# Patient Record
Sex: FEMALE | Race: WHITE | Employment: OTHER | URBAN - METROPOLITAN AREA
[De-identification: names, ages, dates, MRNs, and addresses within clinical notes are randomized per-mention and may not be internally consistent; named-entity substitution may affect disease eponyms.]

---

## 2017-05-19 PROBLEM — F32.A DEPRESSION: Status: ACTIVE | Noted: 2017-05-19

## 2017-05-19 PROBLEM — M17.0 PRIMARY OSTEOARTHRITIS OF BOTH KNEES: Status: ACTIVE | Noted: 2017-05-19

## 2017-05-19 PROBLEM — E03.9 ACQUIRED HYPOTHYROIDISM: Status: ACTIVE | Noted: 2017-05-19

## 2017-05-19 PROBLEM — K64.9 HEMORRHOIDS: Status: ACTIVE | Noted: 2017-05-19

## 2017-05-19 PROBLEM — I10 ESSENTIAL HYPERTENSION: Status: ACTIVE | Noted: 2017-05-19

## 2017-05-19 PROBLEM — G47.9 SLEEP DISTURBANCE: Status: ACTIVE | Noted: 2017-05-19

## 2017-12-01 PROBLEM — E78.49 OTHER HYPERLIPIDEMIA: Status: ACTIVE | Noted: 2017-12-01

## 2018-02-02 ENCOUNTER — HOSPITAL ENCOUNTER (OUTPATIENT)
Dept: MAMMOGRAPHY | Age: 73
Discharge: HOME | End: 2018-02-02
Attending: INTERNAL MEDICINE
Payer: MEDICARE

## 2018-02-02 DIAGNOSIS — Z12.31 VISIT FOR SCREENING MAMMOGRAM: ICD-10-CM

## 2018-02-02 PROCEDURE — 77067 SCR MAMMO BI INCL CAD: CPT

## 2018-06-22 PROBLEM — E55.9 VITAMIN D DEFICIENCY: Status: ACTIVE | Noted: 2018-06-22

## 2018-10-12 PROBLEM — Z00.00 MEDICARE ANNUAL WELLNESS VISIT, SUBSEQUENT: Status: ACTIVE | Noted: 2018-10-12

## 2018-10-12 PROBLEM — Z78.0 MENOPAUSE: Status: ACTIVE | Noted: 2018-10-12

## 2018-10-12 PROBLEM — N39.41 URGE INCONTINENCE OF URINE: Status: ACTIVE | Noted: 2018-10-12

## 2019-02-08 ENCOUNTER — HOSPITAL ENCOUNTER (OUTPATIENT)
Dept: MAMMOGRAPHY | Age: 74
Discharge: HOME | End: 2019-02-08
Attending: INTERNAL MEDICINE
Payer: MEDICARE

## 2019-02-08 DIAGNOSIS — Z12.31 VISIT FOR SCREENING MAMMOGRAM: ICD-10-CM

## 2019-02-08 DIAGNOSIS — Z78.0 MENOPAUSE: ICD-10-CM

## 2019-02-08 PROCEDURE — 77080 DXA BONE DENSITY AXIAL: CPT

## 2019-02-08 PROCEDURE — 77067 SCR MAMMO BI INCL CAD: CPT

## 2020-01-08 ENCOUNTER — HOSPITAL ENCOUNTER (OUTPATIENT)
Age: 75
Setting detail: OUTPATIENT SURGERY
End: 2020-01-08
Attending: ORTHOPAEDIC SURGERY | Admitting: ORTHOPAEDIC SURGERY

## 2020-02-11 ENCOUNTER — HOSPITAL ENCOUNTER (OUTPATIENT)
Dept: MAMMOGRAPHY | Age: 75
Discharge: HOME | End: 2020-02-11
Attending: INTERNAL MEDICINE
Payer: MEDICARE

## 2020-02-11 DIAGNOSIS — Z12.31 VISIT FOR SCREENING MAMMOGRAM: ICD-10-CM

## 2020-02-11 PROCEDURE — 77067 SCR MAMMO BI INCL CAD: CPT

## 2020-03-10 ENCOUNTER — HOSPITAL ENCOUNTER (OUTPATIENT)
Dept: SURGERY | Age: 75
Discharge: HOME | End: 2020-03-10
Payer: MEDICARE

## 2020-03-10 ENCOUNTER — HOSPITAL ENCOUNTER (OUTPATIENT)
Dept: PHYSICAL THERAPY | Age: 75
Discharge: HOME | End: 2020-03-10
Payer: MEDICARE

## 2020-03-10 ENCOUNTER — HOME HEALTH ADMISSION (OUTPATIENT)
Dept: HOME HEALTH SERVICES | Facility: HOME HEALTH | Age: 75
End: 2020-03-10
Payer: MEDICARE

## 2020-03-10 VITALS
RESPIRATION RATE: 12 BRPM | BODY MASS INDEX: 39.73 KG/M2 | HEART RATE: 70 BPM | OXYGEN SATURATION: 99 % | DIASTOLIC BLOOD PRESSURE: 75 MMHG | HEIGHT: 63 IN | WEIGHT: 224.25 LBS | SYSTOLIC BLOOD PRESSURE: 152 MMHG | TEMPERATURE: 97.1 F

## 2020-03-10 LAB
ANION GAP SERPL CALC-SCNC: 5 MMOL/L (ref 7–16)
APTT PPP: 36.5 SEC (ref 24.3–35.4)
ATRIAL RATE: 67 BPM
BACTERIA SPEC CULT: NORMAL
BASOPHILS # BLD: 0.1 K/UL (ref 0–0.2)
BASOPHILS NFR BLD: 1 % (ref 0–2)
BUN SERPL-MCNC: 14 MG/DL (ref 8–23)
CALCIUM SERPL-MCNC: 8.9 MG/DL (ref 8.3–10.4)
CALCULATED P AXIS, ECG09: 44 DEGREES
CALCULATED R AXIS, ECG10: 46 DEGREES
CALCULATED T AXIS, ECG11: 30 DEGREES
CHLORIDE SERPL-SCNC: 103 MMOL/L (ref 98–107)
CO2 SERPL-SCNC: 29 MMOL/L (ref 21–32)
CREAT SERPL-MCNC: 0.94 MG/DL (ref 0.6–1)
DIAGNOSIS, 93000: NORMAL
DIFFERENTIAL METHOD BLD: NORMAL
EOSINOPHIL # BLD: 0.1 K/UL (ref 0–0.8)
EOSINOPHIL NFR BLD: 2 % (ref 0.5–7.8)
ERYTHROCYTE [DISTWIDTH] IN BLOOD BY AUTOMATED COUNT: 13.2 % (ref 11.9–14.6)
EST. AVERAGE GLUCOSE BLD GHB EST-MCNC: 114 MG/DL
GLUCOSE SERPL-MCNC: 93 MG/DL (ref 65–100)
HBA1C MFR BLD: 5.6 %
HCT VFR BLD AUTO: 41 % (ref 35.8–46.3)
HGB BLD-MCNC: 13.3 G/DL (ref 11.7–15.4)
IMM GRANULOCYTES # BLD AUTO: 0 K/UL (ref 0–0.5)
IMM GRANULOCYTES NFR BLD AUTO: 0 % (ref 0–5)
INR PPP: 1
LYMPHOCYTES # BLD: 1.3 K/UL (ref 0.5–4.6)
LYMPHOCYTES NFR BLD: 19 % (ref 13–44)
MCH RBC QN AUTO: 29 PG (ref 26.1–32.9)
MCHC RBC AUTO-ENTMCNC: 32.4 G/DL (ref 31.4–35)
MCV RBC AUTO: 89.3 FL (ref 79.6–97.8)
MONOCYTES # BLD: 0.7 K/UL (ref 0.1–1.3)
MONOCYTES NFR BLD: 10 % (ref 4–12)
NEUTS SEG # BLD: 4.7 K/UL (ref 1.7–8.2)
NEUTS SEG NFR BLD: 68 % (ref 43–78)
NRBC # BLD: 0 K/UL (ref 0–0.2)
P-R INTERVAL, ECG05: 158 MS
PLATELET # BLD AUTO: 175 K/UL (ref 150–450)
PMV BLD AUTO: 11.1 FL (ref 9.4–12.3)
POTASSIUM SERPL-SCNC: 4.3 MMOL/L (ref 3.5–5.1)
PROTHROMBIN TIME: 13.3 SEC (ref 12–14.7)
Q-T INTERVAL, ECG07: 390 MS
QRS DURATION, ECG06: 88 MS
QTC CALCULATION (BEZET), ECG08: 412 MS
RBC # BLD AUTO: 4.59 M/UL (ref 4.05–5.2)
SERVICE CMNT-IMP: NORMAL
SODIUM SERPL-SCNC: 137 MMOL/L (ref 136–145)
VENTRICULAR RATE, ECG03: 67 BPM
WBC # BLD AUTO: 6.9 K/UL (ref 4.3–11.1)

## 2020-03-10 PROCEDURE — 87641 MR-STAPH DNA AMP PROBE: CPT

## 2020-03-10 PROCEDURE — 85025 COMPLETE CBC W/AUTO DIFF WBC: CPT

## 2020-03-10 PROCEDURE — 36415 COLL VENOUS BLD VENIPUNCTURE: CPT

## 2020-03-10 PROCEDURE — 85610 PROTHROMBIN TIME: CPT

## 2020-03-10 PROCEDURE — 77030027138 HC INCENT SPIROMETER -A

## 2020-03-10 PROCEDURE — 80048 BASIC METABOLIC PNL TOTAL CA: CPT

## 2020-03-10 PROCEDURE — 93005 ELECTROCARDIOGRAM TRACING: CPT | Performed by: ANESTHESIOLOGY

## 2020-03-10 PROCEDURE — 83036 HEMOGLOBIN GLYCOSYLATED A1C: CPT

## 2020-03-10 PROCEDURE — 97161 PT EVAL LOW COMPLEX 20 MIN: CPT

## 2020-03-10 PROCEDURE — 85730 THROMBOPLASTIN TIME PARTIAL: CPT

## 2020-03-10 RX ORDER — ACETAMINOPHEN 325 MG/1
650 TABLET ORAL
COMMUNITY
End: 2020-12-21

## 2020-03-10 RX ORDER — LEVOTHYROXINE SODIUM 88 UG/1
88 TABLET ORAL
COMMUNITY
End: 2020-08-13

## 2020-03-10 RX ORDER — LOSARTAN POTASSIUM 100 MG/1
100 TABLET ORAL
COMMUNITY
End: 2021-03-22 | Stop reason: SDUPTHER

## 2020-03-10 NOTE — PERIOP NOTES
PLEASE CONTINUE TAKING ALL PRESCRIPTION MEDICATIONS UP TO THE DAY OF SURGERY UNLESS OTHERWISE DIRECTED BELOW. DISCONTINUE all vitamins and supplements 21 days prior to surgery. DISCONTINUE Non-Steriodal Anti-Inflammatory (NSAIDS) such as Advil and Aleve 5 days prior to surgery. Home Medications to take  the day of surgery   Levothyroxine           Home Medications   to Hold           Comments   Day before surgery: Take Acetaminophen 1000mg in the morning and at bedtime OR Acetaminophen 650mg in the morning, afternoon and bedtime             Please do not bring home medications with you on the day of surgery unless otherwise directed by your nurse. If you are instructed to bring home medications, please give them to your nurse as they will be administered by the nursing staff. If you have any questions, please call [facility] [phone] or [address] [place] [address] [phone]. A copy of this note was provided to the patient for reference.

## 2020-03-10 NOTE — ADVANCED PRACTICE NURSE
Total Joint Surgery Preoperative Chart Review      Patient ID:  Lenny Joseph  [id number]  25 y.o.  1945  Surgeon: Dr. Edy Lock  Date of Surgery: 3/31/2020  Procedure: Total Right Knee Arthroplasty  Primary Care Physician: Ilana Mendoza [phone]  Specialty Physician(s):      Subjective:   Lenny Joseph is a 69 y.o. WHITE OR  female who presents for preoperative evaluation for Total Right Knee arthroplasty. This is a preoperative chart review note based on data collected by the nurse at the surgical Pre-Assessment visit. Past Medical History:   Diagnosis Date    Anxiety     Chronic pain     fibromyalgia    Depression 2017    Essential hypertension 2017    Hypothyroid     Insomnia     Nausea & vomiting     hx of    Obesity     bmi- 39.7    Osteoarthritis 12/15/2015    Stress incontinence, female       Past Surgical History:   Procedure Laterality Date    HX BREAST BIOPSY Bilateral     HX  SECTION      HX COLONOSCOPY      normal; internal hemorrhoid    HX HYSTERECTOMY      HX KNEE REPLACEMENT Left 12/15/2015     Family History   Problem Relation Age of Onset    Heart Disease Father     Stroke Father     Breast Cancer Sister       Social History     Tobacco Use    Smoking status: Never Smoker    Smokeless tobacco: Never Used   Substance Use Topics    Alcohol use: No       Prior to Admission medications    Medication Sig Start Date End Date Taking? Authorizing Provider   levothyroxine (SYNTHROID) 88 mcg tablet Take 88 mcg by mouth Daily (before breakfast). Yes Provider, Historical   losartan (COZAAR) 50 mg tablet Take 50 mg by mouth daily. Yes Provider, Historical   acetaminophen (TYLENOL) 325 mg tablet Take  by mouth as needed for Pain. Yes Provider, Historical   zolpidem (AMBIEN) 10 mg tablet Take 1 Tab by mouth nightly as needed for Sleep. Max Daily Amount: 10 mg.   Patient taking differently: Take 5 mg by mouth nightly. 3/19/19  Yes Jodi Lynch MD   cholecalciferol, VITAMIN D3, (VITAMIN D3) 5,000 unit tab tablet Take 5,000 Units by mouth daily (after lunch). Yes Provider, Historical   FLUoxetine (PROZAC) 20 mg capsule Take 1 Cap by mouth daily. 10/12/18   Jodi Lynch MD   lisinopril (PRINIVIL, ZESTRIL) 10 mg tablet Take 1 Tab by mouth daily. 10/12/18   Jodi Lynch MD   levothyroxine (SYNTHROID) 100 mcg tablet Take 1 Tab by mouth Daily (before breakfast). Indications: hypothyroidism 10/12/18   Jodi Lynch MD   dibucaine (NUPERCAINAL) 1 % ointment Apply  to affected area three (3) times daily.  12/1/17   Jodi Lynch MD     Allergies   Allergen Reactions    Daypro [Oxaprozin] Swelling    Meloxicam Unknown (comments)          Objective:     Physical Exam:   Patient Vitals for the past 24 hrs:   Temp Pulse Resp BP SpO2   03/10/20 1202 97.1 °F (36.2 °C) 70 12 152/75 99 %       ECG:    EKG Results     Procedure 720 Value Units Date/Time    EKG, 12 LEAD, INITIAL [[id number]] Collected:  03/10/20 1128    Order Status:  Completed Updated:  03/10/20 1336     Ventricular Rate 67 BPM      Atrial Rate 67 BPM      P-R Interval 158 ms      QRS Duration 88 ms      Q-T Interval 390 ms      QTC Calculation (Bezet) 412 ms      Calculated P Axis 44 degrees      Calculated R Axis 46 degrees      Calculated T Axis 30 degrees      Diagnosis --     Normal sinus rhythm  Normal ECG  Cannot rule out Inferior infarct , age undetermined  When compared with ECG of 23-NOV-2015 10:44,  No significant change was found  Confirmed by Yamilet Allen ([id number]) on 3/10/2020 1:36:00 PM            Data Review:   Labs:   Recent Results (from the past 24 hour(s))   CBC WITH AUTOMATED DIFF    Collection Time: 03/10/20 10:48 AM   Result Value Ref Range    WBC 6.9 4.3 - 11.1 K/uL    RBC 4.59 4.05 - 5.2 M/uL    HGB 13.3 11.7 - 15.4 g/dL    HCT 41.0 35.8 - 46.3 %    MCV 89.3 79.6 - 97.8 FL    MCH 29.0 26.1 - 32.9 PG    MCHC 32.4 31.4 - 35.0 g/dL    RDW 13.2 11.9 - 14.6 %    PLATELET 376 368 - 456 K/uL    MPV 11.1 9.4 - 12.3 FL    ABSOLUTE NRBC 0.00 0.0 - 0.2 K/uL    DF AUTOMATED      NEUTROPHILS 68 43 - 78 %    LYMPHOCYTES 19 13 - 44 %    MONOCYTES 10 4.0 - 12.0 %    EOSINOPHILS 2 0.5 - 7.8 %    BASOPHILS 1 0.0 - 2.0 %    IMMATURE GRANULOCYTES 0 0.0 - 5.0 %    ABS. NEUTROPHILS 4.7 1.7 - 8.2 K/UL    ABS. LYMPHOCYTES 1.3 0.5 - 4.6 K/UL    ABS. MONOCYTES 0.7 0.1 - 1.3 K/UL    ABS. EOSINOPHILS 0.1 0.0 - 0.8 K/UL    ABS. BASOPHILS 0.1 0.0 - 0.2 K/UL    ABS. IMM.  GRANS. 0.0 0.0 - 0.5 K/UL   HEMOGLOBIN A1C WITH EAG    Collection Time: 03/10/20 10:48 AM   Result Value Ref Range    Hemoglobin A1c 5.6 %    Est. average glucose 556 mg/dL   METABOLIC PANEL, BASIC    Collection Time: 03/10/20 10:48 AM   Result Value Ref Range    Sodium 137 136 - 145 mmol/L    Potassium 4.3 3.5 - 5.1 mmol/L    Chloride 103 98 - 107 mmol/L    CO2 29 21 - 32 mmol/L    Anion gap 5 (L) 7 - 16 mmol/L    Glucose 93 65 - 100 mg/dL    BUN 14 8 - 23 MG/DL    Creatinine 0.94 0.6 - 1.0 MG/DL    GFR est AA >60 >60 ml/min/1.73m2    GFR est non-AA >60 >60 ml/min/1.73m2    Calcium 8.9 8.3 - 10.4 MG/DL   PROTHROMBIN TIME + INR    Collection Time: 03/10/20 10:48 AM   Result Value Ref Range    Prothrombin time 13.3 12.0 - 14.7 sec    INR 1.0     PTT    Collection Time: 03/10/20 10:48 AM   Result Value Ref Range    aPTT 36.5 (H) 24.3 - 35.4 SEC   EKG, 12 LEAD, INITIAL    Collection Time: 03/10/20 11:28 AM   Result Value Ref Range    Ventricular Rate 67 BPM    Atrial Rate 67 BPM    P-R Interval 158 ms    QRS Duration 88 ms    Q-T Interval 390 ms    QTC Calculation (Bezet) 412 ms    Calculated P Axis 44 degrees    Calculated R Axis 46 degrees    Calculated T Axis 30 degrees    Diagnosis       Normal sinus rhythm  Normal ECG  Cannot rule out Inferior infarct , age undetermined  When compared with ECG of 23-NOV-2015 10:44,  No significant change was found  Confirmed by Valentina Freeman ([id number]) on 3/10/2020 1:36:00 PM           Problem List:  )  Patient Active Problem List   Diagnosis Code    Osteoarthritis M19.90    S/P total knee arthroplasty Z96.659    Acquired hypothyroidism E03.9    Essential hypertension I10    Depression F32.9    Hemorrhoids K64.9    Primary osteoarthritis of both knees M17.0    Sleep disturbance G47.9    Other hyperlipidemia E78.49    Vitamin D deficiency E55.9    Urge incontinence of urine N39.41    Medicare annual wellness visit, subsequent Z00.00    Menopause Z78.0       Total Joint Surgery Pre-Assessment Recommendations:           He/she is a moderate risk for sleep apnea but is not interested in additional work up at this time. Will initiate perioperative LEDA precautions. Recommend continuous saturation monitoring hours of sleep, during hospitalization.     PEP therapy BID  Signed By: RODNEY Peguero    March 10, 2020

## 2020-03-10 NOTE — PROGRESS NOTES
SW met with pt in Prehab to discuss Left TKA scheduled for 3/31/20. Pt plans to return home with HHPT postop. Pt states she lives alone. Pt resides in [place] and chose [facility],  [facility] referral completed. Pt states she has a cane, RW and comfort height toilet (therefore doesn't anticipate needing a BSC). No additional needs or questions identified at this time.   Iveth Anne

## 2020-03-10 NOTE — PROGRESS NOTES
03/10/20 1030   Oxygen Therapy   O2 Sat (%) 100 %   Pulse via Oximetry 73 beats per minute   O2 Device Room air   Pre-Treatment   Breath Sounds Bilateral Clear;Diminished   Pre FEV1 (liters) 1.5 liters   % Predicted 72   Incentive Spirometry Treatment   Actual Volume (ml) 2250 ml     Initial respiratory Assessment completed with pt. Pt was interviewed and evaluated in Joint camp prior to surgery. Patient ID:  Tremaine Thayer  [id number]  76 y.o.  1945  Surgeon: Dr. Sophy Hunt  Date of Surgery: 3/31/2020  Procedure: Total Right Hip Arthroplasty  Primary Care Physician: Coleen Wong -[phone]  Specialists:                      Pt instructed in the use of Incentive Spirometry. Pt instructed to bring Incentive Spirometer back on date of surgery & to start using Is upon return to pt room. Written instructions given to patient. Pt taught proper cough technique    History of smoking:   DENIES                 Quit date:         Secondhand smoke:DENIES    Past procedures with Oxygen desaturation or delayed awakening:DENIES    Past Medical History:   Diagnosis Date    Anxiety     Chronic pain     fibromyalgia    Depression 5/19/2017    Essential hypertension 5/19/2017    Hypothyroid     Insomnia     Nausea & vomiting     hx of    Obesity     bmi- 39.7    Osteoarthritis 12/15/2015    Stress incontinence, female         Respiratory history:DENIES SOB                                                                 Respiratory meds:  DENIES    FAMILY PRESENT:                                                            NO    PAST SLEEP STUDY:                      DENIES  HX OF LEDA:                                         DENIES  LEDA assessment:                                                                                            PHYSICAL EXAM   Body mass index is 39.72 kg/m².    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 : No  Does the patient have or are they being treated for high blood pressure?: Yes  Is the patient's BMI greater than 35?: Yes  Is your neck circumference greater than 17 inches (Male) or 16 inches (Female)?: No  Is the patient older than 48?: Yes  Is the patient male?: No  LEDA Score: 4  Has the patient been referred to Sleep Medicine?: No  Has the patient previously been diagnosed with Obstructive Sleep Apnea?: No                            CPAP:                       NONE                                    CONT SAT HS          Referrals:    Pt. Phone Number:

## 2020-03-10 NOTE — PERIOP NOTES
Patient verified name and . Order for consent found in EHR and does not matches case posting; patient verified procedure correct in EMR (R total knee arthroplasty). Type 3 surgery, joint PAT assessment complete. Labs per surgeon: CBC, BMP, PT/PTT, Hgba1c  Labs per anesthesia protocol: no additional  EKG: done today- result NSR and within anesthesia guidelines     Most recent PCP office note 2020 found in Care Everywhere for anesthesia reference. MRSA/MSSA swab collected; pharmacy to review and dose antibiotic as appropriate. Hospital approved surgical skin cleanser and instructions to return bottle on DOS given per hospital policy. Patient provided with handouts including Guide to Surgery, Pain Management, Hand Hygiene, Blood Transfusion Education, and [facility] Brochure. Patient answered medical/surgical history questions at their best of ability. All prior to admission medications documented in [facility]. Original medication prescription bottles NOT visualized during patient appointment. Patient instructed to hold all vitamins 3 weeks prior to surgery and NSAIDS 5 days prior to surgery. Written and verbal medication instructions per anesthesia protocols given to pt. Patient teach back successful and patient demonstrates knowledge of instruction.

## 2020-03-10 NOTE — PERIOP NOTES
Norman Fuentes, ortho NP, stated pt reported she is having right TKA and not right RAZIA as listed on case posting in EMR. SARAH Byrnes to surgeon, called and she verified pt is having right knee arthroplasty and not hip- she will notify Joe Carey (surgeon PA) to update his consent order to state TKA and not RAZIA in EMR. Call to Francine Avila in [address] scheduling and verified with surgeon faxed request[facility] states she will update EMR to state knee and not hip.

## 2020-03-10 NOTE — PERIOP NOTES
Lab results within anesthesia guidelines except for elevated PTT result- will have anesthesia review per protocol. MRSA swab result pending. All results routed/faxed to pt's PCP, Anjali Harding MD, per surgeon's order. Recent Results (from the past 12 hour(s))   CBC WITH AUTOMATED DIFF    Collection Time: 03/10/20 10:48 AM   Result Value Ref Range    WBC 6.9 4.3 - 11.1 K/uL    RBC 4.59 4.05 - 5.2 M/uL    HGB 13.3 11.7 - 15.4 g/dL    HCT 41.0 35.8 - 46.3 %    MCV 89.3 79.6 - 97.8 FL    MCH 29.0 26.1 - 32.9 PG    MCHC 32.4 31.4 - 35.0 g/dL    RDW 13.2 11.9 - 14.6 %    PLATELET 996 835 - 241 K/uL    MPV 11.1 9.4 - 12.3 FL    ABSOLUTE NRBC 0.00 0.0 - 0.2 K/uL    DF AUTOMATED      NEUTROPHILS 68 43 - 78 %    LYMPHOCYTES 19 13 - 44 %    MONOCYTES 10 4.0 - 12.0 %    EOSINOPHILS 2 0.5 - 7.8 %    BASOPHILS 1 0.0 - 2.0 %    IMMATURE GRANULOCYTES 0 0.0 - 5.0 %    ABS. NEUTROPHILS 4.7 1.7 - 8.2 K/UL    ABS. LYMPHOCYTES 1.3 0.5 - 4.6 K/UL    ABS. MONOCYTES 0.7 0.1 - 1.3 K/UL    ABS. EOSINOPHILS 0.1 0.0 - 0.8 K/UL    ABS. BASOPHILS 0.1 0.0 - 0.2 K/UL    ABS. IMM.  GRANS. 0.0 0.0 - 0.5 K/UL   HEMOGLOBIN A1C WITH EAG    Collection Time: 03/10/20 10:48 AM   Result Value Ref Range    Hemoglobin A1c 5.6 %    Est. average glucose 996 mg/dL   METABOLIC PANEL, BASIC    Collection Time: 03/10/20 10:48 AM   Result Value Ref Range    Sodium 137 136 - 145 mmol/L    Potassium 4.3 3.5 - 5.1 mmol/L    Chloride 103 98 - 107 mmol/L    CO2 29 21 - 32 mmol/L    Anion gap 5 (L) 7 - 16 mmol/L    Glucose 93 65 - 100 mg/dL    BUN 14 8 - 23 MG/DL    Creatinine 0.94 0.6 - 1.0 MG/DL    GFR est AA >60 >60 ml/min/1.73m2    GFR est non-AA >60 >60 ml/min/1.73m2    Calcium 8.9 8.3 - 10.4 MG/DL   PROTHROMBIN TIME + INR    Collection Time: 03/10/20 10:48 AM   Result Value Ref Range    Prothrombin time 13.3 12.0 - 14.7 sec    INR 1.0     PTT    Collection Time: 03/10/20 10:48 AM   Result Value Ref Range    aPTT 36.5 (H) 24.3 - 35.4 SEC   EKG, 12 LEAD, INITIAL    Collection Time: 03/10/20 11:28 AM   Result Value Ref Range    Ventricular Rate 67 BPM    Atrial Rate 67 BPM    P-R Interval 158 ms    QRS Duration 88 ms    Q-T Interval 390 ms    QTC Calculation (Bezet) 412 ms    Calculated P Axis 44 degrees    Calculated R Axis 46 degrees    Calculated T Axis 30 degrees    Diagnosis       Normal sinus rhythm  Normal ECG  When compared with ECG of 23-NOV-2015 10:44,  No significant change was found

## 2020-03-10 NOTE — PROGRESS NOTES
Kg Mcelroy  : 7[birth year](78 y.o.) [facility]  [facility] [address]  [phone]       Physical Therapy Prehab Plan of Treatment and Evaluation Summary:3/10/2020    ICD-10: Treatment Diagnosis:   · Pain in Right Knee (M25.561)  · Stiffness of Right Knee, Not elsewhere classified (M25.661)  Precautions/Allergies:   Daypro [oxaprozin] and Meloxicam  MEDICAL/REFERRING DIAGNOSIS:  Unilateral primary osteoarthritis, right knee [M17.11]  REFERRING PHYSICIAN: Ivette Swenson MD  DATE OF SURGERY: 3/31/20    Assessment:   Comments:  She is here alone and has had a L TKA in . She plans [place]ing homeat [place] with the support of her son. PROBLEM LIST (Impacting functional limitations):  Ms. Bean Marin presents with the following right lower extremity(s) problems:  1. Strength  2. Range of Motion  3. Home Exercise Program  4. Pain   INTERVENTIONS PLANNED:  1. Home Exercise Program  2. Educational Discussion      TREATMENT PLAN: Effective Dates: 3/10/2020 TO 3/10/2020. Frequency/Duration: Patient to continue to perform home exercise program at least twice per day up until her surgery. GOALS: (Goals have been discussed and agreed upon with patient.)  Discharge Goals: Time Frame: 1 Day  1. Patient will demonstrate independence with a home exercise program designed to increase strength, range of motion and pain control to minimize functional deficits and optimize patient for total joint replacement. Rehabilitation Potential For Stated Goals: Good  Regarding Joshua Mike's therapy, I certify that the treatment plan above will be carried out by a therapist or under their direction.   Thank you for this referral,  Pawel Whitfield, PT               HISTORY:   Present Symptoms:  Pain Intensity 1: 8(at its worst)  Pain Location 1: Knee  Pain Orientation 1: Right, Medial, Anterior   History of Present Injury/Illness (Reason for Referral):  Medical/Referring Diagnosis: Unilateral primary osteoarthritis, right knee [M17.11]   Past Medical History/Comorbidities:   Ms. Clara Millard  has a past medical history of Anxiety, Chronic pain, Depression (2017), Essential hypertension (2017), Insomnia, Nausea & vomiting, Osteoarthritis (12/15/2015), and Thyroid disease. She also has no past medical history of Adverse effect of anesthesia, Aneurysm ([address] Arrhythmia, Asthma, Autoimmune disease ([address] CAD (coronary artery disease), Cancer ([address] Chronic kidney disease, Coagulation defects, COPD, Diabetes ([address] Difficult intubation, GERD (gastroesophageal reflux disease), Heart failure ([address] Liver disease, Malignant hyperthermia due to anesthesia, Morbid obesity ([address] Other ill-defined conditions(799.89), Pseudocholinesterase deficiency, PUD (peptic ulcer disease), Seizures ([address] Stroke ([address] or Thromboembolus ([address] Ms. Clara Millard  has a past surgical history that includes hx hysterectomy; hx  section; pr breast surgery procedure unlisted; hx orthopaedic (); hx colonoscopy (); and hx breast biopsy.   Social History/Living Environment:   Home Environment: Private residence  # Steps to Enter: 1  Rails to Enter: No  One/Two Story Residence: One story  Living Alone: Yes  Support Systems: Child(beth)  Patient Expects to be Discharged to[de-identified] Private residence  Current DME Used/Available at Home: surinder Villegas Joline Sheller, renee, [address] chair  Tub or Shower Type: Tub/Shower combination  Work/Activity:  retired  Dominant Side:  RIGHT  Current Medications:  See Pre-assessment nursing note   Number of Personal Factors/Comorbidities that affect the Plan of Care: 1-2: MODERATE COMPLEXITY   EXAMINATION:   ADLs (Current Functional Status):   Ambulation:  [x] Independent  [] Walk Indoors Only  [] Walk Outdoors  [x] Use Assistive Device  [] Use Wheelchair Only Dressing:  [x] Independent  Requires Assistance from Someone for:  [] Sock/Shoes  [] Pants  [] Everything Bathing/Showering:   [x] Independent  [] Requires Assistance from Someone  [] Sponge Bath Only Household Activities:  [x] Routine house and yard work  [] Light Housework Only  [] None   Observation/Orthostatic Postural Assessment:    Genu varus right, Leg length discrepancy, right(R LE 1/8\" shorter than L)  ROM/Flexibility:   AROM: Generally decreased, functional(L knee around 100 flexion)                       RLE AROM  R Knee Flexion: 111  R Knee Extension: 12   Strength:   Strength: Generally decreased, functional(L LE 4/5)              RLE Strength  R Hip Flexion: 4  R Knee Extension: 4  R Ankle Dorsiflexion: 4   Functional Mobility:    Sensation: Intact(L LE)    Stand to Sit: Independent  Sit to Stand: Independent  Stand Pivot Transfers: Independent  Distance (ft): 300 Feet (ft)  Ambulation - Level of Assistance: Modified independent  Assistive Device: Cane, straight  Speed/Elaan: Pace decreased (<100 feet/min)  Step Length: Left shortened  Stance: Right decreased  Gait Abnormalities: Antalgic          Balance:    Sitting: Intact  Standing: Intact   Body Structures Involved:  1. Bones  2. Joints  3. Muscles Body Functions Affected:  1. Neuromusculoskeletal  2. Movement Related Activities and Participation Affected:  1. General Tasks and Demands  2. Mobility   Number of elements that affect the Plan of Care: 4+: HIGH COMPLEXITY   CLINICAL PRESENTATION:   Presentation: Stable and uncomplicated: LOW COMPLEXITY   CLINICAL DECISION MAKING:   Outcome Measure: Tool Used: Lower Extremity Functional Scale (LEFS)  Score:  Initial: 53/80 Most Recent: X/80 (Date: -- )   Interpretation of Score: 20 questions each scored on a 5 point scale with 0 representing \"extreme difficulty or unable to perform\" and 4 representing \"no difficulty\". The lower the score, the greater the functional disability. 80/80 represents no disability. Minimal detectable change is 9 points. Medical Necessity:   · Ms. Felipa Chun is expected to optimize her lower extremity strength and ROM in preparation for joint replacement surgery. Reason for Services/Other Comments:  · Achieve baseline assesment of musculoskeletal system, functional mobility and home environment. , educate in PT HEP in preparation for surgery, educate in hospital plan of care. Use of outcome tool(s) and clinical judgement create a POC that gives a: Clear prediction of patient's progress: LOW COMPLEXITY   TREATMENT:   Treatment/Session Assessment:  Patient was instructed in PT- HEP to increase strength and ROM in LEs. Answered all questions. · Post session pain:  2  · Compliance with Program/Exercises: compliant most of the time.   Total Treatment Duration:  PT Patient Time In/Time Out  Time In: 5835  Time Out: [address] [place], [place]

## 2020-05-19 VITALS — WEIGHT: 220 LBS | HEIGHT: 63 IN | BODY MASS INDEX: 38.98 KG/M2

## 2020-05-19 LAB — SARS-COV-2, NAA: NOT DETECTED

## 2020-05-19 NOTE — PERIOP NOTES
PLEASE CONTINUE TAKING ALL PRESCRIPTION MEDICATIONS UP TO THE DAY OF SURGERY UNLESS OTHERWISE DIRECTED BELOW. DISCONTINUE all vitamins and supplements 21 days prior to surgery. DISCONTINUE Non-Steriodal Anti-Inflammatory (NSAIDS) such as Advil and Aleve 5 days prior to surgery. Home Medications to take  the day of surgery   Levothyroxine           Home Medications   to Hold           Comments         *Visitor policy of NO visitor per patient discussed. Please do not bring home medications with you on the day of surgery unless otherwise directed by your nurse. If you are instructed to bring home medications, please give them to your nurse as they will be administered by the nursing staff. If you have any questions, please call [facility] [phone] or [facility] [phone]. A copy of this note was provided to the patient for reference.

## 2020-05-19 NOTE — PERIOP NOTES
Patient verified name and . Order for consent yes found in EHR and matches case posting; patient verified. Type 3 surgery, phone assessment/update complete. Labs per surgeon: CBC,BMP, PT/PTT, Hgb A1C completed 3/10/20 ; PTT elevated at 36.5~reviewed by Dr Mercy Arciniega, anesthesiologist on 3/10/20 with no orders; other results within anesthesia guidelines and available for review in Chart Review. Labs per anesthesia protocol: no additional  EKG:completed 3/10/20 and within anesthesia guidelines; available for reference in Chart Review. MRSA/MSSA swab collected 3/10/20 with result:  negative; pharmacy to review and dose antibiotic as appropriate. Coronavirus oral swab completed 20; result:  pending    Hospital approved surgical skin cleanser and instructions to return bottle on DOS given per hospital policy. Patient provided with handouts including Guide to Surgery, Pain Management, Hand Hygiene, Blood Transfusion Education, and [place] [facility] Brochure. Patient answered medical/surgical history questions at their best of ability. All prior to admission medications documented in [facility]. Original medication prescription bottle NOT visualized during patient appointment. Patient instructed to hold all vitamins 3 weeks prior to surgery and NSAIDS 5 days prior to surgery. Patient teach back successful and patient demonstrates knowledge of instruction.

## 2020-05-20 ENCOUNTER — HOSPITAL ENCOUNTER (OUTPATIENT)
Dept: SURGERY | Age: 75
Discharge: HOME | End: 2020-05-20

## 2020-07-07 ENCOUNTER — HOSPITAL ENCOUNTER (OUTPATIENT)
Dept: PHYSICAL THERAPY | Age: 75
End: 2020-07-07

## 2020-07-07 ENCOUNTER — HOSPITAL ENCOUNTER (OUTPATIENT)
Dept: SURGERY | Age: 75
Discharge: HOME | End: 2020-07-07
Payer: MEDICARE

## 2020-07-07 LAB
ANION GAP SERPL CALC-SCNC: 6 MMOL/L (ref 7–16)
APTT PPP: 37.4 SEC (ref 24.3–35.4)
BACTERIA SPEC CULT: NORMAL
BASOPHILS # BLD: 0.1 K/UL (ref 0–0.2)
BASOPHILS NFR BLD: 1 % (ref 0–2)
BUN SERPL-MCNC: 13 MG/DL (ref 8–23)
CALCIUM SERPL-MCNC: 8.6 MG/DL (ref 8.3–10.4)
CHLORIDE SERPL-SCNC: 106 MMOL/L (ref 98–107)
CO2 SERPL-SCNC: 27 MMOL/L (ref 21–32)
CREAT SERPL-MCNC: 0.87 MG/DL (ref 0.6–1)
DIFFERENTIAL METHOD BLD: NORMAL
EOSINOPHIL # BLD: 0.1 K/UL (ref 0–0.8)
EOSINOPHIL NFR BLD: 1 % (ref 0.5–7.8)
ERYTHROCYTE [DISTWIDTH] IN BLOOD BY AUTOMATED COUNT: 14 % (ref 11.9–14.6)
EST. AVERAGE GLUCOSE BLD GHB EST-MCNC: 111 MG/DL
GLUCOSE SERPL-MCNC: 98 MG/DL (ref 65–100)
HBA1C MFR BLD: 5.5 %
HCT VFR BLD AUTO: 41.4 % (ref 35.8–46.3)
HGB BLD-MCNC: 13.7 G/DL (ref 11.7–15.4)
IMM GRANULOCYTES # BLD AUTO: 0 K/UL (ref 0–0.5)
IMM GRANULOCYTES NFR BLD AUTO: 0 % (ref 0–5)
INR PPP: 1
LYMPHOCYTES # BLD: 1.2 K/UL (ref 0.5–4.6)
LYMPHOCYTES NFR BLD: 18 % (ref 13–44)
MCH RBC QN AUTO: 29.6 PG (ref 26.1–32.9)
MCHC RBC AUTO-ENTMCNC: 33.1 G/DL (ref 31.4–35)
MCV RBC AUTO: 89.4 FL (ref 79.6–97.8)
MONOCYTES # BLD: 0.6 K/UL (ref 0.1–1.3)
MONOCYTES NFR BLD: 9 % (ref 4–12)
NEUTS SEG # BLD: 4.6 K/UL (ref 1.7–8.2)
NEUTS SEG NFR BLD: 70 % (ref 43–78)
NRBC # BLD: 0 K/UL (ref 0–0.2)
PLATELET # BLD AUTO: 173 K/UL (ref 150–450)
PMV BLD AUTO: 11.1 FL (ref 9.4–12.3)
POTASSIUM SERPL-SCNC: 4.1 MMOL/L (ref 3.5–5.1)
PROTHROMBIN TIME: 13.9 SEC (ref 12–14.7)
RBC # BLD AUTO: 4.63 M/UL (ref 4.05–5.2)
SERVICE CMNT-IMP: NORMAL
SODIUM SERPL-SCNC: 139 MMOL/L (ref 136–145)
WBC # BLD AUTO: 6.6 K/UL (ref 4.3–11.1)

## 2020-07-07 PROCEDURE — 36415 COLL VENOUS BLD VENIPUNCTURE: CPT

## 2020-07-07 PROCEDURE — 85025 COMPLETE CBC W/AUTO DIFF WBC: CPT

## 2020-07-07 PROCEDURE — 83036 HEMOGLOBIN GLYCOSYLATED A1C: CPT

## 2020-07-07 PROCEDURE — 85610 PROTHROMBIN TIME: CPT

## 2020-07-07 PROCEDURE — 80048 BASIC METABOLIC PNL TOTAL CA: CPT

## 2020-07-07 PROCEDURE — 87641 MR-STAPH DNA AMP PROBE: CPT

## 2020-07-07 PROCEDURE — 85730 THROMBOPLASTIN TIME PARTIAL: CPT

## 2020-07-07 NOTE — PERIOP NOTES
Patient verified name and . Order for consent found in EHR and matches case posting; patient verifies procedure. Type 3 surgery, PHONE assessment complete. Orders received. Labs per surgeon: CBC, BMP, PT/PTT, [id number] today; results pending. Labs per anesthesia protocol: included in surgeons orders. EKG: last done on 03/10/2020 at prior joint camp appointment. Result was abnormal and has not been reviewed by anesthesia. Will have anesthesia review along with ekg dated 11/23/15 for comparison. Patient had prior walk in joint camp appointment on 03/10/2020 and then a phone assessment on 2020. At that time patient was provided with handouts including Guide to Surgery, Pain Management, Hand Hygiene, Blood Transfusion Education, and [facility] Brochure. Hospital approved surgical skin cleanser and with instructions also given at this time. Patient instructed to return bottle on DOS per hospital policy. MRSA/MSSA swab collected today on 2020; pharmacy to review and dose antibiotic as appropriate. Patient answered medical/surgical history questions at their best of ability. All prior to admission medications documented in [facility]. Patient instructed to take the following medications the day of surgery according to anesthesia guidelines with a small sip of water: Levothyroxine. Hold all vitamins 21 days prior to surgery and NSAIDS 5 days prior to surgery. Prescription meds to hold: none. Patient instructed on the following:  >A negative Covid swab result is required to proceed with surgery; the swab will be collected 7 days prior to surgery at the [facility] at [address]. The patient will be contacted by the Covid swab team for an appointment date and time. For questions or concerns the patient should call [phone]. The clinic is closed from  for lunch and on weekends.   Appointment date/time 2020 @ 9:50 found in EHR and provided to patient. > 1 visitor allowed at this time. >Arrive at [facility], time of arrival to be called the day before by 1400. If no call instructed to call [phone]. >Clear liquids until 2 hours prior to arrival time then NPO including gum, mints, and ice chips. >Responsible adult must drive patient to the hospital, stay during surgery. >Use Dayana-Hex soap in shower the night before surgery and on the morning of surgery  >All piercings must be removed prior to arrival.    >Leave all valuables (money and jewelry) at home but bring insurance card and ID on DOS. >Do not wear make-up, nail polish, lotions, cologne, perfumes, powders, or oil on skin. Patient teach back successful and patient demonstrates knowledge of instruction.             P

## 2020-07-07 NOTE — PERIOP NOTES
CBC, BMP, PT/PTT, HGB-A1C; results within anesthesia protocols except for elevated PTT level=37.4; anesthesia to review.

## 2020-07-08 VITALS — WEIGHT: 220 LBS | HEIGHT: 63 IN | OXYGEN SATURATION: 99 % | BODY MASS INDEX: 38.98 KG/M2

## 2020-07-08 NOTE — PROGRESS NOTES
07/07/20 1030   Oxygen Therapy   O2 Sat (%) 99 %   Pulse via Oximetry 75 beats per minute   Incentive Spirometry Treatment   Actual Volume (ml) 2000 ml   PT WAS SEEN IN JOINT CAMP 3/10/2020.   IS REVIEWED WITH PT   CS HS AFTER SURGERY

## 2020-07-24 NOTE — H&P
H&P    Patient ID:  Carmine Fothergill  [id number]  67 y.o.  1945  Surgeon:  Brown Garibay MD  Date of Surgery: * No surgery date entered *  Procedure: Right Knee Total Arthroplasty  Primary Care Physician: Leroy Chapman MD        Subjective:  Carmine Fothergill is a 69 y.o. WHITE OR  female who presents with Right Knee pain. She has history of Right Knee pain for several months. Symptoms worse with walking long distances and relieved with rest. Conservative treatment consisting of  activity modification and injections have not helped. The patient lives with their family. The patients goal after surgery is improved pain and function. Past Medical History:   Diagnosis Date    Anxiety     Chronic pain     fibromyalgia    Depression 2017    Essential hypertension 2017    on med for control     Hypothyroid     on med for control     Insomnia     Nausea & vomiting     hx of N/V post-op    Obesity     bmi- 39.7    Osteoarthritis 12/15/2015    Stress incontinence, female       Past Surgical History:   Procedure Laterality Date    HX BREAST BIOPSY Bilateral     HX  SECTION      HX COLONOSCOPY      normal; internal hemorrhoid    HX HYSTERECTOMY      HX KNEE REPLACEMENT Left 12/15/2015     Family History   Problem Relation Age of Onset    No Known Problems Mother     Heart Disease Father     Stroke Father     Breast Cancer Sister       Social History     Tobacco Use    Smoking status: Never Smoker    Smokeless tobacco: Never Used   Substance Use Topics    Alcohol use: No       Prior to Admission medications    Medication Sig Start Date End Date Taking? Authorizing Provider   levothyroxine (SYNTHROID) 88 mcg tablet Take 88 mcg by mouth Daily (before breakfast). Take / use AM day of surgery  per anesthesia protocols.   Indications: a condition with low thyroid hormone levels    Provider, Historical   losartan (COZAAR) 50 mg tablet Take 100 mg by mouth daily. Indications: high blood pressure    Provider, Historical   acetaminophen (TYLENOL) 325 mg tablet Take 650 mg by mouth every six (6) hours as needed for Pain. Indications: pain    Provider, Historical   zolpidem (AMBIEN) 10 mg tablet Take 1 Tab by mouth nightly as needed for Sleep. Max Daily Amount: 10 mg. Patient taking differently: Take 5 mg by mouth nightly. Indications: difficulty falling asleep 3/19/19   Jose Hernandez MD   cholecalciferol, VITAMIN D3, (VITAMIN D3) 5,000 unit tab tablet Take 5,000 Units by mouth daily (after lunch). Provider, Historical     Allergies   Allergen Reactions    Daypro [Oxaprozin] Swelling    Meloxicam Unknown (comments)        REVIEW OF SYSTEMS:  CONSTITUTIONAL: Denies fever, decreased appetite, weight loss/gain, night sweats or fatigue. HEENT: Denies vision or hearing changes. denies glasses. denies hearing aids. CARDIAC: Denies CP, palpitations, rheumatic fever, murmur, peripheral edema, carotid artery disease or syncopal episodes. RESPIRATORY: Denies dyspnea on exertion, asthma, COPD or orthopnea. GI: Denies GERD, history of GI bleed or melena, PUD, hepatitis or cirrhosis. : Denies dysuria, hematuria. denies BPH symptoms. HEMATOLOGIC: Denies anemia or blood disorders. ENDOCRINE: Denies thyroid disease. MUSCULOSKELETAL: See HPI. NEUROLOGIC: Denies seizure, peripheral neuropathy or memory loss. PSYCH: Denies depression, anxiety or insomnia. SKIN: Denies rash or open sores. Objective:    PHYSICAL EXAM  GENERAL: No data found. EYES: PERRL. EOM intact. MOUTH:Teeth and Gums normal. NECK: Full ROM. Trachea midline. No thyromegaly or JVD. CARDIOVASCULAR: Regular rate and rhythm. No murmur or gallops. No carotid bruits. Peripheral pulses: radial 2 +, PT 2+, DP 2+ bilaterally. LUNGS: CTA bilaterally. No wheezes, rhonchi or rales. GI: positive BS. Abdomen nontender. NEUROLOGIC: Alert and oriented x 3.  Bilateral equal strong had grasp and bilateral equal strong plantar flexion and dorsiflexion. GAIT: abnormal MUSCULOSKELETAL: ROM: full with pain. Tenderness: over the medial and lateral joint lines. Crepitus: present. SKIN: No rash, bruising, swelling, redness or warmth. Labs:  No results found for this or any previous visit (from the past 24 hour(s)). Xray Right Knee: joint space narrowing    Assessment:  Advanced Right Knee Osteoarthritis. Total Right Knee Arthroplasty Indicated. Patient Active Problem List   Diagnosis Code    Osteoarthritis M19.90    S/P total knee arthroplasty Z96.659    Acquired hypothyroidism E03.9    Essential hypertension I10    Depression F32.9    Hemorrhoids K64.9    Primary osteoarthritis of both knees M17.0    Sleep disturbance G47.9    Other hyperlipidemia E78.49    Vitamin D deficiency E55.9    Urge incontinence of urine N39.41    Medicare annual wellness visit, subsequent Z00.00    Menopause Z78.0       Plan:  I have advised the patient of the risks and consequences, including possible complications of performing total joint replacement, as well as not doing this operation. The patient had the opportunity to ask questions and have them answered to their satisfaction.      Signed:  JUSTIN Hannon 7/24/2020

## 2020-07-27 ENCOUNTER — ANESTHESIA EVENT (OUTPATIENT)
Dept: SURGERY | Age: 75
DRG: 470 | End: 2020-07-27
Payer: MEDICARE

## 2020-07-28 ENCOUNTER — ANESTHESIA (OUTPATIENT)
Dept: SURGERY | Age: 75
DRG: 470 | End: 2020-07-28
Payer: MEDICARE

## 2020-07-28 ENCOUNTER — HOSPITAL ENCOUNTER (INPATIENT)
Age: 75
LOS: 2 days | Discharge: HOME HEALTH | DRG: 470 | End: 2020-07-30
Attending: ORTHOPAEDIC SURGERY | Admitting: ORTHOPAEDIC SURGERY
Payer: MEDICARE

## 2020-07-28 DIAGNOSIS — Z96.651 S/P TKR (TOTAL KNEE REPLACEMENT), RIGHT: Primary | ICD-10-CM

## 2020-07-28 PROBLEM — M17.11 ARTHRITIS OF KNEE, RIGHT: Status: ACTIVE | Noted: 2020-07-28

## 2020-07-28 LAB
GLUCOSE BLD STRIP.AUTO-MCNC: 87 MG/DL (ref 65–100)
HGB BLD-MCNC: 12.3 G/DL (ref 11.7–15.4)

## 2020-07-28 PROCEDURE — 76210000017 HC OR PH I REC 1.5 TO 2 HR: Performed by: ORTHOPAEDIC SURGERY

## 2020-07-28 PROCEDURE — 74011000258 HC RX REV CODE- 258: Performed by: ORTHOPAEDIC SURGERY

## 2020-07-28 PROCEDURE — 77030007880 HC KT SPN EPDRL BBMI -B: Performed by: NURSE ANESTHETIST, CERTIFIED REGISTERED

## 2020-07-28 PROCEDURE — 77030003602 HC NDL NRV BLK BBMI -B: Performed by: NURSE ANESTHETIST, CERTIFIED REGISTERED

## 2020-07-28 PROCEDURE — 74011000250 HC RX REV CODE- 250: Performed by: ANESTHESIOLOGY

## 2020-07-28 PROCEDURE — 74011250636 HC RX REV CODE- 250/636: Performed by: ANESTHESIOLOGY

## 2020-07-28 PROCEDURE — 77030019557 HC ELECTRD VES SEAL MEDT -F: Performed by: ORTHOPAEDIC SURGERY

## 2020-07-28 PROCEDURE — 77030036688 HC BLNKT CLD THER S2SG -B

## 2020-07-28 PROCEDURE — 77030040361 HC SLV COMPR DVT MDII -B

## 2020-07-28 PROCEDURE — 74011000250 HC RX REV CODE- 250: Performed by: ORTHOPAEDIC SURGERY

## 2020-07-28 PROCEDURE — 65270000029 HC RM PRIVATE

## 2020-07-28 PROCEDURE — 97161 PT EVAL LOW COMPLEX 20 MIN: CPT

## 2020-07-28 PROCEDURE — 77030033067 HC SUT PDO STRATFX SPIR J&J -B: Performed by: ORTHOPAEDIC SURGERY

## 2020-07-28 PROCEDURE — 77030040922 HC BLNKT HYPOTHRM STRY -A: Performed by: NURSE ANESTHETIST, CERTIFIED REGISTERED

## 2020-07-28 PROCEDURE — 74011000250 HC RX REV CODE- 250: Performed by: NURSE ANESTHETIST, CERTIFIED REGISTERED

## 2020-07-28 PROCEDURE — 74011250636 HC RX REV CODE- 250/636: Performed by: ORTHOPAEDIC SURGERY

## 2020-07-28 PROCEDURE — 77030013819 HC MX SYS CEM ZIMM -B: Performed by: ORTHOPAEDIC SURGERY

## 2020-07-28 PROCEDURE — 77030016544 HC BLD SAW RECIP1 STRY -B: Performed by: ORTHOPAEDIC SURGERY

## 2020-07-28 PROCEDURE — 77030012935 HC DRSG AQUACEL BMS -B: Performed by: ORTHOPAEDIC SURGERY

## 2020-07-28 PROCEDURE — 36415 COLL VENOUS BLD VENIPUNCTURE: CPT

## 2020-07-28 PROCEDURE — 77030003665 HC NDL SPN BBMI -A: Performed by: NURSE ANESTHETIST, CERTIFIED REGISTERED

## 2020-07-28 PROCEDURE — 82962 GLUCOSE BLOOD TEST: CPT

## 2020-07-28 PROCEDURE — 97535 SELF CARE MNGMENT TRAINING: CPT

## 2020-07-28 PROCEDURE — 74011250637 HC RX REV CODE- 250/637: Performed by: ORTHOPAEDIC SURGERY

## 2020-07-28 PROCEDURE — 77030037714 HC CLOSR DEV INCIS ZIP STRY -C: Performed by: ORTHOPAEDIC SURGERY

## 2020-07-28 PROCEDURE — 76010000162 HC OR TIME 1.5 TO 2 HR INTENSV-TIER 1: Performed by: ORTHOPAEDIC SURGERY

## 2020-07-28 PROCEDURE — 0SRC0J9 REPLACEMENT OF RIGHT KNEE JOINT WITH SYNTHETIC SUBSTITUTE, CEMENTED, OPEN APPROACH: ICD-10-PCS | Performed by: ORTHOPAEDIC SURGERY

## 2020-07-28 PROCEDURE — 77030031139 HC SUT VCRL2 J&J -A: Performed by: ORTHOPAEDIC SURGERY

## 2020-07-28 PROCEDURE — 77030006720 HC BLD PAT RMR ZIMM -B: Performed by: ORTHOPAEDIC SURGERY

## 2020-07-28 PROCEDURE — 74011250636 HC RX REV CODE- 250/636: Performed by: NURSE ANESTHETIST, CERTIFIED REGISTERED

## 2020-07-28 PROCEDURE — 97110 THERAPEUTIC EXERCISES: CPT

## 2020-07-28 PROCEDURE — 76010010054 HC POST OP PAIN BLOCK: Performed by: ORTHOPAEDIC SURGERY

## 2020-07-28 PROCEDURE — 76942 ECHO GUIDE FOR BIOPSY: CPT | Performed by: ORTHOPAEDIC SURGERY

## 2020-07-28 PROCEDURE — 94762 N-INVAS EAR/PLS OXIMTRY CONT: CPT

## 2020-07-28 PROCEDURE — 77030013708 HC HNDPC SUC IRR PULS STRY –B: Performed by: ORTHOPAEDIC SURGERY

## 2020-07-28 PROCEDURE — C1776 JOINT DEVICE (IMPLANTABLE): HCPCS | Performed by: ORTHOPAEDIC SURGERY

## 2020-07-28 PROCEDURE — 77030018836 HC SOL IRR NACL ICUM -A: Performed by: ORTHOPAEDIC SURGERY

## 2020-07-28 PROCEDURE — 97165 OT EVAL LOW COMPLEX 30 MIN: CPT

## 2020-07-28 PROCEDURE — 76060000035 HC ANESTHESIA 2 TO 2.5 HR: Performed by: ORTHOPAEDIC SURGERY

## 2020-07-28 PROCEDURE — 77030006835 HC BLD SAW SAG STRY -B: Performed by: ORTHOPAEDIC SURGERY

## 2020-07-28 PROCEDURE — C1713 ANCHOR/SCREW BN/BN,TIS/BN: HCPCS | Performed by: ORTHOPAEDIC SURGERY

## 2020-07-28 PROCEDURE — 77030037715 HC DRSG ZIP STRY -B: Performed by: ORTHOPAEDIC SURGERY

## 2020-07-28 PROCEDURE — 85018 HEMOGLOBIN: CPT

## 2020-07-28 DEVICE — KNEE K1 TOT HEMI STD CEM IMPL CAPPED SYNTHES: Type: IMPLANTABLE DEVICE | Status: FUNCTIONAL

## 2020-07-28 DEVICE — COMPONENT FEM SZ 6 R KNEE POST STBL CEM ATTUNE: Type: IMPLANTABLE DEVICE | Site: KNEE | Status: FUNCTIONAL

## 2020-07-28 DEVICE — BASEPLATE TIB SZ 6 FIX BEAR CEM ATTUNE: Type: IMPLANTABLE DEVICE | Site: KNEE | Status: FUNCTIONAL

## 2020-07-28 DEVICE — CEMENT BNE SIMPLEX W/O GENT -- PK/10 ONLY: Type: IMPLANTABLE DEVICE | Site: KNEE | Status: FUNCTIONAL

## 2020-07-28 DEVICE — INSERT TIB SZ 6 THK10MM KNEE POST STBL FIX BEAR ATTUNE: Type: IMPLANTABLE DEVICE | Site: KNEE | Status: FUNCTIONAL

## 2020-07-28 RX ORDER — PROMETHAZINE HYDROCHLORIDE 25 MG/1
25 TABLET ORAL
Status: DISCONTINUED | OUTPATIENT
Start: 2020-07-28 | End: 2020-07-30 | Stop reason: HOSPADM

## 2020-07-28 RX ORDER — FENTANYL CITRATE 50 UG/ML
100 INJECTION, SOLUTION INTRAMUSCULAR; INTRAVENOUS ONCE
Status: COMPLETED | OUTPATIENT
Start: 2020-07-28 | End: 2020-07-28

## 2020-07-28 RX ORDER — ACETAMINOPHEN 500 MG
1000 TABLET ORAL ONCE
Status: DISCONTINUED | OUTPATIENT
Start: 2020-07-28 | End: 2020-07-28 | Stop reason: HOSPADM

## 2020-07-28 RX ORDER — AMOXICILLIN 250 MG
2 CAPSULE ORAL DAILY
Status: DISCONTINUED | OUTPATIENT
Start: 2020-07-29 | End: 2020-07-30 | Stop reason: HOSPADM

## 2020-07-28 RX ORDER — ACETAMINOPHEN 500 MG
1000 TABLET ORAL EVERY 6 HOURS
Status: DISCONTINUED | OUTPATIENT
Start: 2020-07-28 | End: 2020-07-30 | Stop reason: HOSPADM

## 2020-07-28 RX ORDER — ZOLPIDEM TARTRATE 5 MG/1
5 TABLET ORAL
Status: DISCONTINUED | OUTPATIENT
Start: 2020-07-28 | End: 2020-07-30 | Stop reason: HOSPADM

## 2020-07-28 RX ORDER — OXYCODONE HYDROCHLORIDE 5 MG/1
5 TABLET ORAL
Status: DISCONTINUED | OUTPATIENT
Start: 2020-07-28 | End: 2020-07-28 | Stop reason: HOSPADM

## 2020-07-28 RX ORDER — DEXAMETHASONE SODIUM PHOSPHATE 100 MG/10ML
10 INJECTION INTRAMUSCULAR; INTRAVENOUS ONCE
Status: ACTIVE | OUTPATIENT
Start: 2020-07-29 | End: 2020-07-30

## 2020-07-28 RX ORDER — HYDROMORPHONE HYDROCHLORIDE 1 MG/ML
1 INJECTION, SOLUTION INTRAMUSCULAR; INTRAVENOUS; SUBCUTANEOUS
Status: DISCONTINUED | OUTPATIENT
Start: 2020-07-28 | End: 2020-07-30 | Stop reason: HOSPADM

## 2020-07-28 RX ORDER — CEFAZOLIN SODIUM/WATER 2 G/20 ML
2 SYRINGE (ML) INTRAVENOUS ONCE
Status: COMPLETED | OUTPATIENT
Start: 2020-07-28 | End: 2020-07-28

## 2020-07-28 RX ORDER — SODIUM CHLORIDE 0.9 % (FLUSH) 0.9 %
5-40 SYRINGE (ML) INJECTION EVERY 8 HOURS
Status: DISCONTINUED | OUTPATIENT
Start: 2020-07-28 | End: 2020-07-30 | Stop reason: HOSPADM

## 2020-07-28 RX ORDER — SODIUM CHLORIDE, SODIUM LACTATE, POTASSIUM CHLORIDE, CALCIUM CHLORIDE 600; 310; 30; 20 MG/100ML; MG/100ML; MG/100ML; MG/100ML
100 INJECTION, SOLUTION INTRAVENOUS CONTINUOUS
Status: DISCONTINUED | OUTPATIENT
Start: 2020-07-28 | End: 2020-07-28 | Stop reason: HOSPADM

## 2020-07-28 RX ORDER — ASPIRIN 81 MG/1
81 TABLET ORAL EVERY 12 HOURS
Status: DISCONTINUED | OUTPATIENT
Start: 2020-07-28 | End: 2020-07-30 | Stop reason: HOSPADM

## 2020-07-28 RX ORDER — MIDAZOLAM HYDROCHLORIDE 1 MG/ML
2 INJECTION, SOLUTION INTRAMUSCULAR; INTRAVENOUS ONCE
Status: DISCONTINUED | OUTPATIENT
Start: 2020-07-28 | End: 2020-07-28 | Stop reason: HOSPADM

## 2020-07-28 RX ORDER — SODIUM CHLORIDE 9 MG/ML
100 INJECTION, SOLUTION INTRAVENOUS CONTINUOUS
Status: DISPENSED | OUTPATIENT
Start: 2020-07-28 | End: 2020-07-30

## 2020-07-28 RX ORDER — ONDANSETRON 4 MG/1
8 TABLET, ORALLY DISINTEGRATING ORAL
Status: DISCONTINUED | OUTPATIENT
Start: 2020-07-28 | End: 2020-07-30 | Stop reason: HOSPADM

## 2020-07-28 RX ORDER — OXYCODONE HYDROCHLORIDE 5 MG/1
5-10 TABLET ORAL
Status: DISCONTINUED | OUTPATIENT
Start: 2020-07-28 | End: 2020-07-30 | Stop reason: HOSPADM

## 2020-07-28 RX ORDER — SODIUM CHLORIDE 0.9 % (FLUSH) 0.9 %
5-40 SYRINGE (ML) INJECTION AS NEEDED
Status: DISCONTINUED | OUTPATIENT
Start: 2020-07-28 | End: 2020-07-30 | Stop reason: HOSPADM

## 2020-07-28 RX ORDER — KETOROLAC TROMETHAMINE 30 MG/ML
INJECTION, SOLUTION INTRAMUSCULAR; INTRAVENOUS AS NEEDED
Status: DISCONTINUED | OUTPATIENT
Start: 2020-07-28 | End: 2020-07-28 | Stop reason: HOSPADM

## 2020-07-28 RX ORDER — MIDAZOLAM HYDROCHLORIDE 1 MG/ML
2 INJECTION, SOLUTION INTRAMUSCULAR; INTRAVENOUS
Status: COMPLETED | OUTPATIENT
Start: 2020-07-28 | End: 2020-07-28

## 2020-07-28 RX ORDER — NALOXONE HYDROCHLORIDE 0.4 MG/ML
.2-.4 INJECTION, SOLUTION INTRAMUSCULAR; INTRAVENOUS; SUBCUTANEOUS
Status: DISCONTINUED | OUTPATIENT
Start: 2020-07-28 | End: 2020-07-30 | Stop reason: HOSPADM

## 2020-07-28 RX ORDER — ROPIVACAINE HYDROCHLORIDE 2 MG/ML
INJECTION, SOLUTION EPIDURAL; INFILTRATION; PERINEURAL AS NEEDED
Status: DISCONTINUED | OUTPATIENT
Start: 2020-07-28 | End: 2020-07-28 | Stop reason: HOSPADM

## 2020-07-28 RX ORDER — DIPHENHYDRAMINE HCL 25 MG
25 CAPSULE ORAL
Status: DISCONTINUED | OUTPATIENT
Start: 2020-07-28 | End: 2020-07-30 | Stop reason: HOSPADM

## 2020-07-28 RX ORDER — PROPOFOL 10 MG/ML
INJECTION, EMULSION INTRAVENOUS
Status: DISCONTINUED | OUTPATIENT
Start: 2020-07-28 | End: 2020-07-28 | Stop reason: HOSPADM

## 2020-07-28 RX ORDER — HYDROMORPHONE HYDROCHLORIDE 2 MG/ML
0.5 INJECTION, SOLUTION INTRAMUSCULAR; INTRAVENOUS; SUBCUTANEOUS
Status: COMPLETED | OUTPATIENT
Start: 2020-07-28 | End: 2020-07-28

## 2020-07-28 RX ORDER — CEFAZOLIN SODIUM/WATER 2 G/20 ML
2 SYRINGE (ML) INTRAVENOUS EVERY 8 HOURS
Status: COMPLETED | OUTPATIENT
Start: 2020-07-28 | End: 2020-07-29

## 2020-07-28 RX ORDER — LEVOTHYROXINE SODIUM 88 UG/1
88 TABLET ORAL
Status: DISCONTINUED | OUTPATIENT
Start: 2020-07-29 | End: 2020-07-30 | Stop reason: HOSPADM

## 2020-07-28 RX ORDER — LIDOCAINE HYDROCHLORIDE 10 MG/ML
0.1 INJECTION INFILTRATION; PERINEURAL AS NEEDED
Status: DISCONTINUED | OUTPATIENT
Start: 2020-07-28 | End: 2020-07-28 | Stop reason: HOSPADM

## 2020-07-28 RX ORDER — LOSARTAN POTASSIUM 50 MG/1
100 TABLET ORAL DAILY
Status: DISCONTINUED | OUTPATIENT
Start: 2020-07-29 | End: 2020-07-30 | Stop reason: HOSPADM

## 2020-07-28 RX ORDER — NALOXONE HYDROCHLORIDE 0.4 MG/ML
0.04 INJECTION, SOLUTION INTRAMUSCULAR; INTRAVENOUS; SUBCUTANEOUS
Status: DISCONTINUED | OUTPATIENT
Start: 2020-07-28 | End: 2020-07-28 | Stop reason: HOSPADM

## 2020-07-28 RX ADMIN — ASPIRIN 81 MG: 81 TABLET, COATED ORAL at 22:29

## 2020-07-28 RX ADMIN — Medication 1 AMPULE: at 22:29

## 2020-07-28 RX ADMIN — SODIUM CHLORIDE, SODIUM LACTATE, POTASSIUM CHLORIDE, AND CALCIUM CHLORIDE: 600; 310; 30; 20 INJECTION, SOLUTION INTRAVENOUS at 11:16

## 2020-07-28 RX ADMIN — HYDROMORPHONE HYDROCHLORIDE 0.5 MG: 2 INJECTION INTRAMUSCULAR; INTRAVENOUS; SUBCUTANEOUS at 12:36

## 2020-07-28 RX ADMIN — Medication 5 ML: at 22:32

## 2020-07-28 RX ADMIN — OXYCODONE HYDROCHLORIDE 10 MG: 5 TABLET ORAL at 17:42

## 2020-07-28 RX ADMIN — ACETAMINOPHEN 1000 MG: 500 TABLET, FILM COATED ORAL at 17:42

## 2020-07-28 RX ADMIN — FENTANYL CITRATE 100 MCG: 50 INJECTION INTRAMUSCULAR; INTRAVENOUS at 10:00

## 2020-07-28 RX ADMIN — TRANEXAMIC ACID 1000 MG: 100 INJECTION, SOLUTION INTRAVENOUS at 10:36

## 2020-07-28 RX ADMIN — MEPIVACAINE HYDROCHLORIDE 60 MG: 20 INJECTION, SOLUTION EPIDURAL; INFILTRATION at 10:23

## 2020-07-28 RX ADMIN — ROPIVACAINE HYDROCHLORIDE 20 ML: 2 INJECTION, SOLUTION EPIDURAL; INFILTRATION at 10:03

## 2020-07-28 RX ADMIN — Medication 3 AMPULE: at 09:18

## 2020-07-28 RX ADMIN — OXYCODONE HYDROCHLORIDE 10 MG: 5 TABLET ORAL at 22:29

## 2020-07-28 RX ADMIN — ACETAMINOPHEN 1000 MG: 500 TABLET, FILM COATED ORAL at 23:44

## 2020-07-28 RX ADMIN — HYDROMORPHONE HYDROCHLORIDE 0.5 MG: 2 INJECTION INTRAMUSCULAR; INTRAVENOUS; SUBCUTANEOUS at 12:51

## 2020-07-28 RX ADMIN — SODIUM CHLORIDE, SODIUM LACTATE, POTASSIUM CHLORIDE, AND CALCIUM CHLORIDE 100 ML/HR: 600; 310; 30; 20 INJECTION, SOLUTION INTRAVENOUS at 09:21

## 2020-07-28 RX ADMIN — HYDROMORPHONE HYDROCHLORIDE 0.5 MG: 2 INJECTION INTRAMUSCULAR; INTRAVENOUS; SUBCUTANEOUS at 12:46

## 2020-07-28 RX ADMIN — LIDOCAINE HYDROCHLORIDE 0.1 ML: 10 INJECTION, SOLUTION INFILTRATION; PERINEURAL at 09:18

## 2020-07-28 RX ADMIN — CEFAZOLIN SODIUM 2 G: 100 INJECTION, POWDER, LYOPHILIZED, FOR SOLUTION INTRAVENOUS at 17:42

## 2020-07-28 RX ADMIN — MIDAZOLAM 2 MG: 1 INJECTION INTRAMUSCULAR; INTRAVENOUS at 09:59

## 2020-07-28 RX ADMIN — HYDROMORPHONE HYDROCHLORIDE 0.5 MG: 2 INJECTION INTRAMUSCULAR; INTRAVENOUS; SUBCUTANEOUS at 12:41

## 2020-07-28 RX ADMIN — ZOLPIDEM TARTRATE 5 MG: 5 TABLET ORAL at 22:30

## 2020-07-28 RX ADMIN — PROPOFOL 75 MCG/KG/MIN: 10 INJECTION, EMULSION INTRAVENOUS at 10:32

## 2020-07-28 RX ADMIN — Medication 2 G: at 10:13

## 2020-07-28 NOTE — PROGRESS NOTES
Problem: Self Care Deficits Care Plan (Adult)  Goal: *Acute Goals and Plan of Care (Insert Text)  Description: GOALS:   DISCHARGE GOALS (in preparation for going home/rehab):  3 days  1. Ms. Bertha Stewart will perform one lower body dressing activity with minimal assistance required to demonstrate improved functional mobility and safety. 2.  Ms. Bertha Stewart will perform one lower body bathing activity with minimal assistance required to demonstrate improved functional mobility and safety. 3.  Ms. Bertha Stweart will perform toileting/toilet transfer with contact guard assistance to demonstrate improved functional mobility and safety. 4.  Ms. Bertha Stewart will perform shower transfer with contact guard assistance to demonstrate improved functional mobility and safety. Outcome: Progressing Towards Goal     JOINT CAMP OCCUPATIONAL THERAPY TKA: Initial Assessment, Daily Note, Treatment Day: Day of Assessment, and PM 7/28/2020  INPATIENT: Hospital Day: 1  Payor: Jessica Giron / Plan: [address] [place] [address] HMO / Product Type: [employer] /      NAME/AGE/GENDER: Alfredo Wilkins is a 69 y.o. female   PRIMARY DIAGNOSIS:  Primary osteoarthritis of right knee [M17.11]   Procedure(s) and Anesthesia Type:     * RIGHT KNEE ARTHROPLASTY TOTAL/DEPUY - Spinal (Right)  ICD-10: Treatment Diagnosis:    Pain in Right Knee (M25.561)  Stiffness of Right Knee, Not elsewhere classified (G29.904)      ASSESSMENT:     Ms. Bertha Stewart is s/p right TKA and presents with decreased weight bearing on right LE and decreased independence with functional mobility and activities of daily living as compared to baseline level of function and safety. Patient would benefit from skilled Occupational Therapy to maximize independence and safety with self-care task and functional mobility. Pt would also benefit from education on adaptive equipment and safety precautions in preparation for going home. Pt up in room, to bathroom and donned underwear.  Pt moves well and should progress well with self care. This section established at most recent assessment   PROBLEM LIST (Impairments causing functional limitations):  Decreased Strength  Decreased ADL/Functional Activities  Decreased Transfer Abilities  Increased Pain  Increased Fatigue  Decreased Flexibility/Joint Mobility  Decreased Knowledge of Precautions   INTERVENTIONS PLANNED: (Benefits and precautions of occupational therapy have been discussed with the patient.)  Activities of daily living training  Adaptive equipment training  Balance training  Clothing management  Donning&doffing training  Theraputic activity     TREATMENT PLAN: Frequency/Duration: Follow patient 1-2 times to address above goals. Rehabilitation Potential For Stated Goals: Good     RECOMMENDED REHABILITATION/EQUIPMENT: (at time of discharge pending progress): Continue Skilled Therapy. OCCUPATIONAL PROFILE AND HISTORY:   History of Present Injury/Illness (Reason for Referral): Pt presents this date s/p (right) TKA. Past Medical History/Comorbidities:   Ms. Patrica Velazquez  has a past medical history of Anxiety, Chronic pain, Depression (5/19/2017), Essential hypertension (05/19/2017), Hypothyroid, Insomnia, Nausea & vomiting, Obesity, Osteoarthritis (12/15/2015), and Stress incontinence, female.  She also has no past medical history of Adverse effect of anesthesia, Aneurysm ([address] Arrhythmia, Asthma, Autoimmune disease ([address] CAD (coronary artery disease), Cancer ([address] Chronic kidney disease, Chronic obstructive pulmonary disease ([address] Coagulation defects, Coagulation disorder ([address] COPD, Diabetes ([address] Difficult intubation, Endocarditis, GERD (gastroesophageal reflux disease), Heart failure ([address] Liver disease, Malignant hyperthermia due to anesthesia, Nicotine vapor product user, Non-nicotine vapor product user, Other ill-defined conditions(799.89), Pseudocholinesterase deficiency, PUD (peptic ulcer disease), Rheumatic fever, Seizures ([address] Sleep apnea, Stroke ([facility] or Thromboembolus ([facility] Ms. Jody Townsend  has a past surgical history that includes hx hysterectomy (); hx  section (); hx colonoscopy (); hx breast biopsy (Bilateral); and hx knee replacement (Left, 12/15/2015). Social History/Living Environment:      Prior Level of Function/Work/Activity:  Independent      Number of Personal Factors/Comorbidities that affect the Plan of Care: Brief history (0):  LOW COMPLEXITY   ASSESSMENT OF OCCUPATIONAL PERFORMANCE[de-identified]   Most Recent Physical Functioning:   Balance  Sitting: Intact  Standing: Intact; With support                    Coordination  Fine Motor Skills-Upper: Left Intact; Right Intact  Gross Motor Skills-Upper: Left Intact; Right Intact         Mental Status  Neurologic State: Alert  Orientation Level: Oriented X4  Perception: Appears intact  Perseveration: No perseveration noted  Safety/Judgement: Awareness of environment                Basic ADLs (From Assessment) Complex ADLs (From Assessment)   Basic ADL  Feeding: Independent  Oral Facial Hygiene/Grooming: Supervision  Bathing: Moderate assistance  Upper Body Dressing: Supervision  Lower Body Dressing: Moderate assistance  Toileting:  Moderate assistance     Grooming/Bathing/Dressing Activities of Daily Living     Cognitive Retraining  Safety/Judgement: Awareness of environment                 Functional Transfers  Bathroom Mobility: Contact guard assistance  Toilet Transfer : Minimum assistance  Shower Transfer: Minimum assistance     Bed/Mat Mobility  Stand to Sit: Contact guard assistance  Bed to Chair: Contact guard assistance         Physical Skills Involved:  Range of Motion  Balance  Strength Cognitive Skills Affected (resulting in the inability to perform in a timely and safe manner):  none Psychosocial Skills Affected:  Environmental Adaptation   Number of elements that affect the Plan of Care: 3-5:  MODERATE COMPLEXITY   CLINICAL DECISION MAKIN [facility] [address] [address] AM-PAC 6 Clicks   Daily Activity Inpatient Short Form  How much help from another person does the patient currently need. .. Total A Lot A Little None   1. Putting on and taking off regular lower body clothing? [] 1   [x] 2   [] 3   [] 4   2. Bathing (including washing, rinsing, drying)? [] 1   [x] 2   [] 3   [] 4   3. Toileting, which includes using toilet, bedpan or urinal?   [] 1   [] 2   [x] 3   [] 4   4. Putting on and taking off regular upper body clothing? [] 1   [] 2   [] 3   [x] 4   5. Taking care of personal grooming such as brushing teeth? [] 1   [] 2   [] 3   [x] 4   6. Eating meals? [] 1   [] 2   [] 3   [x] 4   © 2007, Trustees of [facility] [employer], under license to [employer]. All rights reserved     Score:  Initial: 19 Most Recent: X (Date: -- )    Interpretation of Tool:  Represents activities that are increasingly more difficult (i.e. Bed mobility, Transfers, Gait). Use of outcome tool(s) and clinical judgement create a POC that gives a: LOW COMPLEXITY            TREATMENT:   (In addition to Assessment/Re-Assessment sessions the following treatments were rendered)     Pre-treatment Symptoms/Complaints:  pt without complaint of pain  Pain: Initial:   Pain Intensity 1: 0 0 Post Session:  0     Self Care: (10 min): Procedure(s) (per grid) utilized to improve and/or restore self-care/home management as related to dressing and toileting. Required minimal verbal cueing to facilitate activities of daily living skills and compensatory activities. OT evaluation completed   Treatment/Session Assessment:     Response to Treatment:  Pt up in room tolerated well.     Education:  [] Home Exercises  [x] Fall Precautions  [] Hip Precautions [] Going Home Video  [x] Knee/Hip Prosthesis Review  [x] Walker Management/Safety [x] Adaptive Equipment as Needed       Interdisciplinary Collaboration:   Physical Therapist  Occupational Therapist  Registered Nurse    After treatment position/precautions:   Up in chair  Bed/Chair-wheels locked  Call light within reach  RN notified  Family at bedside     Compliance with Program/Exercises: Compliant all of the time, Will assess as treatment progresses. Recommendations/Intent for next treatment session:  Treatment next visit will focus on increasing Ms. Liberty's independence with bed mobility, transfers, self care, functional mobility, modalities for pain, and patient education.       Total Treatment Duration:  OT Patient Time In/Time Out  Time In: 1320  Time Out: [address] Kriss Severin

## 2020-07-28 NOTE — PERIOP NOTES
TRANSFER - OUT REPORT:    Verbal report given to [facility] RN on Blanco Reach  being transferred to [facility] for routine progression of care       Report consisted of patients Situation, Background, Assessment and   Recommendations(SBAR). Information from the following report(s) SBAR, Kardex, [facility] and Recent Results was reviewed with the receiving nurse. Lines:   Peripheral IV 07/28/20 Left Hand (Active)   Site Assessment Clean, dry, & intact 07/28/20 0917   Phlebitis Assessment 0 07/28/20 0917   Infiltration Assessment 0 07/28/20 0917   Dressing Status Clean, dry, & intact 07/28/20 0917   Dressing Type Tape;Transparent 07/28/20 0917   Hub Color/Line Status Infusing 07/28/20 0917   Action Taken Blood drawn 07/28/20 1618        Opportunity for questions and clarification was provided.       Patient transported with:   [employer]

## 2020-07-28 NOTE — INTERVAL H&P NOTE
Update History & Physical 
 
The Patient's History and Physical of July 24, 2020 was reviewed with the patient and I examined the patient. There was no change. The surgical site was confirmed by the patient and me. Plan:  The risk, benefits, expected outcome, and alternative to the recommended procedure have been discussed with the patient. Patient understands and wants to proceed with the procedure.  
 
Electronically signed by JUSTIN Carranza on 7/28/2020 at 9:53 AM

## 2020-07-28 NOTE — PERIOP NOTES
Teach back method used with patient concerning hibiclens wash, TB screening, incentive spirometer, pain management goals, and discharge needs list. Is goal 1500

## 2020-07-28 NOTE — OP NOTES
[facility]  Cementled Total Knee Arthroplasty  Geovanny Silvestre   : 1945  Medical Record [id number]  Pre-operative Diagnosis:  Primary osteoarthritis of right knee [M17.11]  Post-operative Diagnosis: Primary osteoarthritis of right knee [M17.11]    Surgeon: Anitha Hare MD  Assistant: Darylene Alma, PA-C    Anesthesia: Spinal    Procedure: Cemented Total Knee Arthroplasty   The complexity of the total joint surgery requires the use of a first assistant for positioning, retraction and assistance in closure. The patient's Body mass index is 38.97 kg/m²., BMI's greater then 40 make surgical exposure and retraction extremely difficult and increase operative time. Tourniquet Time: none  EBL: 150cc  Additional Findings: Severe DJD  Releases none    Anjel Kaur was brought to the operating room and positioned on the operating table. She was anethestized  IV antibiotics were administered per CMS protocol. Prior to the incision being made a timeout was called identifying the patient, procedure ,operative side and surgeon. The right leg was prepped and draped in the usual sterile manner  An anterior longitudinal incision was accomplished just medial to the tibial tubercle and extending approximal 6 centimeters proximal to the superior pole of the patella. A medial parapatellar capsular incision was performed. The medial capsular flap was elevated around to the insertion of the semimembranous tendon. The patella was everted and the knee flexed and externally rotated. The medial and external menisci were excised. The lateral half of the fat pad excised and the patella femoral ligament was released. The anterior cruciate ligament was resected and the posterior cruciate ligament was substituted. Using extramedullary instrumentation, the tibial cut was accomplished with appropriate posterior slope. Approxiamately 2 mm of bone was removed from the low side of the tibia. The distal femur was next addressed. A drill hole was made above the intracondylar notch. Using appropriate intramedullary instrumentation,a 5 degree valgus distal cut was accomplished. A femur was sized. The anterior and posterior cuts were then made about the distal femur. The osteophytes were removed from the tibial and femoral surfaces. The flexion and extension gaps were assessed with the appropriate spacer blocks. Additional surgical procedures included none. The flexion and extension gaps were deemed appropriately balanced. The appropriate cutting blocks were then utilized to perform the anterior chamfer, posterior chamfer and notch cuts, with appropriate lateral tranlation accomplished for the patellofemoral groove. The tibia was sized. The tibial base plate was pinned into place with the appropriate external rotation and stem site prepared. A preliminary range of motion was accomplished with the above size trial components. A polyethylene insert allowed the patient to obtain full extension as well as appropriate flexion. The patient's ligaments were stable in flexion and extension to medial and lateral stressing and the alignment was through the appropriate mechanical axis. The patella was then everted. Given acceptable condition, the tibia was left unresurfaced following patelloplasty. All trial components were removed and the implants were cemented into place. Bone and cement debris was meticulously removed. The betadine lavage protocol was not performed. Padmini Laurent knee was placed through range of motion and noted to be stable as mentioned above with the trial components. The wound was dry, therefore no drain was used. The operative knee was injected with 60cc of Naropin, 10 cc's of morphine and 1 cc of 30mg of Toradol.   The capsular layer was closed using a #1 vicryl suture, while subcutaneous layers were closed using 2-0 Vicryl interrupted sutures and a #1 Stratofix. Finally the skin was closed using 3-0 Vicryl and a Zipline closure. A sterile sterile bandage was applied. An Iceman cryo pad was applied on the operative leg. Sponge count and needle counts were correct. Theodore Barry left the operating room     Implants:   Implant Name Type Inv.  Item Serial No.  Lot No. LRB No. Used Action   CEMENT BNE SIMPLEX W/O GENT -- PK/10 ONLY - [id number]  CEMENT BNE SIMPLEX W/O GENT -- PK/10 ONLY [id number] [employer] HOW [id number] Right 1 Implanted   CEMENT BNE SIMPLEX W/O GENT -- PK/10 ONLY - [id number]  CEMENT BNE SIMPLEX W/O GENT -- PK/10 ONLY [id number] [employer] HOW [id number] Right 1 Implanted   ATTUNE PATELLA MEDIALIZED ANATOMIC 35MM CEMENTED AOX   [id number]  [id number] Right 1 Implanted   FEM PS SZ 6 RT DEMIAN -- ATTUNE - [id number]  FEM PS SZ 6 RT DEMIAN -- ATTUNE [id number] [facility] [employer] [id number] Right 1 Implanted   BEARING TIB BASE FIX SZ6 DEMIAN -- ATTUNE - [id number]  BEARING TIB BASE FIX SZ6 DEMIAN -- ATTUNE [id number] [facility] [employer] [id number] Right 1 Implanted   INSERT TIB FB PS SZ 6 10MM -- ATTUNE - [id number]  INSERT TIB FB PS SZ 6 10MM -- ATTUNE [id number] [facility] [employer] [id number] Right 1 Implanted     Signed By: Salvador Cadet MD

## 2020-07-28 NOTE — PROGRESS NOTES
Care Management Interventions  PCP Verified by CM: Yes  Mode of Transport at Discharge: Self  Transition of Care Consult (CM Consult): [address]: Yes  Discharge Durable Medical Equipment: No  Physical Therapy Consult: Yes  Occupational Therapy Consult: Yes  Current Support Network: Own Home  Confirm Follow Up Transport: Family  The Plan for Transition of Care is Related to the Following Treatment Goals : return to independent function  The Patient and/or Patient Representative was Provided with a Choice of Provider and Agrees with the Discharge Plan?: Yes  Freedom of Choice List was Provided with Basic Dialogue that Supports the Patient's Individualized Plan of Care/Goals, Treatment Preferences and Shares the Quality Data Associated with the Providers?: Yes  Discharge Location  Discharge Placement: Home with home health    Patient is a 69y.o. year old female admitted for Right TKA . Patient plans to return home on discharge. Order received to arrange home health. Patient without preference towards agency. Referral sent to [facility]. Patient denies any equipment needs as patient has a walker. Will follow until discharge.

## 2020-07-28 NOTE — ANESTHESIA PREPROCEDURE EVALUATION
Anesthetic History     PONV          Review of Systems / Medical History  Patient summary reviewed    Pulmonary                   Neuro/Psych         Psychiatric history     Cardiovascular    Hypertension              Exercise tolerance: >4 METS     GI/Hepatic/Renal                Endo/Other      Hypothyroidism  Morbid obesity and arthritis     Other Findings              Physical Exam    Airway  Mallampati: II    Neck ROM: normal range of motion   Mouth opening: Normal     Cardiovascular  Regular rate and rhythm,  S1 and S2 normal,  no murmur, click, rub, or gallop             Dental  No notable dental hx       Pulmonary  Breath sounds clear to auscultation               Abdominal         Other Findings            Anesthetic Plan    ASA: 3  Anesthesia type: spinal      Post-op pain plan if not by surgeon: peripheral nerve block single    Induction: Intravenous  Anesthetic plan and risks discussed with: Patient

## 2020-07-28 NOTE — ANESTHESIA PROCEDURE NOTES
Peripheral Block    Start time: 7/28/2020 10:02 AM  End time: 7/28/2020 10:03 AM  Performed by: Hardy Waddell MD  Authorized by: Hardy Waddell MD       Pre-procedure: Indications: at surgeon's request and post-op pain management    Preanesthetic Checklist: patient identified, risks and benefits discussed, site marked, timeout performed, anesthesia consent given and patient being monitored    Timeout Time: 10:02  Preanesthetic Checklist comment:  Risk of nerve damage discussed. Block Type:   Block Type: Adductor canal  Laterality:  Right  Monitoring:  Standard ASA monitoring, continuous pulse ox, frequent vital sign checks, heart rate, oxygen and responsive to questions  Injection Technique:  Single shot  Procedures: ultrasound guided    Prep: chlorhexidine    Location:  Mid thigh  Needle Type:  Stimuplex (4 inch)  Needle Gauge:  20 G  Needle Localization:  Ultrasound guidance    Assessment:  Number of attempts:  1  Injection Assessment:  Incremental injection every 5 mL, local visualized surrounding nerve on ultrasound, negative aspiration for blood, no intravascular symptoms, no paresthesia and ultrasound image on chart  Patient tolerance:  Patient tolerated the procedure well with no immediate complications  3 cc 1% Lidocaine injected at needle insertion site. Needle inserted and placed in close proximity to the nerve under real time ultrasound guidance. Ultrasound was used to visualize the spread of local anesthetic in close proximity to the nerve being blocked. The nerve appeared anatomically normal and there were no abnormal findings.

## 2020-07-28 NOTE — ANESTHESIA PROCEDURE NOTES
Spinal Block    Performed by: Cheri Valencia MD  Authorized by: Cheri Valencia MD     Pre-procedure: Indications: primary anesthetic  Preanesthetic Checklist: patient identified, risks and benefits discussed, anesthesia consent, patient being monitored and timeout performed    Timeout Time: 10:19  Preanesthetic Checklist comment:  Risk of nerve damage discussed.     Spinal Block:   Patient Position:  Seated  Prep Region:  Lumbar  Prep: chlorhexidine and patient draped      Location:  L3-4  Technique:  Single shot    Local Dose (mL):  3    Needle:   Needle Type:  Pencan  Needle Gauge:  25 G  Attempts:  1      Events: CSF confirmed, no blood with aspiration and no paresthesia        Assessment:  Insertion:  Uncomplicated  Patient tolerance:  Patient tolerated the procedure well with no immediate complications

## 2020-07-28 NOTE — ANESTHESIA POSTPROCEDURE EVALUATION
Procedure(s):  RIGHT KNEE ARTHROPLASTY TOTAL/DEPUY.     spinal    Anesthesia Post Evaluation        Patient location during evaluation: PACU  Patient participation: complete - patient participated  Level of consciousness: awake  Pain management: satisfactory to patient  Airway patency: patent  Anesthetic complications: no  Cardiovascular status: hemodynamically stable  Respiratory status: spontaneous ventilation  Hydration status: euvolemic  Post anesthesia nausea and vomiting:  none      INITIAL Post-op Vital signs:   Vitals Value Taken Time   /51 7/28/2020  1:07 PM   Temp 36.5 °C (97.7 °F) 7/28/2020 12:13 PM   Pulse 57 7/28/2020  1:07 PM   Resp 15 7/28/2020  1:07 PM   SpO2 98 % 7/28/2020  1:07 PM

## 2020-07-28 NOTE — PERIOP NOTES
Betadine lavage:  17.5cc of betadine lot # [id number] , exp. Date  06/2021,  in 500cc of . 9NS Lot # [id number] , exp.  Date : 03/01/2023

## 2020-07-28 NOTE — PERIOP NOTES
TRANSFER - IN REPORT:    Verbal report received from [facility] on Alissazach Peoples  being received from 3rd floor ortho for routine progression of care      Report consisted of patients Situation, Background, Assessment and   Recommendations(SBAR). Information from the following report(s) Kardex and MAR was reviewed with the receiving nurse. Opportunity for questions and clarification was provided. Assessment completed upon patients arrival to unit and care assumed.

## 2020-07-28 NOTE — PROGRESS NOTES
Problem: Mobility Impaired (Adult and Pediatric)  Goal: *Acute Goals and Plan of Care (Insert Text)  Description: GOALS (1-4 days):  (1.)Ms. Leopold Bile will move from supine to sit and sit to supine  in bed with SUPERVISION. (2.)Ms. Leopold Bile will transfer from bed to chair and chair to bed with SUPERVISION using the least restrictive device. (3.)Ms. Leopold Bile will ambulate with SUPERVISION for 200 feet with the least restrictive device. (4.)Ms. Leopold Bile will ambulate up/down 1 steps without a railing with MINIMAL ASSIST with no device. (5.)Ms. Leopold Bile will increase right knee ROM to 5°-80°.  ________________________________________________________________________________________________    Outcome: Progressing Towards Goal     PHYSICAL THERAPY JOINT CAMP TKA: Initial Assessment and PM 7/28/2020  INPATIENT: Hospital Day: 1  Payor: Gilma Brock / Plan: [address] [place] [address] HMO / Product Type: [employer] Medicare /      NAME/AGE/GENDER: Gustavo Huggins is a 69 y.o. female   PRIMARY DIAGNOSIS:  Primary osteoarthritis of right knee [M17.11]   Procedure(s) and Anesthesia Type:     * RIGHT KNEE ARTHROPLASTY TOTAL/DEPUY - Spinal (Right)  ICD-10: Treatment Diagnosis:    Pain in Right Knee (M25.561)  Stiffness of Right Knee, Not elsewhere classified (M25.661)  Difficulty in walking, Not elsewhere classified (R26.2)      ASSESSMENT:     Ms. Leopold Bile presents s/p R TKA. Patient demonstrates decreased R LE strength and ROM and decreased independence with mobility. Patient would benefit from therapy to address these deficits. She had a L TKA in 2015. Patient will return home at d/c with assist from her son.     This section established at most recent assessment   PROBLEM LIST (Impairments causing functional limitations):  Decreased Strength  Decreased ADL/Functional Activities  Decreased Transfer Abilities  Decreased Ambulation Ability/Technique  Decreased Balance  Increased Pain  Decreased Flexibility/Joint Mobility  Edema/Girth  Decreased Barker with Home Exercise Program   INTERVENTIONS PLANNED: (Benefits and precautions of physical therapy have been discussed with the patient.)  bed mobility  gait training  home exercise program (HEP)  Range of Motion: active/assisted/passive  Therapeutic Activities  therapeutic exercise/strengthening  transfer training  Group Therapy     TREATMENT PLAN: Frequency/Duration: Follow patient BID for duration of hospital stay to address above goals. Rehabilitation Potential For Stated Goals: Good     RECOMMENDED REHABILITATION/EQUIPMENT: (at time of discharge pending progress): Continue Skilled Therapy. HISTORY:   History of Present Injury/Illness (Reason for Referral):  R TKA  Past Medical History/Comorbidities:   Ms. Adis Aguilar  has a past medical history of Anxiety, Chronic pain, Depression (2017), Essential hypertension (2017), Hypothyroid, Insomnia, Nausea & vomiting, Obesity, Osteoarthritis (12/15/2015), and Stress incontinence, female. She also has no past medical history of Adverse effect of anesthesia, Aneurysm ([address] Arrhythmia, Asthma, Autoimmune disease ([address] CAD (coronary artery disease), Cancer ([address] Chronic kidney disease, Chronic obstructive pulmonary disease ([address] Coagulation defects, Coagulation disorder ([address] COPD, Diabetes ([address] Difficult intubation, Endocarditis, GERD (gastroesophageal reflux disease), Heart failure ([address] Liver disease, Malignant hyperthermia due to anesthesia, Nicotine vapor product user, Non-nicotine vapor product user, Other ill-defined conditions(799.89), Pseudocholinesterase deficiency, PUD (peptic ulcer disease), Rheumatic fever, Seizures ([address] Sleep apnea, Stroke ([address] or Thromboembolus ([address] Ms. Adis Aguilar  has a past surgical history that includes hx hysterectomy (); hx  section (); hx colonoscopy (); hx breast biopsy (Bilateral); and hx knee replacement (Left, 12/15/2015).   Social History/Living Environment:   Home Environment: Private residence  # Steps to Enter: 1  Rails to [employer]: No  One/Two Story Residence: One story  Living Alone: Yes  Support Systems: Child(beth)  Patient Expects to be Discharged to[de-identified] Private residence  Current DME Used/Available at Home: Albertina Lofts, straight, Shower chair, Walker, rolling  Tub or Shower Type: Tub/Shower combination  Prior Level of Function/Work/Activity:  Ambulating with a cane. Number of Personal Factors/Comorbidities that affect the Plan of Care: 0: LOW COMPLEXITY   EXAMINATION:   Most Recent Physical Functioning:      Gross Assessment  AROM: Generally decreased, functional(L LE)  Strength: Generally decreased, functional(L LE)  Coordination: Generally decreased, functional                RLE Strength  R Hip Flexion: 2+  R Knee Extension: 2+  R Ankle Dorsiflexion: 3+    Bed Mobility  Supine to Sit: Contact guard assistance    Transfers  Sit to Stand: Contact guard assistance  Stand to Sit: Contact guard assistance  Bed to Chair: Contact guard assistance    Balance  Sitting: Intact  Standing: With support              Weight Bearing Status  Right Side Weight Bearing: As tolerated  Distance (ft): 15 Feet (ft)  Ambulation - Level of Assistance: Contact guard assistance  Assistive Device: Walker, rolling  Speed/Elana: Pace decreased (<100 feet/min)  Step Length: Left shortened;Right shortened  Stance: Right decreased  Gait Abnormalities: Antalgic  Interventions: Safety awareness training;Verbal cues     Braces/Orthotics: none           Body Structures Involved:  Joints  Muscles Body Functions Affected: Movement Related Activities and Participation Affected: Mobility   Number of elements that affect the Plan of Care: 4+: HIGH COMPLEXITY   CLINICAL PRESENTATION:   Presentation: Stable and uncomplicated: LOW COMPLEXITY   CLINICAL DECISION MAKIN [facility] [address] [address] AM-PAC 6 Clicks   Basic Mobility Inpatient Short Form  How much difficulty does the patient currently have. ..  Unable A Lot A Little None   1. Turning over in bed (including adjusting bedclothes, sheets and blankets)? [] 1   [] 2   [x] 3   [] 4   2. Sitting down on and standing up from a chair with arms ( e.g., wheelchair, bedside commode, etc.)   [] 1   [] 2   [x] 3   [] 4   3. Moving from lying on back to sitting on the side of the bed? [] 1   [] 2   [x] 3   [] 4   How much help from another person does the patient currently need. .. Total A Lot A Little None   4. Moving to and from a bed to a chair (including a wheelchair)? [] 1   [] 2   [x] 3   [] 4   5. Need to walk in hospital room? [] 1   [] 2   [x] 3   [] 4   6. Climbing 3-5 steps with a railing? [] 1   [] 2   [x] 3   [] 4   © 2007, Trustees of [address] [place] [address], under license to [employer]. All rights reserved     Score:  Initial: 18 Most Recent: X (Date: -- )    Interpretation of Tool:  Represents activities that are increasingly more difficult (i.e. Bed mobility, Transfers, Gait). Medical Necessity:     Patient is expected to demonstrate progress in   strength, range of motion, balance, and coordination   to   increase independence with mobility and HEP. .  Reason for Services/Other Comments:  Patient continues to require skilled intervention due to   Decreased R LE strength and ROM and decreased independence with mobility. .   Use of outcome tool(s) and clinical judgement create a POC that gives a: Clear prediction of patient's progress: LOW COMPLEXITY            TREATMENT:   (In addition to Assessment/Re-Assessment sessions the following treatments were rendered)     Pre-treatment Symptoms/Complaints:  Patient ready. Pain Initial:   Pain Intensity 1: 3  Post Session:  3/10     Gait Training ( ):  Gait training to improve and/or restore physical functioning as related to mobility, balance, and coordination.   Ambulated 15 Feet (ft) with Contact guard assistance using a Walker, rolling and minimal Safety awareness training;Verbal cues related to their stance phase and stride length to promote proper body alignment. Therapeutic Exercise: (10 Minutes):  Exercises per grid below to improve mobility and strength. Required minimal verbal and tactile cues to promote proper body alignment. Progressed range and repetitions as indicated. Date:  7/28/20 Date:   Date:     ACTIVITY/EXERCISE AM PM AM PM AM PM   GROUP THERAPY  []  []  []  []  []  []   Ankle Pumps  10       Quad Sets  10       Gluteal Sets  10       Hip ABd/ADduction  10       Straight Leg Raises  10       Knee Slides  10       Short Arc Quads  10       Long Arc Quads         Chair Slides                  B = bilateral; AA = active assistive; A = active; P = passive      Treatment/Session Assessment:     Response to Treatment:  Patient participated well. Moving well and should progress well. Education:  [] Home Exercises  [x] Fall Precautions  []  [] D/C Instruction Review  [] Knee Prosthesis Review  [x] Walker Management/Safety [] Adaptive Equipment as Needed       Interdisciplinary Collaboration:   Physical Therapist  Occupational Therapist  Registered Nurse    After treatment position/precautions:   Up in chair  Bed/Chair-wheels locked  Call light within reach  Family at bedside    Compliance with Program/Exercises: Will assess as treatment progresses. Recommendations/Intent for next treatment session:  Treatment next visit will focus on increasing Ms. Mike's independence with bed mobility, transfers, gait training, strength/ROM exercises, modalities for pain, and patient education.       Total Treatment Duration:  PT Patient Time In/Time Out  Time In: 1340  Time Out: Stewart Henry 62, PT

## 2020-07-28 NOTE — PERIOP NOTES
TRANSFER - OUT REPORT:    Verbal report given to [address] (name) on Nena Stands  being transferred to [facility] (unit) for routine post - op       Report consisted of patients Situation, Background, Assessment and   Recommendations(SBAR). Information from the following report(s) SBAR was reviewed with the receiving nurse. Lines:   Peripheral IV 07/28/20 Left Hand (Active)   Site Assessment Clean, dry, & intact 07/28/20 1254   Phlebitis Assessment 0 07/28/20 1254   Infiltration Assessment 0 07/28/20 1254   Dressing Status Clean, dry, & intact 07/28/20 1254   Dressing Type Tape;Transparent 07/28/20 1254   Hub Color/Line Status Pink; Infusing 07/28/20 1254   Action Taken Blood drawn 07/28/20 8217        Opportunity for questions and clarification was provided.       Patient transported with:  Chart, IV

## 2020-07-29 PROBLEM — Z96.651 S/P TKR (TOTAL KNEE REPLACEMENT), RIGHT: Status: ACTIVE | Noted: 2020-07-29

## 2020-07-29 PROCEDURE — 97116 GAIT TRAINING THERAPY: CPT

## 2020-07-29 PROCEDURE — 74011000250 HC RX REV CODE- 250: Performed by: ORTHOPAEDIC SURGERY

## 2020-07-29 PROCEDURE — 65270000029 HC RM PRIVATE

## 2020-07-29 PROCEDURE — 97535 SELF CARE MNGMENT TRAINING: CPT

## 2020-07-29 PROCEDURE — 74011250636 HC RX REV CODE- 250/636: Performed by: ORTHOPAEDIC SURGERY

## 2020-07-29 PROCEDURE — 97110 THERAPEUTIC EXERCISES: CPT

## 2020-07-29 PROCEDURE — 74011250637 HC RX REV CODE- 250/637: Performed by: ORTHOPAEDIC SURGERY

## 2020-07-29 RX ORDER — OXYCODONE HYDROCHLORIDE 5 MG/1
5-10 TABLET ORAL
Qty: 60 TAB | Refills: 0 | Status: SHIPPED | OUTPATIENT
Start: 2020-07-29 | End: 2020-08-05

## 2020-07-29 RX ORDER — ASPIRIN 81 MG/1
81 TABLET ORAL EVERY 12 HOURS
Qty: 60 TAB | Refills: 0 | Status: SHIPPED | OUTPATIENT
Start: 2020-07-29 | End: 2020-08-28

## 2020-07-29 RX ADMIN — Medication 1 AMPULE: at 22:17

## 2020-07-29 RX ADMIN — LEVOTHYROXINE SODIUM 88 MCG: 88 TABLET ORAL at 08:58

## 2020-07-29 RX ADMIN — HYDROMORPHONE HYDROCHLORIDE 1 MG: 1 INJECTION, SOLUTION INTRAMUSCULAR; INTRAVENOUS; SUBCUTANEOUS at 18:00

## 2020-07-29 RX ADMIN — CEFAZOLIN SODIUM 2 G: 100 INJECTION, POWDER, LYOPHILIZED, FOR SOLUTION INTRAVENOUS at 03:34

## 2020-07-29 RX ADMIN — ASPIRIN 81 MG: 81 TABLET, COATED ORAL at 09:00

## 2020-07-29 RX ADMIN — ACETAMINOPHEN 1000 MG: 500 TABLET, FILM COATED ORAL at 05:55

## 2020-07-29 RX ADMIN — OXYCODONE HYDROCHLORIDE 10 MG: 5 TABLET ORAL at 09:00

## 2020-07-29 RX ADMIN — Medication 5 ML: at 22:22

## 2020-07-29 RX ADMIN — Medication 10 ML: at 05:55

## 2020-07-29 RX ADMIN — Medication 5 ML: at 03:34

## 2020-07-29 RX ADMIN — LOSARTAN POTASSIUM 100 MG: 50 TABLET ORAL at 08:58

## 2020-07-29 RX ADMIN — OXYCODONE HYDROCHLORIDE 10 MG: 5 TABLET ORAL at 16:50

## 2020-07-29 RX ADMIN — ACETAMINOPHEN 1000 MG: 500 TABLET, FILM COATED ORAL at 13:37

## 2020-07-29 RX ADMIN — ACETAMINOPHEN 1000 MG: 500 TABLET, FILM COATED ORAL at 18:00

## 2020-07-29 RX ADMIN — ASPIRIN 81 MG: 81 TABLET, COATED ORAL at 22:17

## 2020-07-29 RX ADMIN — Medication 1 AMPULE: at 09:00

## 2020-07-29 RX ADMIN — OXYCODONE HYDROCHLORIDE 10 MG: 5 TABLET ORAL at 13:37

## 2020-07-29 RX ADMIN — DOCUSATE SODIUM 50MG AND SENNOSIDES 8.6MG 2 TABLET: 8.6; 5 TABLET, FILM COATED ORAL at 08:58

## 2020-07-29 RX ADMIN — HYDROMORPHONE HYDROCHLORIDE 1 MG: 1 INJECTION, SOLUTION INTRAMUSCULAR; INTRAVENOUS; SUBCUTANEOUS at 03:51

## 2020-07-29 RX ADMIN — OXYCODONE HYDROCHLORIDE 10 MG: 5 TABLET ORAL at 22:20

## 2020-07-29 RX ADMIN — ZOLPIDEM TARTRATE 5 MG: 5 TABLET ORAL at 22:17

## 2020-07-29 NOTE — PROGRESS NOTES
Orthopedic Joint Progress Note    2020  Admit Date: 2020  Admit Diagnosis: Primary osteoarthritis of right knee [M17.11]  Arthritis of knee, right [M17.11]    1 Day Post-Op    Subjective:     Kimmy Alvarez awake and alert    Review of Systems: Pertinent items are noted in HPI. Objective:     PT/OT:     PATIENT MOBILITY    Bed Mobility  Supine to Sit: Contact guard assistance  Transfers  Sit to Stand: Contact guard assistance  Stand to Sit: Contact guard assistance  Bed to Chair: Contact guard assistance      Gait  Speed/Elana: Pace decreased (<100 feet/min)  Step Length: Left shortened, Right shortened  Stance: Right decreased  Gait Abnormalities: Antalgic  Ambulation - Level of Assistance: Contact guard assistance  Distance (ft): 15 Feet (ft)  Assistive Device: Walker, rolling  Interventions: Safety awareness training, Verbal cues   Weight Bearing Status  Right Side Weight Bearing: As tolerated        Vital Signs:    Blood pressure 136/64, pulse 72, temperature 98 °F (36.7 °C), resp. rate 16, height 5' 3\" (1.6 m), weight 99.8 kg (220 lb), SpO2 98 %.   Temp (24hrs), Av.1 °F (36.7 °C), Min:97.7 °F (36.5 °C), Max:98.3 °F (36.8 °C)      Pain Control:   Pain Assessment  Pain Scale 1: Numeric (0 - 10)  Pain Intensity 1: 2  Pain Onset 1: post op  Pain Location 1: Knee  Pain Orientation 1: Right  Pain Description 1: Aching  Pain Intervention(s) 1: Medication (see MAR)    Meds:  Current Facility-Administered Medications   Medication Dose Route Frequency    levothyroxine (SYNTHROID) tablet 88 mcg  88 mcg Oral ACB    losartan (COZAAR) tablet 100 mg  100 mg Oral DAILY    zolpidem (AMBIEN) tablet 5 mg  5 mg Oral QHS    0.9% sodium chloride infusion  100 mL/hr IntraVENous CONTINUOUS    sodium chloride (NS) flush 5-40 mL  5-40 mL IntraVENous Q8H    sodium chloride (NS) flush 5-40 mL  5-40 mL IntraVENous PRN    acetaminophen (TYLENOL) tablet 1,000 mg  1,000 mg Oral Q6H    oxyCODONE IR (ROXICODONE) tablet 5-10 mg  5-10 mg Oral Q4H PRN    HYDROmorphone (PF) (DILAUDID) injection 1 mg  1 mg IntraVENous Q3H PRN    naloxone (NARCAN) injection 0.2-0.4 mg  0.2-0.4 mg IntraVENous Q10MIN PRN    dexamethasone (DECADRON) 10 mg/mL injection 10 mg  10 mg IntraVENous ONCE    promethazine (PHENERGAN) tablet 25 mg  25 mg Oral Q6H PRN    diphenhydrAMINE (BENADRYL) capsule 25 mg  25 mg Oral Q4H PRN    senna-docusate (PERICOLACE) 8.6-50 mg per tablet 2 Tab  2 Tab Oral DAILY    aspirin delayed-release tablet 81 mg  81 mg Oral Q12H    ondansetron (ZOFRAN ODT) tablet 8 mg  8 mg Oral Q8H PRN    alcohol 62% (NOZIN) nasal  1 Ampule  1 Ampule Topical Q12H        LAB:    Lab Results   Component Value Date/Time    INR 1.0 07/07/2020 10:50 AM    INR 1.0 03/10/2020 10:48 AM    INR 1.0 11/23/2015 09:05 AM     Lab Results   Component Value Date/Time    HGB 12.3 07/28/2020 07:54 PM    HGB 13.7 07/07/2020 10:50 AM    HGB 13.3 03/10/2020 10:48 AM       Wound Knee Left (Active)   Number of days: 1688       Wound Knee Right (Active)   Dressing Status Clean, dry, and intact 07/28/20 1455   Dressing Type Aquacel 07/28/20 1455   Number of days: 1         Physical Exam:  Calves soft/ neuro intact      Assessment:      Principal Problem:    S/P TKR (total knee replacement), right (7/29/2020)    Active Problems:    Arthritis of knee, right (7/28/2020)         Plan:     Continue PT/OT/Rehab  Consult: Rehab team including PT, OT, recreational therapy, and    Home soon    Patient Expects to be Discharged to[de-identified] Private residence

## 2020-07-29 NOTE — PROGRESS NOTES
Problem: Falls - Risk of  Goal: *Absence of Falls  Description: Document Zenon Jean Fall Risk and appropriate interventions in the flowsheet.   Outcome: Progressing Towards Goal  Note: Fall Risk Interventions:  Mobility Interventions: Bed/chair exit alarm, Communicate number of staff needed for ambulation/transfer, OT consult for ADLs, Patient to call before getting OOB, PT Consult for mobility concerns, PT Consult for assist device competence         Medication Interventions: Patient to call before getting OOB    Elimination Interventions: Call light in reach, Patient to call for help with toileting needs              Problem: Patient Education: Go to Patient Education Activity  Goal: Patient/Family Education  Outcome: Progressing Towards Goal     Problem: Patient Education: Go to Patient Education Activity  Goal: Patient/Family Education  Outcome: Progressing Towards Goal     Problem: Patient Education: Go to Patient Education Activity  Goal: Patient/Family Education  Outcome: Progressing Towards Goal

## 2020-07-29 NOTE — PROGRESS NOTES
Problem: Self Care Deficits Care Plan (Adult)  Goal: *Acute Goals and Plan of Care (Insert Text)  Description: GOALS:   DISCHARGE GOALS (in preparation for going home/rehab):  3 days  1. Ms. Deepa Pineda will perform one lower body dressing activity with minimal assistance required to demonstrate improved functional mobility and safety. 2.  Ms. Deepa Pineda will perform one lower body bathing activity with minimal assistance required to demonstrate improved functional mobility and safety. 3.  Ms. Deepa Pineda will perform toileting/toilet transfer with contact guard assistance to demonstrate improved functional mobility and safety. 4.  Ms. Deepa Pineda will perform shower transfer with contact guard assistance to demonstrate improved functional mobility and safety. Outcome: MET     JOINT CAMP OCCUPATIONAL THERAPY TKA: Daily Note, Discharge, Treatment Day: 1st and AM 7/29/2020  INPATIENT: Hospital Day: 2  Payor: Lakia Armas / Plan: [address] [place] [address] HMO / Product Type: Managed Care Medicare /      NAME/AGE/GENDER: Pedro Mclaughlin is a 69 y.o. female   PRIMARY DIAGNOSIS:  Primary osteoarthritis of right knee [M17.11]   Procedure(s) and Anesthesia Type:     * RIGHT KNEE ARTHROPLASTY TOTAL/DEPUY - Spinal (Right)  ICD-10: Treatment Diagnosis:    · Pain in Right Knee (M25.561)  · Stiffness of Right Knee, Not elsewhere classified (H10.109)      ASSESSMENT:     Ms. Deepa Pineda is s/p right TKA and presents with decreased weight bearing on right LE and decreased independence with functional mobility and activities of daily living as compared to baseline level of function and safety. Patient would benefit from skilled Occupational Therapy to maximize independence and safety with self-care task and functional mobility. Pt would also benefit from education on adaptive equipment and safety precautions in preparation for going home. Pt up in room, to bathroom and donned underwear. Pt moves well and should progress well with self care.       With encouragement, patient completed shower and dressing as charter below in ADL grid and is ambulating with rolling walker and stand by assist.  Patient has met 4/4 goals and plans to return home with good family support. Family able to provide patient with appropriate level of assistance at this time. OT reviewed safety precautions throughout session and therapy schedule for the remainder of today. Patient instructed to call for assistance when needing to get up from recliner and all needs in reach. Patient verbalized understanding of call light. This section established at most recent assessment   PROBLEM LIST (Impairments causing functional limitations):  1. Decreased Strength  2. Decreased ADL/Functional Activities  3. Decreased Transfer Abilities  4. Increased Pain  5. Increased Fatigue  6. Decreased Flexibility/Joint Mobility  7. Decreased Knowledge of Precautions   INTERVENTIONS PLANNED: (Benefits and precautions of occupational therapy have been discussed with the patient.)  1. Activities of daily living training  2. Adaptive equipment training  3. Balance training  4. Clothing management  5. Donning&doffing training  6. Theraputic activity     TREATMENT PLAN: Frequency/Duration: Follow patient 1-2 times to address above goals. Rehabilitation Potential For Stated Goals: Good     RECOMMENDED REHABILITATION/EQUIPMENT: (at time of discharge pending progress): Continue Skilled Therapy. OCCUPATIONAL PROFILE AND HISTORY:   History of Present Injury/Illness (Reason for Referral): Pt presents this date s/p (right) TKA. Past Medical History/Comorbidities:   Ms. Isha De La Garza  has a past medical history of Anxiety, Chronic pain, Depression (5/19/2017), Essential hypertension (05/19/2017), Hypothyroid, Insomnia, Nausea & vomiting, Obesity, Osteoarthritis (12/15/2015), and Stress incontinence, female.  She also has no past medical history of Adverse effect of anesthesia, Aneurysm ([address] Arrhythmia, Asthma, Autoimmune disease ([address] CAD (coronary artery disease), Cancer ([address] Chronic kidney disease, Chronic obstructive pulmonary disease ([address] Coagulation defects, Coagulation disorder ([address] COPD, Diabetes ([address] Difficult intubation, Endocarditis, GERD (gastroesophageal reflux disease), Heart failure ([address] Liver disease, Malignant hyperthermia due to anesthesia, Nicotine vapor product user, Non-nicotine vapor product user, Other ill-defined conditions(799.89), Pseudocholinesterase deficiency, PUD (peptic ulcer disease), Rheumatic fever, Seizures ([address] Sleep apnea, Stroke ([address] or Thromboembolus ([address] Ms. Zonia Roman  has a past surgical history that includes hx hysterectomy (); hx  section (); hx colonoscopy (); hx breast biopsy (Bilateral); and hx knee replacement (Left, 12/15/2015). Social History/Living Environment:   Home Environment: Private residence  # Steps to Enter: 1  Rails to Enter: No  One/Two Story Residence: One story  Living Alone: Yes  Support Systems: Child(beth)  Patient Expects to be Discharged to[de-identified] Private residence  Current DME Used/Available at Home: [facility], [place], straight, Shower chair, Walker, rolling  Tub or Shower Type: Tub/Shower combination  Prior Level of Function/Work/Activity:  Independent      Number of Personal Factors/Comorbidities that affect the Plan of Care: Brief history (0):  LOW COMPLEXITY   ASSESSMENT OF OCCUPATIONAL PERFORMANCE[de-identified]   Most Recent Physical Functioning:   Balance  Sitting: Intact  Standing: Intact; With support                              Mental Status  Neurologic State: Alert                Basic ADLs (From Assessment) Complex ADLs (From Assessment)   Basic ADL  Feeding: Independent  Oral Facial Hygiene/Grooming: Supervision  Bathing: Moderate assistance  Type of Bath: Chlorhexidine (CHG), Full, Shower  Upper Body Dressing: Supervision  Lower Body Dressing: Moderate assistance  Toileting:  Moderate assistance     Grooming/Bathing/Dressing Activities of Daily Living   Grooming  Grooming Assistance: Supervision(standing at the sink)     Upper Body Bathing  Bathing Assistance: Supervision     Lower Body Bathing  Bathing Assistance: Supervision  Position Performed: Seated in chair  Adaptive Equipment: Grab bar;Walker  Adaptive Equipment: Shower chair;Walker;Grab bar [address]: Stand-by assistance  Cues: Verbal cues provided  Adaptive Equipment: Grab bars;Elevated seat;Walker   Upper Body Dressing Assistance  Dressing Assistance: Supervision Functional Transfers  Bathroom Mobility: Stand-by assistance  Toilet Transfer : Stand-by assistance  Shower Transfer: Stand-by assistance  Cues: Verbal cues provided  Adaptive Equipment: Shower chair with back   [address]: Minimum assistance Bed/Mat Mobility  Supine to Sit: Contact guard assistance  Sit to Stand: Contact guard assistance  Stand to Sit: Contact guard assistance  Bed to Chair: Contact guard assistance         Physical Skills Involved:  1. Range of Motion  2. Balance  3. Strength Cognitive Skills Affected (resulting in the inability to perform in a timely and safe manner): 1. none Psychosocial Skills Affected:  1. Environmental Adaptation   Number of elements that affect the Plan of Care: 3-5:  MODERATE COMPLEXITY   CLINICAL DECISION MAKING:   [facility] -PAC 6 Clicks   Daily Activity Inpatient Short Form  How much help from another person does the patient currently need. .. Total A Lot A Little None   1. Putting on and taking off regular lower body clothing? [] 1   [] 2   [x] 3   [] 4   2. Bathing (including washing, rinsing, drying)? [] 1   [] 2   [] 3   [x] 4   3. Toileting, which includes using toilet, bedpan or urinal?   [] 1   [] 2   [] 3   [x] 4   4. Putting on and taking off regular upper body clothing? [] 1   [] 2   [] 3   [x] 4   5. Taking care of personal grooming such as brushing teeth?    [] 1   [] 2   [] 3 [x] 4   6. Eating meals? [] 1   [] 2   [] 3   [x] 4   © 2007, Trustees of [address] [place] [address] [address] [address], under license to [email or web address]. All rights reserved     Score:  Initial: 19 Most Recent: 23 (Date: 7/29/2020 )    Interpretation of Tool:  Represents activities that are increasingly more difficult (i.e. Bed mobility, Transfers, Gait). Use of outcome tool(s) and clinical judgement create a POC that gives a: LOW COMPLEXITY            TREATMENT:   (In addition to Assessment/Re-Assessment sessions the following treatments were rendered)     Pre-treatment Symptoms/Complaints:  pt without complaint of pain  Pain: Initial:   Pain Intensity 1: 0 0 Post Session:  0   Self Care: (40 min): Procedure(s) (per grid) utilized to improve and/or restore self-care/home management as related to dressing, bathing, toileting and grooming. Required minimal verbal, manual and   cueing to facilitate activities of daily living skills and compensatory activities. Treatment/Session Assessment:     Response to Treatment:  Pt up to shower tolerated well. Education:  [] Home Exercises  [x] Fall Precautions  [] Hip Precautions [] Going Home Video  [x] Knee/Hip Prosthesis Review  [x] Walker Management/Safety [x] Adaptive Equipment as Needed       Interdisciplinary Collaboration:   o Physical Therapist  o Occupational Therapist  o Registered Nurse    After treatment position/precautions:   o Up in chair  o Bed/Chair-wheels locked  o Call light within reach  o RN notified     Compliance with Program/Exercises: Compliant all of the time. Recommendations/Intent for next treatment session: Pt doing well all goals met and will do well at home with support from son. Patient will be discharged home with home health PT. No further Occupational Therapy warranted, will discharge Occupational Therapy services.       Total Treatment Duration:  OT Patient Time In/Time Out  Time In: 0915  Time Out: [facility],

## 2020-07-29 NOTE — PROGRESS NOTES
Shift assessment completed via flow sheet. No signs of distress or c/o pain at this time. Safety measures in place. Instructed pt to call for assistance. Will continue to monitor. 2220 Pt c/o R knee pain. 6/10. Administered PRN Roxicodone 2 5mg tablets PO per MD order. Safety measures are in place. Instructed pt to call for assistance. Will continue to monitor.

## 2020-07-29 NOTE — PROGRESS NOTES
Assisted pt to bathroom. Upon returning to bed, pt c/o R knee pain 10/10. Administered PRN Dilaudid 1mg IVP per MD order. Safety measures in place. Will continue to monitor.

## 2020-07-29 NOTE — PROGRESS NOTES
Problem: Mobility Impaired (Adult and Pediatric)  Goal: *Acute Goals and Plan of Care (Insert Text)  Description: GOALS (1-4 days):  (1.)Ms. Tee Melvin will move from supine to sit and sit to supine  in bed with SUPERVISION. (2.)Ms. Tee Melvin will transfer from bed to chair and chair to bed with SUPERVISION using the least restrictive device. (3.)Ms. Tee Melvin will ambulate with SUPERVISION for 200 feet with the least restrictive device. (4.)Ms. Tee Melvin will ambulate up/down 1 steps without a railing with MINIMAL ASSIST with no device. (5.)Ms. Tee Melvin will increase right knee ROM to 5°-80°.  ________________________________________________________________________________________________    Outcome: Progressing Towards Goal     PHYSICAL THERAPY JOINT CAMP TKA: Daily Note and AM 7/29/2020  INPATIENT: Hospital Day: 2  Payor: Kelsy Cain / Plan: [address] [place] [address] HMO / Product Type: Managed Care Medicare /      NAME/AGE/GENDER: Solon Crigler is a 69 y.o. female   PRIMARY DIAGNOSIS:  Primary osteoarthritis of right knee [M17.11]   Procedure(s) and Anesthesia Type:     * RIGHT KNEE ARTHROPLASTY TOTAL/DEPUY - Spinal (Right)  ICD-10: Treatment Diagnosis:    · Pain in Right Knee (M25.561)  · Stiffness of Right Knee, Not elsewhere classified (M25.661)  · Difficulty in walking, Not elsewhere classified (R26.2)      ASSESSMENT:     Ms. Tee Melvin presents s/p R TKA. Patient demonstrates decreased R LE strength and ROM and decreased independence with mobility. Patient would benefit from therapy to address these deficits. She had a L TKA in 2015. Patient will return home at d/c with assist from her son.  7/29 am supine upon arrival.  Performs exercises in the bed without any problems or increase in pain. Walk 30 ft using RW with CGA and no LOB. Remain in the recliner with needs in reach. This section established at most recent assessment   PROBLEM LIST (Impairments causing functional limitations):  1. Decreased Strength  2.  Decreased ADL/Functional Activities  3. Decreased Transfer Abilities  4. Decreased Ambulation Ability/Technique  5. Decreased Balance  6. Increased Pain  7. Decreased Flexibility/Joint Mobility  8. Edema/Girth  9. Decreased [place] with Home Exercise Program   INTERVENTIONS PLANNED: (Benefits and precautions of physical therapy have been discussed with the patient.)  1. bed mobility  2. gait training  3. home exercise program (HEP)  4. Range of Motion: active/assisted/passive  5. Therapeutic Activities  6. therapeutic exercise/strengthening  7. transfer training  8. Group Therapy     TREATMENT PLAN: Frequency/Duration: Follow patient BID for duration of hospital stay to address above goals. Rehabilitation Potential For Stated Goals: Good     RECOMMENDED REHABILITATION/EQUIPMENT: (at time of discharge pending progress): Continue Skilled Therapy. HISTORY:   History of Present Injury/Illness (Reason for Referral):  R TKA  Past Medical History/Comorbidities:   Ms. Jay Pollock  has a past medical history of Anxiety, Chronic pain, Depression (5/19/2017), Essential hypertension (05/19/2017), Hypothyroid, Insomnia, Nausea & vomiting, Obesity, Osteoarthritis (12/15/2015), and Stress incontinence, female. She also has no past medical history of Adverse effect of anesthesia, Aneurysm ([address] Arrhythmia, Asthma, Autoimmune disease ([address] CAD (coronary artery disease), Cancer ([address] Chronic kidney disease, Chronic obstructive pulmonary disease ([address] Coagulation defects, Coagulation disorder ([address] COPD, Diabetes ([address] Difficult intubation, Endocarditis, GERD (gastroesophageal reflux disease), Heart failure ([address] Liver disease, Malignant hyperthermia due to anesthesia, Nicotine vapor product user, Non-nicotine vapor product user, Other ill-defined conditions(799.89), Pseudocholinesterase deficiency, PUD (peptic ulcer disease), Rheumatic fever, Seizures ([address] Sleep apnea, Stroke ([address] or Thromboembolus ([address]   Ms. Jay Pollock  has a past surgical history that includes hx hysterectomy (); hx  section (); hx colonoscopy (); hx breast biopsy (Bilateral); and hx knee replacement (Left, 12/15/2015). Social History/Living Environment:   Home Environment: Private residence  # Steps to Enter: 1  Rails to Enter: No  One/Two Story Residence: One story  Living Alone: Yes  Support Systems: Child(beth)  Patient Expects to be Discharged to[de-identified] Private residence  Current DME Used/Available at Home: Álvaro Cater, straight, Shower chair, Walker, rolling  Tub or Shower Type: Tub/Shower combination  Prior Level of Function/Work/Activity:  Ambulating with a cane. Number of Personal Factors/Comorbidities that affect the Plan of Care: 0: LOW COMPLEXITY   EXAMINATION:   Most Recent Physical Functioning:                            Bed Mobility  Supine to Sit: Contact guard assistance    Transfers  Sit to Stand: Contact guard assistance  Stand to Sit: Contact guard assistance  Bed to Chair: Contact guard assistance    Balance  Sitting: Intact  Standing: With support              Weight Bearing Status  Right Side Weight Bearing: As tolerated  Distance (ft): 30 Feet (ft)  Ambulation - Level of Assistance: Contact guard assistance  Assistive Device: Walker, rolling  Speed/Elana: Pace decreased (<100 feet/min)  Step Length: Left shortened;Right shortened  Stance: Right decreased  Gait Abnormalities: Antalgic  Interventions: Safety awareness training;Verbal cues     Braces/Orthotics: none    Right Knee Cold  Type: Cryocuff      Body Structures Involved:  1. Joints  2. Muscles Body Functions Affected:  1. Movement Related Activities and Participation Affected:  1.  Mobility   Number of elements that affect the Plan of Care: 4+: HIGH COMPLEXITY   CLINICAL PRESENTATION:   Presentation: Stable and uncomplicated: LOW COMPLEXITY   CLINICAL DECISION MAKIN [facility] [address] [id number] AM-PAC 6 Clicks   Basic Mobility Inpatient Short Form  How much difficulty does the patient currently have. .. Unable A Lot A Little None   1. Turning over in bed (including adjusting bedclothes, sheets and blankets)? [] 1   [] 2   [x] 3   [] 4   2. Sitting down on and standing up from a chair with arms ( e.g., wheelchair, bedside commode, etc.)   [] 1   [] 2   [x] 3   [] 4   3. Moving from lying on back to sitting on the side of the bed? [] 1   [] 2   [x] 3   [] 4   How much help from another person does the patient currently need. .. Total A Lot A Little None   4. Moving to and from a bed to a chair (including a wheelchair)? [] 1   [] 2   [x] 3   [] 4   5. Need to walk in hospital room? [] 1   [] 2   [x] 3   [] 4   6. Climbing 3-5 steps with a railing? [] 1   [] 2   [x] 3   [] 4   © 2007, Trustees of [address] [place], [place] [id number], under license to [employer]. All rights reserved     Score:  Initial: 18 Most Recent: X (Date: -- )    Interpretation of Tool:  Represents activities that are increasingly more difficult (i.e. Bed mobility, Transfers, Gait). Medical Necessity:     · Patient is expected to demonstrate progress in   · strength, range of motion, balance, and coordination  ·  to   · increase independence with mobility and HEP. · .  Reason for Services/Other Comments:  · Patient continues to require skilled intervention due to   · Decreased R LE strength and ROM and decreased independence with mobility. · .   Use of outcome tool(s) and clinical judgement create a POC that gives a: Clear prediction of patient's progress: LOW COMPLEXITY            TREATMENT:   (In addition to Assessment/Re-Assessment sessions the following treatments were rendered)     Pre-treatment Symptoms/Complaints:  Patient agreeable to work with therapist  Pain Initial:   Pain Intensity 1: 4(more sore after therapy)  Post Session:      Gait Training (15 Minutes):  Gait training to improve and/or restore physical functioning as related to mobility, balance, and coordination.   Ambulated 30 Feet (ft) with Contact guard assistance using a Walker, rolling and minimal Safety awareness training;Verbal cues related to their stance phase and stride length to promote proper body alignment. Therapeutic Exercise: (15 Minutes):  Exercises per grid below to improve mobility and strength. Required minimal verbal and tactile cues to promote proper body alignment. Progressed range and repetitions as indicated. Date:  7/28/20 Date:  7/29   Date:     ACTIVITY/EXERCISE AM PM AM PM AM PM   GROUP THERAPY  []  []  []  []  []  []   Ankle Pumps  10 15      Quad Sets  10 15      Gluteal Sets  10 15      Hip ABd/ADduction  10 15      Straight Leg Raises  10 15      Knee Slides  10 15      Short Arc Quads  10 15      Long Arc Quads         Chair Slides                  B = bilateral; AA = active assistive; A = active; P = passive      Treatment/Session Assessment:     Response to Treatment:  Patient participated well. Education:  [] Home Exercises  [x] Fall Precautions  []  [] D/C Instruction Review  [] Knee Prosthesis Review  [x] Walker Management/Safety [] Adaptive Equipment as Needed       Interdisciplinary Collaboration:   o Physical Therapy Assistant  o Registered Nurse    After treatment position/precautions:   o Up in chair  o Bed/Chair-wheels locked  o Call light within reach  o Family at bedside    Compliance with Program/Exercises: Will assess as treatment progresses. Recommendations/Intent for next treatment session:  Treatment next visit will focus on increasing Ms. Olympia's independence with bed mobility, transfers, gait training, strength/ROM exercises, modalities for pain, and patient education.       Total Treatment Duration:  PT Patient Time In/Time Out  Time In: 0745  Time Out: 0815    Laverne Niño, PTA

## 2020-07-29 NOTE — DISCHARGE SUMMARY
[facility]  Total Joint Discharge Summary      Patient ID:  Nena Kapoor  [id number]  45 y.o.  1945    Admit date: 7/28/2020  Discharge date and time: 7/29/20  Admitting Physician: Geralene Fleischer, MD  Surgeon: Same  Admission Diagnoses: Primary osteoarthritis of right knee [M17.11]  Arthritis of knee, right [M17.11]  Discharge Diagnoses: Principal Problem:    S/P TKR (total knee replacement), right (7/29/2020)    Active Problems:    Arthritis of knee, right (7/28/2020)                                Perioperative Antibiotics: Ancef 1 to 2 mg was given depending on patient's weight. If allergic to Ancef or due to other indications, patient was given Vancomycin. Hospital Medications given:   [unfilled]  [unfilled]  [unfilled]    Discharge Medications given:  Current Discharge Medication List      START taking these medications    Details   aspirin delayed-release 81 mg tablet Take 1 Tab by mouth every twelve (12) hours every twelve (12) hours for 30 days. Qty: 60 Tab, Refills: 0      oxyCODONE IR (ROXICODONE) 5 mg immediate release tablet Take 1-2 Tabs by mouth every four (4) hours as needed for Pain for up to 7 days. Max Daily Amount: 60 mg.  Qty: 60 Tab, Refills: 0    Associated Diagnoses: S/P TKR (total knee replacement), right         CONTINUE these medications which have NOT CHANGED    Details   levothyroxine (SYNTHROID) 88 mcg tablet Take 88 mcg by mouth Daily (before breakfast). Take / use AM day of surgery  per anesthesia protocols. Indications: a condition with low thyroid hormone levels      losartan (COZAAR) 50 mg tablet Take 100 mg by mouth daily. Indications: high blood pressure      acetaminophen (TYLENOL) 325 mg tablet Take 650 mg by mouth every six (6) hours as needed for Pain. Indications: pain      zolpidem (AMBIEN) 10 mg tablet Take 1 Tab by mouth nightly as needed for Sleep.  Max Daily Amount: 10 mg.  Qty: 90 Tab, Refills: 1    Associated Diagnoses: Sleep disturbance      cholecalciferol, VITAMIN D3, (VITAMIN D3) 5,000 unit tab tablet Take 5,000 Units by mouth daily (after lunch). Additional DVT Prophylaxis:  ROGELIO Hose,Plexi-Pulse    Postoperative transfusions:   none  Post Op complications: none    Hemoglobin at discharge:   Lab Results   Component Value Date/Time    HGB 12.3 07/28/2020 07:54 PM       Wound appears to be healing without any evidence of infection. Physical Therapy started on the day following surgery and progressed to independent ambulation with the aid of a walker. At the time of discharge, able to go up and down stairs and had understanding of precautions needed following surgery.       PT/OT:            Assistive Device: Walker (comment)                Discharged to: home    Discharge instructions:  -Rx pain medication given  - Anticoagulate with: Ecotrin 81 mg PO BID x 4 weeks  -Resume pre hospital diet             -Resume home medications per medical continuation form     -Ambulate with walker, appropriate total joint protocol  -Follow up in office as scheduled       Signed:  Darryle Lister, MD  7/29/2020  7:36 AM

## 2020-07-29 NOTE — PROGRESS NOTES
Problem: Mobility Impaired (Adult and Pediatric)  Goal: *Acute Goals and Plan of Care (Insert Text)  Description: GOALS (1-4 days):  (1.)Ms. Anaid Patel will move from supine to sit and sit to supine  in bed with SUPERVISION. (2.)Ms. Anaid Patel will transfer from bed to chair and chair to bed with SUPERVISION using the least restrictive device. (3.)Ms. Anaid Patel will ambulate with SUPERVISION for 200 feet with the least restrictive device. (4.)Ms. Anaid Patel will ambulate up/down 1 steps without a railing with MINIMAL ASSIST with no device. (5.)Ms. Anaid Patel will increase right knee ROM to 5°-80°.  ________________________________________________________________________________________________    Outcome: Progressing Towards Goal     PHYSICAL THERAPY JOINT CAMP TKA: Daily Note and PM 7/29/2020  INPATIENT: Hospital Day: 2  Payor: Max Hamilton / Plan: [address] [place] [address] HMO / Product Type: Managed Care Medicare /      NAME/AGE/GENDER: Elo Mcclendon is a 69 y.o. female   PRIMARY DIAGNOSIS:  Primary osteoarthritis of right knee [M17.11]   Procedure(s) and Anesthesia Type:     * RIGHT KNEE ARTHROPLASTY TOTAL/DEPUY - Spinal (Right)  ICD-10: Treatment Diagnosis:    · Pain in Right Knee (M25.561)  · Stiffness of Right Knee, Not elsewhere classified (M25.661)  · Difficulty in walking, Not elsewhere classified (R26.2)      ASSESSMENT:     Ms. Anaid Patel presents s/p R TKA. Patient demonstrates decreased R LE strength and ROM and decreased independence with mobility. Patient would benefit from therapy to address these deficits. She had a L TKA in 2015. Patient will return home at d/c with assist from her son.  7/29 am supine upon arrival.  Performs exercises in the bed without any problems or increase in pain. Walk 30 ft using RW with CGA and no LOB. Remain in the recliner with needs in reach.  7/29 pm sitting in the recliner upon arrival.  Performs exercises in the recliner with some increase in pain.   Increase her distance using RW with CGA and working on normal gait pattern. Return to supine with needs in reach. This section established at most recent assessment   PROBLEM LIST (Impairments causing functional limitations):  1. Decreased Strength  2. Decreased ADL/Functional Activities  3. Decreased Transfer Abilities  4. Decreased Ambulation Ability/Technique  5. Decreased Balance  6. Increased Pain  7. Decreased Flexibility/Joint Mobility  8. Edema/Girth  9. Decreased [place] with Home Exercise Program   INTERVENTIONS PLANNED: (Benefits and precautions of physical therapy have been discussed with the patient.)  1. bed mobility  2. gait training  3. home exercise program (HEP)  4. Range of Motion: active/assisted/passive  5. Therapeutic Activities  6. therapeutic exercise/strengthening  7. transfer training  8. Group Therapy     TREATMENT PLAN: Frequency/Duration: Follow patient BID for duration of hospital stay to address above goals. Rehabilitation Potential For Stated Goals: Good     RECOMMENDED REHABILITATION/EQUIPMENT: (at time of discharge pending progress): Continue Skilled Therapy. HISTORY:   History of Present Injury/Illness (Reason for Referral):  R TKA  Past Medical History/Comorbidities:   Ms. Bertha Stewart  has a past medical history of Anxiety, Chronic pain, Depression (5/19/2017), Essential hypertension (05/19/2017), Hypothyroid, Insomnia, Nausea & vomiting, Obesity, Osteoarthritis (12/15/2015), and Stress incontinence, female.  She also has no past medical history of Adverse effect of anesthesia, Aneurysm ([address] Arrhythmia, Asthma, Autoimmune disease ([address] CAD (coronary artery disease), Cancer ([address] Chronic kidney disease, Chronic obstructive pulmonary disease ([address] Coagulation defects, Coagulation disorder ([address] COPD, Diabetes ([address] Difficult intubation, Endocarditis, GERD (gastroesophageal reflux disease), Heart failure ([address] Liver disease, Malignant hyperthermia due to anesthesia, Nicotine vapor product user, Non-nicotine vapor product user, Other ill-defined conditions(799.89), Pseudocholinesterase deficiency, PUD (peptic ulcer disease), Rheumatic fever, Seizures ([facility] Sleep apnea, Stroke ([facility] or Thromboembolus ([facility] Ms. Daron Dupree  has a past surgical history that includes hx hysterectomy (); hx  section (); hx colonoscopy (); hx breast biopsy (Bilateral); and hx knee replacement (Left, 12/15/2015). Social History/Living Environment:   Home Environment: Private residence  # Steps to Enter: 1  Rails to Enter: No  One/Two Story Residence: One story  Living Alone: Yes  Support Systems: Child(beth)  Patient Expects to be Discharged to[de-identified] Private residence  Current DME Used/Available at Home: [facility], [place], straight, Shower chair, Walker, rolling  Tub or Shower Type: Tub/Shower combination  Prior Level of Function/Work/Activity:  Ambulating with a cane. Number of Personal Factors/Comorbidities that affect the Plan of Care: 0: LOW COMPLEXITY   EXAMINATION:   Most Recent Physical Functioning:                            Bed Mobility  Supine to Sit: Contact guard assistance    Transfers  Sit to Stand: Contact guard assistance  Stand to Sit: Contact guard assistance  Bed to Chair: Contact guard assistance    Balance  Sitting: Intact  Standing: Intact; With support              Weight Bearing Status  Right Side Weight Bearing: As tolerated  Distance (ft): 100 Feet (ft)  Ambulation - Level of Assistance: Contact guard assistance  Assistive Device: Walker, rolling  Speed/Elana: Pace decreased (<100 feet/min)  Step Length: Left shortened;Right shortened  Stance: Right decreased  Gait Abnormalities: Antalgic  Interventions: Safety awareness training     Braces/Orthotics: none    Right Knee Cold  Type: Cryocuff      Body Structures Involved:  1. Joints  2. Muscles Body Functions Affected:  1. Movement Related Activities and Participation Affected:  1.  Mobility   Number of elements that affect the Plan of Care: 4+: HIGH COMPLEXITY   CLINICAL PRESENTATION:   Presentation: Stable and uncomplicated: LOW COMPLEXITY   CLINICAL DECISION MAKIN[address] [place] [id number] AM-PAC 6 Clicks   Basic Mobility Inpatient Short Form  How much difficulty does the patient currently have. .. Unable A Lot A Little None   1. Turning over in bed (including adjusting bedclothes, sheets and blankets)? [] 1   [] 2   [x] 3   [] 4   2. Sitting down on and standing up from a chair with arms ( e.g., wheelchair, bedside commode, etc.)   [] 1   [] 2   [x] 3   [] 4   3. Moving from lying on back to sitting on the side of the bed? [] 1   [] 2   [x] 3   [] 4   How much help from another person does the patient currently need. .. Total A Lot A Little None   4. Moving to and from a bed to a chair (including a wheelchair)? [] 1   [] 2   [x] 3   [] 4   5. Need to walk in hospital room? [] 1   [] 2   [x] 3   [] 4   6. Climbing 3-5 steps with a railing? [] 1   [] 2   [x] 3   [] 4   © , Trustees of [address] [place] [address] [address] [address], under license to [employer]. All rights reserved     Score:  Initial: 18 Most Recent: X (Date: -- )    Interpretation of Tool:  Represents activities that are increasingly more difficult (i.e. Bed mobility, Transfers, Gait). Medical Necessity:     · Patient is expected to demonstrate progress in   · strength, range of motion, balance, and coordination  ·  to   · increase independence with mobility and HEP. · .  Reason for Services/Other Comments:  · Patient continues to require skilled intervention due to   · Decreased R LE strength and ROM and decreased independence with mobility.   · .   Use of outcome tool(s) and clinical judgement create a POC that gives a: Clear prediction of patient's progress: LOW COMPLEXITY            TREATMENT:   (In addition to Assessment/Re-Assessment sessions the following treatments were rendered)     Pre-treatment Symptoms/Complaints:  Ready to get back in bed  Pain Initial:   Pain Intensity 1: 2(4/10 after therapy)  Post Session:      Gait Training (15 Minutes):  Gait training to improve and/or restore physical functioning as related to mobility, balance, and coordination. Ambulated 100 Feet (ft) with Contact guard assistance using a Walker, rolling and minimal Safety awareness training related to their stance phase and stride length to promote proper body alignment. Therapeutic Exercise: (15 Minutes):  Exercises per grid below to improve mobility and strength. Required minimal verbal and tactile cues to promote proper body alignment. Progressed range and repetitions as indicated. Date:  7/28/20 Date:  7/29   Date:     ACTIVITY/EXERCISE AM PM AM PM AM PM   GROUP THERAPY  []  []  []  []  []  []   Ankle Pumps  10 15 15     Quad Sets  10 15 15     Gluteal Sets  10 15 15     Hip ABd/ADduction  10 15 15     Straight Leg Raises  10 15 15     Knee Slides  10 15 15     Short Arc Quads  10 15 15     Long Arc Quads         Chair Slides                  B = bilateral; AA = active assistive; A = active; P = passive      Treatment/Session Assessment:     Response to Treatment:  Patient tired after RX    Education:  [] Home Exercises  [x] Fall Precautions  []  [] D/C Instruction Review  [] Knee Prosthesis Review  [x] Walker Management/Safety [] Adaptive Equipment as Needed       Interdisciplinary Collaboration:   o Physical Therapy Assistant  o Registered Nurse    After treatment position/precautions:   o Supine in bed  o Bed/Chair-wheels locked  o Call light within reach  o Family at bedside    Compliance with Program/Exercises: Will assess as treatment progresses. Recommendations/Intent for next treatment session:  Treatment next visit will focus on increasing Ms. Canute's independence with bed mobility, transfers, gait training, strength/ROM exercises, modalities for pain, and patient education.       Total Treatment Duration:  PT Patient Time In/Time Out  Time In: 1300  Time Out: [address] Yvon Niño, [place]

## 2020-07-29 NOTE — PROGRESS NOTES
Shift assessment completed via flow sheet. Pt a/o x 4. No signs of distress or c/o pain at this time. Safety measures in place. Instructed pt to call for assistance. Will continue to monitor.

## 2020-07-30 VITALS
HEIGHT: 63 IN | OXYGEN SATURATION: 96 % | HEART RATE: 81 BPM | BODY MASS INDEX: 38.98 KG/M2 | RESPIRATION RATE: 16 BRPM | DIASTOLIC BLOOD PRESSURE: 55 MMHG | WEIGHT: 220 LBS | TEMPERATURE: 98.5 F | SYSTOLIC BLOOD PRESSURE: 126 MMHG

## 2020-07-30 PROCEDURE — 74011250637 HC RX REV CODE- 250/637: Performed by: ORTHOPAEDIC SURGERY

## 2020-07-30 PROCEDURE — 97116 GAIT TRAINING THERAPY: CPT

## 2020-07-30 PROCEDURE — 97110 THERAPEUTIC EXERCISES: CPT

## 2020-07-30 RX ADMIN — Medication 1 AMPULE: at 08:57

## 2020-07-30 RX ADMIN — OXYCODONE HYDROCHLORIDE 10 MG: 5 TABLET ORAL at 06:19

## 2020-07-30 RX ADMIN — ACETAMINOPHEN 1000 MG: 500 TABLET, FILM COATED ORAL at 00:29

## 2020-07-30 RX ADMIN — ASPIRIN 81 MG: 81 TABLET, COATED ORAL at 08:57

## 2020-07-30 RX ADMIN — Medication 10 ML: at 06:10

## 2020-07-30 RX ADMIN — LEVOTHYROXINE SODIUM 88 MCG: 88 TABLET ORAL at 06:19

## 2020-07-30 RX ADMIN — ACETAMINOPHEN 1000 MG: 500 TABLET, FILM COATED ORAL at 06:09

## 2020-07-30 RX ADMIN — LOSARTAN POTASSIUM 100 MG: 50 TABLET ORAL at 08:57

## 2020-07-30 RX ADMIN — DOCUSATE SODIUM 50MG AND SENNOSIDES 8.6MG 2 TABLET: 8.6; 5 TABLET, FILM COATED ORAL at 08:57

## 2020-07-30 NOTE — PROGRESS NOTES
Problem: Mobility Impaired (Adult and Pediatric)  Goal: *Acute Goals and Plan of Care (Insert Text)  Description: GOALS (1-4 days):  (1.)Ms. Francisca Royal will move from supine to sit and sit to supine  in bed with SUPERVISION. (2.)Ms. Francisca Royal will transfer from bed to chair and chair to bed with SUPERVISION using the least restrictive device. (3.)Ms. Francisca Royal will ambulate with SUPERVISION for 200 feet with the least restrictive device. (4.)Ms. Francisca Royal will ambulate up/down 1 steps without a railing with MINIMAL ASSIST with no device. (5.)Ms. Francisca Royal will increase right knee ROM to 5°-80°.  ________________________________________________________________________________________________    Outcome: Progressing Towards Goal     PHYSICAL THERAPY JOINT CAMP TKA: Daily Note and AM 7/30/2020  INPATIENT: Hospital Day: 3  Payor: Sheri Gould / Plan: [address] [place] [address] HMO / Product Type: Managed Care Medicare /      NAME/AGE/GENDER: Neftali Pitt is a 69 y.o. female   PRIMARY DIAGNOSIS:  Primary osteoarthritis of right knee [M17.11]   Procedure(s) and Anesthesia Type:     * RIGHT KNEE ARTHROPLASTY TOTAL/DEPUY - Spinal (Right)  ICD-10: Treatment Diagnosis:    · Pain in Right Knee (M25.561)  · Stiffness of Right Knee, Not elsewhere classified (M25.661)  · Difficulty in walking, Not elsewhere classified (R26.2)      ASSESSMENT:     Ms. Francisca Royal presents s/p R TKA. Patient demonstrates decreased R LE strength and ROM and decreased independence with mobility. Patient would benefit from therapy to address these deficits. She had a L TKA in 2015. Patient will return home at d/c with assist from her son.  7/29 am supine upon arrival.  Performs exercises in the bed without any problems or increase in pain. Walk 30 ft using RW with CGA and no LOB. Remain in the recliner with needs in reach.  7/29 pm sitting in the recliner upon arrival.  Performs exercises in the recliner with some increase in pain.   Increase her distance using RW with CGA and working on normal gait pattern. Return to supine with needs in reach. 7/30 am sitting in the recliner upon arrival.  Perform exercises in the recliner without any problems. Walk 60 ft using RW with SBA and no LOB, continue to work on normal gait pattern. This section established at most recent assessment   PROBLEM LIST (Impairments causing functional limitations):  1. Decreased Strength  2. Decreased ADL/Functional Activities  3. Decreased Transfer Abilities  4. Decreased Ambulation Ability/Technique  5. Decreased Balance  6. Increased Pain  7. Decreased Flexibility/Joint Mobility  8. Edema/Girth  9. Decreased [place] with Home Exercise Program   INTERVENTIONS PLANNED: (Benefits and precautions of physical therapy have been discussed with the patient.)  1. bed mobility  2. gait training  3. home exercise program (HEP)  4. Range of Motion: active/assisted/passive  5. Therapeutic Activities  6. therapeutic exercise/strengthening  7. transfer training  8. Group Therapy     TREATMENT PLAN: Frequency/Duration: Follow patient BID for duration of hospital stay to address above goals. Rehabilitation Potential For Stated Goals: Good     RECOMMENDED REHABILITATION/EQUIPMENT: (at time of discharge pending progress): Continue Skilled Therapy. HISTORY:   History of Present Injury/Illness (Reason for Referral):  R TKA  Past Medical History/Comorbidities:   Ms. Hagen  has a past medical history of Anxiety, Chronic pain, Depression (5/19/2017), Essential hypertension (05/19/2017), Hypothyroid, Insomnia, Nausea & vomiting, Obesity, Osteoarthritis (12/15/2015), and Stress incontinence, female.  She also has no past medical history of Adverse effect of anesthesia, Aneurysm ([address] Arrhythmia, Asthma, Autoimmune disease ([address] CAD (coronary artery disease), Cancer ([address] Chronic kidney disease, Chronic obstructive pulmonary disease ([address] Coagulation defects, Coagulation disorder ([address] COPD, Diabetes ([address] Difficult intubation, Endocarditis, GERD (gastroesophageal reflux disease), Heart failure ([address] Liver disease, Malignant hyperthermia due to anesthesia, Nicotine vapor product user, Non-nicotine vapor product user, Other ill-defined conditions(799.89), Pseudocholinesterase deficiency, PUD (peptic ulcer disease), Rheumatic fever, Seizures ([address] Sleep apnea, Stroke ([address] or Thromboembolus ([address] Ms. Mateo Rosenberg  has a past surgical history that includes hx hysterectomy (); hx  section (); hx colonoscopy (); hx breast biopsy (Bilateral); and hx knee replacement (Left, 12/15/2015). Social History/Living Environment:   Home Environment: Private residence  # Steps to Enter: 1  Rails to Enter: No  One/Two Story Residence: One story  Living Alone: Yes  Support Systems: Child(beth)  Patient Expects to be Discharged to[de-identified] Private residence  Current DME Used/Available at Home: [employer], straight, Shower chair, Walker, rolling  Tub or Shower Type: Tub/Shower combination  Prior Level of Function/Work/Activity:  Ambulating with a cane. Number of Personal Factors/Comorbidities that affect the Plan of Care: 0: LOW COMPLEXITY   EXAMINATION:   Most Recent Physical Functioning:                                 Transfers  Sit to Stand: Stand-by assistance  Stand to Sit: Stand-by assistance  Bed to Chair: Stand-by assistance    Balance  Sitting: Intact  Standing: Intact; With support              Weight Bearing Status  Right Side Weight Bearing: As tolerated  Distance (ft): 60 Feet (ft)  Ambulation - Level of Assistance: Stand-by assistance  Assistive Device: Walker, rolling  Speed/Elana: Pace decreased (<100 feet/min)  Step Length: Left shortened;Right shortened  Stance: Right decreased  Gait Abnormalities: Antalgic  Interventions: Safety awareness training     Braces/Orthotics: none    Right Knee Cold  Type: Cryocuff      Body Structures Involved:  1. Joints  2. Muscles Body Functions Affected:  1.  Movement Related Activities and Participation Affected:  1. Mobility   Number of elements that affect the Plan of Care: 4+: HIGH COMPLEXITY   CLINICAL PRESENTATION:   Presentation: Stable and uncomplicated: LOW COMPLEXITY   CLINICAL DECISION MAKIN [facility] [address] [id number] AM-PAC 6 Clicks   Basic Mobility Inpatient Short Form  How much difficulty does the patient currently have. .. Unable A Lot A Little None   1. Turning over in bed (including adjusting bedclothes, sheets and blankets)? [] 1   [] 2   [x] 3   [] 4   2. Sitting down on and standing up from a chair with arms ( e.g., wheelchair, bedside commode, etc.)   [] 1   [] 2   [x] 3   [] 4   3. Moving from lying on back to sitting on the side of the bed? [] 1   [] 2   [x] 3   [] 4   How much help from another person does the patient currently need. .. Total A Lot A Little None   4. Moving to and from a bed to a chair (including a wheelchair)? [] 1   [] 2   [x] 3   [] 4   5. Need to walk in hospital room? [] 1   [] 2   [x] 3   [] 4   6. Climbing 3-5 steps with a railing? [] 1   [] 2   [x] 3   [] 4   © , Trustees of [facility] [address] [address], under license to [employer]. All rights reserved     Score:  Initial: 18 Most Recent: X (Date: -- )    Interpretation of Tool:  Represents activities that are increasingly more difficult (i.e. Bed mobility, Transfers, Gait). Medical Necessity:     · Patient is expected to demonstrate progress in   · strength, range of motion, balance, and coordination  ·  to   · increase independence with mobility and HEP. · .  Reason for Services/Other Comments:  · Patient continues to require skilled intervention due to   · Decreased R LE strength and ROM and decreased independence with mobility.   · .   Use of outcome tool(s) and clinical judgement create a POC that gives a: Clear prediction of patient's progress: LOW COMPLEXITY            TREATMENT:   (In addition to Assessment/Re-Assessment sessions the following treatments were rendered) Pre-treatment Symptoms/Complaints:  Doing ok  Pain Initial:   Pain Intensity 1: 2(sore mostly)  Post Session:      Gait Training (15 Minutes):  Gait training to improve and/or restore physical functioning as related to mobility, balance, and coordination. Ambulated 60 Feet (ft) with Stand-by assistance using a Walker, rolling and minimal Safety awareness training related to their stance phase and stride length to promote proper body alignment. Therapeutic Exercise: (15 Minutes):  Exercises per grid below to improve mobility and strength. Required minimal verbal and tactile cues to promote proper body alignment. Progressed range and repetitions as indicated. Date:  7/28/20 Date:  7/29   Date:  7/30     ACTIVITY/EXERCISE AM PM AM PM AM PM   GROUP THERAPY  []  []  []  []  []  []   Ankle Pumps  10 15 15 15    Quad Sets  10 15 15 15    Gluteal Sets  10 15 15 15    Hip ABd/ADduction  10 15 15 15    Straight Leg Raises  10 15 15 15    Knee Slides  10 15 15 15    Short Arc Quads  10 15 15 15    Long Arc Quads         Chair Slides    15 15             B = bilateral; AA = active assistive; A = active; P = passive      Treatment/Session Assessment:     Response to Treatment:  Participated well with therapy    Education:  [] Home Exercises  [x] Fall Precautions  []  [] D/C Instruction Review  [] Knee Prosthesis Review  [x] Walker Management/Safety [] Adaptive Equipment as Needed       Interdisciplinary Collaboration:   o Physical Therapy Assistant  o Registered Nurse    After treatment position/precautions:   o Up in chair  o Bed/Chair-wheels locked  o Call light within reach  o Family at bedside    Compliance with Program/Exercises: Will assess as treatment progresses. Recommendations/Intent for next treatment session:  Treatment next visit will focus on increasing Ms. Cee's independence with bed mobility, transfers, gait training, strength/ROM exercises, modalities for pain, and patient education. Total Treatment Duration:  PT Patient Time In/Time Out  Time In: 0900  Time Out: 0930    Laverne Niño, PTA

## 2020-07-30 NOTE — PROGRESS NOTES
Shift assessment completed. Pt a/ox4 and resting in bed. Aquacel dressing to right knee, c/d/i, pedal pulses +2 and palpaple. Pt able to dorsi/plantarflex. Pt denies pain at this time. Pt in chair with call light within reach and chair wheels locked. Encouraged to call for help when needed; pt verbalized understanding.

## 2020-07-30 NOTE — PROGRESS NOTES
Problem: Falls - Risk of  Goal: *Absence of Falls  Description: Document Elizabeth Baltazar Fall Risk and appropriate interventions in the flowsheet.   Outcome: Progressing Towards Goal  Note: Fall Risk Interventions:  Mobility Interventions: Patient to call before getting OOB, Utilize walker, cane, or other assistive device         Medication Interventions: Patient to call before getting OOB    Elimination Interventions: Call light in reach, Patient to call for help with toileting needs              Problem: Patient Education: Go to Patient Education Activity  Goal: Patient/Family Education  Outcome: Progressing Towards Goal

## 2020-07-30 NOTE — PROGRESS NOTES
Discharge education complete. IV removed and tip intact. Rx given. Chance given to ask questions and answers provided. Ride to arrive at 1400.

## 2020-07-30 NOTE — PROGRESS NOTES
Orthopedic Joint Progress Note    2020  Admit Date: 2020  Admit Diagnosis: Primary osteoarthritis of right knee [M17.11]  Arthritis of knee, right [M17.11]    2 Days Post-Op    Subjective:     Mallissa Gave awake and alert    Review of Systems: Pertinent items are noted in HPI. Objective:     PT/OT:     PATIENT MOBILITY    Bed Mobility  Supine to Sit: Contact guard assistance  Transfers  Sit to Stand: Contact guard assistance  Stand to Sit: Contact guard assistance  Bed to Chair: Contact guard assistance      Gait  Speed/Elana: Pace decreased (<100 feet/min)  Step Length: Left shortened, Right shortened  Stance: Right decreased  Gait Abnormalities: Antalgic  Ambulation - Level of Assistance: Contact guard assistance  Distance (ft): 100 Feet (ft)  Assistive Device: Walker, rolling  Interventions: Safety awareness training  Duration: 15 Minutes   Weight Bearing Status  Right Side Weight Bearing: As tolerated        Vital Signs:    Blood pressure 142/72, pulse 98, temperature 98.1 °F (36.7 °C), resp. rate 16, height 5' 3\" (1.6 m), weight 99.8 kg (220 lb), SpO2 98 %.   Temp (24hrs), Av.2 °F (36.8 °C), Min:98 °F (36.7 °C), Max:98.5 °F (36.9 °C)      Pain Control:   Pain Assessment  Pain Scale 1: Numeric (0 - 10)  Pain Intensity 1: 6  Pain Onset 1: post op  Pain Location 1: Knee  Pain Orientation 1: Right  Pain Description 1: Aching  Pain Intervention(s) 1: Medication (see MAR)    Meds:  Current Facility-Administered Medications   Medication Dose Route Frequency    levothyroxine (SYNTHROID) tablet 88 mcg  88 mcg Oral ACB    losartan (COZAAR) tablet 100 mg  100 mg Oral DAILY    zolpidem (AMBIEN) tablet 5 mg  5 mg Oral QHS    sodium chloride (NS) flush 5-40 mL  5-40 mL IntraVENous Q8H    sodium chloride (NS) flush 5-40 mL  5-40 mL IntraVENous PRN    acetaminophen (TYLENOL) tablet 1,000 mg  1,000 mg Oral Q6H    oxyCODONE IR (ROXICODONE) tablet 5-10 mg  5-10 mg Oral Q4H PRN    HYDROmorphone (PF) (DILAUDID) injection 1 mg  1 mg IntraVENous Q3H PRN    naloxone (NARCAN) injection 0.2-0.4 mg  0.2-0.4 mg IntraVENous Q10MIN PRN    promethazine (PHENERGAN) tablet 25 mg  25 mg Oral Q6H PRN    diphenhydrAMINE (BENADRYL) capsule 25 mg  25 mg Oral Q4H PRN    senna-docusate (PERICOLACE) 8.6-50 mg per tablet 2 Tab  2 Tab Oral DAILY    aspirin delayed-release tablet 81 mg  81 mg Oral Q12H    ondansetron (ZOFRAN ODT) tablet 8 mg  8 mg Oral Q8H PRN    alcohol 62% (NOZIN) nasal  1 Ampule  1 Ampule Topical Q12H        LAB:    Lab Results   Component Value Date/Time    INR 1.0 07/07/2020 10:50 AM    INR 1.0 03/10/2020 10:48 AM    INR 1.0 11/23/2015 09:05 AM     Lab Results   Component Value Date/Time    HGB 12.3 07/28/2020 07:54 PM    HGB 13.7 07/07/2020 10:50 AM    HGB 13.3 03/10/2020 10:48 AM       Wound Knee Left (Active)   Number of days: 1689       Wound Knee Right (Active)   Dressing Status Clean, dry, and intact 07/28/20 1455   Dressing Type Aquacel 07/28/20 1455   Number of days: 2         Physical Exam:  No significant changes    Assessment:      Principal Problem:    S/P TKR (total knee replacement), right (7/29/2020)    Active Problems:    Arthritis of knee, right (7/28/2020)         Plan:     Continue PT/OT/Rehab  Consult: Rehab team including PT, OT, recreational therapy, and    Home today      Patient Expects to be Discharged to[de-identified] Private residence

## 2020-07-30 NOTE — PROGRESS NOTES
Problem: Falls - Risk of  Goal: *Absence of Falls  Description: Document Susu Araya Fall Risk and appropriate interventions in the flowsheet.   Outcome: Progressing Towards Goal  Note: Fall Risk Interventions:  Mobility Interventions: Patient to call before getting OOB         Medication Interventions: Patient to call before getting OOB    Elimination Interventions: Call light in reach, Patient to call for help with toileting needs              Problem: Patient Education: Go to Patient Education Activity  Goal: Patient/Family Education  Outcome: Progressing Towards Goal     Problem: Patient Education: Go to Patient Education Activity  Goal: Patient/Family Education  Outcome: Progressing Towards Goal     Problem: Patient Education: Go to Patient Education Activity  Goal: Patient/Family Education  Outcome: Progressing Towards Goal

## 2020-08-01 ENCOUNTER — HOME CARE VISIT (OUTPATIENT)
Dept: SCHEDULING | Facility: HOME HEALTH | Age: 75
End: 2020-08-01
Payer: MEDICARE

## 2020-08-01 VITALS
HEART RATE: 84 BPM | SYSTOLIC BLOOD PRESSURE: 138 MMHG | DIASTOLIC BLOOD PRESSURE: 70 MMHG | TEMPERATURE: 98.1 F | RESPIRATION RATE: 18 BRPM

## 2020-08-01 PROCEDURE — 400013 HH SOC

## 2020-08-01 PROCEDURE — G0151 HHCP-SERV OF PT,EA 15 MIN: HCPCS

## 2020-08-01 PROCEDURE — 3331090001 HH PPS REVENUE CREDIT

## 2020-08-01 PROCEDURE — 3331090002 HH PPS REVENUE DEBIT

## 2020-08-02 PROCEDURE — 3331090002 HH PPS REVENUE DEBIT

## 2020-08-02 PROCEDURE — 3331090001 HH PPS REVENUE CREDIT

## 2020-08-03 ENCOUNTER — HOME CARE VISIT (OUTPATIENT)
Dept: SCHEDULING | Facility: HOME HEALTH | Age: 75
End: 2020-08-03
Payer: MEDICARE

## 2020-08-03 VITALS
RESPIRATION RATE: 19 BRPM | SYSTOLIC BLOOD PRESSURE: 140 MMHG | HEART RATE: 80 BPM | DIASTOLIC BLOOD PRESSURE: 80 MMHG | TEMPERATURE: 98.3 F

## 2020-08-03 PROCEDURE — 3331090001 HH PPS REVENUE CREDIT

## 2020-08-03 PROCEDURE — G0157 HHC PT ASSISTANT EA 15: HCPCS

## 2020-08-03 PROCEDURE — 3331090002 HH PPS REVENUE DEBIT

## 2020-08-04 PROCEDURE — 3331090001 HH PPS REVENUE CREDIT

## 2020-08-04 PROCEDURE — 3331090002 HH PPS REVENUE DEBIT

## 2020-08-05 ENCOUNTER — HOME CARE VISIT (OUTPATIENT)
Dept: SCHEDULING | Facility: HOME HEALTH | Age: 75
End: 2020-08-05
Payer: MEDICARE

## 2020-08-05 VITALS
RESPIRATION RATE: 18 BRPM | DIASTOLIC BLOOD PRESSURE: 66 MMHG | TEMPERATURE: 97.2 F | HEART RATE: 74 BPM | SYSTOLIC BLOOD PRESSURE: 134 MMHG

## 2020-08-05 PROCEDURE — 3331090001 HH PPS REVENUE CREDIT

## 2020-08-05 PROCEDURE — 3331090002 HH PPS REVENUE DEBIT

## 2020-08-05 PROCEDURE — G0157 HHC PT ASSISTANT EA 15: HCPCS

## 2020-08-06 PROCEDURE — 3331090001 HH PPS REVENUE CREDIT

## 2020-08-06 PROCEDURE — 3331090002 HH PPS REVENUE DEBIT

## 2020-08-07 ENCOUNTER — HOME CARE VISIT (OUTPATIENT)
Dept: SCHEDULING | Facility: HOME HEALTH | Age: 75
End: 2020-08-07
Payer: MEDICARE

## 2020-08-07 VITALS
TEMPERATURE: 97.3 F | RESPIRATION RATE: 18 BRPM | HEART RATE: 74 BPM | SYSTOLIC BLOOD PRESSURE: 130 MMHG | DIASTOLIC BLOOD PRESSURE: 66 MMHG

## 2020-08-07 PROCEDURE — 3331090001 HH PPS REVENUE CREDIT

## 2020-08-07 PROCEDURE — 3331090002 HH PPS REVENUE DEBIT

## 2020-08-07 PROCEDURE — G0157 HHC PT ASSISTANT EA 15: HCPCS

## 2020-08-08 PROCEDURE — 3331090002 HH PPS REVENUE DEBIT

## 2020-08-08 PROCEDURE — 3331090001 HH PPS REVENUE CREDIT

## 2020-08-09 PROCEDURE — 3331090002 HH PPS REVENUE DEBIT

## 2020-08-09 PROCEDURE — 3331090001 HH PPS REVENUE CREDIT

## 2020-08-10 ENCOUNTER — HOME CARE VISIT (OUTPATIENT)
Dept: SCHEDULING | Facility: HOME HEALTH | Age: 75
End: 2020-08-10
Payer: MEDICARE

## 2020-08-10 VITALS
SYSTOLIC BLOOD PRESSURE: 128 MMHG | TEMPERATURE: 97.3 F | HEART RATE: 73 BPM | DIASTOLIC BLOOD PRESSURE: 66 MMHG | RESPIRATION RATE: 18 BRPM

## 2020-08-10 PROCEDURE — A4649 SURGICAL SUPPLIES: HCPCS

## 2020-08-10 PROCEDURE — G0157 HHC PT ASSISTANT EA 15: HCPCS

## 2020-08-10 PROCEDURE — A6258 TRANSPARENT FILM >16<=48 IN: HCPCS

## 2020-08-10 PROCEDURE — 3331090001 HH PPS REVENUE CREDIT

## 2020-08-10 PROCEDURE — 3331090002 HH PPS REVENUE DEBIT

## 2020-08-11 PROCEDURE — 3331090002 HH PPS REVENUE DEBIT

## 2020-08-11 PROCEDURE — 3331090001 HH PPS REVENUE CREDIT

## 2020-08-12 ENCOUNTER — HOME CARE VISIT (OUTPATIENT)
Dept: SCHEDULING | Facility: HOME HEALTH | Age: 75
End: 2020-08-12
Payer: MEDICARE

## 2020-08-12 VITALS
RESPIRATION RATE: 17 BRPM | HEART RATE: 80 BPM | DIASTOLIC BLOOD PRESSURE: 66 MMHG | TEMPERATURE: 97.2 F | SYSTOLIC BLOOD PRESSURE: 130 MMHG

## 2020-08-12 PROCEDURE — 3331090001 HH PPS REVENUE CREDIT

## 2020-08-12 PROCEDURE — 3331090002 HH PPS REVENUE DEBIT

## 2020-08-12 PROCEDURE — G0157 HHC PT ASSISTANT EA 15: HCPCS

## 2020-08-13 PROCEDURE — 3331090002 HH PPS REVENUE DEBIT

## 2020-08-13 PROCEDURE — 3331090001 HH PPS REVENUE CREDIT

## 2020-08-14 ENCOUNTER — HOME CARE VISIT (OUTPATIENT)
Dept: SCHEDULING | Facility: HOME HEALTH | Age: 75
End: 2020-08-14
Payer: MEDICARE

## 2020-08-14 VITALS
DIASTOLIC BLOOD PRESSURE: 66 MMHG | SYSTOLIC BLOOD PRESSURE: 126 MMHG | RESPIRATION RATE: 18 BRPM | TEMPERATURE: 97.2 F | HEART RATE: 73 BPM

## 2020-08-14 PROCEDURE — 3331090002 HH PPS REVENUE DEBIT

## 2020-08-14 PROCEDURE — G0157 HHC PT ASSISTANT EA 15: HCPCS

## 2020-08-14 PROCEDURE — 3331090001 HH PPS REVENUE CREDIT

## 2020-08-15 PROCEDURE — 3331090001 HH PPS REVENUE CREDIT

## 2020-08-15 PROCEDURE — 3331090002 HH PPS REVENUE DEBIT

## 2020-08-16 PROCEDURE — 3331090001 HH PPS REVENUE CREDIT

## 2020-08-16 PROCEDURE — 3331090002 HH PPS REVENUE DEBIT

## 2020-08-17 ENCOUNTER — HOME CARE VISIT (OUTPATIENT)
Dept: SCHEDULING | Facility: HOME HEALTH | Age: 75
End: 2020-08-17
Payer: MEDICARE

## 2020-08-17 VITALS
SYSTOLIC BLOOD PRESSURE: 124 MMHG | RESPIRATION RATE: 18 BRPM | DIASTOLIC BLOOD PRESSURE: 80 MMHG | HEART RATE: 80 BPM | TEMPERATURE: 98.1 F

## 2020-08-17 PROCEDURE — 3331090002 HH PPS REVENUE DEBIT

## 2020-08-17 PROCEDURE — 3331090001 HH PPS REVENUE CREDIT

## 2020-08-17 PROCEDURE — G0157 HHC PT ASSISTANT EA 15: HCPCS

## 2020-08-18 PROCEDURE — 3331090001 HH PPS REVENUE CREDIT

## 2020-08-18 PROCEDURE — 3331090002 HH PPS REVENUE DEBIT

## 2020-08-19 ENCOUNTER — HOME CARE VISIT (OUTPATIENT)
Dept: SCHEDULING | Facility: HOME HEALTH | Age: 75
End: 2020-08-19
Payer: MEDICARE

## 2020-08-19 VITALS
DIASTOLIC BLOOD PRESSURE: 78 MMHG | RESPIRATION RATE: 18 BRPM | SYSTOLIC BLOOD PRESSURE: 142 MMHG | TEMPERATURE: 98.6 F | HEART RATE: 78 BPM

## 2020-08-19 PROCEDURE — 3331090001 HH PPS REVENUE CREDIT

## 2020-08-19 PROCEDURE — 3331090002 HH PPS REVENUE DEBIT

## 2020-08-19 PROCEDURE — G0151 HHCP-SERV OF PT,EA 15 MIN: HCPCS

## 2020-08-20 PROCEDURE — 3331090001 HH PPS REVENUE CREDIT

## 2020-08-20 PROCEDURE — 3331090002 HH PPS REVENUE DEBIT

## 2020-08-24 ENCOUNTER — HOSPITAL ENCOUNTER (OUTPATIENT)
Dept: PHYSICAL THERAPY | Age: 75
Discharge: HOME | End: 2020-08-24
Payer: MEDICARE

## 2020-08-24 PROCEDURE — 97161 PT EVAL LOW COMPLEX 20 MIN: CPT

## 2020-08-24 PROCEDURE — 97110 THERAPEUTIC EXERCISES: CPT

## 2020-08-24 NOTE — THERAPY EVALUATION
Oswaldo Kinney  : 1945  Primary: [place] [employer] [place]  Secondary:  [address]  at . [address]  [address] [address], [address]  [phone]   [phone]         OUTPATIENT PHYSICAL THERAPY:Initial Assessment 2020    ICD-10: Treatment Diagnosis:  Pain in right knee (M25.561)   Difficulty in walking, Not elsewhere classified (R26.2)  Other abnormalities of gait and mobility (R26.89)  Localized edema (R60.1)    Precautions/Allergies:   Daypro [oxaprozin] and Meloxicam   MD Orders: Eval and Treat  MEDICAL/REFERRING DIAGNOSIS:  knee TKA, right Z96.651  DATE OF ONSET: 20  REFERRING PHYSICIAN: Jessica Grimm MD  RETURN PHYSICIAN APPOINTMENT: 20     INITIAL ASSESSMENT:  Ms. Kacey Guzman presents to physical therapy with decreased balance, strength, ROM, joint mobility, and increased swelling of right knee joint following TKA. These S/S are consistent with TKA s/p 4 weeks. Patient will benefit from skilled physical therapy for manual therapeutic techniques (as appropriate), therapeutic exercises and activities, balance and comprehensive home exercises program to address current impairments and functional limitations. Oswaldo Kinney will benefit from skilled PT (medically necessary) in order to address above deficits affecting participation in basic ADLs and overall functional tolerance. PROBLEM LIST (Impacting functional limitations):   1. Decreased Strength  2. Decreased ADL/Functional Activities  3. Decreased Transfer Abilities  4. Decreased Ambulation Ability/Technique  5. Decreased Balance  6. Increased Pain  7. Decreased Joint mobility/Flexibility   8. Decreased Activity Tolerance  9. Decreased [place] with Home Exercise Program INTERVENTIONS PLANNED:   1. Balance Exercise  2. Bed Mobility  3. Cold  4. Cryotherapy  5. Family Education  6. Gait Training  7. Home Exercise Program (HEP)  8. Manual Therapy  9.  Neuromuscular Re-education/Strengthening  10. Range of Motion (ROM)  11. Therapeutic Activites  12. Therapeutic Exercise/Strengthening  13. Transfer Training  14. Vasopneumatic Compression          TREATMENT PLAN:  Effective Dates: 8/24/2020 TO 11/22/2020 (90 days). Frequency/Duration: 2 times a week for 90 Days    GOALS: (Goals have been discussed and agreed upon with patient.)   Short-Term Goals~4 weeks  Goal Met   1. Anjel Kaur will be independent with HEP for strength and ROM 1.  [] Date:   3. Anjel Kaur will participate in LE strengthening exercises for hip, knee, ankle with weight as appropriate for 3 sets of 10. 3.  [] Date:   4. Anjel Kaur will demonstrate a > 1 cm decrease in midpatellar circumference of right knee to demonstrate improved edema control. 4.  [] Date:   Fransisca Elder will participate in static and dynamic balance activities for 5 minutes to help improve proprioception and decrease risk of falls 5. [] Date:   10.. Anjel Kaur will demonstrate R knee flexion >= 105 degrees to improve functional mobility and tolerance of ADLs. 7.  [] Date:   7. Anjel Kaur will demonstrate R knee extension >= within 5 deg of neutral to improve functional mobility and tolerance of ADLs 8. [] Date:   Fransisca Elder will improve MMT R LE to >=4+/5 to improve current level of independence and community reintegration. 9.  [] Date:         Long Term Goals~8 weeks Goal Met   1. Anjel Kaur will be independent in an advanced HEP of stretching and strengthening 1. [] Date:   2. Anjel Kaur will be able to perform sit to stand transfers independently with increased knee flexion, equal Wbing through B LE, and decreased use of upper extremities 2. [] Date:   Fransisca Elder will ascend/descend 12 steps with reciprocal gait pattern and min use of 1 rail. 3.  [] Date:   3. Anjel Kaur to increase lower extremity functional scale by 20 points to show improvement in areas of difficulty. 4.  [] Date:   5. Mor Dao will ambulate x 200 ft without AD demonstrating no LOB and symmetrical gait to assist in community and in home ambulation. 5.  [] Date:   Jhonny Veloz will improve TUG score by 5 sec or greater. 6.  [] Date:   7. Mor Dao will demonstrate safe technique with floor to stand transfer, with or without use of UE on chair/bench. 7.  [] Date:        Outcome Measure: Tool Used: Lower Extremity Functional Scale (LEFS)  Score:  Initial: 32/80 Most Recent: X/80 (Date: -- )   Interpretation of Score: 20 questions each scored on a 5 point scale with 0 representing \"extreme difficulty or unable to perform\" and 4 representing \"no difficulty\". The lower the score, the greater the functional disability. 80/80 represents no disability. Minimal detectable change is 9 points. Medical Necessity:   · Skilled intervention continues to be required due to current impairment. Reason for Services/Other Comments:  · Patient continues to require skilled intervention due to patient continues to present with impairments assessed at initial evaluation and requiring skilled physical therapy to meet goals for PT. Total Treatment Duration:  PT Patient Time In/Time Out  Time In: 0900  Time Out: 1015      Rehabilitation Potential For Stated Goals: Good  Regarding Keshia Mike's therapy, I certify that the treatment plan above will be carried out by a therapist or under their direction. Thank you for this referral,  Jose Morrison, PT     Referring Physician Signature: Tiffanie Stone MD              Date                  HISTORY:   History of Present Injury/Illness (Reason for Referral):  Right TKA 7/28/20, 2 days in hospital, 3 weeks HHPT discharged on 8/19/20, referral for outpatient PT. Pt states she is doing her HEP but admits not as much as she \"should be\". She states that at this point, her biggest complaint is pain and it is disrupting sleep.  Pt currently walking with cane during day and walker if has to get up during night. She says a big concern for her is that she lives alone and she fears falling and being unable to get back up. **pt has hx of left TKA in . Ambulated for several years compensating for pain of right LE prior to current surgery.     -Present Symptoms (on day of evaluation): right knee tight and constantly sore. Also sorness of posterior-lateral calf. Pain Severity:   · Currently: 4/10  · Best: 4/10  · Worst: /10    · Aggravating factors: standing, walking, squatting, kneeling, rising after sitting, stairs  · Relieving factors: Rest, Ice and Self-medicating, elevating   · Irritability: Medium (Onset of pain is equal to alleviation)     Past Medical History/Comorbidities:   Ms. Theodore Pichardo  has a past medical history of Anxiety, Chronic pain, Depression (2017), Essential hypertension (2017), Hypothyroid, Insomnia, Nausea & vomiting, Obesity, Osteoarthritis (12/15/2015), and Stress incontinence, female. She also has no past medical history of Adverse effect of anesthesia, Aneurysm ([address] Arrhythmia, Asthma, Autoimmune disease ([address] CAD (coronary artery disease), Cancer ([address] Chronic kidney disease, Chronic obstructive pulmonary disease ([address] Coagulation defects, Coagulation disorder ([address] COPD, Diabetes ([address] Difficult intubation, Endocarditis, GERD (gastroesophageal reflux disease), Heart failure ([address] Liver disease, Malignant hyperthermia due to anesthesia, Nicotine vapor product user, Non-nicotine vapor product user, Other ill-defined conditions(799.89), Pseudocholinesterase deficiency, PUD (peptic ulcer disease), Rheumatic fever, Seizures ([address] Sleep apnea, Stroke ([address] or Thromboembolus ([address] Ms. Theodore Pichardo  has a past surgical history that includes hx hysterectomy (); hx  section (); hx colonoscopy (); hx breast biopsy (Bilateral); and hx knee replacement (Left, 12/15/2015).     Active Ambulatory Problems     Diagnosis Date Noted    Osteoarthritis 12/15/2015    S/P total knee arthroplasty 12/16/2015    Acquired hypothyroidism 05/19/2017    Essential hypertension 05/19/2017    Depression 05/19/2017    Hemorrhoids 05/19/2017    Primary osteoarthritis of both knees 05/19/2017    Sleep disturbance 05/19/2017    Other hyperlipidemia 12/01/2017    Vitamin D deficiency 06/22/2018    Urge incontinence of urine 10/12/2018    Medicare annual wellness visit, subsequent 10/12/2018    Menopause 10/12/2018    Arthritis of knee, right 07/28/2020    S/P TKR (total knee replacement), right 07/29/2020     Resolved Ambulatory Problems     Diagnosis Date Noted    No Resolved Ambulatory Problems     Past Medical History:   Diagnosis Date    Anxiety     Chronic pain     Hypothyroid     Insomnia     Nausea & vomiting     Obesity     Stress incontinence, female      Social History/Living Environment:   Single level home, lives alone with 1 step to enter. Prior Level of Function/Work/Activity:  retired   Previous Treatment Approach  none  Other Clinical Tests:  none  Current Medications:    Current Outpatient Medications:     oxyCODONE IR (ROXICODONE) 5 mg immediate release tablet, Take 5 mg by mouth every four (4) hours as needed for Pain., Disp: , Rfl:     methocarbamoL (ROBAXIN) 750 mg tablet, Take 750 mg by mouth as needed for Pain., Disp: , Rfl:     levothyroxine (SYNTHROID) 88 mcg tablet, Take 88 mcg by mouth Daily (before breakfast). Indications: a condition with low thyroid hormone levels, Disp: , Rfl:     naphazoline HCl (CLEAR EYES OP), Administer 1 Drop to both eyes as needed (dry eyes). , Disp: , Rfl:     aspirin delayed-release 81 mg tablet, Take 1 Tab by mouth every twelve (12) hours every twelve (12) hours for 30 days. , Disp: 60 Tab, Rfl: 0    losartan (COZAAR) 50 mg tablet, Take 100 mg by mouth daily.  Indications: high blood pressure, Disp: , Rfl:     acetaminophen (TYLENOL) 325 mg tablet, Take 650 mg by mouth every six (6) hours as needed for Pain. Indications: pain, Disp: , Rfl:     zolpidem (AMBIEN) 10 mg tablet, Take 1 Tab by mouth nightly as needed for Sleep. Max Daily Amount: 10 mg. (Patient taking differently: Take 5 mg by mouth nightly. Indications: difficulty falling asleep), Disp: 90 Tab, Rfl: 1    cholecalciferol, VITAMIN D3, (VITAMIN D3) 5,000 unit tab tablet, Take 5,000 Units by mouth daily (after lunch). , Disp: , Rfl:       Ambulatory/Rehab Services H2 Model Falls Risk Assessment    Risk Factors:       No Risk Factors Identified Ability to Rise from Chair:       (1)  Pushes up, successful in one attempt    Falls Prevention Plan:       No modifications necessary   Total: (5 or greater = High Risk): 1    ©2010 [facility] [employer]. All Rights Reserved. [facility] Patent #[id number].  Federal Law prohibits the replication, distribution or use without written permission from [facility] [employer]         Date Last Reviewed:  8/24/2020     Number of Personal Factors/Comorbidities that affect the Plan of Care: 0: LOW COMPLEXITY   EXAMINATION:   Observation/Orthostatic Postural Assessment:   · Static standing: WBing mostly through right LE, right knee in mild flexion   · incision site closed with no sign of drainage of infection  · Moderate edema of right knee joint     Palpation:    · Tenderness to right posterior lateral calf (soleus)  · Girth: 48 left, 51cm right   AROM/PROM         Joint:  8/24/20  Re-Assess Date:  Re-Assess Date:    Active ROM RIGHT LEFT RIGHT LEFT RIGHT LEFT   Knee Extension -10 0           Knee Flexion 90 105           Hip Flexion WFL WFL           Ankle mobility DF to neutral  WNL                                               Passive ROM         Knee Extension -7 0           Knee Flexion 95 110             Strength:     Eval Date: 8/24/20  Re-Assess Date:  Re-Assess Date:      RIGHT LEFT RIGHT LEFT RIGHT LEFT   Knee Flexion  4/5  4+/5           Knee Extension  4/5  4+/5           Hip Flexion  4/5  4+/5           Hip Abduction  4/5  4+/5           Hip Extension  4+/5  4+/5           Ankle Dorsiflexion   4/5 4+/5           Ankle PF 4+ /5 4+/5             Neurological Screen:  Normal sensation and motor control LE     Functional Mobility:  Limited tolerance of walking and standing  Sit to Stand=moderate use of UE, decreased use of right LE  Squat= not observed   Gait: with use of sc, decreased TKE, extended push off on right   Stairs: step to pattern, use of rails     Balance:    · Single leg stance: <2 sec observed B   · TUG= 21 sec     Body Structures Involved:  1. Bones  2. Joints  3. Muscles  4. Ligaments Body Functions Affected:  1. Sensory/Pain  2. Neuromusculoskeletal  3. Movement Related Activities and Participation Affected:  1. Mobility  2. Self Care   Number of elements that affect the Plan of Care: 1-2: LOW COMPLEXITY   CLINICAL PRESENTATION:   Presentation: Stable and uncomplicated: LOW COMPLEXITY   CLINICAL DECISION MAKING:      Use of outcome tool(s) and clinical judgement create a POC that gives a: Clear prediction of patient's progress: LOW COMPLEXITY   See treatment note for associated treatment provided today.       Future Appointments   Date Time Provider Corky Ortiz   8/26/2020  9:00 AM Markos Barer, PT [facility] [place] [facility]   9/2/2020  9:00 AM Markos Barer, PT [facility]   9/4/2020  1:00 PM Markos Barer, PT [facility]   9/16/2020  9:00 AM Markos Barer, PT [facility]   9/18/2020  9:00 AM Markos Barer, PT [facility]   9/23/2020  9:00 AM Markos Barer, PT [facility]   9/25/2020 10:00 AM Markos Barer, PT [facility]   9/30/2020  9:00 AM Markos Barer, PT [facility]   10/2/2020 10:00 AM Markos Barer, PT [facility]   10/7/2020  9:00 AM Markos Barer, PT [facility]   10/9/2020  9:00 AM Asa Saini, PT [facility] 10/14/2020  9:00 AM Bryson Hdz, PT [facility]   10/16/2020  9:00 AM [facility] Fitzgerald[facility]         Corrinne Simper, PT

## 2020-08-24 NOTE — PROGRESS NOTES
Solon Crigler  : 1945  Payor: Kelsy Cain / Plan: [address] HMO / Product Type: Managed Care Medicare /  [address] [facility] at [address] [place] [address]. [address] [place] [address], [facility], [address] [place] [address]  [phone]   [phone]                                                          Dianna Lockhart MD      OUTPATIENT PHYSICAL THERAPY: Daily Treatment Note 2020   Visit Count:  1    Tx Diagnosis ICD-10: Treatment Diagnosis:  Pain in right knee (M25.561)   Difficulty in walking, Not elsewhere classified (R26.2)  Other abnormalities of gait and mobility (R26.89)  Localized edema (R60.1)        Pre-treatment Symptoms/Complaints:  See Initial Eval Dated 20 for more details. Pain: Initial:4/10 Post Session: 4/10   Medications Last Reviewed:  2020     Updated Objective Findings: See Initial Eval for more details. TREATMENT:   THERAPEUTIC EXERCISE: (25 minutes):  Exercises per grid below to improve mobility, strength and balance. Required minimal visual, verbal and manual cues to promote proper body alignment and promote proper body posture. Progressed resistance and complexity of movement as indicated. Date:  2020 Date:   Date:     Activity/Exercise Parameters Parameters Parameters   Education HEP, POC, PT goals, gait pattern      SLR 5 x, 3 sec hold     QS 5 sec hold x 10      Heel slide  3 steps, 10 sec each step, x 4 reps     Sit to stand  X 5      Gait  X 100 ft c cane, focused on increasing right knee flex, and decreasing over use of gastro/soleus      Stairs/step ups  4 stairs A/D           MANUAL THERAPY: (0 minutes): Joint mobilization, Soft tissue mobilization was utilized and necessary because of the patient's restricted joint motion and restricted motion of soft tissue mobility.         Date      Technique Used Grade  Level # Time(s) Effect while being performed                                                                 HEP Log Date 1. Heel slides 8/24/2020     2. SLR 8/24/2020     3. QS 8/24/2020     4.gait     5. [employer] Portal  Treatment/Session Summary:    Response to Treatment: Pt demonstrated understanding of POC and initial HEP. No increase in pain or adverse reactions. Communication/Consultation:  POC, HEP, PT goals, Faxed initial evaluation to MD.   Equipment provided today: HEP Handout     Recommendations/Intent for next treatment session:   Next visit will focus on Manual Therapy Core Stability Pain Science Education Quad strengthening soft tissue mobilization joint mobilizations, expand HEP. Treatment Plan of Care Effective Dates: 8/24/20 TO 11/22/2020 (90 days).   Frequency/Duration: 2 times a week for 90 Days         Total Treatment Billable Duration:   25  Rx plus Eval   PT Patient Time In/Time Out  Time In: 0900  Time Out: Pete 58, PT    Future Appointments   Date Time Provider Corky Ortiz   8/26/2020  9:00 [facility]   9/2/2020  9:00 [facility]   9/4/2020  1:00 PM [facility], PT [facility]   9/16/2020  9:00 AM [facility], PT [facility]   9/18/2020  9:00 AM [facility]   9/23/2020  9:00 AM [facility]   9/25/2020 10:00 AM [facility], PT [facility]   9/30/2020  9:00 AM [facility]   10/2/2020 10:00 AM [facility], PT [facility]   10/7/2020  9:00 AM [facility], PT [facility]   10/9/2020  9:00 AM [facility]   10/14/2020  9:00 AM [facility]   10/16/2020  9:00 AM Syl Blackburn, PT [facility]

## 2020-08-26 ENCOUNTER — HOSPITAL ENCOUNTER (OUTPATIENT)
Dept: PHYSICAL THERAPY | Age: 75
Discharge: HOME | End: 2020-08-26
Payer: MEDICARE

## 2020-08-26 PROCEDURE — 97110 THERAPEUTIC EXERCISES: CPT

## 2020-08-26 PROCEDURE — 97140 MANUAL THERAPY 1/> REGIONS: CPT

## 2020-08-26 PROCEDURE — 97016 VASOPNEUMATIC DEVICE THERAPY: CPT

## 2020-08-26 NOTE — PROGRESS NOTES
Alfredo Wilkins  : 1945  Payor: Jessica Mack / Plan: [address] HMO / Product Type: Managed Care Medicare /  [facility] at [address]. [address] [place] [address], [facility], [address] [place] [address]  [phone]   [phone]                                                          Gage Shelby MD      OUTPATIENT PHYSICAL THERAPY: Daily Treatment Note 2020   Visit Count:  2    Tx Diagnosis ICD-10: Treatment Diagnosis:  Pain in right knee (M25.561)   Difficulty in walking, Not elsewhere classified (R26.2)  Other abnormalities of gait and mobility (R26.89)  Localized edema (R60.1)        Pre-treatment Symptoms/Complaints:  Pt states that she did pretty well with new exercises. Pain: Initial:5/10 Post Session: c/os stiffness    Medications Last Reviewed:  2020     Updated Objective Findings: See Initial Eval for more details. TREATMENT:   THERAPEUTIC EXERCISE: (30 minutes):  Exercises per grid below to improve mobility, strength and balance. Required minimal visual, verbal and manual cues to promote proper body alignment and promote proper body posture. Progressed resistance and complexity of movement as indicated.      Date:  2020 Date:  20   Date:     Activity/Exercise Parameters Parameters Parameters   Education HEP, POC, PT goals, gait pattern  New HEP    SLR 5 x, 3 sec hold     QS 5 sec hold x 10      Heel slide  3 steps, 10 sec each step, x 4 reps With sheet for over pressure     Sit to stand  X 5  (mini squat)    Gait  X 100 ft c cane, focused on increasing right knee flex, and decreasing over use of gastro/soleus      Stairs/step ups  4 stairs A/D Next visit taps     nustep   10 min level 4     LAQ  2 x 10     SAQ  X 20     Hamstring stretch   5 x 10 sec right     Mini squat  X 15    Calf stretching   Next visit         MANUAL THERAPY: 15 minutes): Joint mobilization, Soft tissue mobilization was utilized and necessary because of the patient's restricted joint motion and restricted motion of soft tissue mobility. Date      Technique Used Grade  Level # Time(s) Effect while being performed   STM   10 min Increased mobility of soft tissues and decreased edema    Patellar mobilizations  III Right patella X 5 min  Improved patellar mobility                                         modatlities  (15 min with set up)  · Game ready (cryo and compression) x 10 min 34 deg, moderate compression. Elevation            HEP Log Date 1. Heel slides 8/26/2020     2. SLR 8/26/2020     3. QS 8/26/2020     4.gait       LAQ 8/26/2020    SAQ 8/26/2020    Ham stretch  8/26/2020    Mini squat  8/26/2020     5. [employer] Portal  Treatment/Session Summary:    Response to Treatment: pt required moderate cuing for all above exercises but able to complete all. Edema decreased after session today. Pt reports stiffness upon departure--following game ready. Communication/Consultation:  POC, HEP, PT goals, Faxed initial evaluation to MD.   Equipment provided today: HEP Handout     Recommendations/Intent for next treatment session:   Next visit will focus on Manual Therapy Core Stability Pain Science Education Quad strengthening soft tissue mobilization joint mobilizations, expand HEP. Treatment Plan of Care Effective Dates: 8/24/20 TO 11/22/2020 (90 days).   Frequency/Duration: 2 times a week for 90 Days         Total Treatment Billable Duration:     60 min   PT Patient Time In/Time Out  Time In: 0900  Time Out: [facility], PT    Future Appointments   Date Time Provider Corky Ortiz   9/2/2020  9:00 AM Aquilino Nathan, PT [facility]   9/4/2020  1:00 PM Aquilino Evans, PT [facility]   9/16/2020  9:00 AM Aquilino Scot, PT [facility]   9/18/2020  9:00 AM Aquilino Scot, PT [facility]   9/23/2020  9:00 AM Aquilino Scot, PT [facility]   9/25/2020 10:00 AM Anais Houston, PT [facility]   9/30/2020  9:00 AM [place] Ranjit, PT [facility]   10/2/2020 10:00 AM [place] Ranjit, PT [facility]   10/7/2020  9:00 AM [place] Ranjit, PT [facility]   10/9/2020  9:00 AM [place] Ranjit, PT [facility]   10/14/2020  9:00 AM [place] Ranjit, PT [facility]   10/16/2020  9:00 AM Javon Oden, PT [facility]

## 2020-09-02 ENCOUNTER — HOSPITAL ENCOUNTER (OUTPATIENT)
Dept: PHYSICAL THERAPY | Age: 75
Discharge: HOME | End: 2020-09-02
Payer: MEDICARE

## 2020-09-02 PROCEDURE — 97140 MANUAL THERAPY 1/> REGIONS: CPT

## 2020-09-02 PROCEDURE — 97110 THERAPEUTIC EXERCISES: CPT

## 2020-09-02 NOTE — PROGRESS NOTES
Anjel Vincent  : 1945  Payor: [place] / Plan: [address] HMO / Product Type: Managed Care Medicare /  [address] at 4 [facility]. [address] [place] [address] [address] [place] [address], [facility], [address] [place] [address]  [phone]   [phone]                                                          Rhona Thapa MD      OUTPATIENT PHYSICAL THERAPY: Daily Treatment Note 2020   Visit Count:  3    Tx Diagnosis ICD-10: Treatment Diagnosis:  Pain in right knee (M25.561)   Difficulty in walking, Not elsewhere classified (R26.2)  Other abnormalities of gait and mobility (R26.89)  Localized edema (R60.1)        Pre-treatment Symptoms/Complaints:  Pt states she is still having problems sleeping because of pain and tightness of right knee. Pt states that she feels a little less swelling since last session but c/o's significant discomfort after gameready last visit. Pt without questions regarding new HEP added on visit # 2. Pain: Initial:6-7/10 Post Session: 6/10    Medications Last Reviewed:  2020     Updated Objective Findings: See Initial Eval for more details. TREATMENT:   THERAPEUTIC EXERCISE: (30 minutes):  Exercises per grid below to improve mobility, strength and balance. Required minimal visual, verbal and manual cues to promote proper body alignment and promote proper body posture. Progressed resistance and complexity of movement as indicated.      Date:  2020 Date:  20   Date:  20   Activity/Exercise Parameters Parameters Parameters   Education HEP, POC, PT goals, gait pattern  New HEP    SLR 5 x, 3 sec hold     QS 5 sec hold x 10      Heel slide  3 steps, 10 sec each step, x 4 reps With sheet for over pressure  X 10 with OP    Sit to stand  X 5  (mini squat)    Gait  X 100 ft c cane, focused on increasing right knee flex, and decreasing over use of gastro/soleus   X 300 ft focusing on normal gait pattern and arm swing    Stairs/step ups  4 stairs A/D Next visit taps  NV   nustep   10 min level 4  10 min level 3    LAQ  2 x 10  X 10, 5 sec hold and ham curl    SAQ  X 20     Hamstring stretch   5 x 10 sec right  Towel under knee x 10 with leg hang    Mini squat  X 15    Marching    With tekking poles for stability x 1 min static marching                Calf stretching   Next visit  B with slant board 3 x 30 sec       MANUAL THERAPY: (25 minutes): Joint mobilization, Soft tissue mobilization was utilized and necessary because of the patient's restricted joint motion and restricted motion of soft tissue mobility. Date  9/2/20    Technique Used Grade  Level # Time(s) Effect while being performed   STM   10 min Increased mobility of soft tissues and decreased edema    Patellar mobilizations  III Right patella X 5 min  Improved patellar mobility    Lymph edema mobilization    10 min  Decreased edema surrounding knee and increased tissue mobilization                                  modatlities  (10 min )  (declined game ready)  · Ice x 10 min knee in elevation (bolster under calf to promote knee extension)           HEP Log Date 1. Heel slides 9/2/2020     2. SLR 9/2/2020     3. QS 9/2/2020     4.gait       LAQ 9/2/2020    SAQ 9/2/2020    Ham stretch  9/2/2020    Mini squat  9/2/2020     5. [facility]  Treatment/Session Summary:    Response to Treatment: Pt continues to demonstrate significant discomfort with weightbearing through right LE noted with heavy use of trekking poles to support with marching. Increased manual tissue work today to mobilize scartissue and patella. Noticeable decreased edema and increased tissue mobility after manual treatment. Communication/Consultation:     Equipment provided today: HEP Handout     Recommendations/Intent for next treatment session:   Next visit will focus on Manual Therapy Core Stability Pain Science Education Quad strengthening soft tissue mobilization joint mobilizations, expand HEP. Treatment Plan of Care Effective Dates: 8/24/20 TO 11/22/2020 (90 days).   Frequency/Duration: 2 times a week for 90 Days         Total Treatment Billable Duration:  65 min   PT Patient Time In/Time Out  Time In: 0900  Time Out: 400 [facility], PT    Future Appointments   Date Time Provider Corky Ortiz   9/4/2020  1:00 PM Markos Barer, PT [facility] [place] [facility]   9/16/2020  9:00 AM Markos Barer, PT [facility]   9/18/2020  9:00 AM Markos Barer, PT [facility]   9/23/2020  9:00 AM Markos Barer, PT [facility]   9/25/2020 10:00 AM Markos Barer, PT [facility]   9/30/2020  9:00 AM Markos Barer, PT [facility]   10/2/2020 10:00 AM Markos Barer, PT [facility]   10/7/2020  9:00 AM Markos Barer, PT [facility]   10/9/2020  9:00 AM Markos Barer, PT [facility]   10/14/2020  9:00 AM Markos Barer, PT [facility]   10/16/2020  9:00 AM Jean Pierre Adame, PT [facility]

## 2020-09-04 ENCOUNTER — HOSPITAL ENCOUNTER (OUTPATIENT)
Dept: PHYSICAL THERAPY | Age: 75
Discharge: HOME | End: 2020-09-04
Payer: MEDICARE

## 2020-09-04 PROCEDURE — 97110 THERAPEUTIC EXERCISES: CPT

## 2020-09-04 PROCEDURE — 97140 MANUAL THERAPY 1/> REGIONS: CPT

## 2020-09-04 NOTE — PROGRESS NOTES
Alfredo Wilkins  : 1945  Payor: Jessica Mack / Plan: [address] HMO / Product Type: Managed Care Medicare /  [address] at 4 [place]. [address] [facility], [facility], [address] [place] [address] Road  [phone]   [phone]                                                          Gage Shelby MD      OUTPATIENT PHYSICAL THERAPY: Daily Treatment Note 2020   Visit Count:  4    Tx Diagnosis ICD-10: Treatment Diagnosis:  Pain in right knee (M25.561)   Difficulty in walking, Not elsewhere classified (R26.2)  Other abnormalities of gait and mobility (R26.89)  Localized edema (R60.1)      Pre-treatment Symptoms/Complaints:  Pt states that she is still having a lot of pain and is not seeming to get any better in that aspect. She describes pain as sharp and mostly medial knee and lateral calf. She does states that her ankle is not as tender as it was prior to focusing on normal gait pattern. *Discussed with patient that surgeon may have had to align knee differently from natural degenerated knee joint, and that would cause muscles and tendons to have to change quickly to adapt to new angle--This may be contributing to pain and swelling. Pain: Initial: 7/10 Post Session: 7/10    Medications Last Reviewed:  2020     Updated Objective Findings: gait: antalgic with intermittent use of sc  Moderate pitting edema of anterior knee upon arrival 20        TREATMENT:   THERAPEUTIC EXERCISE: (40 minutes):  Exercises per grid below to improve mobility, strength and balance. Required minimal visual, verbal and manual cues to promote proper body alignment and promote proper body posture. Progressed resistance and complexity of movement as indicated.      Date:  2020 Date:  20   Date:  20    Activity/Exercise Parameters Parameters Parameters    Education HEP, POC, PT goals, gait pattern  New HEP  Breathing, pain control, ice at bed time SLR 5 x, 3 sec hold      QS 5 sec hold x 10       Heel slide  3 steps, 10 sec each step, x 4 reps With sheet for over pressure  X 10 with OP   x 20 with manual support   Sit to stand  X 5  (mini squat)     Gait  X 100 ft c cane, focused on increasing right knee flex, and decreasing over use of gastro/soleus   X 300 ft focusing on normal gait pattern and arm swing     Stairs/step ups  4 stairs A/D Next visit taps  NV Limited due to pain   nustep   10 min level 4  10 min level 3  10 min level 3    LAQ  2 x 10  X 10, 5 sec hold and ham curl  2# x 10, 5 sec holds     SAQ  X 20   2# x 20 with manual patella and scar mobilization    Hamstring stretch   5 x 10 sec right  Towel under knee x 10 with leg hang  Towel under knee x 10 with leg hang     **used this inconjunction with breathing exercise to improve pain management    Mini squat  X 15     Marching    With tekking poles for stability x 1 min static marching     Ham curl    RTB, seated x 10          Calf stretching   Next visit  B with slant board 3 x 30 sec NV       MANUAL THERAPY: 26 minutes): Joint mobilization, Soft tissue mobilization was utilized and necessary because of the patient's restricted joint motion and restricted motion of soft tissue mobility. Date  9/4/20    Technique Used Grade  Level # Time(s) Effect while being performed   STM/ superficial MFR  Right knee anterior  10 min Increased mobility of soft tissues and decreased edema    Patellar mobilizations  III Right patella  1 min  Improved patellar mobility    Lymph edema mobilization    10 min  Decreased edema surrounding knee and increased tissue mobilization    Deep MFR  Right distal and mid quad  5 min  Decreased uneven muscle tone                           modatlities  (10 min )  (declined game ready)  · Ice x 10 min knee in elevation (bolster under calf to promote knee extension)           HEP Log Date 1. Heel slides 9/4/2020     2. SLR 9/4/2020     3.  QS 9/4/2020     4.gait LAQ 9/4/2020    SAQ 9/4/2020    Ham stretch  9/4/2020    Mini squat  9/4/2020     5. [employer] Portal  Treatment/Session Summary:    Response to Treatment: Pt continues to demonstrate significant discomfort with weightbearing through right LE. Due to significant pain level today, limited with higher level and weightbearing activities. Focused on edema control and non weightbearing activities. Pt has improved knee flexion >90 degrees as noted during exercises. Also educated and practiced relaxation breathing to help decrease anxiety and pain. Pt seemed to respond well to this, tolerating increased movement. Knee stiff but decreased edema showing no pitting at end of session. Communication/Consultation:     Equipment provided today: None today. Recommendations/Intent for next treatment session:   Next visit will focus on Manual Therapy Core Stability Pain Science Education Quad strengthening soft tissue mobilization joint mobilizations, expand HEP. Treatment Plan of Care Effective Dates: 8/24/20 TO 11/22/2020 (90 days).   Frequency/Duration: 2 times a week for 90 Days         Total Treatment Billable Duration:  61 min   PT Patient Time In/Time Out  Time In: 1300  Time Out: Char[address], PT    Future Appointments   Date Time Provider Corky Ortiz   9/9/2020  9:00 AM Karri Needle, PT [facility]   9/11/2020 10:00 AM Karri Needle, PT [facility]   9/16/2020  9:00 AM Karri Needle, PT [facility]   9/18/2020  9:00 AM Karri Needle, PT [facility]   9/23/2020  9:00 AM Karri Needle, PT [facility]   9/25/2020 10:00 AM Karri Needle, PT [facility]   9/30/2020  9:00 AM Karri Needle, PT [facility]   10/2/2020 10:00 AM Karri Needle, PT [facility]   10/7/2020  9:00 AM Karri Needle, PT [facility]   10/9/2020  9:00 AM Karri Needle, PT [facility]   10/14/2020  9:00 AM Megan Pat, PT [facility]   10/16/2020  9:00 AM Panda Wilson Mai, PT [facility]

## 2020-09-09 ENCOUNTER — HOSPITAL ENCOUNTER (OUTPATIENT)
Dept: PHYSICAL THERAPY | Age: 75
Discharge: HOME | End: 2020-09-09
Payer: MEDICARE

## 2020-09-09 PROCEDURE — 97140 MANUAL THERAPY 1/> REGIONS: CPT

## 2020-09-09 PROCEDURE — 97110 THERAPEUTIC EXERCISES: CPT

## 2020-09-09 NOTE — PROGRESS NOTES
Alissa Peoples  : 1945  Payor: Leonard Walter / Plan: [address] HMO / Product Type: Managed Care Medicare /  [address] at 4 [place]. [address] [facility], [facility], [address] [place] [address] Road  [phone]   [phone]                                                          Arielle Hathaway MD      OUTPATIENT PHYSICAL THERAPY: Daily Treatment Note 2020   Visit Count:  5    Tx Diagnosis ICD-10: Treatment Diagnosis:  Pain in right knee (M25.561)   Difficulty in walking, Not elsewhere classified (R26.2)  Other abnormalities of gait and mobility (R26.89)  Localized edema (R60.1)       Pre-treatment Symptoms/Complaints:  Pt states her knee is still very painful and swelling and she is not sleeping. Pt also states that she was taken off of Ambien by her MD around same time as knee surgery. She had been taking Ambien for several years and this most likely contributed to inability to sleep and leading to decreased tolerance for pain. Pain: Initial: 6-7/10 Post Session: 5/10    Medications Last Reviewed:  2020     Updated Objective Findings: gait: able to ambulate with symmetrical gait pattern no AD x 20 ft   Min to mod edema of anterior knee upon arrival 20        TREATMENT:   THERAPEUTIC EXERCISE: (40 minutes):  Exercises per grid below to improve mobility, strength and balance. Required minimal visual, verbal and manual cues to promote proper body alignment and promote proper body posture. Progressed resistance and complexity of movement as indicated.      Date:  2020 Date:  20   Date:  20   Activity/Exercise Parameters Parameters Parameters     Education HEP, POC, PT goals, gait pattern  New HEP  Breathing, pain control, ice at bed time  Pain science, sleep deprivation    SLR 5 x, 3 sec hold    X 8   QS 5 sec hold x 10        Heel slide  3 steps, 10 sec each step, x 4 reps With sheet for over pressure  X 10 with OP   x 20 with manual support X 10 with manual support    Sit to stand  X 5  (mini squat)      Gait  X 100 ft c cane, focused on increasing right knee flex, and decreasing over use of gastro/soleus   X 300 ft focusing on normal gait pattern and arm swing   X 20 ft, no AD    Stairs/step ups  4 stairs A/D Next visit taps  NV Limited due to pain Step taps alternating x 2 min    Step ups x 10 each    nustep   10 min level 4  10 min level 3  10 min level 3  10 min level 4    LAQ  2 x 10  X 10, 5 sec hold and ham curl  2# x 10, 5 sec holds   2# x 15    SAQ  X 20   2# x 20 with manual patella and scar mobilization     Hamstring stretch   5 x 10 sec right  Towel under knee x 10 with leg hang  Towel under knee x 10 with leg hang     **used this inconjunction with breathing exercise to improve pain management  Towel under knee x 10 with leg hang    Mini squat  X 15   X 10 (touch downs)    Marching    With tekking poles for stability x 1 min static marching      Ham curl    RTB, seated x 10            Calf stretching   Next visit  B with slant board 3 x 30 sec NV Slant board 3 x 30 sec B        MANUAL THERAPY: 13 minutes): Joint mobilization, Soft tissue mobilization was utilized and necessary because of the patient's restricted joint motion and restricted motion of soft tissue mobility. Date  9/9/20    Technique Used Grade  Level # Time(s) Effect while being performed   STM/ superficial MFR  Right knee anterior  5 min Increased mobility of soft tissues and decreased edema    Patellar mobilizations        Lymph edema mobilization    3min  Decreased edema surrounding knee and increased tissue mobilization    Deep MFR  Right distal and mid quad  5 min  Decreased uneven muscle tone                           modatlities  (10 min )  (declined game ready)  · Ice x 10 min knee in elevation (bolster under calf to promote knee extension)           HEP Log Date 1. Heel slides 9/9/2020     2. SLR 9/9/2020     3.  QS 9/9/2020     4.gait       LAQ 9/9/2020    SAQ 9/9/2020    Ham stretch  9/9/2020    Mini squat  9/9/2020     5. [facility] Portal  Treatment/Session Summary:    Response to Treatment: Pt continues to show improved gait and ROM of knee along with decreased swelling however subjective pain reports persist regardless of level of PT activities performed. significant cuing needed throughout session for breathing and muscle guarding. Pain decreased upon departure. Communication/Consultation:  Lengthy discussion during session today regarding pain science related to sleep deprivation. Advised pt to discuss options with her internist MD for sleep either prescribed or OTC medications. *Pt states that she had taken Ambien for years and recently stopped (under direction of  MD)     Equipment provided today: None today. Recommendations/Intent for next treatment session:   Next visit will focus on Manual Therapy Core Stability Pain Science Education Quad strengthening soft tissue mobilization joint mobilizations, expand HEP. Treatment Plan of Care Effective Dates: 8/24/20 TO 11/22/2020 (90 days).   Frequency/Duration: 2 times a week for 90 Days         Total Treatment Billable Duration:  54 min   PT Patient Time In/Time Out  Time In: 0900  Time Out: (P 1015  [place] [facility], PT    Future Appointments   Date Time Provider Corky Ortiz   9/11/2020 10:00 AM Paris Salaam, PT [facility]   9/16/2020  9:00 AM Paris Salaam, PT [facility]   9/18/2020  9:00 AM Paris Salaam, PT [facility]   9/23/2020  9:00 AM Paris Salaam, PT [facility]   9/25/2020 10:00 AM Paris Salaam, PT [facility]   9/30/2020  9:00 AM Paris Salaam, PT [facility]   10/2/2020 10:00 AM Paris Salaam, PT [facility]   10/7/2020  9:00 AM Paris Salaam, PT [facility]   10/9/2020  9:00 AM Paris Salaam, PT [facility]   10/14/2020 9:00 AM Pepito Seymour, PT [facility]   10/16/2020  9:00 AM Kim Mccoy, PT [facility]

## 2020-09-11 ENCOUNTER — HOSPITAL ENCOUNTER (OUTPATIENT)
Dept: PHYSICAL THERAPY | Age: 75
Discharge: HOME | End: 2020-09-11
Payer: MEDICARE

## 2020-09-11 PROCEDURE — 97140 MANUAL THERAPY 1/> REGIONS: CPT

## 2020-09-11 PROCEDURE — 97110 THERAPEUTIC EXERCISES: CPT

## 2020-09-11 NOTE — PROGRESS NOTES
Eligio Fernandez  : 1945  Payor: Jeb Gitelman / Plan: Leora Teran HMO / Product Type: Managed Care Medicare /  [facility] at [address] [place] [address]. [facility], [address] [place] [address], [address] [place] [address]  [phone]   [phone]                                                          Teresa Zacarias MD      OUTPATIENT PHYSICAL THERAPY: Daily Treatment Note 2020   Visit Count:  6    Tx Diagnosis ICD-10: Treatment Diagnosis:  Pain in right knee (M25.561)   Difficulty in walking, Not elsewhere classified (R26.2)  Other abnormalities of gait and mobility (R26.89)  Localized edema (R60.1)       Pre-treatment Symptoms/Complaints:  Pt states that she she was not too sore after last session. She also was able to sleep better with when she took benadryl which has helped. Pain: Initial: 3-4/10 Post Session: 3/10    Medications Last Reviewed:  2020     Updated Objective Findings: gait: able to ambulate with symmetrical gait pattern no AD x 20 ft   Min to mod edema of anterior knee upon arrival 20        TREATMENT:   THERAPEUTIC EXERCISE: (40 minutes):  Exercises per grid below to improve mobility, strength and balance. Required minimal visual, verbal and manual cues to promote proper body alignment and promote proper body posture. Progressed resistance and complexity of movement as indicated.      Date:  2020 Date:  20   Date:  20   Activity/Exercise Parameters Parameters Parameters      Education HEP, POC, PT goals, gait pattern  New HEP  Breathing, pain control, ice at bed time  Pain science, sleep deprivation     SLR 5 x, 3 sec hold    X 8    QS 5 sec hold x 10         Heel slide  3 steps, 10 sec each step, x 4 reps With sheet for over pressure  X 10 with OP   x 20 with manual support X 10 with manual support  X 10 with manual OP   Sit to stand  X 5  (mini squat)       Gait  X 100 ft c cane, focused on increasing right knee flex, and decreasing over use of gastro/soleus   X 300 ft focusing on normal gait pattern and arm swing   X 20 ft, no AD  X 200 ft focusing on arm swing and symmetrical stance phase    Stairs/step ups  4 stairs A/D Next visit taps  NV Limited due to pain Step taps alternating x 2 min    Step ups x 10 each  Step ups 2 x 1 min    nustep   10 min level 4  10 min level 3  10 min level 3  10 min level 4  10 min level 3    LAQ  2 x 10  X 10, 5 sec hold and ham curl  2# x 10, 5 sec holds   2# x 15  X 15 with tissue mobilization    SAQ  X 20   2# x 20 with manual patella and scar mobilization      Hamstring stretch   5 x 10 sec right  Towel under knee x 10 with leg hang  Towel under knee x 10 with leg hang     **used this inconjunction with breathing exercise to improve pain management  Towel under knee x 10 with leg hang  Towel under knee x 10 with leg hang   Mini squat  X 15   X 10 (touch downs)     Marching    With tekking poles for stability x 1 min static marching       Ham curl    RTB, seated x 10  X 10 seated    Side stepping      2 x 40 ft    Calf stretching   Next visit  B with slant board 3 x 30 sec NV Slant board 3 x 30 sec B  Manual        MANUAL THERAPY: 15 minutes): Joint mobilization, Soft tissue mobilization was utilized and necessary because of the patient's restricted joint motion and restricted motion of soft tissue mobility.         Date  9/11/20    Technique Used Grade  Level # Time(s) Effect while being performed   STM/ superficial MFR  Right knee anterior  5 min Increased mobility of soft tissues and decreased edema    Patellar mobilizations    3 reps all planes  Increased mobility of patella all planes    Lymph edema mobilization    3min  Decreased edema surrounding knee and increased tissue mobilization    Deep MFR  Right distal and mid quad  5 min  Decreased uneven muscle tone    Ankle DF/PF, IV/Ev   X 10 with OP Mobilization of tib-fib (prox and distal) and gastroc elongation modatlities  (10 min )  (declined game ready)  · Ice x 10 min knee in elevation (bolster under calf to promote knee extension)           HEP Log Date 1. Heel slides 9/11/2020     2. SLR 9/11/2020     3. QS 9/11/2020     4.gait       LAQ 9/11/2020    SAQ 9/11/2020    Ham stretch  9/11/2020    Mini squat  9/11/2020     5. [facility] Portal  Treatment/Session Summary:    Response to Treatment: Pt with improved pain level today. Cuing for gait needed but overall performed all above well and with good effort. 90-95 degrees flexion knee today. Able to release some superficial scar adhesions today. Pain decreased upon departure. Communication/Consultation:  None     Equipment provided today: None today. Recommendations/Intent for next treatment session:   Next visit will focus on Manual Therapy Core Stability Pain Science Education Quad strengthening soft tissue mobilization joint mobilizations, expand HEP. Treatment Plan of Care Effective Dates: 8/24/20 TO 11/22/2020 (90 days).   Frequency/Duration: 2 times a week for 90 Days         Total Treatment Billable Duration:  55 min   PT Patient Time In/Time Out  Time In: 1000  Time Out: 43 [facility],     Future Appointments   Date Time Provider Corky Ortiz   9/16/2020  9:00 AM [place] Ranjit, PT [facility]   9/18/2020  9:00 AM [place] Ranjit, PT [facility]   9/23/2020  9:00 AM [place] Ranjit, PT [facility]   9/25/2020 10:00 AM [place] Ranjit, PT [facility]   9/30/2020  9:00 AM [place] Ranjit, PT [facility]   10/2/2020 10:00 AM [place] Ranjit, PT [facility]   10/7/2020  9:00 AM [place] Ranjit, PT [facility]   10/9/2020  9:00 AM [place] Ranjit, PT [facility]   10/14/2020  9:00 AM [place] Ranjit, PT [facility]   10/16/2020  9:00 AM Javon Oden, PT [facility]

## 2020-09-16 ENCOUNTER — HOSPITAL ENCOUNTER (OUTPATIENT)
Dept: PHYSICAL THERAPY | Age: 75
Discharge: HOME | End: 2020-09-16
Payer: MEDICARE

## 2020-09-16 PROCEDURE — 97110 THERAPEUTIC EXERCISES: CPT

## 2020-09-16 PROCEDURE — 97140 MANUAL THERAPY 1/> REGIONS: CPT

## 2020-09-16 NOTE — PROGRESS NOTES
Ines Silva  : 1945  Payor: Doug Mychal / Plan: [address] HMO / Product Type: Managed Care Medicare /  [address] at [address] [place]. [address], [facility], [address] [place] [address] Road  [phone]   [phone]                                                          Yolanda Child MD      OUTPATIENT PHYSICAL THERAPY: Daily Treatment Note 2020   Visit Count:  7    Tx Diagnosis ICD-10: Treatment Diagnosis:  Pain in right knee (M25.561)   Difficulty in walking, Not elsewhere classified (R26.2)  Other abnormalities of gait and mobility (R26.89)  Localized edema (R60.1)       Pre-treatment Symptoms/Complaints: pt states that she continues to have right knee pain disrupting sleep. Pain: Initial: 6/10 Post Session: 5/10    Medications Last Reviewed:  2020     Updated Objective Findings:   gait: able to ambulate with symmetrical gait pattern no AD x 20 ft   ROM: right knee 7-97 degrees         TREATMENT:   THERAPEUTIC EXERCISE: (45 minutes):  Exercises per grid below to improve mobility, strength and balance. Required minimal visual, verbal and manual cues to promote proper body alignment and promote proper body posture. Progressed resistance and complexity of movement as indicated.      Date:  2020 Date:  20   Date:  20       Activity/Exercise Parameters Parameters Parameters           Education HEP, POC, PT goals, gait pattern  New HEP  Breathing, pain control, ice at bed time  Pain science, sleep deprivation          SLR 5 x, 3 sec hold    X 8         QS 5 sec hold x 10       Standing and supine x 10        Heel slide  3 steps, 10 sec each step, x 4 reps With sheet for over pressure  X 10 with OP   x 20 with manual support X 10 with manual support  X 10 with manual OP X 5, 15 sec OP       Sit to stand  X 5  (mini squat)            Gait  X 100 ft c cane, focused on increasing right knee flex, and decreasing over use of gastro/soleus   X 300 ft focusing on normal gait pattern and arm swing   X 20 ft, no AD  X 200 ft focusing on arm swing and symmetrical stance phase  X 200 ft        Stairs/step ups  4 stairs A/D Next visit taps  NV Limited due to pain Step taps alternating x 2 min    Step ups x 10 each  Step ups 2 x 1 min         nustep   10 min level 4  10 min level 3  10 min level 3  10 min level 4  10 min level 3  10 min level 3        LAQ  2 x 10  X 10, 5 sec hold and ham curl  2# x 10, 5 sec holds   2# x 15  X 15 with tissue mobilization  2 1/2 # x 10, 5 sec hold in extension        SAQ  X 20   2# x 20 with manual patella and scar mobilization           Hamstring stretch   5 x 10 sec right  Towel under knee x 10 with leg hang  Towel under knee x 10 with leg hang     **used this inconjunction with breathing exercise to improve pain management  Towel under knee x 10 with leg hang  Towel under knee x 10 with leg hang On stairs 5 x 10 sec with quad set        Mini squat  X 15   X 10 (touch downs)          Marching    With tekking poles for stability x 1 min static marching            Ham curl    RTB, seated x 10  X 10 seated  X 10 seated,   X 5 prone         Side stepping      2 x 40 ft  2 x 40 ft        Calf stretching   Next visit  B with slant board 3 x 30 sec NV Slant board 3 x 30 sec B  Manual  B 3 x 20 sec        Knee flexion stretching on steps        X 10, 10 sec hold                                                      MANUAL THERAPY: 10 minutes): Joint mobilization, Soft tissue mobilization was utilized and necessary because of the patient's restricted joint motion and restricted motion of soft tissue mobility.         Date  9/16/20    Technique Used Grade  Level # Time(s) Effect while being performed   STM/ superficial MFR  Right knee anterior  3 min Increased mobility of soft tissues and decreased edema    Patellar mobilizations    3 reps all planes  Increased mobility of patella all planes    Lymph edema mobilization    3 min  Decreased edema surrounding knee and increased tissue mobilization    Deep MFR  Right distal and mid quad  3 min  Decreased uneven muscle tone    Ankle DF/PF, IV/Ev  -------------                     modatlities  (0min ) declined 9/16/2020 ---will ice at home   · Ice x 10 min knee in elevation (bolster under calf to promote knee extension)           HEP Log Date 1. Heel slides 9/16/2020     2. SLR 9/16/2020     3. QS 9/16/2020     4.gait     5. LAQ 9/16/2020    6. SAQ 9/16/2020    7. Ham stretch  9/16/2020    8. Mini squat  9/16/2020     9. Prone ham hang and curl    10. [employer] Portal  Treatment/Session Summary:    Response to Treatment: Pt continues to report higher levels of pain but is tolerating higher level activities and manual techniques each visit. Tissue mobility seems to be improving as well as patellar mobility. Pt continues to need cuing for gait but showing improvements with symmetry and stability as well. Added prone knee extension and ham curls to HEP. Knee flexion improving slowly. Communication/Consultation:  None     Equipment provided today: None today. Recommendations/Intent for next treatment session:   Next visit will focus on Manual Therapy Core Stability Pain Science Education Quad strengthening soft tissue mobilization joint mobilizations, expand HEP. Treatment Plan of Care Effective Dates: 8/24/20 TO 11/22/2020 (90 days).   Frequency/Duration: 2 times a week for 90 Days         Total Treatment Billable Duration:  55 min   PT Patient Time In/Time Out  Time In: 0900  Time Out: [facility],     Future Appointments   Date Time Provider Corky Ortiz   9/18/2020  9:00 AM Waylon Caputo, PT [facility]   9/23/2020  9:00 AM Waylon Caputo, PT [facility]   9/25/2020 10:00 AM Waylon Caputo, PT [facility]   9/30/2020  9:00 AM Burgess Fidel Gordon, PT [facility] 10/2/2020 10:00 AM Arnaldo Riggs, PT [facility]   10/7/2020  9:00 AM Arnaldo Riggs, PT [facility]   10/9/2020  9:00 AM Arnaldo Riggs, PT [facility]   10/14/2020  9:00 AM Arnaldo Riggs, PT [facility]   10/16/2020  9:00 AM Jose Bailey, PT [facility]

## 2020-09-18 ENCOUNTER — HOSPITAL ENCOUNTER (OUTPATIENT)
Dept: PHYSICAL THERAPY | Age: 75
Discharge: HOME | End: 2020-09-18
Payer: MEDICARE

## 2020-09-18 PROCEDURE — 97140 MANUAL THERAPY 1/> REGIONS: CPT

## 2020-09-18 PROCEDURE — 97110 THERAPEUTIC EXERCISES: CPT

## 2020-09-18 NOTE — PROGRESS NOTES
Humberto Costa  : 1945  Payor: Aurea Alexsierra / Plan: [address] HMO / Product Type: Managed Care Medicare /  Yana Quiver at [address] [place]. [facility] [address], [address] [place] [address]  [phone]   [phone]                                                          Adelso Cardona MD      OUTPATIENT PHYSICAL THERAPY: Daily Treatment Note 2020   Visit Count:  8    Tx Diagnosis ICD-10: Treatment Diagnosis:  Pain in right knee (M25.561)   Difficulty in walking, Not elsewhere classified (R26.2)  Other abnormalities of gait and mobility (R26.89)  Localized edema (R60.1)       Pre-treatment Symptoms/Complaints:    Pt states that she had some increased pain over last couple days since new exercises. Still cant sleep. Has not tried using ice or getting out of bed during night. --- I continue to encourage pt to contact MD to discuss sleep difficulty. Pain: Initial: 6/10 Post Session: 5/10    Medications Last Reviewed:  2020     Updated Objective Findings:   gait: able to ambulate with symmetrical gait pattern no AD x 20 ft   ROM: right knee 7-97 degrees         TREATMENT:   THERAPEUTIC EXERCISE: (42 minutes):  Exercises per grid below to improve mobility, strength and balance. Required minimal visual, verbal and manual cues to promote proper body alignment and promote proper body posture. Progressed resistance and complexity of movement as indicated.      Date:  2020 Date:  20   Date:  20      Activity/Exercise Parameters Parameters Parameters           Education HEP, POC, PT goals, gait pattern  New HEP  Breathing, pain control, ice at bed time  Pain science, sleep deprivation          SLR 5 x, 3 sec hold    X 8         QS 5 sec hold x 10       Standing and supine x 10  Standing and supine with Left SAQ       Heel slide  3 steps, 10 sec each step, x 4 reps With sheet for over pressure  X 10 with OP   x 20 with manual support X 10 with manual support  X 10 with manual OP X 5, 15 sec OP X 5 with 15 sec OP       Sit to stand  X 5  (mini squat)            Gait  X 100 ft c cane, focused on increasing right knee flex, and decreasing over use of gastro/soleus   X 300 ft focusing on normal gait pattern and arm swing   X 20 ft, no AD  X 200 ft focusing on arm swing and symmetrical stance phase  X 200 ft  X 200 ft        Stairs/step ups  4 stairs A/D Next visit taps  NV Limited due to pain Step taps alternating x 2 min    Step ups x 10 each  Step ups 2 x 1 min         nustep   10 min level 4  10 min level 3  10 min level 3  10 min level 4  10 min level 3  10 min level 3  10 min level 3      LAQ  2 x 10  X 10, 5 sec hold and ham curl  2# x 10, 5 sec holds   2# x 15  X 15 with tissue mobilization  2 1/2 # x 10, 5 sec hold in extension  X 10, 5 sec holds in flexion and extension       SAQ  X 20   2# x 20 with manual patella and scar mobilization     Over ball with ankle pumps x 10 R and L      Hamstring stretch   5 x 10 sec right  Towel under knee x 10 with leg hang  Towel under knee x 10 with leg hang     **used this inconjunction with breathing exercise to improve pain management  Towel under knee x 10 with leg hang  Towel under knee x 10 with leg hang On stairs 5 x 10 sec with quad set  On stairs 5 x 10 sec with quad set       Mini squat  X 15   X 10 (touch downs)          Marching    With tekking poles for stability x 1 min static marching            Ham curl    RTB, seated x 10  X 10 seated  X 10 seated,   X 5 prone   Seated       Side stepping      2 x 40 ft  2 x 40 ft  In ladder x 4 laps       Calf stretching   Next visit  B with slant board 3 x 30 sec NV Slant board 3 x 30 sec B  Manual  B 3 x 20 sec  2 x 30 sec B       Knee flexion stretching on steps        X 10, 10 sec hold  X 6, 10 sec                                                     MANUAL THERAPY: 13 minutes): Joint mobilization, Soft tissue mobilization was utilized and necessary because of the patient's restricted joint motion and restricted motion of soft tissue mobility. Date  9/18/20    Technique Used Grade  Level # Time(s) Effect while being performed   STM/ superficial MFR  Right knee anterior  5 min Increased mobility of soft tissues and decreased edema    Patellar mobilizations     ------   Lymph edema mobilization    5 min  Decreased edema surrounding knee and increased tissue mobilization    Deep MFR  Right distal and mid quad  3 min  Decreased uneven muscle tone    Ankle DF/PF, IV/Ev  -------------                     modatlities  (0min ) declined 9/18/2020 ---will ice at home   · Ice x 10 min knee in elevation (bolster under calf to promote knee extension)           HEP Log Date 1. Heel slides 9/18/2020     2. SLR 9/18/2020     3. QS 9/18/2020     4.gait     5. LAQ 9/18/2020    6. SAQ 9/18/2020    7. Ham stretch  9/18/2020    8. Mini squat  9/18/2020     9. Prone ham hang and curl    10. [employer] Portal  Treatment/Session Summary:    Response to Treatment: No significant change with pain reports during and prior to therapy session today verses last.  Pt continues to require encouragement with exercises and cueing to improve gait but shows understanding each time. Used agility ladder for timing of gait. Some increased pitting edema of distal scar noted today. Communication/Consultation:  None     Equipment provided today: None today. Recommendations/Intent for next treatment session:   Next visit will focus on Manual Therapy Core Stability Pain Science Education Quad strengthening soft tissue mobilization joint mobilizations, expand HEP. Treatment Plan of Care Effective Dates: 8/24/20 TO 11/22/2020 (90 days).   Frequency/Duration: 2 times a week for 90 Days         Total Treatment Billable Duration:  55 min   PT Patient Time In/Time Out  Time In: 0900  Time Out: [facility] , PT    Future Appointments   Date Time Provider Corky Diana   9/23/2020  9:00 AM Stacie Manges, PT [facility] [place] [facility]   9/25/2020 10:00 AM Stacie Manges, PT [facility]   9/30/2020  9:00 AM Stacie Manges, PT [facility]   10/2/2020 10:00 AM Stacie Manges, PT [facility]   10/7/2020  9:00 AM Stacie Manges, PT [facility]   10/9/2020  9:00 AM Stacie Manges, PT [facility]   10/14/2020  9:00 AM Stacie Manges, PT [facility]   10/16/2020  9:00 AM Javi Silva, PT [facility]

## 2020-09-23 ENCOUNTER — HOSPITAL ENCOUNTER (OUTPATIENT)
Dept: PHYSICAL THERAPY | Age: 75
Discharge: HOME | End: 2020-09-23
Payer: MEDICARE

## 2020-09-23 PROCEDURE — 97110 THERAPEUTIC EXERCISES: CPT

## 2020-09-23 NOTE — PROGRESS NOTES
Alfredo Wilkins  : 1945  Payor: Jessica Mack / Plan: [address] HMO / Product Type: Managed Care Medicare /  [facility] at [address] [place]. [facility], [address] [place] [address], [address] [place] [address]  [phone]   [phone]                                                          Gage Shelby MD      OUTPATIENT PHYSICAL THERAPY: Daily Treatment Note 2020   Visit Count:  9    Tx Diagnosis ICD-10: Treatment Diagnosis:  Pain in right knee (M25.561)   Difficulty in walking, Not elsewhere classified (R26.2)  Other abnormalities of gait and mobility (R26.89)  Localized edema (R60.1)       Pre-treatment Symptoms/Complaints:    Pt continues to do HEP regularly but states that she continues to have pain throughout and that this morning is the best she has felt since last therapy session. Pt continues also to be frustrated and unable to \"see\" progress -- discussion throughout session today about progress made. See below for measurements and increased resist or reps on exercise. Pain: Initial: 5/10 Post Session: 5/10    Medications Last Reviewed:  2020     Updated Objective Findings:   Gait: able to ambulate with symmetrical gait pattern no AD x 20 ft   ROM: right knee 5-99 degrees   TUG: 15 sec         TREATMENT:   THERAPEUTIC EXERCISE: (55 minutes):  Exercises per grid below to improve mobility, strength and balance. Required minimal visual, verbal and manual cues to promote proper body alignment and promote proper body posture. Progressed resistance and complexity of movement as indicated.      Date:  2020 Date:  20   Date:  20     Activity/Exercise Parameters Parameters Parameters           Education HEP, POC, PT goals, gait pattern  New HEP  Breathing, pain control, ice at bed time  Pain science, sleep deprivation          SLR 5 x, 3 sec hold    X 8    2 x 10      QS 5 sec hold x 10       Standing and supine x 10  Standing and supine with Left SAQ  Standing and with LAQ, SLR, and SAQ     Heel slide  3 steps, 10 sec each step, x 4 reps With sheet for over pressure  X 10 with OP   x 20 with manual support X 10 with manual support  X 10 with manual OP X 5, 15 sec OP X 5 with 15 sec OP  X 5, 30 sec, 3 step progression, each rep     Sit to stand  X 5  (mini squat)            Gait  X 100 ft c cane, focused on increasing right knee flex, and decreasing over use of gastro/soleus   X 300 ft focusing on normal gait pattern and arm swing   X 20 ft, no AD  X 200 ft focusing on arm swing and symmetrical stance phase  X 200 ft  X 200 ft   X 200 ft   Cuing for right heel strike, forward gaze, relaxed arm swing and breathing      Stairs/step ups  4 stairs A/D Next visit taps  NV Limited due to pain Step taps alternating x 2 min    Step ups x 10 each  Step ups 2 x 1 min         nustep   10 min level 4  10 min level 3  10 min level 3  10 min level 4  10 min level 3  10 min level 3  10 min level 3 12 min level 3      LAQ  2 x 10  X 10, 5 sec hold and ham curl  2# x 10, 5 sec holds   2# x 15  X 15 with tissue mobilization  2 1/2 # x 10, 5 sec hold in extension  X 10, 5 sec holds in flexion and extension  3# 2 x 10      SAQ  X 20   2# x 20 with manual patella and scar mobilization     Over ball with ankle pumps x 10 R and L 3# x 20      Hamstring stretch   5 x 10 sec right  Towel under knee x 10 with leg hang  Towel under knee x 10 with leg hang     **used this inconjunction with breathing exercise to improve pain management  Towel under knee x 10 with leg hang  Towel under knee x 10 with leg hang On stairs 5 x 10 sec with quad set  On stairs 5 x 10 sec with quad set       Mini squat  X 15   X 10 (touch downs)          Marching    With tekking poles for stability x 1 min static marching            Ham curl    RTB, seated x 10  X 10 seated  X 10 seated,   X 5 prone   Seated       Side stepping      2 x 40 ft  2 x 40 ft  In ladder x 4 laps       Calf stretching   Next visit  B with slant board 3 x 30 sec NV Slant board 3 x 30 sec B  Manual  B 3 x 20 sec  2 x 30 sec B  Slant board 3 x 30 sec      Knee flexion stretching on steps        X 10, 10 sec hold  X 6, 10 sec  Seated knee flexion stretch 3 x 10- 20 sec      Step taps          2 x 1 min                                      MANUAL THERAPY: (NONE TODAY minutes): Joint mobilization, Soft tissue mobilization was utilized and necessary because of the patient's restricted joint motion and restricted motion of soft tissue mobility. Date      Technique Used Grade  Level # Time(s) Effect while being performed   STM/ superficial MFR  Right knee anterior   Increased mobility of soft tissues and decreased edema    Patellar mobilizations     ------   Lymph edema mobilization     Decreased edema surrounding knee and increased tissue mobilization    Deep MFR  Right distal and mid quad   Decreased uneven muscle tone    Ankle DF/PF, IV/Ev  -------------                     modatlities  (0min ) declined 9/23/2020 ---will ice at home   · Ice x 10 min knee in elevation (bolster under calf to promote knee extension)           HEP Log Date 1. Heel slides 9/23/2020     2. SLR 9/23/2020     3. QS 9/23/2020     4.gait     5. LAQ 9/23/2020    6. SAQ 9/23/2020    7. Ham stretch  9/23/2020    8. Mini squat  9/23/2020     9. Prone knee hang and ham curl 9/23/2020    10. Seated knee flexion (scooting) 9/23/2020              [facility] [employer] Portal  Treatment/Session Summary:    Response to Treatment:  added new HEP to progress ROM. Reinforced progress is being made, for patient, noting the increased weights and repetitions for above exercises as well as the ROM measurements. Difficult for patient to see progress when she compares to memories and perceptions of left TKA several years ago. I encouraged pt to focus on ROM, strength, and gait steve rather than pain level. Communication/Consultation:  None     Equipment provided today: None today. Recommendations/Intent for next treatment session:   Next visit will focus on Manual Therapy Core Stability Pain Science Education Quad strengthening soft tissue mobilization joint mobilizations, expand HEP. Treatment Plan of Care Effective Dates: 8/24/20 TO 11/22/2020 (90 days).   Frequency/Duration: 2 times a week for 90 Days     Total Treatment Billable Duration:  55 min   PT Patient Time In/Time Out  Time In: 0900  Time Out: [facility], PT    Future Appointments   Date Time Provider Corky Ortiz   9/25/2020 10:00 AM [facility], PT [facility]   9/30/2020  9:00 AM [facility], PT [facility]   10/2/2020 10:00 AM [facility], PT [facility]   10/7/2020  9:00 AM [facility], PT [facility]   10/9/2020  9:00 AM [facility], PT [facility]   10/14/2020  9:00 AM [facility], PT [facility]   10/16/2020  9:00 AM Gayle Lawson, PT [facility]

## 2020-09-25 ENCOUNTER — HOSPITAL ENCOUNTER (OUTPATIENT)
Dept: PHYSICAL THERAPY | Age: 75
Discharge: HOME | End: 2020-09-25
Payer: MEDICARE

## 2020-09-25 PROCEDURE — 97110 THERAPEUTIC EXERCISES: CPT

## 2020-09-25 PROCEDURE — 97140 MANUAL THERAPY 1/> REGIONS: CPT

## 2020-09-25 NOTE — PROGRESS NOTES
Nena Kapoor  : 1945  Primary: [employer] Hmo  Secondary:  [address]  [address]. [place] [address], [address]  [phone]   [phone]                                                                                                                                  PROGRESS UPDATE: 2020  ________________________________________________________                                                                                                                                                                  Visit Count:  10  Treatment Diagnosis:  Pain in right knee (M25.561)   Difficulty in walking, Not elsewhere classified (R26.2)  Other abnormalities of gait and mobility (R26.89)  Localized edema (R60.1)    Summary: Nena Kapoor has attended 10 out patient physical therapy visits. She is progressing well toward her functional goals however, continues to be frustrated at the amount of pain she is experiencing. Though she is improving her ROM and strength, she ambulates with antalgic gait and his significant difficulty sleeping due to right knee pain. Pt admitted that she was recently taken off of sleep medication she had been taking for several years, causing sleep difficulty---this may be contributing to insomnia and in turn, may be leading to decreased tolerance for pain or increased frustration. Overall, she is working hard during her therapy sessions and demonstrates evidence of consistent HEP. Would recommend continuing to improve ROM and gait . ROM:   Right knee 5-99 degrees   Left knee 0-105 degrees       Short-Term Goals~4 weeks  Goal Met   1. Nena Kapoor will be independent with HEP for strength and ROM 1.  [x]? Date:   1. Nena Kapoor will participate in LE strengthening exercises for hip, knee, ankle with weight as appropriate for 3 sets of 10. 3.  [x]?  Date:   3. Nena Kapoor will demonstrate a > 1 cm decrease in midpatellar circumference of right knee to demonstrate improved edema control. 4.  [x]? Date:   11. Mallrandy Gave will participate in static and dynamic balance activities for 5 minutes to help improve proprioception and decrease risk of falls 5. [x]? Date:   10.. Mallissa Gave will demonstrate R knee flexion >= 105 degrees to improve functional mobility and tolerance of ADLs. 7.  []? Date:   7. Mallissa Gave will demonstrate R knee extension >= within 5 deg of neutral to improve functional mobility and tolerance of ADLs 8.  [x]? Date:   6. Mallissa Gave will improve MMT R LE to >=4+/5 to improve current level of independence and community reintegration. 9.  []? Date:             Long Term Goals~8 weeks Goal Met   1. Mor Gave will be independent in an advanced HEP of stretching and strengthening 1. [x]? Date:   2. Mallrandy Gave will be able to perform sit to stand transfers independently with increased knee flexion, equal Wbing through B LE, and decreased use of upper extremities 2. [x]? needs cuing    3. Mallrandy Gave will ascend/descend 12 steps with reciprocal gait pattern and min use of 1 rail. 3.  []? Date:   3. Mallissa Gave to increase lower extremity functional scale by 20 points to show improvement in areas of difficulty. 4.  []? Date:   11.  Mallrandy Gave will ambulate x 200 ft without AD demonstrating no LOB and symmetrical gait to assist in community and in home ambulation. 5.  []? Date:   10.  Mallissa Gave will improve TUG score by 5 sec or greater. 6.  []? Date:   7. Mallissa Gave will demonstrate safe technique with floor to stand transfer, with or without use of UE on chair/bench.      []? Date:     Thank you for this referral,   Jose Morrison, PT  Regarding [facility] Cee's therapy, I certify that the treatment plan above will be carried out by a therapist or under their direction.   Thank you for this referral,  Jose Morrison, PT       Referring Physician:  Tiffanie Stone MD

## 2020-09-25 NOTE — PROGRESS NOTES
Mike Olmos  : 1945  Payor: Cassie Abimani / Plan: [address] HMO / Product Type: Managed Care Medicare /  [address] at [address] [place]. [address], [facility], [address] [place] [address]  [phone]   [phone]                                                          Gregg Sinha MD      OUTPATIENT PHYSICAL THERAPY: Daily Treatment Note 2020   Visit Count:  10    Tx Diagnosis ICD-10: Treatment Diagnosis:  Pain in right knee (M25.561)   Difficulty in walking, Not elsewhere classified (R26.2)  Other abnormalities of gait and mobility (R26.89)  Localized edema (R60.1)       Pre-treatment Symptoms/Complaints:   Pt states soreness of right medial knee pain. Overall, says she doesn't feel great today and thinks the rain has something to do with it. No significant increase of knee soreness after last session. Pain: Initial: 4-5/10 Post Session: 5/10    Medications Last Reviewed:  2020     Updated Objective Findings:   Gait: able to ambulate with symmetrical gait pattern no AD x 20 ft   ROM: right knee 5-99 degrees   TUG: 15 sec        TREATMENT:   THERAPEUTIC EXERCISE: (45 minutes):  Exercises per grid below to improve mobility, strength and balance. Required minimal visual, verbal and manual cues to promote proper body alignment and promote proper body posture. Progressed resistance and complexity of movement as indicated.      Date:  2020 Date:  20   Date:  20    Activity/Exercise Parameters Parameters Parameters           Education HEP, POC, PT goals, gait pattern  New HEP  Breathing, pain control, ice at bed time  Pain science, sleep deprivation      Weaning form cane     SLR 5 x, 3 sec hold    X 8    2 x 10  X 10 5 sec hold     QS 5 sec hold x 10       Standing and supine x 10  Standing and supine with Left SAQ  Standing and with LAQ, SLR, and SAQ     Heel slide 3 steps, 10 sec each step, x 4 reps With sheet for over pressure  X 10 with OP   x 20 with manual support X 10 with manual support  X 10 with manual OP X 5, 15 sec OP X 5 with 15 sec OP  X 5, 30 sec, 3 step progression, each rep X 5     Sit to stand  X 5  (mini squat)        X 2 demo, no use of UE     Gait  X 100 ft c cane, focused on increasing right knee flex, and decreasing over use of gastro/soleus   X 300 ft focusing on normal gait pattern and arm swing   X 20 ft, no AD  X 200 ft focusing on arm swing and symmetrical stance phase  X 200 ft  X 200 ft   X 200 ft   Cuing for right heel strike, forward gaze, relaxed arm swing and breathing  With trekking poles x 600 ft, minimal cuing     X 100 ft carrying laundry basket     Stairs/step ups  4 stairs A/D Next visit taps  [place] Limited due to pain Step taps alternating x 2 min    Step ups x 10 each  Step ups 2 x 1 min         nustep   10 min level 4  10 min level 3  10 min level 3  10 min level 4  10 min level 3  10 min level 3  10 min level 3 12 min level 3  10 min level 3     LAQ  2 x 10  X 10, 5 sec hold and ham curl  2# x 10, 5 sec holds   2# x 15  X 15 with tissue mobilization  2 1/2 # x 10, 5 sec hold in extension  X 10, 5 sec holds in flexion and extension  3# 2 x 10  3# 2 x 10     SAQ  X 20   2# x 20 with manual patella and scar mobilization     Over ball with ankle pumps x 10 R and L 3# x 20  3# with STM x 20     Hamstring stretch   5 x 10 sec right  Towel under knee x 10 with leg hang  Towel under knee x 10 with leg hang     **used this inconjunction with breathing exercise to improve pain management  Towel under knee x 10 with leg hang  Towel under knee x 10 with leg hang On stairs 5 x 10 sec with quad set  On stairs 5 x 10 sec with quad set       Mini squat  X 15   X 10 (touch downs)          Marching    With tekking poles for stability x 1 min static marching            Ham curl    RTB, seated x 10  X 10 seated  X 10 seated,   X 5 prone   Seated       Side stepping      2 x 40 ft  2 x 40 ft  In ladder x 4 laps       Calf stretching   Next visit  B with slant board 3 x 30 sec [place] Slant board 3 x 30 sec B  Manual  B 3 x 20 sec  2 x 30 sec B  Slant board 3 x 30 sec      Knee flexion stretching on steps        X 10, 10 sec hold  X 6, 10 sec  Seated knee flexion stretch 3 x 10- 20 sec      Step taps          2 x 1 min      Bridge           X 10                       MANUAL THERAPY: (10 minutes): Joint mobilization, Soft tissue mobilization was utilized and necessary because of the patient's restricted joint motion and restricted motion of soft tissue mobility. Date      Technique Used Grade  Level # Time(s) Effect while being performed   STM/ superficial MFR  Right knee anterior  5 min  Increased mobility of soft tissues and decreased edema    Patellar mobilizations  III All directions 3 bouts  Increased mobility of patella    Lymph edema mobilization   superficial  2 min  Decreased edema surrounding knee and increased tissue mobilization    Deep MFR  Right distal and mid quad  5 min  Decreased uneven muscle tone    Ankle DF/PF, IV/Ev  -------------                     modatlities  (0min ) declined 9/25/2020 ---will ice at home   · Ice x 10 min knee in elevation (bolster under calf to promote knee extension)           HEP Log Date 1. Heel slides 9/25/2020     2. SLR 9/25/2020     3. QS 9/25/2020     4.gait     5. LAQ 9/25/2020    6. SAQ 9/25/2020    7. Ham stretch  9/25/2020    8. Mini squat  9/25/2020     9. Prone knee hang and ham curl 9/25/2020    10. Seated knee flexion (scooting) 9/25/2020              [facility] Portal  Treatment/Session Summary:    Response to Treatment:    Focused some time on gait today as pt continues to limit weight shift to right LE efficiently. Pt demonstrated improvement while using trekking poles and carrying laundry basket.   Discussed weaning from cane as well as getting more activity such as going for walks outside and going out to stores more often to improve mobility as well as lift spirits. Pt agreeable to attempt over weekend. Pt departed with no c/os   Communication/Consultation:  None     Equipment provided today: None today. Recommendations/Intent for next treatment session:   Next visit will focus on Manual Therapy Core Stability Pain Science Education Quad strengthening soft tissue mobilization joint mobilizations, expand HEP. Treatment Plan of Care Effective Dates: 8/24/20 TO 11/22/2020 (90 days).   Frequency/Duration: 2 times a week for 90 Days     Total Treatment Billable Duration:  55 min   PT Patient Time In/Time Out  Time In: 1000  Time Out: [address] [facility], PT    Future Appointments   Date Time Provider Corky [place]iz   9/30/2020  9:00 AM Arnaldo Riggs, PT [facility]   10/2/2020 10:00 AM Arnaldo Riggs, PT [facility]   10/7/2020  9:00 AM Arnaldo Riggs, PT [facility]   10/9/2020  9:00 AM Arnaldo Riggs, PT [facility]   10/14/2020  9:00 AM Arnaldo Riggs, PT [facility]   10/16/2020  9:00 AM Jose Bailey, PT [facility]

## 2020-09-30 ENCOUNTER — HOSPITAL ENCOUNTER (OUTPATIENT)
Dept: PHYSICAL THERAPY | Age: 75
Discharge: HOME | End: 2020-09-30
Payer: MEDICARE

## 2020-09-30 PROCEDURE — 97140 MANUAL THERAPY 1/> REGIONS: CPT

## 2020-09-30 PROCEDURE — 97110 THERAPEUTIC EXERCISES: CPT

## 2020-09-30 NOTE — PROGRESS NOTES
Aide Caballero  : 1945  Payor: Julio Frames / Plan: [address] HMO / Product Type: Managed Care Medicare /  [address] [place] [address] at [facility]. [address] [facility], [facility], [address] [place] [address] Road  [phone]   [phone]                                                          Federico Lance MD      OUTPATIENT PHYSICAL THERAPY: Daily Treatment Note 2020   Visit Count:  11    Tx Diagnosis ICD-10: Treatment Diagnosis:  Pain in right knee (M25.561)   Difficulty in walking, Not elsewhere classified (R26.2)  Other abnormalities of gait and mobility (R26.89)  Localized edema (R60.1)       Pre-treatment Symptoms/Complaints:   Pt states no changes with pain level over weekend. She reports she worked on walking outside a couple times like we discussed last and also switching cane from left hand to right hand. Pain: Initial: 4-10 Post Session: -5/10    Medications Last Reviewed:  2020     Updated Objective Findings:   Gait: able to ambulate with symmetrical gait pattern no AD x 20 ft   ROM: right knee 5-99 degrees   TUG: 15 sec   Symmetrical girth measurements of right knee        TREATMENT:   THERAPEUTIC EXERCISE: (45 minutes):  Exercises per grid below to improve mobility, strength and balance. Required minimal visual, verbal and manual cues to promote proper body alignment and promote proper body posture. Progressed resistance and complexity of movement as indicated.      Date:  2020 Date:  20   Date:  20    Activity/Exercise Parameters Parameters Parameters           Education HEP, POC, PT goals, gait pattern  New HEP  Breathing, pain control, ice at bed time  Pain science, sleep deprivation      Weaning form cane     SLR 5 x, 3 sec hold    X 8    2 x 10  X 10 5 sec hold     QS 5 sec hold x 10       Standing and supine x 10  Standing and supine with Left SAQ  Standing and with LAQ, SLR, and SAQ     Heel slide  3 steps, 10 sec each step, x 4 reps With sheet for over pressure  X 10 with OP   x 20 with manual support X 10 with manual support  X 10 with manual OP X 5, 15 sec OP X 5 with 15 sec OP  X 5, 30 sec, 3 step progression, each rep X 5  X 3    Sit to stand  X 5  (mini squat)        X 2 demo, no use of UE  2 x 10 no UE, touch down onto blue cushion    Gait  X 100 ft c cane, focused on increasing right knee flex, and decreasing over use of gastro/soleus   X 300 ft focusing on normal gait pattern and arm swing   X 20 ft, no AD  X 200 ft focusing on arm swing and symmetrical stance phase  X 200 ft  X 200 ft   X 200 ft   Cuing for right heel strike, forward gaze, relaxed arm swing and breathing  With trekking poles x 600 ft, minimal cuing     X 100 ft carrying laundry basket  200 ft x 2    Stairs/step ups  4 stairs A/D Next visit taps  [place] Limited due to pain Step taps alternating x 2 min    Step ups x 10 each  Step ups 2 x 1 min         nustep   10 min level 4  10 min level 3  10 min level 3  10 min level 4  10 min level 3  10 min level 3  10 min level 3 12 min level 3  10 min level 3  10 min level 3.5   LAQ  2 x 10  X 10, 5 sec hold and ham curl  2# x 10, 5 sec holds   2# x 15  X 15 with tissue mobilization  2 1/2 # x 10, 5 sec hold in extension  X 10, 5 sec holds in flexion and extension  3# 2 x 10  3# 2 x 10     SAQ  X 20   2# x 20 with manual patella and scar mobilization     Over ball with ankle pumps x 10 R and L 3# x 20  3# with STM x 20     Hamstring stretch   5 x 10 sec right  Towel under knee x 10 with leg hang  Towel under knee x 10 with leg hang     **used this inconjunction with breathing exercise to improve pain management  Towel under knee x 10 with leg hang  Towel under knee x 10 with leg hang On stairs 5 x 10 sec with quad set  On stairs 5 x 10 sec with quad set       Mini squat  X 15   X 10 (touch downs)          Marching    With tekking poles for stability x 1 min static marching         in front of mirror with dowel to hold x 10 B    Ham curl    RTB, seated x 10  X 10 seated  X 10 seated,   X 5 prone   Seated       Side stepping      2 x 40 ft  2 x 40 ft  In ladder x 4 laps       Calf stretching   Next visit  B with slant board 3 x 30 sec [place] Slant board 3 x 30 sec B  Manual  B 3 x 20 sec  2 x 30 sec B  Slant board 3 x 30 sec   Slant board 3 x 30 sec    Knee flexion stretching on steps        X 10, 10 sec hold  X 6, 10 sec  Seated knee flexion stretch 3 x 10- 20 sec   10 x 10   Step taps          2 x 1 min   Out to side 2 x 10 B with dowel for stability    Bridge           X 10  HEP                                                 MANUAL THERAPY: (10 minutes): Joint mobilization, Soft tissue mobilization was utilized and necessary because of the patient's restricted joint motion and restricted motion of soft tissue mobility. Date  9/30/20    Technique Used Grade  Level # Time(s) Effect while being performed   STM/ superficial MFR  Right knee anterior  5 min  Increased mobility of soft tissues and decreased edema    Patellar mobilizations   ----- ----- ----   Lymph edema mobilization   superficial  2 min  Decreased edema surrounding knee and increased tissue mobilization    Deep MFR  Right distal and mid quad  5 min  Decreased uneven muscle tone    Ankle DF/PF, IV/Ev  -------------                     modatlities  (0min ) declined 9/30/2020 ---will ice at home   · Ice x 10 min knee in elevation (bolster under calf to promote knee extension)           HEP Log Date 1. Heel slides 9/30/2020     2. SLR 9/30/2020     3. QS 9/30/2020     4.gait     5. LAQ 9/30/2020    6. SAQ 9/30/2020    7. Ham stretch  9/30/2020    8. Mini squat  9/30/2020     9. Prone knee hang and ham curl 9/30/2020    10.  Seated knee flexion (scooting) 9/30/2020              [facility] Portal  Treatment/Session Summary:    Response to Treatment:  continues to progress but needs encouragement for positive thoughts and to acknowledge physical progress being made. Pt with no new c/o's on departure   Communication/Consultation:  None     Equipment provided today: None today. Recommendations/Intent for next treatment session:   Next visit will focus on Manual Therapy Core Stability Pain Science Education Quad strengthening soft tissue mobilization joint mobilizations, expand HEP. Treatment Plan of Care Effective Dates: 8/24/20 TO 11/22/2020 (90 days).   Frequency/Duration: 2 times a week for 90 Days     Total Treatment Billable Duration:  55 min   PT Patient Time In/Time Out  Time In: 0900  Time Out: 531 [facility], PT    Future Appointments   Date Time Provider Corky Ortiz   10/2/2020 10:00 AM Creed Flattery, PT [facility]   10/7/2020  9:00 AM Creed Flattery, PT [facility]   10/9/2020  9:00 AM Creed Flattery, PT [facility]   10/14/2020  9:00 AM Creed Flattery, PT [facility]   10/16/2020  9:00 AM Karen Neely, PT [facility]

## 2020-10-02 ENCOUNTER — HOSPITAL ENCOUNTER (OUTPATIENT)
Dept: PHYSICAL THERAPY | Age: 75
Discharge: HOME | End: 2020-10-02
Payer: MEDICARE

## 2020-10-02 PROCEDURE — 97110 THERAPEUTIC EXERCISES: CPT

## 2020-10-02 PROCEDURE — 97140 MANUAL THERAPY 1/> REGIONS: CPT

## 2020-10-02 NOTE — PROGRESS NOTES
Miles Archibald  : 1945  Payor: Krystina Orr / Plan: 1600 [address] HMO / Product Type: Managed Care Medicare /  [address] [place]. [address], [facility], [address] [place] [address]  [phone]   [phone]                                                          Chari Hankins MD      OUTPATIENT PHYSICAL THERAPY: Daily Treatment Note 10/2/2020   Visit Count:  12    Tx Diagnosis ICD-10: Treatment Diagnosis:  Pain in right knee (M25.561)   Difficulty in walking, Not elsewhere classified (R26.2)  Other abnormalities of gait and mobility (R26.89)  Localized edema (R60.1)       Pre-treatment Symptoms/Complaints:   Pt with reports of unchanged pain levels right knee. Pt called surgeon to discuss pain and disturbed sleep--waiting for returned call. States she is doing her HEP at home regularly. Walked around outside home using cane. Pain: Initial: 6/10 Post Session: 6/10    Medications Last Reviewed:  10/2/2020     Updated Objective Findings:   Gait: able to ambulate with symmetrical gait pattern no AD x 100 ft   ROM: right knee 5-100 degrees   TUG: 15 sec   Symmetrical girth measurements of right knee        TREATMENT:   THERAPEUTIC EXERCISE: (45 minutes):  Exercises per grid below to improve mobility, strength and balance. Required minimal visual, verbal and manual cues to promote proper body alignment and promote proper body posture. Progressed resistance and complexity of movement as indicated.      Date:  2020 Date:  20   Date:  9/2/20 9/4/20  9/9/20 9/11/20 9/16/20 9/18/20 9/23/20 9/25/20 9/30/20  10/2/20    Activity/Exercise Parameters Parameters Parameters            Education HEP, POC, PT goals, gait pattern  New HEP  Breathing, pain control, ice at bed time  Pain science, sleep deprivation      Weaning from cane      SLR 5 x, 3 sec hold    X 8    2 x 10  X 10 5 sec hold      QS 5 sec hold x 10       Standing and supine x 10 Standing and supine with Left SAQ  Standing and with LAQ, SLR, and SAQ   In weight bearing positions    Heel slide  3 steps, 10 sec each step, x 4 reps With sheet for over pressure  X 10 with OP   x 20 with manual support X 10 with manual support  X 10 with manual OP X 5, 15 sec OP X 5 with 15 sec OP  X 5, 30 sec, 3 step progression, each rep X 5  X 3  Supine knee flex/ext (hip at 90) x 10 with ankle pumps each position  X 5    Sit to stand  X 5  (mini squat)        X 2 demo, no use of UE  2 x 10 no UE, touch down onto blue cushion     Gait  X 100 ft c cane, focused on increasing right knee flex, and decreasing over use of gastro/soleus   X 300 ft focusing on normal gait pattern and arm swing   X 20 ft, no AD  X 200 ft focusing on arm swing and symmetrical stance phase  X 200 ft  X 200 ft   X 200 ft   Cuing for right heel strike, forward gaze, relaxed arm swing and breathing  With trekking poles x 600 ft, minimal cuing     X 100 ft carrying laundry basket  200 ft x 2  X 400 ft   200 with 4# weights    Stairs/step ups  4 stairs A/D Next visit taps  [place] Limited due to pain Step taps alternating x 2 min    Step ups x 10 each  Step ups 2 x 1 min       Step ups x 10 B (1 UE)    Step taps x 2 min    nustep   10 min level 4  10 min level 3  10 min level 3  10 min level 4  10 min level 3  10 min level 3  10 min level 3 12 min level 3  10 min level 3  10 min level 3.5 10 min level 3.5    LAQ  2 x 10  X 10, 5 sec hold and ham curl  2# x 10, 5 sec holds   2# x 15  X 15 with tissue mobilization  2 1/2 # x 10, 5 sec hold in extension  X 10, 5 sec holds in flexion and extension  3# 2 x 10  3# 2 x 10   2 x 10 with 3#    SAQ  X 20   2# x 20 with manual patella and scar mobilization     Over ball with ankle pumps x 10 R and L 3# x 20  3# with STM x 20   X 20 with 3#    Hamstring stretch   5 x 10 sec right  Towel under knee x 10 with leg hang  Towel under knee x 10 with leg hang     **used this inconjunction with breathing exercise to improve pain management  Towel under knee x 10 with leg hang  Towel under knee x 10 with leg hang On stairs 5 x 10 sec with quad set  On stairs 5 x 10 sec with quad set        Mini squat  X 15   X 10 (touch downs)           Marching    With tekking poles for stability x 1 min static marching         in front of mirror with dowel to hold x 10 B     Ham curl    RTB, seated x 10  X 10 seated  X 10 seated,   X 5 prone   Seated        Side stepping      2 x 40 ft  2 x 40 ft  In ladder x 4 laps     2 x 40 ft    Calf stretching   Next visit  B with slant board 3 x 30 sec [place] Slant board 3 x 30 sec B  Manual  B 3 x 20 sec  2 x 30 sec B  Slant board 3 x 30 sec   Slant board 3 x 30 sec     Knee flexion stretching on steps        X 10, 10 sec hold  X 6, 10 sec  Seated knee flexion stretch 3 x 10- 20 sec   10 x 10 Seated scoot x 4, 10 sec holds at end range, with manual tissue work    Step taps          2 x 1 min   Out to side 2 x 10 B with dowel for stability  On stairs 2 x 1 min, no UE    Bridge           X 10  HEP Declined today due to soreness of right glute--will attempt next visit    hooklying resisted hip abd             RTB x 10, 3 sec hold                                      MANUAL THERAPY: (10 minutes): Joint mobilization, Soft tissue mobilization was utilized and necessary because of the patient's restricted joint motion and restricted motion of soft tissue mobility.         Date  10/2/20    Technique Used Grade  Level # Time(s) Effect while being performed   STM/ superficial MFR  Right knee anterior and incision  sight   5 min  Increased mobility of soft tissues and decreased edema, decreased scar adhesion     Patellar mobilizations   ----- ----- ----   Lymph edema mobilization   ---------     Deep MFR  Right distal and mid quad  2 min  Decreased uneven muscle tone    Ankle DF/PF, IV/Ev  -------------                     modatlities  (0min ) declined 10/2/2020 ---will ice at home   · Ice x 10 min knee in elevation (bolster under calf to promote knee extension)           HEP Log Date 1. Heel slides 10/2/2020     2. SLR 10/2/2020     3. QS 10/2/2020     4.gait     5. LAQ 10/2/2020    6. SAQ 10/2/2020    7. Ham stretch  10/2/2020    8. Mini squat  10/2/2020     9. Prone knee hang and ham curl 10/2/2020    10. Seated knee flexion (scooting) 10/2/2020    resisted hip abd  10/2/2020          [facility] Portal  Treatment/Session Summary:      Response to Treatment:  pt continues to improve functional mobility of right knee however due to pain, pt requires encouragement to increase knee flexion and weightbearing during ambulation. Edema min to none, ROM improving, and pt able to perform 10 step ups with right LE today with minimal UE assist demonstrating functional strength, however, pt report VERY painful. Communication/Consultation:  None     Equipment provided today: None today. Recommendations/Intent for next treatment session:   Next visit will focus on Manual Therapy Core Stability Pain Science Education Quad strengthening soft tissue mobilization joint mobilizations, expand HEP. Treatment Plan of Care Effective Dates: 8/24/20 TO 11/22/2020 (90 days).   Frequency/Duration: 2 times a week for 90 Days     Total Treatment Billable Duration:  55 min   PT Patient Time In/Time Out  Time In: 1000  Time Out: 43 [facility],     Future Appointments   Date Time Provider Corky Ortiz   10/7/2020  9:00 AM Anastasia Nichols, PT [facility]   10/9/2020  9:00 AM Anastasia Nichols, PT [facility]   10/14/2020  9:00 AM Anastasia Nichols, PT [facility]   10/16/2020  9:00 AM Celine Echevarria, PT [facility]

## 2020-10-07 ENCOUNTER — HOSPITAL ENCOUNTER (OUTPATIENT)
Dept: PHYSICAL THERAPY | Age: 75
Discharge: HOME | End: 2020-10-07
Payer: MEDICARE

## 2020-10-07 PROCEDURE — 97530 THERAPEUTIC ACTIVITIES: CPT

## 2020-10-07 PROCEDURE — 97110 THERAPEUTIC EXERCISES: CPT

## 2020-10-07 NOTE — PROGRESS NOTES
Trudi Mora  : 1945  Payor: Cathy Dakota / Plan: [address] HMO / Product Type: Managed Care Medicare /  [address] at [address] [place]. [address], [facility], [address] [place] [address] Road  [phone]   [phone]                                                          Laurence Yanez MD      OUTPATIENT PHYSICAL THERAPY: Daily Treatment Note 10/7/2020   Visit Count:  13    Tx Diagnosis ICD-10: Treatment Diagnosis:  Pain in right knee (M25.561)   Difficulty in walking, Not elsewhere classified (R26.2)  Other abnormalities of gait and mobility (R26.89)  Localized edema (R60.1)       Pre-treatment Symptoms/Complaints:  Pt to MD on Friday so will cancel therapy session that day. Plans to discuss pain level and progress with MD.  Pt reports that she was pretty sore after last visit. She thinks this may be due to scar tissue work. States she is doing her HEP at home regularly. Pain: Initial: 5/10 Post Session: 5/10    Medications Last Reviewed:  10/7/2020     Updated Objective Findings:     Short-Term Goals~4 weeks  Goal Met   1. Shiloh Priest be independent with HEP for strength and ROM 1.  [x]? ? Date:   3. Shiloh Priest participate in LE strengthening exercises for hip, knee, ankle with weight as appropriate for 3 sets of 10. 3.  [x]? ? Date:   4. Shiloh Priest demonstrate a > 1 cm decrease in midpatellar circumference of right knee to demonstrate improved edema control.  4.  [x]? ? Date:   5. Shiloh Priest participate in static and dynamic balance activities for 5 minutes to help improve proprioception and decrease risk of falls 5.  [x]? ? Date:   6.. Rudi IRWIN Cee will demonstrate R knee flexion >= 105 degrees to improve functional mobility and tolerance of ADLs. 7.  [x]? ? Date:   7. Art Marie Cee will demonstrate R knee extension >= within 5 deg of neutral to improve functional mobility and tolerance of ADLs 8.  [x]? ? Date:   8. Adriano Marie improve MMT R LE to >=4+/5 to improve current level of independence and community reintegration. 9.  [x]? ? Date:             Long Term Goals~8 weeks Goal Met   1. Adriano Marie be independent in an advanced HEP of stretching and strengthening 1.  [x]? ? Date:   2. Adriano Marie be able to perform sit to stand transfers independently with increased knee flexion, equal Wbing through B LE, and decreased use of upper extremities 2.  [x]? ? needs cuing    3. Terrie Sandhoff Babb will ascend/descend 12 steps with reciprocal gait pattern and min use of 1 rail. 3.  [x]? ? Date:   4. Rudi Rahmanb to increase lower extremity functional scale by 20 points to show improvement in areas of difficulty.  4.  []? ? Date:   5.  Rudi Mike will ambulate x 200 ft without AD demonstrating no LOB and symmetrical gait to assist in community and in home ambulation. 5.  [x]? ? Date:   6.  Terrie Sandhoff Babb will improve TUG score by 5 sec or greater. 6.  []? ? Date:   7. Adriano Marie demonstrate safe technique with floor to stand transfer, with or without use of UE on chair/bench.      []? ? Date:        Gait: able to ambulate with symmetrical gait pattern no AD x 100 ft   ROM: right knee 5-104 degrees   TUG: 10 sec  Symmetrical girth measurements of right knee    sit to stand x 10 in 30 sec      TREATMENT:   THERAPEUTIC EXERCISE: (45 minutes):  Exercises per grid below to improve mobility, strength and balance. Required minimal visual, verbal and manual cues to promote proper body alignment and promote proper body posture. Progressed resistance and complexity of movement as indicated.      Date:  8/24/2020 Date:  8/26/20   Date:  9/2/20 9/4/20  9/9/20 9/11/20 9/16/20 9/18/20 9/23/20 9/25/20 9/30/20  10/2/20  10/ 7/20    Activity/Exercise Parameters Parameters Parameters             Education HEP, POC, PT goals, gait pattern  New HEP  Breathing, pain control, ice at bed time  Pain science, sleep deprivation Weaning from cane       SLR 5 x, 3 sec hold    X 8    2 x 10  X 10 5 sec hold       QS 5 sec hold x 10       Standing and supine x 10  Standing and supine with Left SAQ  Standing and with LAQ, SLR, and SAQ   In weight bearing positions     Heel slide  3 steps, 10 sec each step, x 4 reps With sheet for over pressure  X 10 with OP   x 20 with manual support X 10 with manual support  X 10 with manual OP X 5, 15 sec OP X 5 with 15 sec OP  X 5, 30 sec, 3 step progression, each rep X 5  X 3  Supine knee flex/ext (hip at 90) x 10 with ankle pumps each position  X 5  X 5 with 10 sec hold    Sit to stand  X 5  (mini squat)        X 2 demo, no use of UE  2 x 10 no UE, touch down onto blue cushion   X 10 no UE    Gait  X 100 ft c cane, focused on increasing right knee flex, and decreasing over use of gastro/soleus   X 300 ft focusing on normal gait pattern and arm swing   X 20 ft, no AD  X 200 ft focusing on arm swing and symmetrical stance phase  X 200 ft  X 200 ft   X 200 ft   Cuing for right heel strike, forward gaze, relaxed arm swing and breathing  With trekking poles x 600 ft, minimal cuing     X 100 ft carrying laundry basket  200 ft x 2  X 400 ft   200 with 4# weights  4# x 400ft        Stairs/step ups  4 stairs A/D Next visit taps  NV Limited due to pain Step taps alternating x 2 min    Step ups x 10 each  Step ups 2 x 1 min       Step ups x 10 B (1 UE)    Step taps x 2 min  recip gait, 1 UE x 4 up and down     nustep   10 min level 4  10 min level 3  10 min level 3  10 min level 4  10 min level 3  10 min level 3  10 min level 3 12 min level 3  10 min level 3  10 min level 3.5 10 min level 3.5  10 min level 3.0    LAQ  2 x 10  X 10, 5 sec hold and ham curl  2# x 10, 5 sec holds   2# x 15  X 15 with tissue mobilization  2 1/2 # x 10, 5 sec hold in extension  X 10, 5 sec holds in flexion and extension  3# 2 x 10  3# 2 x 10   2 x 10 with 3#  2 x 10 with 4#   SAQ  X 20   2# x 20 with manual patella and scar mobilization Over ball with ankle pumps x 10 R and L 3# x 20  3# with STM x 20   X 20 with 3#  4# x 2 x 15    Hamstring stretch   5 x 10 sec right  Towel under knee x 10 with leg hang  Towel under knee x 10 with leg hang     **used this inconjunction with breathing exercise to improve pain management  Towel under knee x 10 with leg hang  Towel under knee x 10 with leg hang On stairs 5 x 10 sec with quad set  On stairs 5 x 10 sec with quad set         Mini squat  X 15   X 10 (touch downs)            Marching    With tekking poles for stability x 1 min static marching         in front of mirror with dowel to hold x 10 B      Ham curl    RTB, seated x 10  X 10 seated  X 10 seated,   X 5 prone   Seated         Side stepping      2 x 40 ft  2 x 40 ft  In ladder x 4 laps     2 x 40 ft     Calf stretching   Next visit  B with slant board 3 x 30 sec NV Slant board 3 x 30 sec B  Manual  B 3 x 20 sec  2 x 30 sec B  Slant board 3 x 30 sec   Slant board 3 x 30 sec      Knee flexion stretching on steps        X 10, 10 sec hold  X 6, 10 sec  Seated knee flexion stretch 3 x 10- 20 sec   10 x 10 Seated scoot x 4, 10 sec holds at end range, with manual tissue work     Step taps          2 x 1 min   Out to side 2 x 10 B with dowel for stability  On stairs 2 x 1 min, no UE     Bridge           X 10  HEP Declined today due to soreness of right glute--will attempt next visit     hooklying resisted hip abd             RTB x 10, 3 sec hold                                         MANUAL THERAPY: ( during TE minutes): Joint mobilization, Soft tissue mobilization was utilized and necessary because of the patient's restricted joint motion and restricted motion of soft tissue mobility.         Date  10/2/20    Technique Used Grade  Level # Time(s) Effect while being performed   STM/ superficial MFR  Right knee anterior and incision  sight   5 min  Increased mobility of soft tissues and decreased edema, decreased scar adhesion     Patellar mobilizations ----- ----- ----   Lymph edema mobilization   ---------     Deep MFR  Right distal and mid quad  2 min  Decreased uneven muscle tone    Ankle DF/PF, IV/Ev  -------------                   Therapeutic Activity (    15 min): Therapeutic activities per grid below to improve mobility, strength, balance, coordination and dynamic movement of hip - right, knee - right and leg - right to improve functional gait and transfers . Required minimal verbal cues to promote coordination of lower extremity(s). · TUG  · Sit to stand 30 sec     modatlities  (0min ) declined 10/7/2020 ---will ice at home   · Ice x 10 min knee in elevation (bolster under calf to promote knee extension)           HEP Log Date 1. Heel slides 10/7/2020     2. SLR 10/7/2020     3. QS 10/7/2020     4.gait     5. LAQ 10/7/2020    6. SAQ 10/7/2020    7. Ham stretch  10/7/2020    8. Mini squat  10/7/2020     9. Prone knee hang and ham curl 10/7/2020    10. Seated knee flexion (scooting) 10/7/2020    resisted hip abd  10/7/2020          [facility]  Treatment/Session Summary:      Response to Treatment:  see progress update for objective data and summary. Pt progressing well and has improved in all aspects above. Communication/Consultation:  Routed progress update to MD due to pt seeing MD Friday    Equipment provided today: None today. Recommendations/Intent for next treatment session:   Next visit will focus on Manual Therapy Core Stability Pain Science Education Quad strengthening soft tissue mobilization joint mobilizations, expand HEP. Treatment Plan of Care Effective Dates: 8/24/20 TO 11/22/2020 (90 days).   Frequency/Duration: 2 times a week for 90 Days     Total Treatment Billable Duration:  55 min   PT Patient Time In/Time Out  Time In: 0900  Time Out: [facility], PT    Future Appointments   Date Time Provider Corky Ortiz   10/14/2020  9:00 AM DELIA Rascon   10/16/2020  9:00 AM [facility]

## 2020-10-07 NOTE — PROGRESS NOTES
Malgorzata Banuelos  : 1945  Primary: [employer] Hmo  Secondary:  [address] at [facility]  [address] [place] [address], [address]  [phone]   [phone]         OUTPATIENT PHYSICAL THERAPY:Progress Report 10/7/2020    ICD-10: Treatment Diagnosis:  Pain in right knee (M25.561)   Difficulty in walking, Not elsewhere classified (R26.2)  Other abnormalities of gait and mobility (R26.89)  Localized edema (R60.1)    Precautions/Allergies:   Daypro [oxaprozin] and Meloxicam   MD Orders: Eval and Treat  MEDICAL/REFERRING DIAGNOSIS:  knee TKA, right Z96.651  DATE OF ONSET: 20  REFERRING PHYSICIAN: Torri Lopez MD  RETURN PHYSICIAN APPOINTMENT: 20      ASSESSMENT 10/7/20:  Ms. Elza Kee has attended 13 scheduled out patient PT visits for treatment following R TKA. She is currently 10 weeks s/p surgery. She continues to progress well toward and is meeting most therapy goals. She is improving her right knee ROM and strength, and is able to walk community distances without use of assistive device, no LOB and demonstration good negotiation of curbs and stairs. All functional outcome testing has improved in significant amounts and overall has demonstrated independence with HEP. One limiting factor continues to be her pain level. Pt has been unable to sleep well due to pain and reports pain seems to be unchanged with any activities or treatment. Would recommend continuing to improve and refine functional mobility over the next week and may prepare for discharge as indicated at that time. TREATMENT PLAN:  Effective Dates: 2020 TO 2020 (90 days). Frequency/Duration: 2 times a week for 90 Days    GOALS: (Goals have been discussed and agreed upon with patient.)      Short-Term Goals~4 weeks  Goal Met   1. Sobia Harris be independent with HEP for strength and ROM 1.  [x]? ? Date:   3. Sobia Harris participate in LE strengthening exercises for hip, knee, ankle with weight as appropriate for 3 sets of 10. 3.  [x]? ? Date:   4. Rowena Marr demonstrate a > 1 cm decrease in midpatellar circumference of right knee to demonstrate improved edema control.  4.  [x]? ? Date:   5. Rowena Marr participate in static and dynamic balance activities for 5 minutes to help improve proprioception and decrease risk of falls 5.  [x]? ? Date:   6.. Rudi IRWIN Cee will demonstrate R knee flexion >= 105 degrees to improve functional mobility and tolerance of ADLs. 7.  [x]? ? Date:   7. Nancy Winkler Cee will demonstrate R knee extension >= within 5 deg of neutral to improve functional mobility and tolerance of ADLs 8.  [x]? ? Date:   8. Rudi Rahmanb will improve MMT R LE to >=4+/5 to improve current level of independence and community reintegration. 9.  [x]? ? Date:             Long Term Goals~8 weeks Goal Met   1. Rowena Marr be independent in an advanced HEP of stretching and strengthening 1.  [x]? ? Date:   2. Rowena Marr be able to perform sit to stand transfers independently with increased knee flexion, equal Wbing through B LE, and decreased use of upper extremities 2.  [x]? ? needs cuing    3. Nancy Mike will ascend/descend 12 steps with reciprocal gait pattern and min use of 1 rail. 3.  [x]? ? Date:   4. Rudi Rahmanb to increase lower extremity functional scale by 20 points to show improvement in areas of difficulty.  4.  []? ? Date:   5.  Rudi Mike will ambulate x 200 ft without AD demonstrating no LOB and symmetrical gait to assist in community and in home ambulation. 5.  [x]? ? Date:   6.  Nancy Rahmanb will improve TUG score by 5 sec or greater. 6.  [x]? ? Date:   7. Rowena Marr demonstrate safe technique with floor to stand transfer, with or without use of UE on chair/bench.      []? ? Date:       Outcome Measure:    Tool Used: Lower Extremity Functional Scale (LEFS)  Score:  Initial: 32/80 Most Recent: 49/80 (Date: 10/7/20 )   Interpretation of Score: 20 questions each scored on a 5 point scale with 0 representing \"extreme difficulty or unable to perform\" and 4 representing \"no difficulty\". The lower the score, the greater the functional disability. 80/80 represents no disability. Minimal detectable change is 9 points. Medical Necessity:   · Skilled intervention continues to be required due to current impairment. Reason for Services/Other Comments:  · Patient continues to require skilled intervention due to patient continues to present with impairments assessed at initial evaluation and requiring skilled physical therapy to meet goals for PT. Total Treatment Duration:         Rehabilitation Potential For Stated Goals: Good  Regarding Art Mike's therapy, I certify that the treatment plan above will be carried out by a therapist or under their direction. Thank you for this referral,  Annel Kessler, PT     Referring Physician  Laurence Yanez MD                Ambulatory/Rehab Services H2 Model Falls Risk Assessment    Risk Factors:       No Risk Factors Identified Ability to Rise from Chair:       (1)  Pushes up, successful in one attempt    Falls Prevention Plan:       No modifications necessary   Total: (5 or greater = High Risk): 1    ©2010 [facility] [place] [address] [place] [address] States Patent #[id number].  Federal Law prohibits the replication, distribution or use without written permission from [facility] of "[employer]     Date Last Reviewed:  10/7/2020   EXAMINATION:   Observation/Orthostatic Postural Assessment:   · Static standing: WBing mostly through right LE, right knee in mild flexion   · incision site closed with no sign of drainage of infection  · No edema of right knee joint observed      Palpation:    · Tenderness anterior and medial right knee  · Girth: mid patella 51 cm right , 51 cm left   AROM/PROM       Joint:  8/24/20  Re-Assess Date: 10/7/2020    Active ROM RIGHT LEFT RIGHT LEFT   Knee Extension -10 0 -5 0   Knee Flexion 90 105 103 105   Hip Flexion [facility] WFL       Ankle mobility DF to neutral  WNL                                   Passive ROM       Knee Extension -7 0 -5     Knee Flexion 95 110 106       Strength:     Eval Date: 8/24/20  Re-Assess Date: 10/7/2020        RIGHT LEFT RIGHT LEFT   Knee Flexion  4/5  4+/5 4+/5     Knee Extension  4/5  4+/5 4+/5     Hip Flexion  4/5  4+/5 4+/5     Hip Abduction  4/5  4+/5 4+/5     Hip Extension  4+/5  4+/5 4+/5     Ankle Dorsiflexion   4/5 4+/5 4+/5     Ankle PF 4+ /5 4+/5 4+/5       Functional Mobility:        Sit to Stand=Sit to stand x 10 reps in 30 sec, no use of UE   Gait: x 400 ft with mild intermittent antalgic gait, No LOB    Stairs: one rail, recip pattern, no LOB    TUG= 21 sec (at initial eval)   TUG 10 sec 10/7/2020 (no fall risk)     See treatment note for associated treatment provided today.       Future Appointments   Date Time Provider Corky Ortiz   10/14/2020  9:00 AM Janice Calix PT [facility]   10/16/2020  9:00 AM Shelton Salas, PT [facility]         Nell Lafleur, PT

## 2020-10-09 ENCOUNTER — APPOINTMENT (OUTPATIENT)
Dept: PHYSICAL THERAPY | Age: 75
End: 2020-10-09
Payer: MEDICARE

## 2020-10-14 ENCOUNTER — APPOINTMENT (OUTPATIENT)
Dept: PHYSICAL THERAPY | Age: 75
End: 2020-10-14
Payer: MEDICARE

## 2020-10-16 ENCOUNTER — APPOINTMENT (OUTPATIENT)
Dept: PHYSICAL THERAPY | Age: 75
End: 2020-10-16
Payer: MEDICARE

## 2020-12-20 PROBLEM — Z11.59 NEED FOR HEPATITIS C SCREENING TEST: Status: ACTIVE | Noted: 2020-12-20

## 2020-12-20 PROBLEM — K64.9 HEMORRHOIDS: Status: RESOLVED | Noted: 2017-05-19 | Resolved: 2020-12-20

## 2020-12-20 PROBLEM — E78.5 HYPERLIPIDEMIA: Status: ACTIVE | Noted: 2017-12-01

## 2020-12-20 PROBLEM — M17.0 PRIMARY OSTEOARTHRITIS OF BOTH KNEES: Status: RESOLVED | Noted: 2017-05-19 | Resolved: 2020-12-20

## 2020-12-20 PROBLEM — Z23 NEED FOR SHINGLES VACCINE: Status: ACTIVE | Noted: 2020-12-20

## 2020-12-20 PROBLEM — Z12.11 COLON CANCER SCREENING: Status: ACTIVE | Noted: 2020-12-20

## 2020-12-20 PROBLEM — G47.00 INSOMNIA: Status: ACTIVE | Noted: 2017-05-19

## 2020-12-20 PROBLEM — M17.11 ARTHRITIS OF KNEE, RIGHT: Status: RESOLVED | Noted: 2020-07-28 | Resolved: 2020-12-20

## 2020-12-20 PROBLEM — M85.80 OSTEOPENIA: Status: ACTIVE | Noted: 2020-12-20

## 2020-12-21 PROBLEM — Z12.31 ENCOUNTER FOR SCREENING MAMMOGRAM FOR MALIGNANT NEOPLASM OF BREAST: Status: ACTIVE | Noted: 2020-12-21

## 2020-12-21 PROBLEM — Z12.4 CERVICAL CANCER SCREENING: Status: ACTIVE | Noted: 2020-12-21

## 2020-12-21 PROBLEM — Z13.5 GLAUCOMA SCREENING: Status: ACTIVE | Noted: 2020-12-21

## 2020-12-21 PROBLEM — Z23 NEED FOR INFLUENZA VACCINATION: Status: ACTIVE | Noted: 2020-12-21

## 2020-12-21 PROBLEM — E66.01 SEVERE OBESITY (HCC): Status: ACTIVE | Noted: 2020-12-21

## 2021-01-12 ENCOUNTER — TRANSCRIBE ORDER (OUTPATIENT)
Dept: SCHEDULING | Age: 76
End: 2021-01-12

## 2021-01-12 DIAGNOSIS — Z12.39 SCREENING BREAST EXAMINATION: Primary | ICD-10-CM

## 2021-01-15 NOTE — PROGRESS NOTES
Jeyson Coleman  : 1945  Primary: [employer] Medicare Hmo  Secondary:  [address] at . [address]  [address] [place] [address], [address]  [phone]   [phone]         OUTPATIENT PHYSICAL THERAPY:Discontinuation Summary 1/15/2021       ICD-10: Treatment Diagnosis:  Pain in right knee (M25.561)   Difficulty in walking, Not elsewhere classified (R26.2)  Other abnormalities of gait and mobility (R26.89)  Localized edema (R60.1)     MEDICAL/REFERRING DIAGNOSIS:  knee TKA, right Z96.651  DATE OF ONSET: 20  REFERRING PHYSICIAN: Taylor Whitmore MD        ASSESSMENT 10/7/20:  Ms. Josh Mcnamara attended 13 scheduled out patient PT visits for treatment following R TKA. She progressed well toward her therapy goals and was meeting most at last attended PT visit on 10/7/20. Pt left a message with front office later that week stating that felt she could be discharged at that time. Please refer to last progress update on 10/7 for objective measures. Pt's POC is discontinued. Please reorder therapy if needed.      Thank you for this referral.     Madison Amaro, PT

## 2021-02-12 ENCOUNTER — HOSPITAL ENCOUNTER (OUTPATIENT)
Dept: MAMMOGRAPHY | Age: 76
Discharge: HOME | End: 2021-02-12
Attending: INTERNAL MEDICINE
Payer: MEDICARE

## 2021-02-12 DIAGNOSIS — Z12.39 SCREENING BREAST EXAMINATION: ICD-10-CM

## 2021-02-12 PROCEDURE — 77067 SCR MAMMO BI INCL CAD: CPT

## 2021-02-18 ENCOUNTER — HOSPITAL ENCOUNTER (OUTPATIENT)
Dept: MAMMOGRAPHY | Age: 76
Discharge: HOME | End: 2021-02-18
Attending: DERMATOLOGY
Payer: MEDICARE

## 2021-02-18 DIAGNOSIS — R92.8 ABNORMAL SCREENING MAMMOGRAM: ICD-10-CM

## 2021-02-18 PROCEDURE — 76642 ULTRASOUND BREAST LIMITED: CPT

## 2021-02-18 PROCEDURE — 77065 DX MAMMO INCL CAD UNI: CPT

## 2021-02-25 ENCOUNTER — HOSPITAL ENCOUNTER (OUTPATIENT)
Dept: MAMMOGRAPHY | Age: 76
Discharge: HOME | End: 2021-02-25
Attending: INTERNAL MEDICINE
Payer: MEDICARE

## 2021-02-25 ENCOUNTER — HOSPITAL ENCOUNTER (OUTPATIENT)
Dept: MAMMOGRAPHY | Age: 76
Discharge: HOME | End: 2021-02-25
Attending: DERMATOLOGY
Payer: MEDICARE

## 2021-02-25 VITALS — DIASTOLIC BLOOD PRESSURE: 68 MMHG | SYSTOLIC BLOOD PRESSURE: 152 MMHG | HEART RATE: 74 BPM

## 2021-02-25 DIAGNOSIS — N63.10 BREAST MASS, RIGHT: ICD-10-CM

## 2021-02-25 DIAGNOSIS — R92.8 ABNORMAL MAMMOGRAM: ICD-10-CM

## 2021-02-25 DIAGNOSIS — R92.8 ABNORMAL ULTRASOUND OF BREAST: ICD-10-CM

## 2021-02-25 PROCEDURE — 19084 BX BREAST ADD LESION US IMAG: CPT

## 2021-02-25 PROCEDURE — 19083 BX BREAST 1ST LESION US IMAG: CPT

## 2021-02-25 PROCEDURE — 77065 DX MAMMO INCL CAD UNI: CPT

## 2021-02-25 PROCEDURE — 88305 TISSUE EXAM BY PATHOLOGIST: CPT

## 2021-02-25 RX ORDER — LIDOCAINE HYDROCHLORIDE 10 MG/ML
10 INJECTION INFILTRATION; PERINEURAL
Status: DISPENSED | OUTPATIENT
Start: 2021-02-25 | End: 2021-02-25

## 2021-03-03 ENCOUNTER — HOSPITAL ENCOUNTER (OUTPATIENT)
Dept: MRI IMAGING | Age: 76
Discharge: HOME | End: 2021-03-03
Attending: DERMATOLOGY
Payer: MEDICARE

## 2021-03-03 DIAGNOSIS — R92.8 ABNORMAL MAMMOGRAM OF RIGHT BREAST: ICD-10-CM

## 2021-03-03 PROCEDURE — 77049 MRI BREAST C-+ W/CAD BI: CPT

## 2021-03-03 PROCEDURE — 74011250636 HC RX REV CODE- 250/636: Performed by: DERMATOLOGY

## 2021-03-03 PROCEDURE — A9575 INJ GADOTERATE MEGLUMI 0.1ML: HCPCS | Performed by: DERMATOLOGY

## 2021-03-03 RX ORDER — SODIUM CHLORIDE 0.9 % (FLUSH) 0.9 %
10 SYRINGE (ML) INJECTION
Status: COMPLETED | OUTPATIENT
Start: 2021-03-03 | End: 2021-03-03

## 2021-03-03 RX ORDER — GADOTERATE MEGLUMINE 376.9 MG/ML
20 INJECTION INTRAVENOUS
Status: COMPLETED | OUTPATIENT
Start: 2021-03-03 | End: 2021-03-03

## 2021-03-03 RX ADMIN — Medication 10 ML: at 13:55

## 2021-03-03 RX ADMIN — GADOTERATE MEGLUMINE 20 ML: 376.9 INJECTION INTRAVENOUS at 13:55

## 2021-03-16 NOTE — H&P (VIEW-ONLY)
Radha Butts MD, [address] [facility], [address] [place] Phone [phone]   Fax [phone] Date of visit: 3/16/2021 Primary/Requesting provider: Zaheer Valdez MD 
 
Chief Complaint Patient presents with  New Patient  
  r br papilloma HISTORY OF PRESENT ILLNESS 
Avril Mims is a 77 y.o. female. HPI Patient is a 77 y.o. female who presents for discussion of her recent breast biopsies and surgical options. She denies any antecedent symptoms referrable to her breast(s), specifically no pain, swelling, redness, skin change, or nipple retraction or discharge. She has had 2(?) prior breast biopsies, both surgical, which were benign. She reports a sister had breast cancer, diagnosed in her 62s. Pt is admittedly very hesitant to proceed with any further interventions. Medications:  
Current Outpatient Medications Medication Sig  
 IBUPROFEN PO Take  by mouth.  levothyroxine (SYNTHROID) 88 mcg tablet Take 88 mcg by mouth Daily (before breakfast). Indications: a condition with low thyroid hormone levels  losartan (COZAAR) 100 mg tablet Take 100 mg by mouth. Indications: high blood pressure  zolpidem (AMBIEN) 5 mg tablet Take  by mouth nightly as needed for Sleep. No current facility-administered medications for this visit. Allergies: Allergies Allergen Reactions  Daypro [Oxaprozin] Swelling  Meloxicam Unknown (comments) Past History: 
Past Medical History:  
Diagnosis Date  Anxiety  Depression 5/19/2017  Essential hypertension 05/19/2017  Fibromyalgia  History of postoperative nausea and vomiting  Hyperlipidemia  Hypothyroid  Impaired fasting glucose  Insomnia  Obesity  Osteoarthritis 12/15/2015  Stress incontinence, female  Vitamin D deficiency Past Surgical History:  
Procedure Laterality Date  HX BREAST BIOPSY Bilateral   
 [address]  HX COLONOSCOPY  2012  
 normal; internal hemorrhoid [address]  HX KNEE REPLACEMENT Bilateral   
 Left 2015, Right 7/29/20 Family and Social History: 
Family History Problem Relation Age of Onset  No Known Problems Mother  Heart Disease Father  Stroke Father  Breast Cancer Sister Social History Socioeconomic History  Marital status:  Spouse name: Not on file  Number of children: Not on file  Years of education: Not on file  Highest education level: Not on file Occupational History  Not on file Social Needs  Financial resource strain: Not on file  Food insecurity Worry: Not on file Inability: Not on file  Transportation needs Medical: Not on file Non-medical: Not on file Tobacco Use  Smoking status: Never Smoker  Smokeless tobacco: Never Used Substance and Sexual Activity  Alcohol use: No  
 Drug use: No  
 Sexual activity: Not on file Lifestyle  Physical activity Days per week: Not on file Minutes per session: Not on file  Stress: Not on file Relationships  Social connections Talks on phone: Not on file Gets together: Not on file Attends Denominational service: Not on file Active member of club or organization: Not on file Attends meetings of clubs or organizations: Not on file Relationship status: Not on file  Intimate partner violence Fear of current or ex partner: Not on file Emotionally abused: Not on file Physically abused: Not on file Forced sexual activity: Not on file Other Topics Concern  Not on file Social History Narrative  Not on file Review of Systems Constitutional: Negative for chills, fever and weight loss. HENT: Negative for congestion and ear discharge. Eyes: Negative. Respiratory: Negative. Negative for stridor. Cardiovascular: Positive for leg swelling.   
Gastrointestinal: Negative for diarrhea, nausea and vomiting. Genitourinary: Negative for dysuria and hematuria. Musculoskeletal: Positive for joint pain (right knee replaced 6 mos ago, still sore). Skin: Negative for rash. Neurological: Positive for weakness. Negative for dizziness, seizures and headaches. Endo/Heme/Allergies: Does not bruise/bleed easily. Psychiatric/Behavioral: Negative for depression, substance abuse and suicidal ideas. All other systems reviewed and are negative. Physical Exam 
Visit Vitals /72 Pulse 71 Ht 5' 3\" (1.6 m) Wt 214 lb (97.1 kg) BMI 37.91 kg/m² General Appearance: In no acute distress Ears/Nose/Mouth/Throat:   Hearing grossly normal. 
  
    Neck: Supple. Chest:   Lungs clear to auscultation bilaterally. Cardiovascular:  Regular rate and rhythm, S1, S2 normal, no murmur. Abdomen:   Soft. Extremities: No gross deformity Neuro: alert and oriented x 3  
   
   
  
PEDRO LUIS Results (most recent): 
[place] [facility] [place]/25/21 [facility] POST BX IMAGING RT INCL CAD Addendum AddendumLaangelaa Downs, accession number: [id number] Date: 3/8/2021  Pathology was noted as A: Right breast, 1:00 position, 9 cm from nipple,  core biopsy: Fibrocystic mastopathy having intraductal papilloma with focal  atypia. B: Right breast, 1:00 position 12 cm from nipple, core biopsy: Fibrocystic mastopathy having intraductal papilloma with focal atypia. Results are likely concordant with imaging. Recommend surgical  consultation and consideration of excision, given the possibility of associated malignancy. Pathology report was called to Dr. Rani Luque office on 2/26/2021 by  VIRGINIA Shannon. Patient was referred to Dr. Tiago Morfin and has an  appointment scheduled with him on 3/16/2021. Thomas Franco MD 3/9/2021  4:32 PM        
 Narrative Ultrasound-guided core right breast biopsy x2 Digital diagnostic right mammogram  
 
CLINICAL INDICATION: Indeterminate lesions in the right upper inner quadrant at 
1:00 12 cm from nipple and 1:00 9 cm from nipple. Her sister had postmenopausal 
breast cancer. COMPARISON: 2/18/2021 and 2/12/2021 PROCEDURE: After discussion of the risks and benefits, including but not limited 
to bleeding and infection, both written and verbal informed consent were 
obtained. The overlying skin was prepped in sterile fashion. Preprocedural sonography of the right axilla showed no evidence of 
lymphadenopathy. On prior study the right breast lesions recommended for biopsy 
were described at 1:00 16 cm from nipple and at 1:00 12 cm from nipple; but on 
additional review and real-time scanning today these are more accurately labeled 
at 12 cm and 9 cm from the nipple. Right breast 1:00 12 cm from nipple: Total of 5 mL of 1% Lidocaine was used for 
local anesthesia. Under ultrasound guidance 3 core samples were obtained with a 
14 gauge Achieve biopsy needle. A marker clip was placed within the lesion. The 
needle was withdrawn and hemostasis was obtained with manual pressure. Right breast 1:00 9 cm from nipple: Total of 5 mL of 1% Lidocaine was used for 
local anesthesia. Under ultrasound guidance 3 core samples were obtained with a 
14 gauge Achieve biopsy needle. A marker clip was placed within the lesion. The 
needle was withdrawn and hemostasis was obtained with manual pressure. The patient tolerated each procedure well without complications. Postprocedural right mammogram was obtained showing appropriate location of each 
clip. Impression Successful ultrasound-guided core biopsies of 2 right breast lesions 
at 1:00. Pathology results are currently pending. MRI Results (most recent): 
Results from Hospital Encounter encounter on 03/03/21 MRI BREAST BI W WO CONT  Narrative MRI of the Breasts with and without contrast 
 
CLINICAL INDICATION:  Further evaluation of right posterior 1:00 upper inner 
breast abnormalities with recent biopsies on 2/25/2021 demonstrating papillomas, 
atypia, and fibrocystic changes (at 1:00 9 cm from nipple and at 1:00 12 cm from 
nipple). Prior left benign biopsy in 2011 demonstrating fibroadenoma at 2:00 
posteriorly. No previous personal malignancy. Her sister had breast cancer. COMPARISON: Ultrasound and mammography 2/25/2021, 2/18/2021 and 2/12/2021 TECHNIQUE: Standard MRI sequences were obtained through the breasts in multiple 
planes. Images were obtained before and after intravenous infusion of 20 mL of 
Dotarem contrast. Images were reviewed with PACS and with ObjectVideo CAD software. FINDINGS: The breasts demonstrate mild glandularity and minimal background 
enhancement. Right breast: Biopsy clip artifact noted in the 2 sites at 1:00, middle and 
posterior depth. There are partially confluent and partially scattered clumped 
and reticular nodular areas of nonmass enhancement spanning the upper inner 
quadrant from anterior to middle and posterior depth. Overall this measures up 
to 10 cm in AP dimension, 3 cm transverse, and 3 cm craniocaudal. The most 
focally prominent areas of nodular enhancement do surround the clips. Left breast: No evidence of suspicious enhancing mass, and no dominant or unique 
nonmass enhancement, to suggest malignancy. Lymph nodes: No evidence of axillary or internal mammary lymphadenopathy. Elsewhere: limited visualization of the partially included thorax and upper 
abdomen shows no concerning findings. Impression 1. Right upper inner quadrant nonmass enhancement, discontinuous but overall 
spanning 10 cm. This could all be due to the biopsy result of papillomas, 
fibrocystic changes and atypia. However malignancy such as DCIS can have a 
similar appearance. Recommend surgical consultation and consideration of 
excision. 2. No suspicious finding in the left breast or the chest wall.  No evidence of lymphadenopathy. Continued management as directed clinically. BI-RADS Assessment Category 4: Suspicious Finding- Biopsy should be considered. Pathology: 
Biopsy of right breast, x2, at 1:00 position 9cm and 12cm FN---- fibrocystic disease with intraductal papilloma with focal atypia. ASSESSMENT and PLAN Encounter Diagnoses Name Primary?  Intraductal papilloma of breast, left Yes Comment: x2, both with atypia Discussed pathology in detail with Ms Teto Groves, attempting to explain the rationale for wide excision of the area(s) when core biopsy shows atypical papilloma. Specifically, she is counseled regarding the small percentage of women who will be found to harbor neoplastic process(es) locally when core biopsy reveals atypia. She reports being previously told that the indication for additional surgery was to prevent the papillomas from turning into cancer. This may also be a benefit. Patient repeatedly expressed frustration with the workup process and her disappointment that she had the needle biopsy and now needs more procedures. Multiple times, I reviewed the current standard evaluation and management process for mammographically identified abnormalities. She expresses understanding but remains incredulous. I am not sure what the indication was for her MRI; this is not typically obtained for papillomas. However, since it was obtained, we have evidence of a large area of her upper inner right breast with non-mass enhancement. Removal of all of this tissue is not appropriate at this time as we have no confirmation of malignancy. I do not believe she would have a reasonable cosmetic outcome if the entirety of the MRI abnormality were removed; she would likely need total mastectomy. We thoroughly discussed the need for additional intervention(s) potentially necessary should the proposed lumpectomy/wide excisions reveal neoplasia.  
 
Above discussion/counseling consumes >75% of our ~65min appointment. After discussion, we will proceed with right breast wide excision x 2, with wire localization. We have discussed the technical details of the procedure(s) in detail. Risks reviewed include anesthetic risks, bleeding, infection, wound healing problems and cosmetic abnormalities, need for further surgical intervention depending on pathology reports, lymphedema if axillary procedure planned, and recurrence. All questions are answered.

## 2021-03-22 ENCOUNTER — HOSPITAL ENCOUNTER (OUTPATIENT)
Dept: SURGERY | Age: 76
Discharge: HOME | End: 2021-03-22

## 2021-03-22 PROBLEM — Z11.59 NEED FOR HEPATITIS C SCREENING TEST: Status: RESOLVED | Noted: 2020-12-20 | Resolved: 2021-03-22

## 2021-03-25 VITALS — HEIGHT: 63 IN | BODY MASS INDEX: 37.39 KG/M2 | WEIGHT: 211 LBS

## 2021-03-25 RX ORDER — IBUPROFEN 400 MG/1
TABLET ORAL
COMMUNITY

## 2021-03-25 RX ORDER — BISMUTH SUBSALICYLATE 262 MG
1 TABLET,CHEWABLE ORAL DAILY
COMMUNITY

## 2021-03-25 NOTE — PERIOP NOTES
Patient verified name and . Order for consent found in EHR and matches case posting; patient verifies procedure. Type 1B surgery, PAT PHONE assessment complete. Orders received. Labs per surgeon: None  Labs per anesthesia protocol: None. Negative Covid test dated 3/23/21 located in EHR. Patient answered medical/surgical history questions at their best of ability. All prior to admission medications documented in [facility]. Patient instructed to take the following medications the day of surgery according to anesthesia guidelines with a small sip of water: Levothyroxine. Hold all vitamins, supplements, herbals, NSAIDs, and ASA starting today. Patient instructed on the following:      > Arrive at [facility] [address], time of arrival to be called the day before by 1700  > NPO after midnight including gum, mints, and ice chips  > Responsible adult must drive patient to the hospital, stay during surgery, and patient will need supervision 24 hours after anesthesia  > Use anti-bacterial soap in shower the night before surgery and on the morning of surgery  > All piercings must be removed prior to arrival.    > Leave all valuables (money and jewelry) at home but bring insurance card and ID on DOS.   > Do not wear make-up, nail polish, lotions, cologne, perfumes, powders, or oil on skin.

## 2021-03-28 ENCOUNTER — ANESTHESIA EVENT (OUTPATIENT)
Dept: SURGERY | Age: 76
End: 2021-03-28
Payer: MEDICARE

## 2021-03-29 ENCOUNTER — APPOINTMENT (OUTPATIENT)
Dept: MAMMOGRAPHY | Age: 76
End: 2021-03-29
Attending: SURGERY
Payer: MEDICARE

## 2021-03-29 ENCOUNTER — ANESTHESIA (OUTPATIENT)
Dept: SURGERY | Age: 76
End: 2021-03-29
Payer: MEDICARE

## 2021-03-29 ENCOUNTER — HOSPITAL ENCOUNTER (OUTPATIENT)
Age: 76
Setting detail: OUTPATIENT SURGERY
Discharge: HOME | End: 2021-03-29
Attending: SURGERY | Admitting: SURGERY
Payer: MEDICARE

## 2021-03-29 VITALS
SYSTOLIC BLOOD PRESSURE: 148 MMHG | OXYGEN SATURATION: 99 % | TEMPERATURE: 97.3 F | WEIGHT: 211 LBS | RESPIRATION RATE: 16 BRPM | HEIGHT: 63 IN | HEART RATE: 65 BPM | BODY MASS INDEX: 37.39 KG/M2 | DIASTOLIC BLOOD PRESSURE: 80 MMHG

## 2021-03-29 DIAGNOSIS — D24.2 INTRADUCTAL PAPILLOMA OF BREAST, LEFT: ICD-10-CM

## 2021-03-29 DIAGNOSIS — N63.10 BREAST MASS, RIGHT: ICD-10-CM

## 2021-03-29 DIAGNOSIS — D24.1 INTRADUCTAL PAPILLOMA OF BREAST, RIGHT: ICD-10-CM

## 2021-03-29 PROCEDURE — 19285 PERQ DEV BREAST 1ST US IMAG: CPT

## 2021-03-29 PROCEDURE — 2709999900 HC NON-CHARGEABLE SUPPLY: Performed by: SURGERY

## 2021-03-29 PROCEDURE — 76210000006 HC OR PH I REC 0.5 TO 1 HR: Performed by: SURGERY

## 2021-03-29 PROCEDURE — 74011250637 HC RX REV CODE- 250/637: Performed by: ANESTHESIOLOGY

## 2021-03-29 PROCEDURE — 77030040922 HC BLNKT HYPOTHRM STRY -A: Performed by: NURSE ANESTHETIST, CERTIFIED REGISTERED

## 2021-03-29 PROCEDURE — 77030002996 HC SUT SLK J&J -A: Performed by: SURGERY

## 2021-03-29 PROCEDURE — 19126 EXCISION ADDL BREAST LESION: CPT | Performed by: SURGERY

## 2021-03-29 PROCEDURE — 19125 EXCISION BREAST LESION: CPT | Performed by: SURGERY

## 2021-03-29 PROCEDURE — 74011000250 HC RX REV CODE- 250: Performed by: SURGERY

## 2021-03-29 PROCEDURE — 76210000020 HC REC RM PH II FIRST 0.5 HR: Performed by: SURGERY

## 2021-03-29 PROCEDURE — 77030011267 HC ELECTRD BLD COVD -A: Performed by: SURGERY

## 2021-03-29 PROCEDURE — 77065 DX MAMMO INCL CAD UNI: CPT

## 2021-03-29 PROCEDURE — 74011250636 HC RX REV CODE- 250/636: Performed by: NURSE ANESTHETIST, CERTIFIED REGISTERED

## 2021-03-29 PROCEDURE — 88307 TISSUE EXAM BY PATHOLOGIST: CPT

## 2021-03-29 PROCEDURE — 74011250636 HC RX REV CODE- 250/636: Performed by: SURGERY

## 2021-03-29 PROCEDURE — 77030031139 HC SUT VCRL2 J&J -A: Performed by: SURGERY

## 2021-03-29 PROCEDURE — 76010000161 HC OR TIME 1 TO 1.5 HR INTENSV-TIER 1: Performed by: SURGERY

## 2021-03-29 PROCEDURE — 77030010509 HC AIRWY LMA MSK TELE -A: Performed by: NURSE ANESTHETIST, CERTIFIED REGISTERED

## 2021-03-29 PROCEDURE — 74011000250 HC RX REV CODE- 250: Performed by: NURSE ANESTHETIST, CERTIFIED REGISTERED

## 2021-03-29 PROCEDURE — 74011000250 HC RX REV CODE- 250: Performed by: ANESTHESIOLOGY

## 2021-03-29 PROCEDURE — 76060000033 HC ANESTHESIA 1 TO 1.5 HR: Performed by: SURGERY

## 2021-03-29 PROCEDURE — 77030040361 HC SLV COMPR DVT MDII -B: Performed by: SURGERY

## 2021-03-29 PROCEDURE — 74011250636 HC RX REV CODE- 250/636: Performed by: ANESTHESIOLOGY

## 2021-03-29 PROCEDURE — 19286 PERQ DEV BREAST ADD US IMAG: CPT

## 2021-03-29 RX ORDER — LIDOCAINE HYDROCHLORIDE 10 MG/ML
3 INJECTION INFILTRATION; PERINEURAL
Status: COMPLETED | OUTPATIENT
Start: 2021-03-29 | End: 2021-03-29

## 2021-03-29 RX ORDER — FENTANYL CITRATE 50 UG/ML
INJECTION, SOLUTION INTRAMUSCULAR; INTRAVENOUS AS NEEDED
Status: DISCONTINUED | OUTPATIENT
Start: 2021-03-29 | End: 2021-03-29 | Stop reason: HOSPADM

## 2021-03-29 RX ORDER — FLUMAZENIL 0.1 MG/ML
0.2 INJECTION INTRAVENOUS AS NEEDED
Status: DISCONTINUED | OUTPATIENT
Start: 2021-03-29 | End: 2021-03-29 | Stop reason: HOSPADM

## 2021-03-29 RX ORDER — ACETAMINOPHEN 500 MG
1000 TABLET ORAL ONCE
Status: COMPLETED | OUTPATIENT
Start: 2021-03-29 | End: 2021-03-29

## 2021-03-29 RX ORDER — KETOROLAC TROMETHAMINE 30 MG/ML
INJECTION, SOLUTION INTRAMUSCULAR; INTRAVENOUS AS NEEDED
Status: DISCONTINUED | OUTPATIENT
Start: 2021-03-29 | End: 2021-03-29 | Stop reason: HOSPADM

## 2021-03-29 RX ORDER — DIPHENHYDRAMINE HYDROCHLORIDE 50 MG/ML
12.5 INJECTION, SOLUTION INTRAMUSCULAR; INTRAVENOUS
Status: DISCONTINUED | OUTPATIENT
Start: 2021-03-29 | End: 2021-03-29 | Stop reason: HOSPADM

## 2021-03-29 RX ORDER — NALOXONE HYDROCHLORIDE 0.4 MG/ML
0.1 INJECTION, SOLUTION INTRAMUSCULAR; INTRAVENOUS; SUBCUTANEOUS
Status: DISCONTINUED | OUTPATIENT
Start: 2021-03-29 | End: 2021-03-29 | Stop reason: HOSPADM

## 2021-03-29 RX ORDER — OXYCODONE HYDROCHLORIDE 5 MG/1
5 TABLET ORAL
Status: DISCONTINUED | OUTPATIENT
Start: 2021-03-29 | End: 2021-03-29 | Stop reason: HOSPADM

## 2021-03-29 RX ORDER — DEXAMETHASONE SODIUM PHOSPHATE 4 MG/ML
INJECTION, SOLUTION INTRA-ARTICULAR; INTRALESIONAL; INTRAMUSCULAR; INTRAVENOUS; SOFT TISSUE AS NEEDED
Status: DISCONTINUED | OUTPATIENT
Start: 2021-03-29 | End: 2021-03-29 | Stop reason: HOSPADM

## 2021-03-29 RX ORDER — LIDOCAINE HYDROCHLORIDE 10 MG/ML
INJECTION INFILTRATION; PERINEURAL
Status: CANCELLED | OUTPATIENT
Start: 2021-03-29 | End: 2021-03-29

## 2021-03-29 RX ORDER — PROPOFOL 10 MG/ML
INJECTION, EMULSION INTRAVENOUS AS NEEDED
Status: DISCONTINUED | OUTPATIENT
Start: 2021-03-29 | End: 2021-03-29 | Stop reason: HOSPADM

## 2021-03-29 RX ORDER — SODIUM CHLORIDE, SODIUM LACTATE, POTASSIUM CHLORIDE, CALCIUM CHLORIDE 600; 310; 30; 20 MG/100ML; MG/100ML; MG/100ML; MG/100ML
75 INJECTION, SOLUTION INTRAVENOUS CONTINUOUS
Status: DISCONTINUED | OUTPATIENT
Start: 2021-03-29 | End: 2021-03-29 | Stop reason: HOSPADM

## 2021-03-29 RX ORDER — HYDROCODONE BITARTRATE AND ACETAMINOPHEN 5; 325 MG/1; MG/1
TABLET ORAL
Qty: 10 TAB | Refills: 0 | Status: SHIPPED | OUTPATIENT
Start: 2021-03-29 | End: 2021-04-05

## 2021-03-29 RX ORDER — OXYCODONE HYDROCHLORIDE 5 MG/1
10 TABLET ORAL
Status: DISCONTINUED | OUTPATIENT
Start: 2021-03-29 | End: 2021-03-29 | Stop reason: HOSPADM

## 2021-03-29 RX ORDER — CEFAZOLIN SODIUM/WATER 2 G/20 ML
2 SYRINGE (ML) INTRAVENOUS
Status: COMPLETED | OUTPATIENT
Start: 2021-03-29 | End: 2021-03-29

## 2021-03-29 RX ORDER — LIDOCAINE HYDROCHLORIDE 10 MG/ML
0.1 INJECTION INFILTRATION; PERINEURAL AS NEEDED
Status: DISCONTINUED | OUTPATIENT
Start: 2021-03-29 | End: 2021-03-29 | Stop reason: HOSPADM

## 2021-03-29 RX ORDER — LIDOCAINE HYDROCHLORIDE 20 MG/ML
INJECTION, SOLUTION EPIDURAL; INFILTRATION; INTRACAUDAL; PERINEURAL AS NEEDED
Status: DISCONTINUED | OUTPATIENT
Start: 2021-03-29 | End: 2021-03-29 | Stop reason: HOSPADM

## 2021-03-29 RX ORDER — ONDANSETRON 2 MG/ML
INJECTION INTRAMUSCULAR; INTRAVENOUS AS NEEDED
Status: DISCONTINUED | OUTPATIENT
Start: 2021-03-29 | End: 2021-03-29 | Stop reason: HOSPADM

## 2021-03-29 RX ORDER — HYDROMORPHONE HYDROCHLORIDE 2 MG/ML
0.5 INJECTION, SOLUTION INTRAMUSCULAR; INTRAVENOUS; SUBCUTANEOUS
Status: DISCONTINUED | OUTPATIENT
Start: 2021-03-29 | End: 2021-03-29 | Stop reason: HOSPADM

## 2021-03-29 RX ADMIN — DEXAMETHASONE SODIUM PHOSPHATE 10 MG: 4 INJECTION, SOLUTION INTRAMUSCULAR; INTRAVENOUS at 12:00

## 2021-03-29 RX ADMIN — SODIUM CHLORIDE, SODIUM LACTATE, POTASSIUM CHLORIDE, AND CALCIUM CHLORIDE 75 ML/HR: 600; 310; 30; 20 INJECTION, SOLUTION INTRAVENOUS at 08:37

## 2021-03-29 RX ADMIN — CEFAZOLIN 2 G: 1 INJECTION, POWDER, FOR SOLUTION INTRAVENOUS at 12:00

## 2021-03-29 RX ADMIN — FENTANYL CITRATE 25 MCG: 50 INJECTION INTRAMUSCULAR; INTRAVENOUS at 12:34

## 2021-03-29 RX ADMIN — LIDOCAINE HYDROCHLORIDE 0.1 ML: 10 INJECTION, SOLUTION INFILTRATION; PERINEURAL at 08:38

## 2021-03-29 RX ADMIN — KETOROLAC TROMETHAMINE 15 MG: 30 INJECTION, SOLUTION INTRAMUSCULAR at 12:42

## 2021-03-29 RX ADMIN — PROPOFOL 200 MG: 10 INJECTION, EMULSION INTRAVENOUS at 11:54

## 2021-03-29 RX ADMIN — LIDOCAINE HYDROCHLORIDE 3 ML: 10 INJECTION, SOLUTION INFILTRATION; PERINEURAL at 10:00

## 2021-03-29 RX ADMIN — LIDOCAINE HYDROCHLORIDE 60 MG: 20 INJECTION, SOLUTION EPIDURAL; INFILTRATION; INTRACAUDAL; PERINEURAL at 11:54

## 2021-03-29 RX ADMIN — FENTANYL CITRATE 25 MCG: 50 INJECTION INTRAMUSCULAR; INTRAVENOUS at 12:22

## 2021-03-29 RX ADMIN — ACETAMINOPHEN 1000 MG: 500 TABLET, FILM COATED ORAL at 08:37

## 2021-03-29 RX ADMIN — ONDANSETRON 4 MG: 2 INJECTION INTRAMUSCULAR; INTRAVENOUS at 12:42

## 2021-03-29 NOTE — OP NOTES
Operative Note    Date of Surgery: 3/29/2021    Preoperative Diagnosis: Papilloma of right breast [D24.1] -2 sites    Postoperative Diagnosis: Papilloma of right breast [D24.1] -2 sites    Surgeon(s) and Role:  Pastor See MD - Primary      Anesthesia: General    Estimated Blood Loss: minimal    Procedure:   Procedure(s):  RIGHT BREAST NEEDLE LOCALIZED LUMPECTOMY X2- (upper outer quadrant, deep central)    Indications:  As detailed in the H&P. Procedure in Detail:  Prior to the operative procedure,  Johnathan Michel underwent wire localization to the two discrete/distinct lesions, previously biopsied, of the Right breast in radiology. Films were reviewed. The patient was then brought to the operating room and placed on the table in a supine position with adequate padding to all pressure points and the arm extended laterally. After induction of anesthesia, the operative site was prepped and draped in the usual fashion. A curved, skin-crease,  incision was made in the area of the more central wire localizing the deep, central lesion. This was carried down into the subcutaneous level and  small superior and inferior flaps were created sharply. Dissection then extended down the wire to the depth of interest where the area was excised circumferentially  and was imaged for evaluation and confirmation of wire removal and clip capture. To avoid a second incision, a subcutaneous tunnel was dissected from this cavity to the second, more medial and superior wire. It was identified just below the dermis and pulled into the cavity. Dissection then extended down the wire to the depth of interest where the area was excised circumferentially  and was imaged for evaluation and confirmation of wire removal and clip capture. This represented a distinct lesion, in a different breast section. The cavity was irrigated and  hemostasis was achieved with cautery.   The area was infiltrated with local and closed with interrupted subcutaneous 3-0 Vicryl and 4-0 Vicryl subcuticular sutures. Steri-Strips were applied to the wound. A gauze pressure dressing was applied to the breast. Sponge and needle counts were correct times two. The patient tolerated the procedure well and was transferred to recovery room in stable condition.                Specimens:   ID Type Source Tests Collected by Time Destination   1 : right breast superficial lesion Fresh   Sangita Meng MD 3/29/2021 1230 Pathology   2 : right breast deep lesion Fresh   Sangita Meng MD 3/29/2021 1236 Pathology        Signed By: Aline Gallagher MD     March 29, 2021

## 2021-03-29 NOTE — ANESTHESIA PREPROCEDURE EVALUATION
Relevant Problems   No relevant active problems       Anesthetic History     PONV          Review of Systems / Medical History  Patient summary reviewed and pertinent labs reviewed    Pulmonary  Within defined limits                 Neuro/Psych   Within defined limits           Cardiovascular    Hypertension: well controlled              Exercise tolerance: >4 METS     GI/Hepatic/Renal  Within defined limits              Endo/Other      Hypothyroidism  Obesity     Other Findings              Physical Exam    Airway  Mallampati: II  TM Distance: > 6 cm  Neck ROM: normal range of motion   Mouth opening: Normal     Cardiovascular    Rhythm: regular  Rate: normal         Dental  No notable dental hx       Pulmonary  Breath sounds clear to auscultation               Abdominal         Other Findings            Anesthetic Plan    ASA: 2  Anesthesia type: general            Anesthetic plan and risks discussed with: Patient

## 2021-03-29 NOTE — ANESTHESIA POSTPROCEDURE EVALUATION
Procedure(s):  RIGHT BREAST NEEDLE LOCALIZED LUMPECTOMY X2.    general    Anesthesia Post Evaluation      Multimodal analgesia: multimodal analgesia used between 6 hours prior to anesthesia start to PACU discharge  Patient location during evaluation: PACU  Patient participation: complete - patient participated  Level of consciousness: awake  Pain management: adequate  Airway patency: patent  Anesthetic complications: no  Cardiovascular status: acceptable and hemodynamically stable  Respiratory status: acceptable  Hydration status: acceptable  Comments: Acceptable for discharge from PACU.   Post anesthesia nausea and vomiting:  none  Final Post Anesthesia Temperature Assessment:  Normothermia (36.0-37.5 degrees C)      INITIAL Post-op Vital signs:   Vitals Value Taken Time   /71 03/29/21 1321   Temp 36.3 °C (97.3 °F) 03/29/21 1256   Pulse 65 03/29/21 1321   Resp 16 03/29/21 1321   SpO2 99 % 03/29/21 1321

## 2021-03-29 NOTE — DISCHARGE INSTRUCTIONS
Carlos Still M.D.  [phone]    Instructions following Breast Surgery:    ACTIVITY:   Try to take a few short walks with help around the house later today. It is very important to take short walks to avoid blood clots and pneumonia.  You may be light-headed or sleepy from anesthesia, so be careful going up and down stairs.  Avoid any activity that involves lifting your operative-side arm above your head until your follow-up appointment. We suggest wearing a tight-fitting bra continuously for the next 48 hours to minimize motion of the breast.    DIET:   Start with clear, non-carbonated liquids when you get home (sugar-free if you are diabetic), such as Gatorade, chicken broth, etc., and you may advance to regular foods as you feel able. PAIN:   You will be given a prescription for pain medication.  Try to take the pain medication with food, even a few crackers.  You may also use Tylenol, Motrin, Advil, or Aleve instead of the prescription pain medication. Do no take Tylenol and the prescription pain medication within 3 hours of each other.  URINARY RETENTION: If you are unable to empty your bladder within 6-8 hours after returning home, please go to your nearest Emergency Department or Urgent Care for urinary catheterization. WOUND CARE:   Please keep the dressing dry and intact for 5 days after surgery. You may then remove the tape and gauze;  at this time it is fine to get the incision wet,  The steri-strips will probably remain on your skin for several more days.  It is not uncommon for the breast to have a bruised appearance in the region of the surgery; this will clear over several days.  Incisions will sometimes develop redness around them as well as a hard lumpy feel. If this area of redness continues to get larger, please call the office. FOLLOW UP:   Your follow-up appointment is usually made when your surgery is arranged.  Please call the office if you are not sure of this appointment. CALL THE DOCTOR IF:   You have a temperature higher than 101.5° Fahrenheit for more than 6 hours.  You have severe nausea or vomiting.  You develop increasing redness or infection at the incision. Continue home medications as previously prescribed. After general anesthesia or intravenous sedation, for 24 hours or while taking prescription Narcotics:  · Limit your activities  · A responsible adult needs to be with you for the next 24 hours  · Do not drive and operate hazardous machinery  · Do not make important personal or business decisions  · Do not drink alcoholic beverages  · If you have not urinated within 8 hours after discharge, please contact your surgeon on call. · If you have sleep apnea and have a CPAP machine, please use it for all naps and sleeping. · Please use caution when taking narcotics and any of your home medications that may cause drowsiness. *  Please give a list of your current medications to your Primary Care Provider. *  Please update this list whenever your medications are discontinued, doses are      changed, or new medications (including over-the-counter products) are added. *  Please carry medication information at all times in case of emergency situations. These are general instructions for a healthy lifestyle:  No smoking/ No tobacco products/ Avoid exposure to second hand smoke  Surgeon General's Warning:  Quitting smoking now greatly reduces serious risk to your health. Obesity, smoking, and sedentary lifestyle greatly increases your risk for illness  A healthy diet, regular physical exercise & weight monitoring are important for maintaining a healthy lifestyle    You may be retaining fluid if you have a history of heart failure or if you experience any of the following symptoms:  Weight gain of 3 pounds or more overnight or 5 pounds in a week, increased swelling in our hands or feet or shortness of breath while lying flat in bed.   Please call your doctor as soon as you notice any of these symptoms; do not wait until your next office visit.

## 2021-03-29 NOTE — PROGRESS NOTES
eliezer Sibley will be coming back to pick patient up- [phone]  He would like to come in and hear DC instructions  Pt belongings are in locker

## 2021-03-29 NOTE — INTERVAL H&P NOTE
Update History & Physical    The Patient's History and Physical  was reviewed with the patient and I examined the patient. There was no change. The surgical site was confirmed by the patient and me. Plan:  The risk, benefits, expected outcome, and alternative to the recommended procedure have been discussed with the patient. Patient understands and wants to proceed with the procedure.     Electronically signed by Allison Romo MD on 3/29/2021 at 11:33 AM

## 2021-12-23 PROBLEM — M65.331 TRIGGER MIDDLE FINGER OF RIGHT HAND: Status: ACTIVE | Noted: 2021-12-23

## 2021-12-23 PROBLEM — Z13.5 GLAUCOMA SCREENING: Status: RESOLVED | Noted: 2020-12-21 | Resolved: 2021-12-23

## 2022-03-18 PROBLEM — E55.9 VITAMIN D DEFICIENCY: Status: ACTIVE | Noted: 2018-06-22

## 2022-03-18 PROBLEM — Z12.11 COLON CANCER SCREENING: Status: ACTIVE | Noted: 2020-12-20

## 2022-03-18 PROBLEM — Z23 ENCOUNTER FOR IMMUNIZATION: Status: ACTIVE | Noted: 2020-12-20

## 2022-03-18 PROBLEM — Z78.0 MENOPAUSE: Status: ACTIVE | Noted: 2018-10-12

## 2022-03-18 PROBLEM — Z12.31 ENCOUNTER FOR SCREENING MAMMOGRAM FOR MALIGNANT NEOPLASM OF BREAST: Status: ACTIVE | Noted: 2020-12-21

## 2022-03-18 PROBLEM — F32.A DEPRESSION: Status: ACTIVE | Noted: 2017-05-19

## 2022-03-19 PROBLEM — M65.331 TRIGGER MIDDLE FINGER OF RIGHT HAND: Status: ACTIVE | Noted: 2021-12-23

## 2022-03-19 PROBLEM — E78.5 HYPERLIPIDEMIA: Status: ACTIVE | Noted: 2017-12-01

## 2022-03-19 PROBLEM — N39.41 URGE INCONTINENCE OF URINE: Status: ACTIVE | Noted: 2018-10-12

## 2022-03-19 PROBLEM — G47.00 INSOMNIA: Status: ACTIVE | Noted: 2017-05-19

## 2022-03-19 PROBLEM — I10 ESSENTIAL HYPERTENSION: Status: ACTIVE | Noted: 2017-05-19

## 2022-03-19 PROBLEM — Z96.651 S/P TKR (TOTAL KNEE REPLACEMENT), RIGHT: Status: ACTIVE | Noted: 2020-07-29

## 2022-03-19 PROBLEM — Z12.4 CERVICAL CANCER SCREENING: Status: ACTIVE | Noted: 2020-12-21

## 2022-03-19 PROBLEM — M85.80 OSTEOPENIA: Status: ACTIVE | Noted: 2020-12-20

## 2022-03-19 PROBLEM — E03.9 ACQUIRED HYPOTHYROIDISM: Status: ACTIVE | Noted: 2017-05-19

## 2022-03-19 PROBLEM — Z00.00 MEDICARE ANNUAL WELLNESS VISIT, SUBSEQUENT: Status: ACTIVE | Noted: 2018-10-12

## 2022-03-20 PROBLEM — E66.01 SEVERE OBESITY (HCC): Status: ACTIVE | Noted: 2020-12-21

## 2022-03-23 ENCOUNTER — HOSPITAL ENCOUNTER (OUTPATIENT)
Dept: MAMMOGRAPHY | Age: 77
Discharge: HOME | End: 2022-03-23
Attending: INTERNAL MEDICINE
Payer: MEDICARE

## 2022-03-23 DIAGNOSIS — M85.852 OSTEOPENIA OF NECK OF LEFT FEMUR: ICD-10-CM

## 2022-03-23 DIAGNOSIS — Z12.31 ENCOUNTER FOR SCREENING MAMMOGRAM FOR MALIGNANT NEOPLASM OF BREAST: ICD-10-CM

## 2022-03-23 PROCEDURE — 77067 SCR MAMMO BI INCL CAD: CPT

## 2022-03-23 PROCEDURE — 77080 DXA BONE DENSITY AXIAL: CPT

## 2022-03-25 ENCOUNTER — HOSPITAL ENCOUNTER (OUTPATIENT)
Dept: MAMMOGRAPHY | Age: 77
Discharge: HOME | End: 2022-03-25
Attending: DERMATOLOGY
Payer: MEDICARE

## 2022-03-25 DIAGNOSIS — R92.8 ABNORMAL MAMMOGRAM: ICD-10-CM

## 2022-03-25 PROCEDURE — 76642 ULTRASOUND BREAST LIMITED: CPT

## 2022-03-29 NOTE — PROGRESS NOTES
Call patient. Her osteopenia is slightly worse over time but still not severe enough to require medication. Will discuss next appt.

## 2022-04-12 ENCOUNTER — HOSPITAL ENCOUNTER (OUTPATIENT)
Dept: MAMMOGRAPHY | Age: 77
Discharge: HOME | End: 2022-04-12
Attending: SURGERY
Payer: MEDICARE

## 2022-04-12 VITALS — DIASTOLIC BLOOD PRESSURE: 72 MMHG | HEART RATE: 75 BPM | SYSTOLIC BLOOD PRESSURE: 161 MMHG

## 2022-04-12 DIAGNOSIS — N63.0 BREAST MASS: ICD-10-CM

## 2022-04-12 DIAGNOSIS — R92.8 ABNORMAL MAMMOGRAM OF RIGHT BREAST: ICD-10-CM

## 2022-04-12 PROCEDURE — 77065 DX MAMMO INCL CAD UNI: CPT

## 2022-04-12 PROCEDURE — 88305 TISSUE EXAM BY PATHOLOGIST: CPT

## 2022-04-12 PROCEDURE — 74011000250 HC RX REV CODE- 250: Performed by: SURGERY

## 2022-04-12 PROCEDURE — 88361 TUMOR IMMUNOHISTOCHEM/COMPUT: CPT

## 2022-04-12 PROCEDURE — 19083 BX BREAST 1ST LESION US IMAG: CPT

## 2022-04-12 RX ORDER — LIDOCAINE HYDROCHLORIDE 10 MG/ML
8 INJECTION INFILTRATION; PERINEURAL
Status: COMPLETED | OUTPATIENT
Start: 2022-04-12 | End: 2022-04-12

## 2022-04-12 RX ADMIN — LIDOCAINE HYDROCHLORIDE 8 ML: 10 INJECTION, SOLUTION INFILTRATION; PERINEURAL at 09:55

## 2022-04-14 ENCOUNTER — HOSPITAL ENCOUNTER (OUTPATIENT)
Dept: MRI IMAGING | Age: 77
Discharge: HOME | End: 2022-04-14
Attending: SURGERY
Payer: MEDICARE

## 2022-04-14 DIAGNOSIS — C50.911 BREAST CANCER, RIGHT (HCC): ICD-10-CM

## 2022-04-14 PROCEDURE — 74011250636 HC RX REV CODE- 250/636: Performed by: SURGERY

## 2022-04-14 PROCEDURE — A9576 INJ PROHANCE MULTIPACK: HCPCS | Performed by: SURGERY

## 2022-04-14 PROCEDURE — 77049 MRI BREAST C-+ W/CAD BI: CPT

## 2022-04-14 RX ORDER — SODIUM CHLORIDE 0.9 % (FLUSH) 0.9 %
10 SYRINGE (ML) INJECTION
Status: COMPLETED | OUTPATIENT
Start: 2022-04-14 | End: 2022-04-14

## 2022-04-14 RX ADMIN — GADOTERIDOL 20 ML: 279.3 INJECTION, SOLUTION INTRAVENOUS at 10:32

## 2022-04-14 RX ADMIN — Medication 10 ML: at 10:32

## 2022-04-18 ENCOUNTER — NURSE NAVIGATOR (OUTPATIENT)
Dept: CASE MANAGEMENT | Age: 77
End: 2022-04-18

## 2022-04-18 NOTE — PROGRESS NOTES
4/18/2022 Spoke to the patient regarding her MRI. She is obviously concerned at the finding of multiple areas in the breast. We discussed that we are still waiting on the ER/AL/Her 2 which will direct treatment. She had appointment 4/25 with Dr Genet Bar and 4/26 with Dr Emily Lopez. I did ensure she had my cell number for any questions.

## 2022-04-25 ENCOUNTER — NURSE NAVIGATOR (OUTPATIENT)
Dept: CASE MANAGEMENT | Age: 77
End: 2022-04-25

## 2022-04-25 DIAGNOSIS — I10 ESSENTIAL HYPERTENSION: Primary | ICD-10-CM

## 2022-04-25 DIAGNOSIS — M85.80 OSTEOPENIA, UNSPECIFIED LOCATION: ICD-10-CM

## 2022-04-25 DIAGNOSIS — E03.9 ACQUIRED HYPOTHYROIDISM: ICD-10-CM

## 2022-04-25 NOTE — PROGRESS NOTES
Met with patient and son Vianey Caicedo in room prior to consult with Dr Gustavo Chang. Contact card given for navigation. Encouraged to call for questions or concerns.

## 2022-04-26 ENCOUNTER — NURSE NAVIGATOR (OUTPATIENT)
Dept: CASE MANAGEMENT | Age: 77
End: 2022-04-26

## 2022-04-26 NOTE — PROGRESS NOTES
Patient called with update after her visit with Dr. Teresa Wood this am.  She states she will have a mastectomy and states it is scheduled for 6-1-22. She states she is awaiting a call from the [facility]  breast center to schedule a biopsy of her axilla lymph node. She wants to reschedule her follow up with Dr. Preeti Lacy to after 6-22 so that her son can attend follow up with her. Message to  to reschedule. Support and encouragement given. Encouraged to call for questions/concerns.

## 2022-04-27 ENCOUNTER — HOSPITAL ENCOUNTER (OUTPATIENT)
Dept: MAMMOGRAPHY | Age: 77
Discharge: HOME | End: 2022-04-27
Attending: SURGERY
Payer: MEDICARE

## 2022-04-27 DIAGNOSIS — C50.911 BREAST CANCER, RIGHT (HCC): ICD-10-CM

## 2022-04-27 PROCEDURE — 76882 US LMTD JT/FCL EVL NVASC XTR: CPT

## 2022-05-31 NOTE — OR NURSING
Patient verified name and . Order for consent not found in EHR ; patient verifies procedure. Type 1b surgery, Phone assessment complete. Orders not received. Labs per surgeon: none  Labs per anesthesia protocol: none    Patient answered medical/surgical history questions at their best of ability. All prior to admission medications documented in [facility]. Patient instructed to take the following medications the day of surgery according to anesthesia guidelines with a small sip of water: synthroid   Hold all vitamins 7 days prior to surgery and NSAIDS 5 days prior to surgery. Prescription meds to hold: none    Pt states either she or her phone provider has blocked the hospital caller ID # [phone]. Please call son Elizabeth Falcon # [phone] - if needed. Patient instructed on the following:    > Arrive at [address], time of arrival to be called the day before by 1700  > NPO after midnight including gum, mints, and ice chips  > Responsible adult must drive patient to the hospital, stay during surgery, and patient will need supervision 24 hours after anesthesia  > Use antibacterial soap in shower the night before surgery and on the morning of surgery  > All piercings must be removed prior to arrival.    > Leave all valuables (money and jewelry) at home but bring insurance card and ID on DOS.   > Do not wear make-up, nail polish, lotions, cologne, perfumes, powders, or oil on skin. Artificial nails are not permitted.

## 2022-05-31 NOTE — PERIOP NOTE
Attempted to call patient for phone assessment x 7 times since 05/20/22. Left multiple messages for patient to return call - no return call from patient.

## 2022-05-31 NOTE — PERIOP NOTE
Jose with pre assessment notified that patient has not received any pre assessment calls or appointments, she will address.

## 2022-06-01 ENCOUNTER — APPOINTMENT (OUTPATIENT)
Dept: GENERAL RADIOLOGY | Age: 77
End: 2022-06-01
Attending: SURGERY
Payer: MEDICARE

## 2022-06-01 ENCOUNTER — HOSPITAL ENCOUNTER (OUTPATIENT)
Age: 77
Setting detail: OBSERVATION
Discharge: HOME | End: 2022-06-02
Attending: SURGERY
Payer: MEDICARE

## 2022-06-01 ENCOUNTER — ANESTHESIA EVENT (OUTPATIENT)
Dept: SURGERY | Age: 77
End: 2022-06-01
Payer: MEDICARE

## 2022-06-01 ENCOUNTER — ANESTHESIA (OUTPATIENT)
Dept: SURGERY | Age: 77
End: 2022-06-01
Payer: MEDICARE

## 2022-06-01 DIAGNOSIS — C50.911 MALIGNANT NEOPLASM OF RIGHT FEMALE BREAST, UNSPECIFIED ESTROGEN RECEPTOR STATUS, UNSPECIFIED SITE OF BREAST (HCC): ICD-10-CM

## 2022-06-01 PROBLEM — G89.18 POST-OPERATIVE PAIN: Status: ACTIVE | Noted: 2022-06-01

## 2022-06-01 PROBLEM — I10 ESSENTIAL HYPERTENSION: Status: ACTIVE | Noted: 2017-05-19

## 2022-06-01 PROBLEM — E03.9 ACQUIRED HYPOTHYROIDISM: Status: ACTIVE | Noted: 2017-05-19

## 2022-06-01 LAB
ANION GAP SERPL CALC-SCNC: 10 MMOL/L (ref 7–16)
BUN SERPL-MCNC: 12 MG/DL (ref 8–23)
CALCIUM SERPL-MCNC: 9 MG/DL (ref 8.3–10.4)
CHLORIDE SERPL-SCNC: 105 MMOL/L (ref 98–107)
CO2 SERPL-SCNC: 26 MMOL/L (ref 21–32)
CREAT SERPL-MCNC: 0.91 MG/DL (ref 0.6–1)
ERYTHROCYTE [DISTWIDTH] IN BLOOD BY AUTOMATED COUNT: 14.2 % (ref 11.9–14.6)
GLUCOSE SERPL-MCNC: 143 MG/DL (ref 65–100)
HCT VFR BLD AUTO: 39.6 % (ref 35.8–46.3)
HGB BLD-MCNC: 13 G/DL (ref 11.7–15.4)
MCH RBC QN AUTO: 29.1 PG (ref 26.1–32.9)
MCHC RBC AUTO-ENTMCNC: 32.8 G/DL (ref 31.4–35)
MCV RBC AUTO: 88.8 FL (ref 79.6–97.8)
NRBC # BLD: 0 K/UL (ref 0–0.2)
PLATELET # BLD AUTO: 151 K/UL (ref 150–450)
PMV BLD AUTO: 10.9 FL (ref 9.4–12.3)
POTASSIUM SERPL-SCNC: 3.4 MMOL/L (ref 3.5–5.1)
RBC # BLD AUTO: 4.46 M/UL (ref 4.05–5.2)
SODIUM SERPL-SCNC: 141 MMOL/L (ref 136–145)
WBC # BLD AUTO: 12.6 K/UL (ref 4.3–11.1)

## 2022-06-01 PROCEDURE — 2709999900 HC NON-CHARGEABLE SUPPLY: Performed by: SURGERY

## 2022-06-01 PROCEDURE — A4648 IMPLANTABLE TISSUE MARKER: HCPCS | Performed by: SURGERY

## 2022-06-01 PROCEDURE — 96372 THER/PROPH/DIAG INJ SC/IM: CPT

## 2022-06-01 PROCEDURE — 88307 TISSUE EXAM BY PATHOLOGIST: CPT

## 2022-06-01 PROCEDURE — G0378 HOSPITAL OBSERVATION PER HR: HCPCS

## 2022-06-01 PROCEDURE — 19303 MAST SIMPLE COMPLETE: CPT | Performed by: SURGERY

## 2022-06-01 PROCEDURE — C1788 PORT, INDWELLING, IMP: HCPCS | Performed by: SURGERY

## 2022-06-01 PROCEDURE — 36561 INSERT TUNNELED CV CATH: CPT | Performed by: SURGERY

## 2022-06-01 PROCEDURE — 38900 IO MAP OF SENT LYMPH NODE: CPT | Performed by: SURGERY

## 2022-06-01 PROCEDURE — 2500000003 HC RX 250 WO HCPCS: Performed by: NURSE ANESTHETIST, CERTIFIED REGISTERED

## 2022-06-01 PROCEDURE — C1751 CATH, INF, PER/CENT/MIDLINE: HCPCS | Performed by: SURGERY

## 2022-06-01 PROCEDURE — 88309 TISSUE EXAM BY PATHOLOGIST: CPT

## 2022-06-01 PROCEDURE — 3700000000 HC ANESTHESIA ATTENDED CARE: Performed by: SURGERY

## 2022-06-01 PROCEDURE — 6360000002 HC RX W HCPCS: Performed by: ANESTHESIOLOGY

## 2022-06-01 PROCEDURE — 3600000002 HC SURGERY LEVEL 2 BASE: Performed by: SURGERY

## 2022-06-01 PROCEDURE — 80048 BASIC METABOLIC PNL TOTAL CA: CPT

## 2022-06-01 PROCEDURE — 38525 BIOPSY/REMOVAL LYMPH NODES: CPT | Performed by: SURGERY

## 2022-06-01 PROCEDURE — 6370000000 HC RX 637 (ALT 250 FOR IP): Performed by: FAMILY MEDICINE

## 2022-06-01 PROCEDURE — 6370000000 HC RX 637 (ALT 250 FOR IP): Performed by: ANESTHESIOLOGY

## 2022-06-01 PROCEDURE — 2580000003 HC RX 258: Performed by: ANESTHESIOLOGY

## 2022-06-01 PROCEDURE — 3700000001 HC ADD 15 MINUTES (ANESTHESIA): Performed by: SURGERY

## 2022-06-01 PROCEDURE — 6360000002 HC RX W HCPCS: Performed by: NURSE ANESTHETIST, CERTIFIED REGISTERED

## 2022-06-01 PROCEDURE — 71045 X-RAY EXAM CHEST 1 VIEW: CPT

## 2022-06-01 PROCEDURE — 6360000002 HC RX W HCPCS: Performed by: SURGERY

## 2022-06-01 PROCEDURE — 2500000003 HC RX 250 WO HCPCS: Performed by: SURGERY

## 2022-06-01 PROCEDURE — 7100000000 HC PACU RECOVERY - FIRST 15 MIN: Performed by: SURGERY

## 2022-06-01 PROCEDURE — 36415 COLL VENOUS BLD VENIPUNCTURE: CPT

## 2022-06-01 PROCEDURE — 2580000003 HC RX 258: Performed by: FAMILY MEDICINE

## 2022-06-01 PROCEDURE — 77001 FLUOROGUIDE FOR VEIN DEVICE: CPT | Performed by: SURGERY

## 2022-06-01 PROCEDURE — 2720000010 HC SURG SUPPLY STERILE: Performed by: SURGERY

## 2022-06-01 PROCEDURE — 7100000001 HC PACU RECOVERY - ADDTL 15 MIN: Performed by: SURGERY

## 2022-06-01 PROCEDURE — 3600000012 HC SURGERY LEVEL 2 ADDTL 15MIN: Performed by: SURGERY

## 2022-06-01 PROCEDURE — 85027 COMPLETE CBC AUTOMATED: CPT

## 2022-06-01 DEVICE — IMPLANTABLE DEVICE: Type: IMPLANTABLE DEVICE | Site: CHEST | Status: FUNCTIONAL

## 2022-06-01 RX ORDER — LEVOTHYROXINE SODIUM 0.1 MG/1
100 TABLET ORAL DAILY
Status: DISCONTINUED | OUTPATIENT
Start: 2022-06-02 | End: 2022-06-02 | Stop reason: HOSPADM

## 2022-06-01 RX ORDER — SODIUM CHLORIDE 0.9 % (FLUSH) 0.9 %
5-40 SYRINGE (ML) INJECTION EVERY 12 HOURS SCHEDULED
Status: DISCONTINUED | OUTPATIENT
Start: 2022-06-01 | End: 2022-06-01 | Stop reason: HOSPADM

## 2022-06-01 RX ORDER — MIDAZOLAM HYDROCHLORIDE 2 MG/2ML
2 INJECTION, SOLUTION INTRAMUSCULAR; INTRAVENOUS
Status: DISCONTINUED | OUTPATIENT
Start: 2022-06-01 | End: 2022-06-01 | Stop reason: HOSPADM

## 2022-06-01 RX ORDER — LIDOCAINE HYDROCHLORIDE ANHYDROUS AND DEXTROSE MONOHYDRATE .4; 5 G/100ML; G/100ML
1 INJECTION, SOLUTION INTRAVENOUS CONTINUOUS
Status: CANCELLED | OUTPATIENT
Start: 2022-06-01 | End: 2022-06-02

## 2022-06-01 RX ORDER — IBUPROFEN 600 MG/1
600 TABLET ORAL EVERY 8 HOURS
Status: CANCELLED | OUTPATIENT
Start: 2022-06-02

## 2022-06-01 RX ORDER — NEOSTIGMINE METHYLSULFATE 1 MG/ML
INJECTION, SOLUTION INTRAVENOUS PRN
Status: DISCONTINUED | OUTPATIENT
Start: 2022-06-01 | End: 2022-06-01 | Stop reason: SDUPTHER

## 2022-06-01 RX ORDER — EPHEDRINE SULFATE/0.9% NACL/PF 50 MG/5 ML
SYRINGE (ML) INTRAVENOUS PRN
Status: DISCONTINUED | OUTPATIENT
Start: 2022-06-01 | End: 2022-06-01 | Stop reason: SDUPTHER

## 2022-06-01 RX ORDER — HYDROMORPHONE HYDROCHLORIDE 1 MG/ML
0.25 INJECTION, SOLUTION INTRAMUSCULAR; INTRAVENOUS; SUBCUTANEOUS EVERY 5 MIN PRN
Status: COMPLETED | OUTPATIENT
Start: 2022-06-01 | End: 2022-06-01

## 2022-06-01 RX ORDER — HEPARIN SODIUM 5000 [USP'U]/ML
INJECTION, SOLUTION INTRAVENOUS; SUBCUTANEOUS PRN
Status: DISCONTINUED | OUTPATIENT
Start: 2022-06-01 | End: 2022-06-01 | Stop reason: ALTCHOICE

## 2022-06-01 RX ORDER — OXYCODONE HYDROCHLORIDE 5 MG/1
10 TABLET ORAL PRN
Status: COMPLETED | OUTPATIENT
Start: 2022-06-01 | End: 2022-06-01

## 2022-06-01 RX ORDER — ONDANSETRON 2 MG/ML
INJECTION INTRAMUSCULAR; INTRAVENOUS PRN
Status: DISCONTINUED | OUTPATIENT
Start: 2022-06-01 | End: 2022-06-01 | Stop reason: SDUPTHER

## 2022-06-01 RX ORDER — HYDROCODONE BITARTRATE AND ACETAMINOPHEN 5; 325 MG/1; MG/1
1 TABLET ORAL EVERY 6 HOURS PRN
Status: DISCONTINUED | OUTPATIENT
Start: 2022-06-01 | End: 2022-06-02 | Stop reason: HOSPADM

## 2022-06-01 RX ORDER — DIPHENHYDRAMINE HYDROCHLORIDE 50 MG/ML
12.5 INJECTION INTRAMUSCULAR; INTRAVENOUS
Status: DISCONTINUED | OUTPATIENT
Start: 2022-06-01 | End: 2022-06-01

## 2022-06-01 RX ORDER — HYDROMORPHONE HYDROCHLORIDE 2 MG/ML
0.5 INJECTION, SOLUTION INTRAMUSCULAR; INTRAVENOUS; SUBCUTANEOUS EVERY 4 HOURS PRN
Status: CANCELLED | OUTPATIENT
Start: 2022-06-01

## 2022-06-01 RX ORDER — HYDROCODONE BITARTRATE AND ACETAMINOPHEN 5; 325 MG/1; MG/1
1 TABLET ORAL EVERY 4 HOURS PRN
Qty: 20 TABLET | Refills: 0 | Status: SHIPPED | OUTPATIENT
Start: 2022-06-01 | End: 2022-06-06

## 2022-06-01 RX ORDER — GLYCOPYRROLATE 0.2 MG/ML
INJECTION INTRAMUSCULAR; INTRAVENOUS PRN
Status: DISCONTINUED | OUTPATIENT
Start: 2022-06-01 | End: 2022-06-01 | Stop reason: SDUPTHER

## 2022-06-01 RX ORDER — DEXAMETHASONE SODIUM PHOSPHATE 10 MG/ML
INJECTION INTRAMUSCULAR; INTRAVENOUS PRN
Status: DISCONTINUED | OUTPATIENT
Start: 2022-06-01 | End: 2022-06-01 | Stop reason: SDUPTHER

## 2022-06-01 RX ORDER — LOSARTAN POTASSIUM 50 MG/1
100 TABLET ORAL DAILY
Status: DISCONTINUED | OUTPATIENT
Start: 2022-06-01 | End: 2022-06-02 | Stop reason: HOSPADM

## 2022-06-01 RX ORDER — ONDANSETRON 4 MG/1
4 TABLET, ORALLY DISINTEGRATING ORAL EVERY 8 HOURS PRN
Status: DISCONTINUED | OUTPATIENT
Start: 2022-06-01 | End: 2022-06-02 | Stop reason: HOSPADM

## 2022-06-01 RX ORDER — SODIUM CHLORIDE 9 MG/ML
INJECTION, SOLUTION INTRAVENOUS PRN
Status: DISCONTINUED | OUTPATIENT
Start: 2022-06-01 | End: 2022-06-02 | Stop reason: HOSPADM

## 2022-06-01 RX ORDER — ACETAMINOPHEN 500 MG
1000 TABLET ORAL EVERY 6 HOURS
Status: CANCELLED | OUTPATIENT
Start: 2022-06-01

## 2022-06-01 RX ORDER — SODIUM CHLORIDE 0.9 % (FLUSH) 0.9 %
5-40 SYRINGE (ML) INJECTION PRN
Status: DISCONTINUED | OUTPATIENT
Start: 2022-06-01 | End: 2022-06-02 | Stop reason: HOSPADM

## 2022-06-01 RX ORDER — ACETAMINOPHEN 500 MG
1000 TABLET ORAL ONCE
Status: COMPLETED | OUTPATIENT
Start: 2022-06-01 | End: 2022-06-01

## 2022-06-01 RX ORDER — FENTANYL CITRATE 50 UG/ML
INJECTION, SOLUTION INTRAMUSCULAR; INTRAVENOUS PRN
Status: DISCONTINUED | OUTPATIENT
Start: 2022-06-01 | End: 2022-06-01 | Stop reason: SDUPTHER

## 2022-06-01 RX ORDER — PROPOFOL 10 MG/ML
INJECTION, EMULSION INTRAVENOUS PRN
Status: DISCONTINUED | OUTPATIENT
Start: 2022-06-01 | End: 2022-06-01 | Stop reason: SDUPTHER

## 2022-06-01 RX ORDER — HYDROMORPHONE HYDROCHLORIDE 1 MG/ML
0.5 INJECTION, SOLUTION INTRAMUSCULAR; INTRAVENOUS; SUBCUTANEOUS EVERY 5 MIN PRN
Status: DISCONTINUED | OUTPATIENT
Start: 2022-06-01 | End: 2022-06-01

## 2022-06-01 RX ORDER — ROCURONIUM BROMIDE 10 MG/ML
INJECTION, SOLUTION INTRAVENOUS PRN
Status: DISCONTINUED | OUTPATIENT
Start: 2022-06-01 | End: 2022-06-01 | Stop reason: SDUPTHER

## 2022-06-01 RX ORDER — SODIUM CHLORIDE 0.9 % (FLUSH) 0.9 %
5-40 SYRINGE (ML) INJECTION PRN
Status: DISCONTINUED | OUTPATIENT
Start: 2022-06-01 | End: 2022-06-01 | Stop reason: HOSPADM

## 2022-06-01 RX ORDER — LIDOCAINE HYDROCHLORIDE 20 MG/ML
INJECTION, SOLUTION EPIDURAL; INFILTRATION; INTRACAUDAL; PERINEURAL PRN
Status: DISCONTINUED | OUTPATIENT
Start: 2022-06-01 | End: 2022-06-01 | Stop reason: SDUPTHER

## 2022-06-01 RX ORDER — ACETAMINOPHEN 325 MG/1
650 TABLET ORAL EVERY 6 HOURS PRN
Status: DISCONTINUED | OUTPATIENT
Start: 2022-06-01 | End: 2022-06-02 | Stop reason: HOSPADM

## 2022-06-01 RX ORDER — ACETAMINOPHEN 650 MG/1
650 SUPPOSITORY RECTAL EVERY 6 HOURS PRN
Status: DISCONTINUED | OUTPATIENT
Start: 2022-06-01 | End: 2022-06-02 | Stop reason: HOSPADM

## 2022-06-01 RX ORDER — SODIUM CHLORIDE 0.9 % (FLUSH) 0.9 %
5-40 SYRINGE (ML) INJECTION EVERY 12 HOURS SCHEDULED
Status: DISCONTINUED | OUTPATIENT
Start: 2022-06-01 | End: 2022-06-02 | Stop reason: HOSPADM

## 2022-06-01 RX ORDER — OXYCODONE HYDROCHLORIDE 5 MG/1
5 TABLET ORAL EVERY 4 HOURS PRN
Status: CANCELLED | OUTPATIENT
Start: 2022-06-01

## 2022-06-01 RX ORDER — HEPARIN SODIUM (PORCINE) LOCK FLUSH IV SOLN 100 UNIT/ML 100 UNIT/ML
SOLUTION INTRAVENOUS PRN
Status: DISCONTINUED | OUTPATIENT
Start: 2022-06-01 | End: 2022-06-01 | Stop reason: ALTCHOICE

## 2022-06-01 RX ORDER — ONDANSETRON 2 MG/ML
4 INJECTION INTRAMUSCULAR; INTRAVENOUS EVERY 6 HOURS PRN
Status: DISCONTINUED | OUTPATIENT
Start: 2022-06-01 | End: 2022-06-02 | Stop reason: HOSPADM

## 2022-06-01 RX ORDER — POLYETHYLENE GLYCOL 3350 17 G/17G
17 POWDER, FOR SOLUTION ORAL DAILY PRN
Status: DISCONTINUED | OUTPATIENT
Start: 2022-06-01 | End: 2022-06-02 | Stop reason: HOSPADM

## 2022-06-01 RX ORDER — KETOROLAC TROMETHAMINE 30 MG/ML
30 INJECTION, SOLUTION INTRAMUSCULAR; INTRAVENOUS EVERY 6 HOURS
Status: CANCELLED | OUTPATIENT
Start: 2022-06-01 | End: 2022-06-02

## 2022-06-01 RX ORDER — SODIUM CHLORIDE, SODIUM LACTATE, POTASSIUM CHLORIDE, CALCIUM CHLORIDE 600; 310; 30; 20 MG/100ML; MG/100ML; MG/100ML; MG/100ML
INJECTION, SOLUTION INTRAVENOUS CONTINUOUS
Status: DISCONTINUED | OUTPATIENT
Start: 2022-06-01 | End: 2022-06-01 | Stop reason: HOSPADM

## 2022-06-01 RX ORDER — SODIUM CHLORIDE 0.9 % (FLUSH) 0.9 %
5-40 SYRINGE (ML) INJECTION EVERY 12 HOURS SCHEDULED
Status: CANCELLED | OUTPATIENT
Start: 2022-06-01

## 2022-06-01 RX ORDER — MORPHINE SULFATE 2 MG/ML
1 INJECTION, SOLUTION INTRAMUSCULAR; INTRAVENOUS EVERY 4 HOURS PRN
Status: DISCONTINUED | OUTPATIENT
Start: 2022-06-01 | End: 2022-06-02 | Stop reason: HOSPADM

## 2022-06-01 RX ORDER — SODIUM CHLORIDE, SODIUM LACTATE, POTASSIUM CHLORIDE, CALCIUM CHLORIDE 600; 310; 30; 20 MG/100ML; MG/100ML; MG/100ML; MG/100ML
INJECTION, SOLUTION INTRAVENOUS CONTINUOUS
Status: DISCONTINUED | OUTPATIENT
Start: 2022-06-01 | End: 2022-06-01

## 2022-06-01 RX ORDER — BUPIVACAINE HYDROCHLORIDE AND EPINEPHRINE 5; 5 MG/ML; UG/ML
INJECTION, SOLUTION EPIDURAL; INTRACAUDAL; PERINEURAL PRN
Status: DISCONTINUED | OUTPATIENT
Start: 2022-06-01 | End: 2022-06-01 | Stop reason: ALTCHOICE

## 2022-06-01 RX ORDER — ONDANSETRON 2 MG/ML
4 INJECTION INTRAMUSCULAR; INTRAVENOUS
Status: DISCONTINUED | OUTPATIENT
Start: 2022-06-01 | End: 2022-06-01

## 2022-06-01 RX ORDER — ONDANSETRON 4 MG/1
4 TABLET, ORALLY DISINTEGRATING ORAL EVERY 4 HOURS PRN
Status: CANCELLED | OUTPATIENT
Start: 2022-06-01

## 2022-06-01 RX ORDER — ENOXAPARIN SODIUM 100 MG/ML
40 INJECTION SUBCUTANEOUS DAILY
Status: CANCELLED | OUTPATIENT
Start: 2022-06-01

## 2022-06-01 RX ORDER — ONDANSETRON 2 MG/ML
4 INJECTION INTRAMUSCULAR; INTRAVENOUS EVERY 4 HOURS PRN
Status: CANCELLED | OUTPATIENT
Start: 2022-06-01

## 2022-06-01 RX ORDER — GABAPENTIN 100 MG/1
200 CAPSULE ORAL 3 TIMES DAILY
Status: CANCELLED | OUTPATIENT
Start: 2022-06-01

## 2022-06-01 RX ORDER — FENTANYL CITRATE 50 UG/ML
100 INJECTION, SOLUTION INTRAMUSCULAR; INTRAVENOUS
Status: DISCONTINUED | OUTPATIENT
Start: 2022-06-01 | End: 2022-06-01 | Stop reason: HOSPADM

## 2022-06-01 RX ORDER — OXYCODONE HYDROCHLORIDE 5 MG/1
5 TABLET ORAL PRN
Status: COMPLETED | OUTPATIENT
Start: 2022-06-01 | End: 2022-06-01

## 2022-06-01 RX ORDER — BUPIVACAINE HYDROCHLORIDE 5 MG/ML
INJECTION, SOLUTION EPIDURAL; INTRACAUDAL PRN
Status: DISCONTINUED | OUTPATIENT
Start: 2022-06-01 | End: 2022-06-01 | Stop reason: ALTCHOICE

## 2022-06-01 RX ORDER — SODIUM CHLORIDE, SODIUM LACTATE, POTASSIUM CHLORIDE, CALCIUM CHLORIDE 600; 310; 30; 20 MG/100ML; MG/100ML; MG/100ML; MG/100ML
INJECTION, SOLUTION INTRAVENOUS CONTINUOUS
Status: CANCELLED | OUTPATIENT
Start: 2022-06-01 | End: 2022-06-02

## 2022-06-01 RX ADMIN — SODIUM CHLORIDE, SODIUM LACTATE, POTASSIUM CHLORIDE, AND CALCIUM CHLORIDE: 600; 310; 30; 20 INJECTION, SOLUTION INTRAVENOUS at 15:59

## 2022-06-01 RX ADMIN — FENTANYL CITRATE 25 MCG: 50 INJECTION INTRAMUSCULAR; INTRAVENOUS at 16:37

## 2022-06-01 RX ADMIN — Medication 2000 MG: at 15:52

## 2022-06-01 RX ADMIN — LOSARTAN POTASSIUM 100 MG: 50 TABLET, FILM COATED ORAL at 21:49

## 2022-06-01 RX ADMIN — HYDROMORPHONE HYDROCHLORIDE 0.25 MG: 1 INJECTION, SOLUTION INTRAMUSCULAR; INTRAVENOUS; SUBCUTANEOUS at 19:12

## 2022-06-01 RX ADMIN — GLYCOPYRROLATE 0.4 MG: 0.2 INJECTION, SOLUTION INTRAMUSCULAR; INTRAVENOUS at 17:55

## 2022-06-01 RX ADMIN — GLYCOPYRROLATE 0.2 MG: 0.2 INJECTION, SOLUTION INTRAMUSCULAR; INTRAVENOUS at 17:14

## 2022-06-01 RX ADMIN — SODIUM CHLORIDE, SODIUM LACTATE, POTASSIUM CHLORIDE, AND CALCIUM CHLORIDE: 600; 310; 30; 20 INJECTION, SOLUTION INTRAVENOUS at 13:21

## 2022-06-01 RX ADMIN — FENTANYL CITRATE 50 MCG: 50 INJECTION INTRAMUSCULAR; INTRAVENOUS at 15:44

## 2022-06-01 RX ADMIN — PROPOFOL 50 MG: 10 INJECTION, EMULSION INTRAVENOUS at 15:47

## 2022-06-01 RX ADMIN — ACETAMINOPHEN 1000 MG: 500 TABLET, FILM COATED ORAL at 13:12

## 2022-06-01 RX ADMIN — OXYCODONE 5 MG: 5 TABLET ORAL at 19:32

## 2022-06-01 RX ADMIN — Medication 10 MG: at 17:15

## 2022-06-01 RX ADMIN — ROCURONIUM BROMIDE 25 MG: 50 INJECTION, SOLUTION INTRAVENOUS at 15:45

## 2022-06-01 RX ADMIN — SODIUM CHLORIDE, PRESERVATIVE FREE 10 ML: 5 INJECTION INTRAVENOUS at 21:50

## 2022-06-01 RX ADMIN — PROPOFOL 200 MG: 10 INJECTION, EMULSION INTRAVENOUS at 15:44

## 2022-06-01 RX ADMIN — PROPOFOL 20 MG: 10 INJECTION, EMULSION INTRAVENOUS at 17:57

## 2022-06-01 RX ADMIN — Medication 10 MG: at 16:07

## 2022-06-01 RX ADMIN — FENTANYL CITRATE 50 MCG: 50 INJECTION INTRAMUSCULAR; INTRAVENOUS at 17:00

## 2022-06-01 RX ADMIN — HYDROCODONE BITARTRATE AND ACETAMINOPHEN 1 TABLET: 5; 325 TABLET ORAL at 23:41

## 2022-06-01 RX ADMIN — ONDANSETRON 4 MG: 2 INJECTION INTRAMUSCULAR; INTRAVENOUS at 15:54

## 2022-06-01 RX ADMIN — LIDOCAINE HYDROCHLORIDE 70 MG: 20 INJECTION, SOLUTION EPIDURAL; INFILTRATION; INTRACAUDAL; PERINEURAL at 15:44

## 2022-06-01 RX ADMIN — Medication 3 MG: at 17:55

## 2022-06-01 RX ADMIN — FENTANYL CITRATE 25 MCG: 50 INJECTION INTRAMUSCULAR; INTRAVENOUS at 15:57

## 2022-06-01 RX ADMIN — HYDROMORPHONE HYDROCHLORIDE 0.25 MG: 1 INJECTION, SOLUTION INTRAMUSCULAR; INTRAVENOUS; SUBCUTANEOUS at 19:17

## 2022-06-01 RX ADMIN — DEXAMETHASONE SODIUM PHOSPHATE 10 MG: 10 INJECTION INTRAMUSCULAR; INTRAVENOUS at 15:54

## 2022-06-01 ASSESSMENT — PAIN SCALES - GENERAL
PAINLEVEL_OUTOF10: 5
PAINLEVEL_OUTOF10: 0
PAINLEVEL_OUTOF10: 0
PAINLEVEL_OUTOF10: 6
PAINLEVEL_OUTOF10: 7
PAINLEVEL_OUTOF10: 5
PAINLEVEL_OUTOF10: 6
PAINLEVEL_OUTOF10: 5
PAINLEVEL_OUTOF10: 7
PAINLEVEL_OUTOF10: 2
PAINLEVEL_OUTOF10: 5
PAINLEVEL_OUTOF10: 2
PAINLEVEL_OUTOF10: 6
PAINLEVEL_OUTOF10: 5
PAINLEVEL_OUTOF10: 5
PAINLEVEL_OUTOF10: 0
PAINLEVEL_OUTOF10: 5
PAINLEVEL_OUTOF10: 5
PAINLEVEL_OUTOF10: 7

## 2022-06-01 ASSESSMENT — PAIN DESCRIPTION - ORIENTATION
ORIENTATION: RIGHT
ORIENTATION: LEFT;RIGHT

## 2022-06-01 ASSESSMENT — PAIN DESCRIPTION - FREQUENCY
FREQUENCY: CONTINUOUS
FREQUENCY: INTERMITTENT

## 2022-06-01 ASSESSMENT — PAIN DESCRIPTION - DESCRIPTORS
DESCRIPTORS: ACHING;SORE
DESCRIPTORS: ACHING
DESCRIPTORS: ACHING;SORE
DESCRIPTORS: ACHING;SORE

## 2022-06-01 ASSESSMENT — PAIN DESCRIPTION - LOCATION
LOCATION: BREAST
LOCATION: BREAST
LOCATION: CHEST
LOCATION: BREAST

## 2022-06-01 ASSESSMENT — PAIN DESCRIPTION - PAIN TYPE
TYPE: SURGICAL PAIN
TYPE: SURGICAL PAIN

## 2022-06-01 ASSESSMENT — PAIN DESCRIPTION - ONSET: ONSET: ON-GOING

## 2022-06-01 ASSESSMENT — PAIN - FUNCTIONAL ASSESSMENT: PAIN_FUNCTIONAL_ASSESSMENT: 0-10

## 2022-06-01 NOTE — PERIOP NOTE
During times patient is charted as EBEN on her pain, she was displaying intermittent bouts of post op  delerium.

## 2022-06-01 NOTE — ANESTHESIA PRE PROCEDURE
Department of Anesthesiology  Preprocedure Note       Name:  Nancy Humphrey   Age:  77 y.o.  :  1945                                          MRN:  [id number]         Date:  2022      Surgeon: Maame Baird):  Pablo Hardy MD    Procedure: Procedure(s):  RIGHT BREAST MASTECTOMY  RIGHT SENTINEL NODE BIOPSY MAG TRACE  PORT INSERTION    Medications prior to admission:   Prior to Admission medications    Medication Sig Start Date End Date Taking? Authorizing Provider   ibuprofen (ADVIL;MOTRIN) 400 MG tablet Take by mouth every 6 hours as needed    Ar Automatic Reconciliation   levothyroxine (SYNTHROID) 100 MCG tablet TAKE 1 TABLET EVERY DAY BEFORE BREAKFAST FOR LOW THYROID HORMONE LEVELS 21   Ar Automatic Reconciliation   losartan (COZAAR) 100 MG tablet TAKE 1 TABLET EVERY DAY FOR HIGH BLOOD PRESSURE 21   Ar Automatic Reconciliation       Current medications:    Current Facility-Administered Medications   Medication Dose Route Frequency Provider Last Rate Last Admin    fentaNYL (SUBLIMAZE) injection 100 mcg  100 mcg IntraVENous Once PRN Scott Oswald MD        lactated ringers infusion   IntraVENous Continuous Scott Oswald  mL/hr at 22 1321 New Bag at 22 1321    sodium chloride flush 0.9 % injection 5-40 mL  5-40 mL IntraVENous 2 times per day Scott Oswald MD        sodium chloride flush 0.9 % injection 5-40 mL  5-40 mL IntraVENous PRN Scott Oswald MD        midazolam PF (VERSED) injection 2 mg  2 mg IntraVENous Once PRN Scott Oswald MD        ceFAZolin (ANCEF) 2000 mg in sterile water 20 mL IV syringe  2,000 mg IntraVENous Once Pablo Hardy MD           Allergies:     Allergies   Allergen Reactions    Sulfamethoxazole-Trimethoprim Nausea And Vomiting    Meloxicam Other (See Comments)    Oxaprozin Swelling       Problem List:    Patient Active Problem List   Diagnosis Code    Encounter for immunization Z23    Colon cancer screening Z12.11    Vitamin D deficiency E55.9    Encounter for screening mammogram for malignant neoplasm of breast Z12.31    Menopause Z78.0    Depression F32. A    S/P TKR (total knee replacement), right Z96.651    Osteopenia M85.80    Acquired hypothyroidism E03.9    Cervical cancer screening Z12.4    Urge incontinence of urine N39.41    Medicare annual wellness visit, subsequent Z00.00    Trigger middle finger of right hand M65.331    Osteoarthritis M19.90    Hyperlipidemia E78.5    Insomnia G47.00    Essential hypertension I10    Impaired fasting glucose R73.01    Severe obesity (HCC) E66.01       Past Medical History:        Diagnosis Date    Anxiety     Depression 5/19/2017    Essential hypertension 05/19/2017    Fibromyalgia     History of postoperative nausea and vomiting     Hyperlipidemia     Hypothyroid     Impaired fasting glucose     Insomnia     Nausea & vomiting     Need for hepatitis C screening test 12/20/2020    3/15/21 HCV ab negative.       Obesity     Osteoarthritis 12/15/2015    Stress incontinence, female     Vitamin D deficiency        Past Surgical History:        Procedure Laterality Date    BREAST BIOPSY Bilateral     BREAST BIOPSY Right 3/29/2021    RIGHT BREAST NEEDLE LOCALIZED LUMPECTOMY X2 performed by Humza Hodge MD at [place] [address] COLONOSCOPY  2012    normal; internal hemorrhoid    HYSTERECTOMY  1980    TOTAL KNEE ARTHROPLASTY Bilateral     Left 2015, Right 7/29/20    US BREAST BIOPSY NEEDLE ADDITIONAL RIGHT Right 2/25/2021    US BREAST BIOPSY NEEDLE ADDITIONAL RIGHT 2/25/2021 [facility]    US BREAST NEEDLE BIOPSY RIGHT Right 2/25/2021    US BREAST NEEDLE BIOPSY RIGHT 2/25/2021 [facility]    US BREAST NEEDLE BIOPSY RIGHT Right 4/12/2022    US BREAST NEEDLE BIOPSY RIGHT 4/12/2022 [facility]    US GUIDED NEEDLE LOC BREAST ADDL RIGHT Right 3/29/2021    US GUIDED NEEDLE LOC BREAST ADDL RIGHT 3/29/2021 [facility]    US GUIDED NEEDLE LOC OF RIGHT BREAST Right 3/29/2021    US GUIDED NEEDLE LOC OF RIGHT BREAST 3/29/2021 [facility]       Social History:    Social History     Tobacco Use    Smoking status: Never Smoker    Smokeless tobacco: Never Used   Substance Use Topics    Alcohol use: No                                Counseling given: Not Answered      Vital Signs (Current):   Vitals:    05/31/22 1413 06/01/22 1233 06/01/22 1243   BP:   (!) 168/84   Pulse:  78    Resp:  18    Temp:  97.5 °F (36.4 °C)    TempSrc:  Temporal    SpO2:  98%    Weight: 200 lb (90.7 kg) 215 lb 1.6 oz (97.6 kg)    Height: 5' 3\" (1.6 m) 5' 3\" (1.6 m)                                               BP Readings from Last 3 Encounters:   06/01/22 (!) 168/84   04/25/22 (!) 160/72   04/07/22 135/74       NPO Status: Time of last liquid consumption: 2200                        Time of last solid consumption: 1800                        Date of last liquid consumption: 05/31/22                        Date of last solid food consumption: 05/31/22    BMI:   Wt Readings from Last 3 Encounters:   06/01/22 215 lb 1.6 oz (97.6 kg)   04/26/22 216 lb (98 kg)   04/25/22 219 lb (99.3 kg)     Body mass index is 38.1 kg/m².     CBC:   Lab Results   Component Value Date    WBC 6.6 07/07/2020    RBC 4.63 07/07/2020    HGB 12.3 07/28/2020    HCT 41.4 07/07/2020    MCV 89.4 07/07/2020    RDW 14.0 07/07/2020     07/07/2020       CMP:   Lab Results   Component Value Date     12/16/2021    K 4.7 12/16/2021     12/16/2021    CO2 25 12/16/2021    BUN 15 12/16/2021    CREATININE 0.91 12/16/2021    GFRAA 71 12/16/2021    AGRATIO 1.2 12/16/2021    GLUCOSE 90 12/16/2021    PROT 7.1 12/16/2021    CALCIUM 9.1 12/16/2021    BILITOT 0.7 12/16/2021    ALKPHOS 110 12/16/2021    AST 21 12/16/2021    ALT 13 12/16/2021       POC Tests: No results for input(s): POCGLU, POCNA, POCK, POCCL, POCBUN, POCHEMO, POCHCT in the last 72 hours.    Coags:   Lab Results   Component Value Date    PROTIME 13.9 07/07/2020    INR 1.0 07/07/2020    APTT 37.4 07/07/2020       HCG (If Applicable): No results found for: PREGTESTUR, PREGSERUM, HCG, HCGQUANT     ABGs: No results found for: PHART, PO2ART, EGW6ASI, SAR6AWG, BEART, N9DVJQGL     Type & Screen (If Applicable):  No results found for: LABABO, LABRH    Drug/Infectious Status (If Applicable):  No results found for: HIV, HEPCAB    COVID-19 Screening (If Applicable):   Lab Results   Component Value Date    COVID19 Performed 03/23/2021    COVID19 Not Detected 03/23/2021           Anesthesia Evaluation  Patient summary reviewed and Nursing notes reviewed  Airway: Mallampati: II  TM distance: >3 FB   Neck ROM: full  Mouth opening: > = 3 FB   Dental: normal exam         Pulmonary:Negative Pulmonary ROS breath sounds clear to auscultation                             Cardiovascular:  Exercise tolerance: good (>4 METS),   (+) hypertension:,         Rhythm: regular  Rate: normal                    Neuro/Psych:   Negative Neuro/Psych ROS              GI/Hepatic/Renal:            ROS comment: Obesity. Endo/Other:    (+) hypothyroidism::., malignancy/cancer (Breast). Abdominal:             Vascular: Other Findings:           Anesthesia Plan      general     ASA 2       Induction: intravenous. Anesthetic plan and risks discussed with patient and child/children.                         Ramon Orantes MD   6/1/2022

## 2022-06-01 NOTE — H&P
Primary/Requesting provider: Lakeisha Moneg MD             Chief Complaint       Patient presents with          Follow-up             r breast IDC              HISTORY OF PRESENT ILLNESS   Darrian Hudson is a 77 y.o.  female. HPI   Patient is a 77 y.o.  female who presents for followup after her recent RIGHT breast biopsy, which confirmed cancer. She is s/p excision of atypical papilloma x 2 March 2021. She and her son met with Dr Ronnie Simon yesterday  to discuss neoadjuvant and adjuvant therapy options. She currently denies any symptoms referrable to her breast and had no problems with her recent biopsy. Medications:      Current Outpatient Medications        Medication  Sig           cholecalciferol, vitamin D3, (VITAMIN D3 PO)  Take  by mouth.   CALCIUM CITRATE PO  Take  by mouth daily.   losartan (COZAAR) 100 mg tablet  TAKE 1 TABLET EVERY DAY FOR HIGH BLOOD PRESSURE       levothyroxine (SYNTHROID) 100 mcg tablet  TAKE 1 TABLET EVERY DAY BEFORE BREAKFAST FOR LOW THYROID HORMONE LEVELS       multivitamin (ONE A DAY) tablet  Take 1 Tab by mouth daily.   ibuprofen (MOTRIN) 400 mg tablet  Take  by mouth every six (6) hours as needed for Pain. No current facility-administered medications for this visit. Allergies: Allergies        Allergen  Reactions           Daypro [Oxaprozin]  Swelling           Meloxicam  Unknown (comments)            Past History:          Past Medical History:        Diagnosis  Date           Anxiety         Depression  5/19/2017       Essential hypertension  05/19/2017       Fibromyalgia         History of postoperative nausea and vomiting         Hyperlipidemia         Hypothyroid         Impaired fasting glucose         Insomnia         Nausea & vomiting         Need for hepatitis C screening test  12/20/2020          3/15/21 HCV ab negative.              Obesity         Osteoarthritis  12/15/2015       Stress incontinence, female             Vitamin D deficiency                   Past Surgical History:         Procedure  Laterality  Date            HX BREAST BIOPSY  Bilateral         HX BREAST BIOPSY  Right  3/29/2021          RIGHT BREAST NEEDLE LOCALIZED LUMPECTOMY X2 performed by Olivia Carrillo MD at [address]       HX COLONOSCOPY    2012          normal; internal hemorrhoid            HX HYSTERECTOMY    1980       HX KNEE REPLACEMENT  Bilateral            Left 2015, Right 7/29/20            Family and Social History:           Family History         Problem  Relation  Age of Onset            No Known Problems  Mother         Heart Disease  Father         Stroke  Father              Breast Cancer  Sister            Social History                Socioeconomic History           Marital status:                Spouse name:  Not on file           Number of children:  Not on file       Years of education:  Not on file       Highest education level:  Not on file       Occupational History          Not on file       Tobacco Use           Smoking status:  Never Smoker       Smokeless tobacco:  Never Used       Vaping Use           Vaping Use:  Never used       Substance and Sexual Activity           Alcohol use:  No       Drug use:  No       Sexual activity:  Not on file        Other Topics  Concern          Not on file       Social History Narrative          Not on file          Social Determinants of Health              Financial Resource Strain:           Difficulty of Paying Living Expenses: Not on file       Food Insecurity:           Worried About [address] in the Last Year: Not on file       Lydia of Food in the Last Year: Not on file       Transportation Needs:           Lack of Transportation (Medical): Not on file       Lack of Transportation (Non-Medical):  Not on file       Physical Activity:           Days of Exercise per Week: Not on file     [employer] of Exercise per Session: Not on file       Stress:           Feeling of Stress : Not on file       Social Connections:           Frequency of Communication with Friends and Family: Not on file       Frequency of Social Gatherings with Friends and Family: Not on file       Attends Taoism Services: Not on file       Active Member of Clubs or Organizations: Not on file       Attends Club or Organization Meetings: Not on file       Marital Status: Not on file       Intimate Partner Violence:           Fear of Current or Ex-Partner: Not on file       Emotionally Abused: Not on file       Physically Abused: Not on file       Sexually Abused: Not on file       Housing Stability:           Unable to Pay for Housing in the Last Year: Not on file       Number of [place] in the Last Year: Not on file          Unstable Housing in the Last Year: Not on file          Review of Systems    Constitutional: Negative. HENT: Negative. Eyes: Negative. Respiratory: Negative. Cardiovascular: Negative. Gastrointestinal: Negative. Genitourinary: Negative. Skin: Negative. Neurological: Negative. Endo/Heme/Allergies: Negative. Psychiatric/Behavioral: Negative. Physical Exam   Visit Vitals      Ht  5' 3\" (1.6 m)     Wt  216 lb (98 kg)        BMI  38.26 kg/m²           General Appearance: In no acute distress        Ears/Nose/Mouth/Throat:    Hearing grossly normal.               Neck:  Supple. Chest:    Lungs clear to auscultation bilaterally. Cardiovascular:   Regular rate and rhythm, S1, S2 normal, no murmur. Abdomen:    Soft.         Extremities:  No gross deformity         Neuro:  alert and oriented x 3                    JUANA Results (most recent) :   Results from Hospital Encounter encounter on 04/12/22      JUANA POST BX IMAGING RT INCL CAD      Addendum 4/14/2022  6:31 PM   Addendum: Matt Boo, accession number: [id number] Date: 4/14/2022 Pathology was noted as A: Right breast, 12:00 position, 7 cm   from nipple, core biopsy: Infiltrating ductal carcinoma with apocrine features,   high grade (poorly differentiated). Definite in situ component and   lymphovascular invasion are not identified. Results concordant with imaging. Pathology report was called to  Dr. Tiny Scheuermann' office on 4/13/2022 by KOREY Irving. Patient was referred to   Dr. Tamar Sarah and has an appointment scheduled with him on 4/26/2022. Narrative   Ultrasound-guided right breast biopsy and Mammogram      INDICATION: Breast mass      After informed consent was obtained, the mass at 12:00 in the right breast was   localized with ultrasound. The overlying skin was prepped using sterile   technique. 1% lidocaine was used for superficial anesthesia. Using ultrasound guidance, 4 biopsy passes were made with a 14-gauge core biopsy   needle. A clip was then left to kit the biopsy site. The patient tolerated the   procedure well. Right Mammogram:  Post biopsy mammogram shows the biopsy clip in good position. Impression   Successful ultrasound-guided biopsy of small 12:00 right breast mass         MRI Results (most recent):   Results from Hospital Encounter encounter on 04/14/22      MRI BREAST BI W WO CONT      Narrative   MRI of the Breasts with and without contrast      CLINICAL INDICATION:  New diagnosis of right 12:00 breast cancer status post   biopsy demonstrating infiltrating ductal carcinoma high-grade, poorly   differentiated. Evaluate for extent of disease, staging, therapy planning. Patient had right benign papillomas removed in 2021, and a left benign biopsy in   2011 demonstrating a 2:00 fibroadenoma. Family history of her sister having   breast cancer. .      COMPARISON: Ultrasound and mammography 4/12/2022, 3/25/2022, 3/23/2022 and prior   breast MRI 3/3/2021.       TECHNIQUE: Standard MRI sequences were obtained through the breasts in multiple   planes. Images were obtained before and after intravenous infusion of 20 mL of   Prohance contrast. Images were reviewed with PACS and with [employer] CAD software. FINDINGS: The breasts demonstrate mild-moderate bilateral glandularity and mild   background enhancement. Right breast: The new 12:00 biopsy clip is within an irregular heterogeneously   enhancing 1.2 x 0.8 x 1.1 cm malignant mass. As seen on prior exam a large area   of the right upper breast spanning 11:00-12:00 anterior, middle, posterior depth   (overall about 10-12 cm) contains multiple scattered masses of varying sizes and   shapes. There are greater than 10 masses total. Anteriorly at 12:00 10 cm from   nipple an irregular heterogeneously enhancing mass measures up to 1.2 x 1.0 cm   (previously 0.6 x 0.4 cm). Posteriorly near 11:00 14 cm from nipple a lobulated   mass measures up to 1.1 x 0.4 cm (previously 0.8 x 0.3 cm). Left breast: No evidence of suspicious enhancing mass, and no dominant or unique   nonmass enhancement, to suggest additional malignancy. Lymph nodes: Right axilla demonstrates a dysmorphic 1.2 x 0.8 cm node (series 4   image 351) which has developed since the prior exam. No other axillary or   internal mammary lymphadenopathy is evident. Elsewhere: Limited visualization of the partially included thorax and upper   abdomen shows no acute abnormality. Impression   1. Right anterior 12:00 breast cancer measures up to 1.2 cm. 2. Multiple (greater than 10 total) other right upper breast masses, some of   which have enlarged, concerning for additional malignancy. 3. Right axillary indeterminate lymph node. 4. No suspicious left breast finding. Recommend continued malignancy management as directed clinically. BI-RADS Assessment Category 6: Known Biopsy Proven Malignancy         ASSESSMENT and PLAN          Encounter Diagnoses        Name  Primary?          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 ER status also reviewed and we discussed how this may modify her post-surgical management including treatment with anti-estrogen agents and the need to stop any hormonal supplementation she may be taking. Discussed family history as it may relate to any value to investigation of a genetic basis for her cancer.- per oncology       Discussed management options. Initial treatment should be surgical. Reviewed total vs. Partial mastectomy (w/ XRT) including different local recurrence rates but equivalent long term survival rates. Discussed potential indications for post-mastectomy  XRT as well. The multifocal abnormalities on MRI are worrisome for multifocal cancer. The area of involvement would not currently permit breast conservation. Neoadjuvant therapy could be considered, especially in light of her Her-2 overexpression,  but breast conservation would still not likely be recommended. Pt is unlikely to be comfortable with anything less than a mastectomy given her recent biopsy history and potential for future intervention(s). Discussed axillary abnormality on MRI. Recommend second-look US and biopsy. If node is (+), would plan total ax dissection. If node is (-), then [magtrace]sentinel node biopsy would be appropriate. She is amenable to biopsy, which will be ordered. Port will be placed for her herceptin-based adjuvant therapy. After discussion, we will proceed with right simple mastectomy with sentinel node biopsy  , possible MRM if ax biopsy is positive, and port insertion. We have discussed the technical details of the procedure(s) in detail.   Risks reviewed include anesthetic risks, bleeding, infection, wound healing problems and cosmetic abnormalities, need for further surgical intervention depending on pathology reports, lymphedema if axillary procedure planned, pneumothorax from port insertion and port malfunction, and recurrence. All questions are answered.

## 2022-06-01 NOTE — PERIOP NOTE
patient began to show improved mentation and is able to now converse with staff and answer questions appropriately. She still displays some \"starring\" intermittently but is oriented when she is spoken to. Per MD we can administer pain medication in small increments as ordered.

## 2022-06-01 NOTE — PERIOP NOTE
Patient continues to display post op delerium type behavior; she is able to speak intermittently and is oriented however her head falls back onto the pillow and eyes roll back into her head; MD notified and is back to bedside. Patient's left arm goes from flaccid to rigid with no pattern noted.

## 2022-06-02 ENCOUNTER — HOME HEALTH ADMISSION (OUTPATIENT)
Dept: HOME HEALTH SERVICES | Facility: HOME HEALTH | Age: 77
End: 2022-06-02
Payer: MEDICARE

## 2022-06-02 VITALS
DIASTOLIC BLOOD PRESSURE: 86 MMHG | OXYGEN SATURATION: 97 % | RESPIRATION RATE: 18 BRPM | HEART RATE: 70 BPM | WEIGHT: 215.1 LBS | TEMPERATURE: 97.6 F | BODY MASS INDEX: 38.11 KG/M2 | SYSTOLIC BLOOD PRESSURE: 127 MMHG | HEIGHT: 63 IN

## 2022-06-02 LAB
ANION GAP SERPL CALC-SCNC: 8 MMOL/L (ref 7–16)
BASOPHILS # BLD: 0 K/UL (ref 0–0.2)
BASOPHILS NFR BLD: 0 % (ref 0–2)
BUN SERPL-MCNC: 11 MG/DL (ref 8–23)
CALCIUM SERPL-MCNC: 8.9 MG/DL (ref 8.3–10.4)
CHLORIDE SERPL-SCNC: 104 MMOL/L (ref 98–107)
CO2 SERPL-SCNC: 27 MMOL/L (ref 21–32)
CREAT SERPL-MCNC: 0.86 MG/DL (ref 0.6–1)
DIFFERENTIAL METHOD BLD: ABNORMAL
EOSINOPHIL # BLD: 0 K/UL (ref 0–0.8)
EOSINOPHIL NFR BLD: 0 % (ref 0.5–7.8)
ERYTHROCYTE [DISTWIDTH] IN BLOOD BY AUTOMATED COUNT: 14.3 % (ref 11.9–14.6)
GLUCOSE SERPL-MCNC: 138 MG/DL (ref 65–100)
HCT VFR BLD AUTO: 38 % (ref 35.8–46.3)
HGB BLD-MCNC: 12.7 G/DL (ref 11.7–15.4)
IMM GRANULOCYTES # BLD AUTO: 0 K/UL (ref 0–0.5)
IMM GRANULOCYTES NFR BLD AUTO: 0 % (ref 0–5)
LYMPHOCYTES # BLD: 0.4 K/UL (ref 0.5–4.6)
LYMPHOCYTES NFR BLD: 4 % (ref 13–44)
MCH RBC QN AUTO: 29.5 PG (ref 26.1–32.9)
MCHC RBC AUTO-ENTMCNC: 33.4 G/DL (ref 31.4–35)
MCV RBC AUTO: 88.2 FL (ref 79.6–97.8)
MONOCYTES # BLD: 0.5 K/UL (ref 0.1–1.3)
MONOCYTES NFR BLD: 5 % (ref 4–12)
NEUTS SEG # BLD: 8.7 K/UL (ref 1.7–8.2)
NEUTS SEG NFR BLD: 91 % (ref 43–78)
NRBC # BLD: 0 K/UL (ref 0–0.2)
PLATELET # BLD AUTO: 162 K/UL (ref 150–450)
PMV BLD AUTO: 11.4 FL (ref 9.4–12.3)
POTASSIUM SERPL-SCNC: 4.3 MMOL/L (ref 3.5–5.1)
RBC # BLD AUTO: 4.31 M/UL (ref 4.05–5.2)
SODIUM SERPL-SCNC: 139 MMOL/L (ref 136–145)
WBC # BLD AUTO: 9.6 K/UL (ref 4.3–11.1)

## 2022-06-02 PROCEDURE — 36415 COLL VENOUS BLD VENIPUNCTURE: CPT

## 2022-06-02 PROCEDURE — 85025 COMPLETE CBC W/AUTO DIFF WBC: CPT

## 2022-06-02 PROCEDURE — 6370000000 HC RX 637 (ALT 250 FOR IP): Performed by: FAMILY MEDICINE

## 2022-06-02 PROCEDURE — 80048 BASIC METABOLIC PNL TOTAL CA: CPT

## 2022-06-02 PROCEDURE — G0378 HOSPITAL OBSERVATION PER HR: HCPCS

## 2022-06-02 RX ADMIN — LEVOTHYROXINE SODIUM 100 MCG: 0.1 TABLET ORAL at 06:15

## 2022-06-02 RX ADMIN — HYDROCODONE BITARTRATE AND ACETAMINOPHEN 1 TABLET: 5; 325 TABLET ORAL at 04:20

## 2022-06-02 RX ADMIN — LOSARTAN POTASSIUM 100 MG: 50 TABLET, FILM COATED ORAL at 08:21

## 2022-06-02 ASSESSMENT — PAIN DESCRIPTION - DESCRIPTORS
DESCRIPTORS: THROBBING
DESCRIPTORS: THROBBING
DESCRIPTORS: ACHING

## 2022-06-02 ASSESSMENT — PAIN DESCRIPTION - FREQUENCY
FREQUENCY: INTERMITTENT

## 2022-06-02 ASSESSMENT — PAIN DESCRIPTION - PAIN TYPE
TYPE: SURGICAL PAIN

## 2022-06-02 ASSESSMENT — PAIN DESCRIPTION - ORIENTATION
ORIENTATION: RIGHT;LEFT

## 2022-06-02 ASSESSMENT — PAIN DESCRIPTION - LOCATION
LOCATION: CHEST

## 2022-06-02 ASSESSMENT — PAIN DESCRIPTION - ONSET
ONSET: ON-GOING

## 2022-06-02 ASSESSMENT — PAIN SCALES - GENERAL
PAINLEVEL_OUTOF10: 2
PAINLEVEL_OUTOF10: 5
PAINLEVEL_OUTOF10: 2

## 2022-06-02 NOTE — PROGRESS NOTES
Discharge instructions discussed with Patient and son  All Ivs removed  Opportunity for questions provided.   Prescriptions sent to pharmacy by MD  Home health: [facility]

## 2022-06-02 NOTE — DISCHARGE SUMMARY
Date of Admission: 6/1/2022  Date of Discharge: 6/2/2022    Discharge Diagnoses:  Principal Problem:    Malignant neoplasm of right female breast [facility])  Active Problems:    Post-operative pain    Acquired hypothyroidism    Essential hypertension  Resolved Problems:    * No resolved hospital problems. *       Discharge Medications:     Medication List      START taking these medications    HYDROcodone-acetaminophen 5-325 MG per tablet  Commonly known as: Norco  Take 1 tablet by mouth every 4 hours as needed for Pain for up to 5 days. CONTINUE taking these medications    ibuprofen 400 MG tablet  Commonly known as: ADVIL;MOTRIN     levothyroxine 100 MCG tablet  Commonly known as: SYNTHROID     losartan 100 MG tablet  Commonly known as: COZAAR           Where to Get Your Medications      These medications were sent to [facility], [address] [place] [address], [address]    Phone: [phone]   · HYDROcodone-acetaminophen 5-325 MG per tablet          Pending Labs:  None    Follow-up (including scheduled tests):  PMD    History of Present Illness:  Per Dr Crow Lopez  \"66 y.o. female with medical history of HTN, hypothyroidism, R breast cancer who is s/p R mastectomy (and port placement) this evening. Hospitalist was consulted by Anesthesiologist, Dr. Shanique Watson, who requested overnight observation of this patient. Patient underwent surgery by Dr. Rockwell Meigs this afternoon. While in recovery, patient was slow to rouse after anesthesia and had some brief post-op delirium. The delirium subsided, but patient is still somnolent with report of increased post-op pain. Patient's son, who would have been her caretaker at home this evening would feel more comfortable if patient was monitored in hospital overnight.  \"    Past Medical History:  Past Medical History:   Diagnosis Date    Anxiety     Depression 5/19/2017    Essential hypertension 05/19/2017    Fibromyalgia     History of postoperative nausea and vomiting     Hyperlipidemia     Hypothyroid     Impaired fasting glucose     Insomnia     Nausea & vomiting     Need for hepatitis C screening test 12/20/2020    3/15/21 HCV ab negative.  Obesity     Osteoarthritis 12/15/2015    Stress incontinence, female     Vitamin D deficiency        Allergies: Allergies   Allergen Reactions    Sulfamethoxazole-Trimethoprim Nausea And Vomiting    Meloxicam Other (See Comments)    Oxaprozin Swelling       Hospital Course:  77 y.o. female with medical history of HTN, hypothyroidism, R breast cancer who is s/p R mastectomy (and port placement) this evening. Hospitalist was consulted by Anesthesiologist, Dr. Jasmin Palomino, who requested overnight observation of this patient. Patient underwent surgery and while in recovery, patient was slow to rouse after anesthesia and had some brief post-op delirium. Mentation back to baseline. Pain better controlled. Hemodynamically stable on discharge.     Procedures:  None    Discharge Day Information:  Follow with PMD      Discharge Physical Exam:  General: Alert, oriented, NAD  HEENT: NC/AT, EOM are intact  Neck: supple, no JVD  Cardiovascular: RRR, S1, S2, no murmurs  Respiratory: Lungs are clear, no wheezes or rales  Abdomen: Soft, NT, ND  Back: No CVA tenderness, no paraspinal tenderness  Extremities: LE without pedal edema, no erythema  Neuro: A&O, CN are intact, no focal deficits  Skin: no rash or ulcers  Psych: good mood and affect    Recent Results (from the past 24 hour(s))   CBC    Collection Time: 06/01/22  8:52 PM   Result Value Ref Range    WBC 12.6 (H) 4.3 - 11.1 K/uL    RBC 4.46 4.05 - 5.2 M/uL    Hemoglobin 13.0 11.7 - 15.4 g/dL    Hematocrit 39.6 35.8 - 46.3 %    MCV 88.8 79.6 - 97.8 FL    MCH 29.1 26.1 - 32.9 PG    MCHC 32.8 31.4 - 35.0 g/dL    RDW 14.2 11.9 - 14.6 %    Platelets 787 115 - 614 K/uL    MPV 10.9 9.4 - 12.3 FL    nRBC 0.00 0.0 - 0.2 K/uL   Basic Metabolic Panel    Collection Time: 06/01/22  8:52 PM   Result Value Ref Range    Sodium 141 136 - 145 mmol/L    Potassium 3.4 (L) 3.5 - 5.1 mmol/L    Chloride 105 98 - 107 mmol/L    CO2 26 21 - 32 mmol/L    Anion Gap 10 7 - 16 mmol/L    Glucose 143 (H) 65 - 100 mg/dL    BUN 12 8 - 23 MG/DL    CREATININE 0.91 0.6 - 1.0 MG/DL    GFR African American >60 >60 ml/min/1.73m2    GFR Non- >60 >60 ml/min/1.73m2    Calcium 9.0 8.3 - 10.4 MG/DL   Basic Metabolic Panel w/ Reflex to MG    Collection Time: 06/02/22  4:30 AM   Result Value Ref Range    Sodium 139 136 - 145 mmol/L    Potassium 4.3 3.5 - 5.1 mmol/L    Chloride 104 98 - 107 mmol/L    CO2 27 21 - 32 mmol/L    Anion Gap 8 7 - 16 mmol/L    Glucose 138 (H) 65 - 100 mg/dL    BUN 11 8 - 23 MG/DL    CREATININE 0.86 0.6 - 1.0 MG/DL    GFR African American >60 >60 ml/min/1.73m2    GFR Non- >60 >60 ml/min/1.73m2    Calcium 8.9 8.3 - 10.4 MG/DL   CBC with Auto Differential    Collection Time: 06/02/22  4:30 AM   Result Value Ref Range    WBC 9.6 4.3 - 11.1 K/uL    RBC 4.31 4.05 - 5.2 M/uL    Hemoglobin 12.7 11.7 - 15.4 g/dL    Hematocrit 38.0 35.8 - 46.3 %    MCV 88.2 79.6 - 97.8 FL    MCH 29.5 26.1 - 32.9 PG    MCHC 33.4 31.4 - 35.0 g/dL    RDW 14.3 11.9 - 14.6 %    Platelets 842 395 - 485 K/uL    MPV 11.4 9.4 - 12.3 FL    nRBC 0.00 0.0 - 0.2 K/uL    Differential Type AUTOMATED      Seg Neutrophils 91 (H) 43 - 78 %    Lymphocytes 4 (L) 13 - 44 %    Monocytes 5 4.0 - 12.0 %    Eosinophils % 0 (L) 0.5 - 7.8 %    Basophils 0 0.0 - 2.0 %    Immature Granulocytes 0 0.0 - 5.0 %    Segs Absolute 8.7 (H) 1.7 - 8.2 K/UL    Absolute Lymph # 0.4 (L) 0.5 - 4.6 K/UL    Absolute Mono # 0.5 0.1 - 1.3 K/UL    Absolute Eos # 0.0 0.0 - 0.8 K/UL    Basophils Absolute 0.0 0.0 - 0.2 K/UL    Absolute Immature Granulocyte 0.0 0.0 - 0.5 K/UL        XR CHEST PORTABLE   Final Result      NC XR TECHNOLOGIST SERVICE    (Results Pending)        Condition: Improved    Disposition: Home    Consultants During This Hospitalization: None            Spent 31 minutes on discharge services

## 2022-06-02 NOTE — ANESTHESIA POSTPROCEDURE EVALUATION
Department of Anesthesiology  Postprocedure Note    Patient: Kelton Vital  MRN: [id number]  YOB: 1945  Date of evaluation: 6/1/2022  Time:  8:22 PM     Procedure Summary     Date: 06/01/22 Room / Location: [facility] MAIN OR 06 / E MAIN OR    Anesthesia Start: 1536 Anesthesia Stop: 1821    Procedures:       RIGHT BREAST MASTECTOMY (Right Breast)      RIGHT SENTINEL NODE BIOPSY MAG TRACE (Right Breast)      PORT INSERTION (Left Chest) Diagnosis:       Malignant neoplasm of right female breast, unspecified estrogen receptor status, unspecified site of breast [facility])      ([id number])    Surgeons: Bharath Marvin MD Responsible Provider: Twyla King MD    Anesthesia Type: General ASA Status: 2          Anesthesia Type: General    Angela Phase I:      Angela Phase II:      Last vitals: Reviewed and per EMR flowsheets. Anesthesia Post Evaluation    Patient location during evaluation: PACU  Patient participation: complete - patient participated  Level of consciousness: awake and alert  Airway patency: patent  Nausea & Vomiting: no nausea and no vomiting  Complications: no  Cardiovascular status: hemodynamically stable  Respiratory status: acceptable, nonlabored ventilation and spontaneous ventilation  Hydration status: euvolemic  Comments: BP (!) 165/75   Pulse 78   Temp 98.3 °F (36.8 °C) (Temporal)   Resp 18   Ht 5' 3\" (1.6 m)   Wt 215 lb 1.6 oz (97.6 kg)   SpO2 92%   BMI 38.10 kg/m²     Patient had prolonged emergence, delayed return to baseline mental status. After resolution, complain of intense pain, necessitating low dose narcotic for relief. Her son, who was to be her caregiver overnight, is requesting overnight observation and I agree. Hospitalist service has agreed to admit overnight.   Multimodal analgesia pain management approach

## 2022-06-02 NOTE — OP NOTE
Operative Note    Date of Surgery: 6/1/2022    Preoperative Diagnosis: [id number]     Postoperative Diagnosis: * No post-op diagnosis entered *     Surgeon(s) and Role:     * Doni Akers MD - Primary      Anesthesia: General    Procedure:   Procedure(s):  RIGHT BREAST MASTECTOMY  RIGHT SENTINEL NODE BIOPSY MAG TRACE  PORT INSERTION    Indications:  As detailed in the H&P. Procedure in Detail:  The patient was taken to the operating room, identified as Mariposa Vogel, and the procedure verified. A Time Out was held and the above information confirmed. The patient was then brought to the operating room and placed on the table in a supine position with adequate padding to all pressure points and the arm extended laterally. After induction of anesthesia, 2 cc of MagTrace were injected in the immediate subdermal retroareolar tissue and the area massaged vigorously for several minutes. The chest and arm were then prepped and draped in the usual sterile manner. While allowing additional time for MagTrace migration, the port was placed on the patient's left side. The needle was introduced into the  subclavian vein using an infraclavicular approach. A guidewire was then passed through the needle and confirmed fluoroscopically to be in the central venous circulation. Several cm inferiorly,  a pocket was created with a 15-blade and Bovie cautery to allow positioning of the port in the subcutaneous tissue. The port was anchored to the pectoralis fascia with  2-0 prolene suture. The catheter was tunneled to the infraclavicular guide-wire site and cut to the proper length. The dilator/introducer  was then passed over the guidewire, the catheter was inserted through the introducer sheath as it was peeled apart. Flouroscopy revealed incomplete advancement of the catheter. Significant manipulation was attempted to advance the catheter into the SVC but these failed, despite having blood return from the port itself. After prolonged attempts at manipulation, including wide dilation of the clavipectoral fascia, the catheter was removed and the vein re-accessed slightly laterally, again without difficulty. The process was repeated and again the catheter did not advance medial to the head of the clavicle. Prolonged manipulation eventually successfully resulted in full advancement of the catheter and the tip of the catheter was confirmed flouroscopically to be in good position. There was no kink of the catheter when imaged fluoroscopically. The port aspirated venous blood quite sluggishly but it did aspirate, and flushed without inappropriate resistance. It was flushed with heparinized saline and then locked with 2.2 cc of 100 units per cc of heparin. The pocket and puncture site were confirmed to be hemostatic and then the pocket was closed with interrupted 3-0 Vicryl suture in a deep dermal fashion. The skin at both sites was closed with  subcuticular 4-0 Vicryl suture. Later, steri-strips were placed beneath the Telfa and Tegaderm. Attention was then turned to the right breast where a standard transverse, circumareolar elliptical was made, excising significant skin,  and cautery was used to elevate the skin flaps, attempting to maintain the avascular superficial subcutaneous plane. Margins of dissection were the clavicle, costosternal junction and rectus fascia. The breast was then amputated off of the chest wall, to include the pectoralis fascia, and laterally it was transected off of the skin and lateral margin of the pectoralis major and it was removed. The wound was irrigated and hemostasis was achieved with cautery.     The axilla was entered sharply and careful sharp and blunt dissection was then used following Sentimag probe cues to identify 1 deep axillary  node which was dissected from the surrounding tissues using clips to control lymphatic and vascular structures to the node, and it was sent separately to pathology. Of note, there was repeated difficulty zeroing the Sentimag probe and the backup probe was not available, being out for repair. The probe was then used to sweep the axilla and no further activity  was noted. Additional redundant deep axillary tissue was excised and submitted separately for possible node analysis. The surgical site was irrigated and confirmed to be hemostatic. A 19F Wilfred drain was inserted through a lateral stab incision and was draped into the axilla and under the inferior flap, then was sutured in place with 3-0 nylon. An onQ Painbuster was inserted alongside the drain and placed along the clavicle. The wound was again confirmed hemostatic and was then closed with deep dermal interrupted 2 and 3-0 vicryl sutures and the skin was closed with staples. A sterile dressing was applied. The patient tolerated the procedure well. She was awakened from anesthesia, extubated in the operating room, and taken to PACU in stable condition. ID Type Source Tests Collected by Time Destination   A : RIGHT BREAST MASTECTOMY Tissue Breast SURGICAL PATHOLOGY Bharath Marvin MD 6/1/2022 1708    B : rIGHT SENTINEL NODE#1.  Tissue Tissue SURGICAL PATHOLOGY Bharath Marvin MD 6/1/2022 1732    C : RIGHT ADDITIONAL AXILLARY TISSUE Tissue Tissue SURGICAL PATHOLOGY Bharath Marvin MD 6/1/2022 1733        6/1/2022  Alma Devlin MD

## 2022-06-02 NOTE — CARE COORDINATION
06/02/22 0856   Service Assessment   Patient Orientation Alert and Oriented   Cognition Alert   History Provided By Patient   Primary [place] [address]   Prior Functional Level Independent in ADLs/IADLs   Current Functional Level Assistance with the following:;Bathing;Dressing; Mobility   Can patient return to prior living arrangement Yes   Ability to make needs known: Good   Family able to assist with home care needs: Yes   Would you like for me to discuss the discharge plan with any other family members/significant others, and if so, who? No   Social/Functional History   Lives With Alone   Receives Help From Family   ADL Assistance Needs assistance   Ambulation Assistance Needs assistance   Active  Yes   Mode of Transportation Car   Occupation Retired   Discharge [place] Medications No   Type of [facility]   Patient expects to be discharged to: House   Follow Up Appointment: Best Day/Time  Tuesday AM   Services At/After Discharge   Transition of Care Consult (CM Consult) [address] [place] [address]   Confirm Follow Up Transport Self   Condition of Participation: Discharge Planning   The Patient and/or Patient Representative was provided with a Choice of Provider? Patient   The Patient and/Or Patient Representative agree with the Discharge Plan? Yes   Freedom of Choice list was provided with basic dialogue that supports the patient's individualized plan of care/goals, treatment preferences, and shares the quality data associated with the providers? Yes   Melly reviewed chart and talked with Md this am. Pt is a 77 yr old female who was adm yest due to having a mastectomy secondary to breast cancer. Md asked Melly to speak with pt and her son about home health care visits.  Pt is normally active and indep but is a little overwhelmed/anxious about going home with drains and bandages. Sw talked with both and attempted to answer questions. Sw explained home health care to them. Talked to both about the assistance required. Pt has a follow up maritza with surgeon on Tues am. Melly contacted [address] [place] [address] to see if a nurse could see pt tomorrow. Await decision. Pt being discharged today with her son who will check on pt. [facility] was able to accept the ref and a nurse will see pt tomorrow.    Ted Colby

## 2022-06-02 NOTE — PLAN OF CARE
Admission assessment complete. Patient arrived from ED alert and oriented. Respirations are even and unlabored, patient is on room air. Lung sounds are clear bilaterally. Bowel sounds are hypoactive at this time. Patient's mastectomy incision dressed with fluff dressing. Dressing is clean, dry and intact. Patient's JONO drain is charged. Port is dressed with gauze and Tegaderm. Patient has not voided, informed to call for assistance. No needs expressed at this time.    Bed low and locked, call light within reach.   ___________________________________________________       06/01/22 2030   Dual Clinician Skin Assessment   Dual Skin Assessment (4 Eyes) WDL   Second Clinical  (First and Last Name) Michaela Hope RN   Skin Integumentary    Skin Integumentary (WDL) X   Skin Integrity Incision (see LDA)  (left subclavicular, right breast)   ______________________________________________________      Problem: Pain  Goal: Verbalizes/displays adequate comfort level or baseline comfort level  Outcome: Progressing     Problem: Safety - Adult  Goal: Free from fall injury  Outcome: Progressing

## 2022-06-02 NOTE — PERIOP NOTE
Patients son to PACU at approximately 1915. He states he would be much more comfortable if his mother was admitted to the hospital for tonight for additional pain control and due to the fact that she was very slow wakening from her surgical procedure.

## 2022-06-02 NOTE — PERIOP NOTE
TRANSFER - OUT REPORT:    Verbal report given to Rhoda No RN on Kelton Vital  being transferred to [facility] for change in patient condition (need for pain control; slow wakening from surgery)       Report consisted of patient's Situation, Background, Assessment and   Recommendations(SBAR). Information from the following report(s) Nurse Handoff Report and Cardiac Rhythm SR was reviewed with the receiving nurse. Lines:   Peripheral IV 06/01/22 Left;Posterior Hand (Active)   Site Assessment Clean, dry & intact 06/01/22 1820   Line Status Infusing 06/01/22 1820   Phlebitis Assessment No symptoms 06/01/22 1820   Infiltration Assessment 0 06/01/22 1820   Alcohol Cap Used No 06/01/22 1820   Dressing Status Clean, dry & intact 06/01/22 1820   Dressing Type Transparent 06/01/22 1820        Opportunity for questions and clarification was provided.       Patient transported with:  O2 @ [facility]

## 2022-06-02 NOTE — H&P
Hospitalist History and Physical   Admit Date:  2022 12:18 PM   Name:  Jerome Lombardo   Age:  77 y.o. Sex:  female  :  1945   MRN:  [id number]     Presenting Complaint: Difficulty rousing after anesthesia. Pain. Reason(s) for Admission: Malignant neoplasm of right female breast, unspecified estrogen receptor status, unspecified site of breast ([facility] [C50.911]  Post-operative pain [G89.18]     History of Present Illness:   Jerome Lombardo is a 77 y.o. female with medical history of HTN, hypothyroidism, R breast cancer who is s/p R mastectomy (and port placement) this evening. Hospitalist was consulted by Anesthesiologist, Dr. Giselle Haider, who requested overnight observation of this patient. Patient underwent surgery by Dr. Jason Yu this afternoon. While in recovery, patient was slow to rouse after anesthesia and had some brief post-op delirium. The delirium subsided, but patient is still somnolent with report of increased post-op pain. Patient's son, who would have been her caretaker at home this evening would feel more comfortable if patient was monitored in hospital overnight. Anticipate patient will be stable for discharge in the morning as patient underwent outpatient surgery with initial plan to go home tonight. Review of Systems:  10 systems reviewed and negative except as noted in HPI. Assessment & Plan:   Post-Operative Pain  - Will have PO and IV pain medications available, if patient becomes altered again due to pain medications, will hold any opioid medications    Post-Operative Delirium  - Resolved  - Monitor for continued stability    R Breast Cancer  - S/P R mastectomy today (POD 0)  - Port placement as well today  - Oncologist is Dr. Dago Chavira    HTN  - BP slightly elevated  - Restart home meds    Disposition: OBSERVATION    Diet: ADULT DIET; Regular  VTE ppx: SCDs    Past medical history reviewed.     Past Medical History:   Diagnosis Date    Anxiety     Depression 2017    Essential hypertension 05/19/2017    Fibromyalgia     History of postoperative nausea and vomiting     Hyperlipidemia     Hypothyroid     Impaired fasting glucose     Insomnia     Nausea & vomiting     Need for hepatitis C screening test 12/20/2020    3/15/21 HCV ab negative.  Obesity     Osteoarthritis 12/15/2015    Stress incontinence, female     Vitamin D deficiency      Past surgical history reviewed.     Past Surgical History:   Procedure Laterality Date    BREAST BIOPSY Bilateral     BREAST BIOPSY Right 3/29/2021    RIGHT BREAST NEEDLE LOCALIZED LUMPECTOMY X2 performed by Reta Lyons MD at [place] [address] COLONOSCOPY  2012    normal; internal hemorrhoid    HYSTERECTOMY  1980    TOTAL KNEE ARTHROPLASTY Bilateral     Left 2015, Right 7/29/20    US BREAST BIOPSY NEEDLE ADDITIONAL RIGHT Right 2/25/2021    US BREAST BIOPSY NEEDLE ADDITIONAL RIGHT 2/25/2021 [facility]    US BREAST NEEDLE BIOPSY RIGHT Right 2/25/2021    US BREAST NEEDLE BIOPSY RIGHT 2/25/2021 [facility]    US BREAST NEEDLE BIOPSY RIGHT Right 4/12/2022    US BREAST NEEDLE BIOPSY RIGHT 4/12/2022 [facility]    US GUIDED NEEDLE LOC BREAST ADDL RIGHT Right 3/29/2021    US GUIDED NEEDLE LOC BREAST ADDL RIGHT 3/29/2021 [facility]    US GUIDED NEEDLE LOC OF RIGHT BREAST Right 3/29/2021    US GUIDED NEEDLE LOC OF RIGHT BREAST 3/29/2021 [facility]      Allergies   Allergen Reactions    Sulfamethoxazole-Trimethoprim Nausea And Vomiting    Meloxicam Other (See Comments)    Oxaprozin Swelling      Social History     Tobacco Use    Smoking status: Never Smoker    Smokeless tobacco: Never Used   Substance Use Topics    Alcohol use: No      Family History   Problem Relation Age of Onset    No Known Problems Mother     Heart Disease Father     Breast Cancer Sister     Stroke Father       Family history reviewed and noncontributory to patient's acute condition; no relevant family history unless otherwise noted above.   Immunization History   Administered Date(s) Administered    COVID-19, Moderna, Primary or Immunocompromised, PF, 100mcg/0.5mL 03/01/2021, 04/01/2021, 10/01/2021    Influenza Virus Vaccine 10/28/1998    Influenza, High Dose (Fluzone 65 yrs and older) 10/23/2015, 10/14/2016, 09/23/2017, 09/26/2018, 10/10/2019, 10/13/2021    Influenza, Quadv, adjuvanted, 65 yrs +, IM, PF (Fluad) 09/22/2020    PPD Test 12/15/2015    Pneumococcal Conjugate 13-valent (Opkpiad93) 12/04/2015    Pneumococcal Polysaccharide (Radasmarz89) 12/04/2016    Tdap (Boostrix, Adacel) 04/10/2015    Zoster Live (Zostavax) 06/05/2015     PTA Medications:  Current Outpatient Medications   Medication Instructions    HYDROcodone-acetaminophen (NORCO) 5-325 MG per tablet 1 tablet, Oral, EVERY 4 HOURS PRN    ibuprofen (ADVIL;MOTRIN) 400 MG tablet Oral, EVERY 6 HOURS PRN    levothyroxine (SYNTHROID) 100 MCG tablet TAKE 1 TABLET EVERY DAY BEFORE BREAKFAST FOR LOW THYROID HORMONE LEVELS    losartan (COZAAR) 100 MG tablet TAKE 1 TABLET EVERY DAY FOR HIGH BLOOD PRESSURE       Objective:     Patient Vitals for the past 24 hrs:   Temp Pulse Resp BP SpO2   06/01/22 2031 97.3 °F (36.3 °C) 73 16 (!) 168/79 97 %   06/01/22 2000 -- 78 18 (!) 165/75 92 %   06/01/22 1955 -- 75 18 -- 95 %   06/01/22 1950 -- 79 18 (!) 154/81 --   06/01/22 1945 -- -- 18 -- --   06/01/22 1932 -- -- 18 -- 96 %   06/01/22 1930 -- 78 -- (!) 170/78 97 %   06/01/22 1925 -- 79 16 (!) 172/79 94 %   06/01/22 1920 -- 78 16 (!) 174/79 97 %   06/01/22 1917 -- -- 16 -- --   06/01/22 1915 -- 78 (P) 16 -- --   06/01/22 1912 -- -- 16 -- --   06/01/22 1910 -- 79 (P) 16 -- --   06/01/22 1905 -- 80 16 (!) 164/69 98 %   06/01/22 1900 -- 87 18 (!) 174/57 96 %   06/01/22 1855 -- 88 16 (!) 170/72 96 %   06/01/22 1850 -- 92 18 (!) 180/122 97 %   06/01/22 1845 -- 86 18 (!) 158/71 94 %   06/01/22 1840 -- 90 16 (!) 164/71 --   06/01/22 1835 -- 94 16 (!) 159/72 --   06/01/22 1830 -- 98 18 (!) 163/71 --   06/01/22 1825 -- 100 18 (!) 167/79 --   06/01/22 1820 98.3 °F (36.8 °C) 98 18 (!) 149/72 96 %   06/01/22 1243 -- -- -- (!) 168/84 --   06/01/22 1233 97.5 °F (36.4 °C) 78 18 -- 98 %       Estimated body mass index is 38.1 kg/m² as calculated from the following:    Height as of this encounter: 5' 3\" (1.6 m). Weight as of this encounter: 215 lb 1.6 oz (97.6 kg). Intake/Output Summary (Last 24 hours) at 6/1/2022 2051  Last data filed at 6/1/2022 1940  Gross per 24 hour   Intake 1000 ml   Output 70 ml   Net 930 ml         Physical Exam:  General:    Well nourished. No overt distress  Head:  Normocephalic, atraumatic  Eyes:  Sclerae appear normal.  Pupils equally round. HENT:  Nares appear normal, no drainage. Moist mucous membranes  Neck:  No restricted ROM. Trachea midline  CV:   RRR. S1/S2 auscultated  Lungs:   CTAB. No wheezing, rhonchi, or rales. Appears even, unlabored  Chest:  Bandaged. JONO drain in place. Abdomen: Bowel sounds present. Soft, nontender, nondistended. Extremities: Warm and dry. No cyanosis or clubbing. No edema. Skin:     No rashes. Normal turgor. Normal coloration  Neuro:  Cranial nerves II-XII grossly intact. Sensation intact  Psych:  Normal mood and affect.   Alert and oriented x3      Signed:  Moni Monsalve MD

## 2022-06-03 ENCOUNTER — HOME CARE VISIT (OUTPATIENT)
Dept: SCHEDULING | Facility: HOME HEALTH | Age: 77
End: 2022-06-03
Payer: MEDICARE

## 2022-06-03 PROCEDURE — G0299 HHS/HOSPICE OF RN EA 15 MIN: HCPCS

## 2022-06-03 PROCEDURE — 1090000002 HH PPS REVENUE DEBIT

## 2022-06-03 PROCEDURE — 1090000001 HH PPS REVENUE CREDIT

## 2022-06-03 PROCEDURE — 400013 HH SOC

## 2022-06-03 PROCEDURE — 0221000100 HH NO PAY CLAIM PROCEDURE

## 2022-06-04 VITALS
RESPIRATION RATE: 12 BRPM | SYSTOLIC BLOOD PRESSURE: 138 MMHG | DIASTOLIC BLOOD PRESSURE: 70 MMHG | HEART RATE: 64 BPM | TEMPERATURE: 97 F | OXYGEN SATURATION: 98 %

## 2022-06-04 PROCEDURE — 1090000002 HH PPS REVENUE DEBIT

## 2022-06-04 PROCEDURE — 1090000001 HH PPS REVENUE CREDIT

## 2022-06-04 ASSESSMENT — ENCOUNTER SYMPTOMS
COUGH CHARACTERISTICS: PRODUCTIVE
SPUTUM AMOUNT: SCANT
COUGH: 1
SPUTUM CONSISTENCY: THICK
SPUTUM PRODUCTION: 1

## 2022-06-05 PROCEDURE — 1090000001 HH PPS REVENUE CREDIT

## 2022-06-05 PROCEDURE — 1090000002 HH PPS REVENUE DEBIT

## 2022-06-06 PROCEDURE — 1090000001 HH PPS REVENUE CREDIT

## 2022-06-06 PROCEDURE — 1090000002 HH PPS REVENUE DEBIT

## 2022-06-07 ENCOUNTER — OFFICE VISIT (OUTPATIENT)
Dept: SURGERY | Age: 77
End: 2022-06-07

## 2022-06-07 DIAGNOSIS — C50.911 HER2-POSITIVE CARCINOMA OF RIGHT BREAST (HCC): ICD-10-CM

## 2022-06-07 DIAGNOSIS — Z90.11 S/P MASTECTOMY, RIGHT: Primary | ICD-10-CM

## 2022-06-07 PROCEDURE — 99024 POSTOP FOLLOW-UP VISIT: CPT | Performed by: SURGERY

## 2022-06-07 PROCEDURE — 1090000002 HH PPS REVENUE DEBIT

## 2022-06-07 PROCEDURE — 1090000001 HH PPS REVENUE CREDIT

## 2022-06-08 PROCEDURE — 1090000001 HH PPS REVENUE CREDIT

## 2022-06-08 PROCEDURE — 1090000002 HH PPS REVENUE DEBIT

## 2022-06-08 RX ORDER — HYDROCODONE BITARTRATE AND ACETAMINOPHEN 5; 325 MG/1; MG/1
1 TABLET ORAL EVERY 4 HOURS PRN
Qty: 20 TABLET | Refills: 0 | Status: SHIPPED | OUTPATIENT
Start: 2022-06-08 | End: 2022-06-13

## 2022-06-09 ENCOUNTER — HOME CARE VISIT (OUTPATIENT)
Dept: SCHEDULING | Facility: HOME HEALTH | Age: 77
End: 2022-06-09
Payer: MEDICARE

## 2022-06-09 VITALS
TEMPERATURE: 97.2 F | DIASTOLIC BLOOD PRESSURE: 75 MMHG | HEART RATE: 67 BPM | RESPIRATION RATE: 15 BRPM | SYSTOLIC BLOOD PRESSURE: 130 MMHG | OXYGEN SATURATION: 99 %

## 2022-06-09 PROCEDURE — 1090000002 HH PPS REVENUE DEBIT

## 2022-06-09 PROCEDURE — 1090000001 HH PPS REVENUE CREDIT

## 2022-06-09 PROCEDURE — G0299 HHS/HOSPICE OF RN EA 15 MIN: HCPCS

## 2022-06-09 ASSESSMENT — ENCOUNTER SYMPTOMS
PAIN LOCATION - PAIN QUALITY: THROBBING
STOOL DESCRIPTION: FORMED

## 2022-06-10 PROCEDURE — 1090000002 HH PPS REVENUE DEBIT

## 2022-06-10 PROCEDURE — 1090000001 HH PPS REVENUE CREDIT

## 2022-06-11 PROCEDURE — 1090000002 HH PPS REVENUE DEBIT

## 2022-06-11 PROCEDURE — 1090000001 HH PPS REVENUE CREDIT

## 2022-06-12 PROCEDURE — 1090000001 HH PPS REVENUE CREDIT

## 2022-06-12 PROCEDURE — 1090000002 HH PPS REVENUE DEBIT

## 2022-06-13 ENCOUNTER — TELEPHONE (OUTPATIENT)
Dept: SURGERY | Age: 77
End: 2022-06-13

## 2022-06-13 PROCEDURE — 1090000001 HH PPS REVENUE CREDIT

## 2022-06-13 PROCEDURE — 1090000002 HH PPS REVENUE DEBIT

## 2022-06-13 NOTE — TELEPHONE ENCOUNTER
Pt called to report the drain readings:    6/11- 40 cc  6/12- 30 cc  3/13- 20 cc     She says her staples are in tact. I suggested that she call in the morning with another reading. The output is still high. She voiced understanding.

## 2022-06-14 ENCOUNTER — TELEPHONE (OUTPATIENT)
Dept: CASE MANAGEMENT | Age: 77
End: 2022-06-14

## 2022-06-14 ENCOUNTER — TELEPHONE (OUTPATIENT)
Dept: SURGERY | Age: 77
End: 2022-06-14

## 2022-06-14 DIAGNOSIS — C50.911 MALIGNANT NEOPLASM OF RIGHT FEMALE BREAST, UNSPECIFIED ESTROGEN RECEPTOR STATUS, UNSPECIFIED SITE OF BREAST (HCC): Primary | ICD-10-CM

## 2022-06-14 DIAGNOSIS — Z90.11 HISTORY OF RIGHT MASTECTOMY: ICD-10-CM

## 2022-06-14 PROCEDURE — 1090000001 HH PPS REVENUE CREDIT

## 2022-06-14 PROCEDURE — 1090000002 HH PPS REVENUE DEBIT

## 2022-06-14 NOTE — TELEPHONE ENCOUNTER
Called pt for drain output- 30 cc    Instructed the pt to call again in the morning with drain output and to schedule a time for a nurse visit to remove the staples. She verbalized understanding.

## 2022-06-14 NOTE — TELEPHONE ENCOUNTER
Patient called wanting resources for mastectomy supplies/ prosthesis. Given information on [facility] but [facility] so [facility] may have temporary use of lightweight prosthesis.

## 2022-06-15 PROCEDURE — 1090000001 HH PPS REVENUE CREDIT

## 2022-06-15 PROCEDURE — 1090000002 HH PPS REVENUE DEBIT

## 2022-06-16 ENCOUNTER — NURSE ONLY (OUTPATIENT)
Dept: SURGERY | Age: 77
End: 2022-06-16

## 2022-06-16 DIAGNOSIS — Z90.11 S/P MASTECTOMY, RIGHT: Primary | ICD-10-CM

## 2022-06-16 PROCEDURE — 1090000001 HH PPS REVENUE CREDIT

## 2022-06-16 PROCEDURE — 1090000002 HH PPS REVENUE DEBIT

## 2022-06-16 NOTE — PROGRESS NOTES
Patient presents today s/p right mastectomy on 6/1/2022. Her drain is in place with 30 cc output. Her staples are in tact. She denies fevers, chills, n/v. She has some discomfort to the right arm. I removed the stapes and the pt tolerated well. I suggested that she continue to monitor and write down her drain totals.

## 2022-06-17 ENCOUNTER — HOME CARE VISIT (OUTPATIENT)
Dept: SCHEDULING | Facility: HOME HEALTH | Age: 77
End: 2022-06-17
Payer: MEDICARE

## 2022-06-17 ENCOUNTER — TELEPHONE (OUTPATIENT)
Dept: SURGERY | Age: 77
End: 2022-06-17

## 2022-06-17 PROCEDURE — G0299 HHS/HOSPICE OF RN EA 15 MIN: HCPCS

## 2022-06-17 PROCEDURE — 1090000001 HH PPS REVENUE CREDIT

## 2022-06-17 PROCEDURE — 1090000002 HH PPS REVENUE DEBIT

## 2022-06-17 NOTE — TELEPHONE ENCOUNTER
Pt left a voicemail with the drain reading of 20 cc this am. I suggested that she leave it in over the weekend and we can take it out Monday morning. She verbalized understanding.

## 2022-06-18 PROCEDURE — 1090000001 HH PPS REVENUE CREDIT

## 2022-06-18 PROCEDURE — 1090000002 HH PPS REVENUE DEBIT

## 2022-06-19 VITALS
OXYGEN SATURATION: 98 % | TEMPERATURE: 98.1 F | RESPIRATION RATE: 16 BRPM | DIASTOLIC BLOOD PRESSURE: 72 MMHG | SYSTOLIC BLOOD PRESSURE: 136 MMHG | HEART RATE: 72 BPM

## 2022-06-19 PROCEDURE — 1090000002 HH PPS REVENUE DEBIT

## 2022-06-19 PROCEDURE — 1090000001 HH PPS REVENUE CREDIT

## 2022-06-19 ASSESSMENT — ENCOUNTER SYMPTOMS
PAIN LOCATION - PAIN QUALITY: PULLING
STOOL DESCRIPTION: FORMED

## 2022-06-20 PROCEDURE — 1090000002 HH PPS REVENUE DEBIT

## 2022-06-20 PROCEDURE — 1090000001 HH PPS REVENUE CREDIT

## 2022-06-21 DIAGNOSIS — E03.9 ACQUIRED HYPOTHYROIDISM: ICD-10-CM

## 2022-06-21 DIAGNOSIS — I10 ESSENTIAL HYPERTENSION: ICD-10-CM

## 2022-06-21 DIAGNOSIS — M85.80 OSTEOPENIA, UNSPECIFIED LOCATION: ICD-10-CM

## 2022-06-21 LAB
25(OH)D3 SERPL-MCNC: 30.3 NG/ML (ref 30–100)
ANION GAP SERPL CALC-SCNC: 8 MMOL/L (ref 7–16)
BUN SERPL-MCNC: 13 MG/DL (ref 8–23)
CALCIUM SERPL-MCNC: 9.2 MG/DL (ref 8.3–10.4)
CHLORIDE SERPL-SCNC: 108 MMOL/L (ref 98–107)
CO2 SERPL-SCNC: 25 MMOL/L (ref 21–32)
CREAT SERPL-MCNC: 0.9 MG/DL (ref 0.6–1)
GLUCOSE SERPL-MCNC: 86 MG/DL (ref 65–100)
POTASSIUM SERPL-SCNC: 4.3 MMOL/L (ref 3.5–5.1)
SODIUM SERPL-SCNC: 141 MMOL/L (ref 136–145)
TSH, 3RD GENERATION: 4.5 UIU/ML (ref 0.36–3.74)

## 2022-06-21 PROCEDURE — 1090000002 HH PPS REVENUE DEBIT

## 2022-06-21 PROCEDURE — 1090000001 HH PPS REVENUE CREDIT

## 2022-06-21 NOTE — PROGRESS NOTES
S/p right mastectomy/SLN and left port insertion (with difficulty)   Noted pain increase  when on-Q appeared to ; developed burning in right chest and left shoulder pain    Exam-  Right chest wall- incision intact. JONO unremarkable. Volumes >25cc/24hr. Mild ecchymosis  Left chest port site- ecchymosis extending inferiorly onto LUQ, into axilla. No cellulitis    Path:  1.5cm IDC  SLN (-) x1 [4 nonsentinel nodes neg]  [previously ER-76, Pr 0, her2 3+]    1. S/P mastectomy, right    2. HER2-positive carcinoma of right breast ([address]       Suspect left shoulder (trapezius,neck) stiffness related to traumatic port insertion- potentially intramuscular hematoma and expect slow resolution  Mastectomy site doing well    Call office with drain output update next week  Follow-up and Dispositions    · Return in about 4 weeks (around 2022). Orders Placed This Encounter    HYDROcodone-acetaminophen (NORCO) 5-325 MG per tablet     Sig: Take 1 tablet by mouth every 4 hours as needed for Pain for up to 5 days.      Dispense:  20 tablet     Refill:  0     Reduce doses taken as pain becomes manageable

## 2022-06-22 PROCEDURE — 1090000001 HH PPS REVENUE CREDIT

## 2022-06-22 PROCEDURE — 1090000002 HH PPS REVENUE DEBIT

## 2022-06-22 NOTE — PATIENT INSTRUCTIONS
Patient Instructions from Today's Visit    Reason for Visit:  Follow up - [facility]    Diagnosis Information:  [email or web address]    Plan:  - Your post op testing indicates tumor removed and no lymph nodes involved. This is a stage 1 breast cancer.   - Due to the positive HER2 receptor on the cells we do recommend that you get treatment with Herceptin and Taxol. This is treatment weekly for 12 weeks then every 3 weeks for 9 months for a total of 1 year of therapy. The taxol is weekly with herceptin given every 3 weeks for the first 12 weeks then would continue with just herceptin for the rest of the year. Your Chemotherapy Plan      Diagnosis: Breast Cancer    Goal of Therapy: __ Palliative      _X_Curative         Name of Chemotherapy medications: Taxol/herceptin    Number of Cycles Planned: Taxol and Herceptin given weekly x 12 and then Herceptin continues every three weeks to complete a year    Length of Cycle:  weekly and then every three week      Chemotherapy plan is subject to change at your provider's discretion    A copy of this plan has been discussed and given to patient by Laura Khan RN      Chemo Education:   Scheduled  yes  Previously reviewed no    Consent for therapy:   Completed on - to be done at chemo education        Taxol        Generic Name: Paclitaxel  Other Trade Name: Onxal TM    Drug Type: What is Taxol? Taxol is an anti-cancer (\"antineoplastic\" or \"cytotoxic\") chemotherapy drug. Taxol is classified as a \"plant alkaloid,\" a \"taxane\" and an \"antimicrotubule agent. \"  (For more detail, see \"How Taxol Works\" section below). What Taxol Is Used For:  Taxol is used for the treatment of breast, ovarian, lung, bladder, prostate, melanoma, esophageal, as well as other types of solid tumor cancers. It has also been used in Kaposi's sarcoma.   Note:  If a drug has been approved for one use, physicians sometimes elect to use this same drug for other problems if they believe it might be helpful. How Taxol Is Given:  Taxol is given as an injection or infusion into the vein (intravenous, IV). Taxol is an irritant. An irritant is a chemical that can cause inflammation of the vein through which it is given. If the medication escapes from the vein it can cause tissue damage. The nurse or doctor who gives Taxol must be carefully trained. If you experience pain or notice redness or swelling at the IV site while you are receiving Taxol, alert your health care professional immediately. Because severe allergic reactions have occurred in some people taking Taxol, you will be asked to take medications to help prevent a reaction. Your doctor will prescribe the exact regimen. Taxol is given over various amounts of times and in various schedules. There is no pill form of Taxol. The amount of Taxol and the schedule that it is given will receive depend on many factors, including your height and weight, your general health or other health problems, and the type of cancer or condition being treated. Your doctor will determine your dose and schedule. Side Effects:  Important things to remember about Taxol side effects include:    Most people do not experience all of the Taxol side effects listed. Taxol side effects are often predictable in terms of their onset and duration. Taxol side effects are almost always reversible and will go away after treatment is complete. There are many options to help minimize or prevent Taxol side effects. There is no relationship between the presence or severity of Taxol side effects and the effectiveness of the medication. The Taxol side effects and their severity vary depending on how much of the drug is given, and/or the schedule in which it is given. The following Taxol side effects are common (occurring in greater than 30%) for patients taking Taxol:    Low blood counts. Your white and red blood cells and platelets may temporarily decrease. This can put you at increased risk for infection, anemia and/or bleeding. Hair loss  Arthralgias and myalgias, pain in the joints and muscles. Usually temporary occurring 2 to 3 days after Taxol, and resolve within a few days. Peripheral neuropathy (numbness and tingling of the hands and feet)  Nausea and vomiting (usually mild)  Diarrhea  Mouth sores  Hypersensitivity reaction - fever, facial flushing, chills, shortness of breath, or hives after Taxol is given. The majority of these reactions occur within the first 10 minutes of an infusion. Notify your healthcare provider immediately (premedication regimen has significantly decreased the incidence of this reaction). The following are less common side effects (occurring in 10-29%) for patients receiving Taxol:    Swelling of the feet or ankles (edema). Increases in blood tests measuring liver function. These return to normal once treatment is discontinued (see liver problems). Low blood pressure (occurring during the first 3 hours of infusion). Darkening of the skin where previous radiation treatment has been given (radiation recall - see skin reactions). Nail changes (discoloration of nail beds - rare) (see skin reactions). Amadeo:  15-21 days    This list includes common and less common side effects for individuals taking Taxol. Side effects that are very rare, occurring in less than 10% of patients, are not listed here. However, you should always inform your health care provider if you experience any unusual symptoms.     When to contact your doctor or health care provider:  Contact your health care provider immediately, day or night, if you should experience any of the following symptoms:    Fever of 100.4° F (38° C), chills (possible signs of infection)  Shortness of breath, wheezing, difficulty breathing, closing up of the throat, swelling of facial features, hives (possible allergic reaction). The following symptoms require medical attention, but are not an emergency. Contact your health care provider within 24 hours of noticing any of the following: If you notice any redness or pain at the site of injection  Nausea (interferes with ability to eat and unrelieved with prescribed medication)  Vomiting (vomiting more than 4-5 times in a 24 hour period)  Diarrhea (4-6 episodes in a 24-hour period)  Unusual bleeding or bruising  Black or tarry stools, or blood in your stools or urine  Extreme fatigue (unable to carry on self-care activities)  Mouth sores (painful redness, swelling or ulcers)  Yellowing of the skin or eyes  Swelling of the feet or ankles. Sudden weight gain  Signs of infection such as redness or swelling, pain on swallowing, coughing up mucous, or painful urination. Always inform your health care provider if you experience any unusual symptoms. Precautions:  Before starting Taxol treatment, make sure you tell your doctor about any other medications you are taking (including prescription, over-the-counter, vitamins, herbal remedies, etc.). Do not take aspirin, or products containing aspirin unless your doctor specifically permits this. Do not receive any kind of immunization or vaccination without your doctor's approval while taking Taxol. Inform your health care professional if you are pregnant or may be pregnant prior to starting this treatment. Pregnancy category D (Taxol may be hazardous to the fetus. Women who are pregnant or become pregnant must be advised of the potential hazard to the fetus). For both men and women: Do not conceive a child (get pregnant) while taking Taxol. Barrier methods of contraception, such as condoms, are recommended. Discuss with your doctor when you may safely become pregnant or conceive a child after therapy. Do not breast feed while taking Taxol.   Self-Care Tips:  Taxol, or the medications that you take with Taxol may cause you to feel dizzy or drowsy. Do not operate any heavy machinery until you know how you respond to Taxol. If you notice any redness or pain at the injection site, place a warm compress, and notify your healthcare provider. Drink at least two to three quarts of fluid every 24 hours, unless you are instructed otherwise. You may be at risk of infection so try to avoid crowds or people with colds and those not feeling well, and report fever or any other signs of infection immediately to your health care provider. Wash your hands often. To help treat/prevent mouth sores, use a soft toothbrush, and rinse three times a day with 1/2 to 1 teaspoon of baking soda and/or 1/2 to 1 teaspoon of salt mixed with 8 ounces of water. Use an electric razor and a soft toothbrush to minimize bleeding. Avoid contact sports or activities that could cause injury. Taxol causes little nausea. But if you should experience nausea, take anti-nausea medications as prescribed by your doctor, and eat small frequent meals. Sucking on lozenges and chewing gum may also help. Acetaminophen or ibuprofen may help relieve discomfort from fever, headache and/or generalized aches and pains. However, be sure to talk with your doctor before taking it. You may experience drowsiness or dizziness; avoid driving or engaging in tasks that require alertness until your response to the drug is known. Taxol will make you sensitive to sunlight. You must wear sunglasses when outside, and avoid sun exposure. Wear protective clothing, and also wear SPF 15 (or higher) sun block. In general, drinking alcoholic beverages should be kept to a minimum or avoided completely. You should discuss this with your doctor. Get plenty of rest.   Maintain good nutrition.   If you experience symptoms or side effects, be sure to discuss them with your health care team.  They can prescribe medications and/or offer other suggestions that are effective in managing such problems. Monitoring and Testing: You will be checked regularly by your health care professional while you are taking Taxol, to monitor side effects and check your response to therapy. Periodic blood work to monitor your complete blood count (CBC) as well as the function of other organs (such as your kidneys and liver) will also be ordered by your doctor. How Taxol Works:  Cancerous tumors are characterized by cell division, which is no longer controlled as it is in normal tissue. \"Normal\" cells stop dividing when they come into contact with like cells, a mechanism known as contact inhibition. Cancerous cells lose this ability. Cancer cells no longer have the normal checks and balances in place that control and limit cell division. The process of cell division, whether normal or cancerous cells, is through the cell cycle. The cell cycle goes from the resting phase, through active growing phases, and then to mitosis (division). The ability of chemotherapy to kill cancer cells depends on its ability to halt cell division. Usually, the drugs work by damaging the RNA or DNA that tells the cell how to copy itself in division. If the cells are unable to divide, they die. The faster the cells are dividing, the more likely it is that chemotherapy will kill the cells, causing the tumor to shrink. They also induce cell suicide (self-death or apoptosis). Chemotherapy drugs that affect cells only when they are dividing are called cell-cycle specific. Chemotherapy drugs that affect cells when they are at rest are called cell-cycle non-specific. The scheduling of chemotherapy is set based on the type of cells, rate at which they divide, and the time at which a given drug is likely to be effective. This is why chemotherapy is typically given in cycles. Chemotherapy is most effective at killing cells that are rapidly dividing.   Unfortunately, chemotherapy does not know the difference between the cancerous cells and the normal cells. The \"normal\" cells will grow back and be healthy but in the meantime, side effects occur. The \"normal\" cells most commonly affected by chemotherapy are the blood cells, the cells in the mouth, stomach and bowel, and the hair follicles; resulting in low blood counts, mouth sores, nausea, diarrhea, and/or hair loss. Different drugs may affect different parts of the body. Taxol belongs to a class of chemotherapy drugs called plant alkaloids. Plant alkaloids are made from plants. The vinca alkaloids are made from the periwinkle plant (catharanthus rosea). The taxanes are made from the bark of the [employer] tree (taxus). The vinca alkaloids and taxanes are also known as antimicrotubule agents. The podophyllotoxins are derived from the May Apple plant. Camptothecan analogs are derived from the  \"Happy Tree\" (Camptotheca acuminata). Podophyllotoxins and camptothecan analogs are also known as topoisomerase inhibitors. The plant alkaloids are cell-cycle specific. This means they attack the cells during various phases of division. Vinca alkaloids: Vincristine, Vinblastine and Vinorelbine. Taxanes:  Taxol and Docetaxel. Podophyllotoxins:  Etoposide and Tenisopide. Camptothecan analogs: Irinotecan and Topotecan. Antimicrotubule agents (such as Taxol), inhibit the microtubule structures within the cell. Microtubules are part of the cell's apparatus for dividing and replicating itself. Inhibition of these structures ultimately results in cell death. Note:  We strongly encourage you to talk with your health care professional about your specific medical condition and treatments. The information contained in this website is meant to be helpful and educational, but is not a substitute for medical advice.          Herceptin ®        Generic name: Trastuzumab    Biosimilars: Edna Jacobruzant®    A biosimilar is a biologic medical product that is almost an identical copy of an original medication that is manufactured by a different pharmaceutical company. [employer] uses generic names in all descriptions of drugs. Herceptin is the trade name for Trastuzumab. In some cases, health care professionals may use the trade name Herceptin when referring to the generic drug name Trastuzumab. Drug type:  Herceptin is a monoclonal antibody. (For more detail, see \"How this drug works\" section below). What This Drug Is Used For:  Trastuzumab is used to treat metastatic (spread) breast cancer. It is effective against tumors that overexpress the HER2/florence protein. As part of chemotherapy regimen for adjuvant treatment of lymph-node positive, HER2/florence protein positive breast cancer. Treatment of gastric (stomach) cancer   It is not known whether or not trastuzumab may be effective in other cancers that may also have this HER-2/florence protein, including ovarian, colon, endometrial, lung, bladder, prostate, and salivary gland tumors. Note:  If a drug has been approved for one use, physicians may elect to use this same drug for other problems if they believe it may be helpful. How This Drug Is Given:  Trastuzumab is given through an infusion into vein (intravenous, IV). The first dose is given over 90 minutes. If well-tolerated subsequent maintenance doses may be given over 30 minutes. The amount of trastuzumab that you will receive depends on many factors, including your height and weight, your general health or other health problems, and the type of cancer or condition being treated. Your doctor will determine your dose and schedule. Side Effects:  Important things to remember about the side effects of trastuzumab:    Most people do not experience all of the side effects listed. Side effects are often predictable in terms of their onset and duration. Side effects are almost always reversible and will go away after treatment is complete.   There are many options to help minimize or prevent side effects. There is no relationship between the presence or severity of side effects and the effectiveness of the medication. The side effects of trastuzumab and their severity depend on whether the drug is given in combination with other medications. In other words, trastuzumab given in combination with other chemotherapy drugs may produce more severe side effects. The following side effects are common (occurring in greater than 30%) for patients taking trastuzumab. The frequency of side effects reported is based on single agent trastuzumab:    During the first infusion of this trastuzumab, you may develop chills or a fever. Your health care provider might prescribe medicine to prevent or treat these symptoms. Body pain  Weakness  Nausea  These side effects are less common side effects (occurring in about 10-29%) of patients receiving trastuzumab. The frequency of side effects reported is based on single agent trastuzumab:    Headache  Diarrhea  Abdominal pain  Back pain  Infection  Flu-like symptoms  Vomiting  Cough  Shortness of breath  Rhinitis or pharyngitis (see cold symptoms)  Insomnia (see sleep problems)  Rash (see skin reactions)  Dizziness  Swelling (usually of the feet, ankles or hands)  Infrequently, serious hypersensitivity reactions (anaphylaxis) (see allergic reactions), have been associated with trastuzumab. Most of these events occur within 24 hours of infusion. However, delayed reactions have occurred. Trastuzumab should be used with caution in people with lung problems. If a person experiences severe hypersensitivity reaction, trastuzumab may be discontinued. A serious but uncommon side effect of trastuzumab can be interference with the pumping action of the heart.  The incidence of heart problems (heart failure) increase in people with heart disease or other risk factors such as radiation to the chest, advancing age, and use of other heart-toxic drugs (such as doxorubicin and cyclophosphamide). Your doctor may check your heart function before you may take any trastuzumab and will monitor your heart closely during your treatment. Trastuzumab may be discontinued if symptoms of heart failure appear. Not all side effects are listed above. Some that are rare (occurring in less than 10% of patients) are not listed here. However, you should always inform your health care provider if you experience any unusual symptoms. When to contact your doctor or health care provider:  Seek emergency help immediately and notify your health care provider, it you experience the following symptoms:    Shortness of breath, wheezing, difficulty breathing, closing up of the throat, swelling of facial features, hives (possible allergic reaction). Contact your health care provider immediately, day or night, if you should experience any of the following symptoms:    Shortness of breath or chest pain  The following symptoms require medical attention, but are not an emergency. Contact your health care provider within 24 hours of noticing any of the following:    Nausea (interferes with ability to eat and unrelieved with prescribed medication)  Vomiting (vomiting more than 4-5 times in a 24 hour period)  Diarrhea (4-6 episodes in a 24-hour period)  Swelling of the feet or ankles. Sudden weight gain  Signs of infection such as redness or swelling, pain on swallowing, coughing up mucous, or painful urination. Pain that is unrelieved by prescribed medication  Always inform your health care provider if you experience any unusual symptoms. Precautions:  Before starting trastuzumab treatment, make sure you tell your doctor about any other medications you are taking (including prescription, over-the-counter, vitamins, herbal remedies, etc.). Inform your health care professional if you are pregnant or may be pregnant prior to starting this treatment.   Pregnancy category B (there is no evidence of risk in humans based on negative animal studies. Use in pregnancy only if clearly needed). For both men and women: It is not recommended to conceive a child (get pregnant) while taking trastuzumab. Barrier methods of contraception, such as condoms, are recommended. Discuss with your doctor when you may safely become pregnant or conceive a child after therapy. Do not breast feed while taking this medication. Self-Care Tips:  Drink at least two to three quarts of fluid every 24 hours, unless you are instructed otherwise. This medication causes little nausea. But if you should experience nausea, take anti-nausea medications as prescribed by your doctor, and eat small frequent meals. Sucking on lozenges and chewing gum may also help. You may experience drowsiness or dizziness; avoid driving or engaging in tasks that require alertness until your response to the drug is known. For flu-like symptoms, keep warm with blankets and drink plenty of liquids. There are medications that can help reduce the discomfort caused by chills. Acetaminophen or ibuprofen may help relieve discomfort from fever, headache and/or generalized aches and pains. However, be sure to talk with your doctor before taking it. In general, drinking alcoholic beverages should be kept to a minimum or avoided completely. You should discuss this with your doctor. Get plenty of rest.   Maintain good nutrition. If you experience symptoms or side effects, be sure to discuss them with your health care team.  They can prescribe medications and/or offer other suggestions that are effective in managing such problems. Monitoring and Testing: You will be checked regularly by your health care professional while you are taking trastuzumab, to monitor side effects and check your response to therapy.   Periodic blood work to monitor your complete blood count (CBC) as well as the function of other organs (such as your kidneys and liver) may also be ordered by your doctor. Your doctor will monitor your heart while you are taking trastuzumab. How This Drug Works:  Monoclonal antibodies are a relatively new type of \"targeted\" cancer therapy. Antibodies are part of the immune system. Normally, the body creates antibodies in response to an antigen (such as a protein in a germ) entering the body. The antibodies attach to the antigen in order to kit it for destruction by the body's immune system. In the laboratory, scientists analyze specific antigens on the surface of cancer cells (target) to determine a protein to match the antigen. Then, using animal and human proteins, scientists work to create a special antibody that will attach to the target antigen. Antibodies will attach to matching antigens like a key fits a lock. This technology allows treatment to target specific cells, causing less toxicity to healthy cells. Monoclonal antibody therapy can be done only for cancers in which antigens (and the respective antibodies) have been identified. Trastuzumab works by targeting the HER2/florence receptor on cancer cells. The HER2 gene produces a protein receptor on the cell surface that signals normal cell growth by telling the cell to divide and multiply. Some cancerous breast tissue has too much HER2 (HER2/florence overexpression), triggering the cells to divide and multiply very rapidly. Trastuzumab attaches to the HER2 receptors to prevent cells from multiplying, preventing further cancer growth and slowing cancer progression. It may also work by stimulating an immune mechanism. Note:  We strongly encourage you to talk with your health care professional about your specific medical condition and treatments. The information contained in this website is meant to be helpful and educational, but is not a substitute for medical advice.                 Follow Up:  - as scheduled      Treatment Summary has been discussed and given to patient: n/a        -------------------------------------------------------------------------------------------------------------------  Please call our office at [phone] if you have any  of the following symptoms:   · Fever of 100.5 or greater  · Chills  · Shortness of breath  · Swelling or pain in one leg    After office hours an answering service is available and will contact a provider for emergencies or if you are experiencing any of the above symptoms.  Patient has My Chart. My Chart log in information explained on the after visit summary printout at the [facility] desk.     Alli Morrison RN

## 2022-06-23 ENCOUNTER — HOME CARE VISIT (OUTPATIENT)
Dept: SCHEDULING | Facility: HOME HEALTH | Age: 77
End: 2022-06-23
Payer: MEDICARE

## 2022-06-23 VITALS
SYSTOLIC BLOOD PRESSURE: 124 MMHG | DIASTOLIC BLOOD PRESSURE: 72 MMHG | OXYGEN SATURATION: 98 % | RESPIRATION RATE: 16 BRPM | HEART RATE: 78 BPM | TEMPERATURE: 97.8 F

## 2022-06-23 PROCEDURE — 1090000002 HH PPS REVENUE DEBIT

## 2022-06-23 PROCEDURE — 1090000001 HH PPS REVENUE CREDIT

## 2022-06-23 PROCEDURE — G0299 HHS/HOSPICE OF RN EA 15 MIN: HCPCS

## 2022-06-23 ASSESSMENT — ENCOUNTER SYMPTOMS
STOOL DESCRIPTION: FORMED
PAIN LOCATION - PAIN QUALITY: SHARP

## 2022-06-27 ENCOUNTER — OFFICE VISIT (OUTPATIENT)
Dept: ONCOLOGY | Age: 77
End: 2022-06-27
Payer: MEDICARE

## 2022-06-27 ENCOUNTER — HOSPITAL ENCOUNTER (OUTPATIENT)
Dept: LAB | Age: 77
Discharge: HOME | End: 2022-06-30
Payer: MEDICARE

## 2022-06-27 VITALS
HEIGHT: 63 IN | DIASTOLIC BLOOD PRESSURE: 71 MMHG | RESPIRATION RATE: 18 BRPM | HEART RATE: 85 BPM | BODY MASS INDEX: 37.33 KG/M2 | WEIGHT: 210.7 LBS | OXYGEN SATURATION: 99 % | TEMPERATURE: 98 F | SYSTOLIC BLOOD PRESSURE: 137 MMHG

## 2022-06-27 DIAGNOSIS — R53.83 OTHER FATIGUE: ICD-10-CM

## 2022-06-27 DIAGNOSIS — Z11.59 ENCOUNTER FOR SCREENING FOR VIRAL DISEASE: ICD-10-CM

## 2022-06-27 DIAGNOSIS — Z17.0 MALIGNANT NEOPLASM OF RIGHT BREAST IN FEMALE, ESTROGEN RECEPTOR POSITIVE, UNSPECIFIED SITE OF BREAST (HCC): ICD-10-CM

## 2022-06-27 DIAGNOSIS — C50.911 MALIGNANT NEOPLASM OF RIGHT BREAST IN FEMALE, ESTROGEN RECEPTOR POSITIVE, UNSPECIFIED SITE OF BREAST (HCC): Primary | ICD-10-CM

## 2022-06-27 DIAGNOSIS — C50.911 MALIGNANT NEOPLASM OF RIGHT BREAST IN FEMALE, ESTROGEN RECEPTOR POSITIVE, UNSPECIFIED SITE OF BREAST (HCC): ICD-10-CM

## 2022-06-27 DIAGNOSIS — Z17.0 MALIGNANT NEOPLASM OF RIGHT BREAST IN FEMALE, ESTROGEN RECEPTOR POSITIVE, UNSPECIFIED SITE OF BREAST (HCC): Primary | ICD-10-CM

## 2022-06-27 LAB
ALBUMIN SERPL-MCNC: 3.7 G/DL (ref 3.2–4.6)
ALBUMIN/GLOB SERPL: 1 {RATIO} (ref 1.2–3.5)
ALP SERPL-CCNC: 97 U/L (ref 50–136)
ALT SERPL-CCNC: 23 U/L (ref 12–65)
ANION GAP SERPL CALC-SCNC: 4 MMOL/L (ref 7–16)
AST SERPL-CCNC: 25 U/L (ref 15–37)
BASOPHILS # BLD: 0 K/UL (ref 0–0.2)
BASOPHILS NFR BLD: 1 % (ref 0–2)
BILIRUB SERPL-MCNC: 0.8 MG/DL (ref 0.2–1.1)
BUN SERPL-MCNC: 12 MG/DL (ref 8–23)
CALCIUM SERPL-MCNC: 9.1 MG/DL (ref 8.3–10.4)
CHLORIDE SERPL-SCNC: 106 MMOL/L (ref 98–107)
CO2 SERPL-SCNC: 29 MMOL/L (ref 21–32)
CREAT SERPL-MCNC: 0.9 MG/DL (ref 0.6–1)
DIFFERENTIAL METHOD BLD: NORMAL
EOSINOPHIL # BLD: 0.2 K/UL (ref 0–0.8)
EOSINOPHIL NFR BLD: 3 % (ref 0.5–7.8)
ERYTHROCYTE [DISTWIDTH] IN BLOOD BY AUTOMATED COUNT: 14.4 % (ref 11.9–14.6)
GLOBULIN SER CALC-MCNC: 3.7 G/DL (ref 2.3–3.5)
GLUCOSE SERPL-MCNC: 104 MG/DL (ref 65–100)
HAV IGM SER QL: NONREACTIVE
HBV CORE IGM SER QL: NONREACTIVE
HBV SURFACE AB SERPL IA-ACNC: <3.1 MIU/ML
HBV SURFACE AG SER QL: NONREACTIVE
HCT VFR BLD AUTO: 41.4 % (ref 35.8–46.3)
HCV AB SER QL: NONREACTIVE
HGB BLD-MCNC: 13.4 G/DL (ref 11.7–15.4)
IMM GRANULOCYTES # BLD AUTO: 0 K/UL (ref 0–0.5)
IMM GRANULOCYTES NFR BLD AUTO: 0 % (ref 0–5)
LYMPHOCYTES # BLD: 1.3 K/UL (ref 0.5–4.6)
LYMPHOCYTES NFR BLD: 22 % (ref 13–44)
MCH RBC QN AUTO: 29.5 PG (ref 26.1–32.9)
MCHC RBC AUTO-ENTMCNC: 32.4 G/DL (ref 31.4–35)
MCV RBC AUTO: 91 FL (ref 79.6–97.8)
MONOCYTES # BLD: 0.6 K/UL (ref 0.1–1.3)
MONOCYTES NFR BLD: 10 % (ref 4–12)
NEUTS SEG # BLD: 4 K/UL (ref 1.7–8.2)
NEUTS SEG NFR BLD: 64 % (ref 43–78)
NRBC # BLD: 0 K/UL (ref 0–0.2)
PLATELET # BLD AUTO: 193 K/UL (ref 150–450)
PMV BLD AUTO: 11 FL (ref 9.4–12.3)
POTASSIUM SERPL-SCNC: 4.2 MMOL/L (ref 3.5–5.1)
PROT SERPL-MCNC: 7.4 G/DL (ref 6.3–8.2)
RBC # BLD AUTO: 4.55 M/UL (ref 4.05–5.2)
SODIUM SERPL-SCNC: 139 MMOL/L (ref 136–145)
WBC # BLD AUTO: 6.1 K/UL (ref 4.3–11.1)

## 2022-06-27 PROCEDURE — 3017F COLORECTAL CA SCREEN DOC REV: CPT | Performed by: INTERNAL MEDICINE

## 2022-06-27 PROCEDURE — G8417 CALC BMI ABV UP PARAM F/U: HCPCS | Performed by: INTERNAL MEDICINE

## 2022-06-27 PROCEDURE — 1123F ACP DISCUSS/DSCN MKR DOCD: CPT | Performed by: INTERNAL MEDICINE

## 2022-06-27 PROCEDURE — 1036F TOBACCO NON-USER: CPT | Performed by: INTERNAL MEDICINE

## 2022-06-27 PROCEDURE — 36415 COLL VENOUS BLD VENIPUNCTURE: CPT

## 2022-06-27 PROCEDURE — 1090F PRES/ABSN URINE INCON ASSESS: CPT | Performed by: INTERNAL MEDICINE

## 2022-06-27 PROCEDURE — 86706 HEP B SURFACE ANTIBODY: CPT

## 2022-06-27 PROCEDURE — 85025 COMPLETE CBC W/AUTO DIFF WBC: CPT

## 2022-06-27 PROCEDURE — G8399 PT W/DXA RESULTS DOCUMENT: HCPCS | Performed by: INTERNAL MEDICINE

## 2022-06-27 PROCEDURE — 80074 ACUTE HEPATITIS PANEL: CPT

## 2022-06-27 PROCEDURE — G8428 CUR MEDS NOT DOCUMENT: HCPCS | Performed by: INTERNAL MEDICINE

## 2022-06-27 PROCEDURE — 80053 COMPREHEN METABOLIC PANEL: CPT

## 2022-06-27 PROCEDURE — 86704 HEP B CORE ANTIBODY TOTAL: CPT

## 2022-06-27 PROCEDURE — 99214 OFFICE O/P EST MOD 30 MIN: CPT | Performed by: INTERNAL MEDICINE

## 2022-06-27 ASSESSMENT — PATIENT HEALTH QUESTIONNAIRE - PHQ9
SUM OF ALL RESPONSES TO PHQ QUESTIONS 1-9: 0
2. FEELING DOWN, DEPRESSED OR HOPELESS: 0

## 2022-06-27 NOTE — PROGRESS NOTES
[facility]: Office Visit Established Patient    Chief Complaint:    Chief Complaint   Patient presents with    Follow-up         History of Present Illness:  Ms. Yamil Laurent is a 77 y.o. female who returns today for management of breast cancer. She initially presented for a routine bilateral screening mammogram on 3/23/22 which identified a right breast mass near the 11:00-12:00 position, 6-7 cm from the nipple and other small circumscribed round masses in the right upper inner breast which had not definitely changed from prior examinations. Further evaluation with right breast ultrasound on 3/25/22 confirmed a 6 mm hypoechoic shadowing mass in the 12:00 right breast at 7 cm from the nipple. She presented to Dr. Babar Wilson on 4/7/22 to discuss whether to proceed with recommended biopsy, he recommended that biopsy be performed. Ultrasound-guided biopsy was subsequently completed on 4/12/22 with pathology revealing high grade infiltrating ductal carcinoma, ER 76%, [place] negative, and HER2 positive. MRI of the bilateral breasts was completed on 4/14/22 demonstrating a right anterior 12:00 breast cancer measuring up to 1.2 cm; multiple (greater than 10 total) other right upper breast masses, some of which had enlarged, concerning for additional malignancy; an indeterminate 1.2 cm right axillary lymph node; and no suspicious left breast finding. We recommended upfront surgery because of the MRI findings and the inconclusive size/clinical stage of her cancer. Here for follow-up. She is now about 4 weeks post-op from her mastectomy and sentinel node biopsy, she is healing well. She did also have a port put in during the procedure in anticipation of HER2 directed systemic adjuvant therapy. Review of Systems:  Constitutional: Negative. HENT: Negative. Eyes: Negative. Respiratory: Negative. Cardiovascular: Negative. Gastrointestinal: Negative. Genitourinary: Negative.    Musculoskeletal: Negative. Skin: Negative. Neurological: Negative. Endo/Heme/Allergies: Negative. Psychiatric/Behavioral: Negative. All other systems reviewed and are negative. Allergies   Allergen Reactions    Sulfamethoxazole-Trimethoprim Nausea And Vomiting    Meloxicam Other (See Comments)    Oxaprozin Swelling     Past Medical History:   Diagnosis Date    Anxiety     Depression 5/19/2017    Essential hypertension 05/19/2017    Fibromyalgia     History of postoperative nausea and vomiting     Hyperlipidemia     Hypothyroid     Impaired fasting glucose     Insomnia     Nausea & vomiting     Need for hepatitis C screening test 12/20/2020    3/15/21 HCV ab negative.       Obesity     Osteoarthritis 12/15/2015    Stress incontinence, female     Vitamin D deficiency      Past Surgical History:   Procedure Laterality Date    BREAST BIOPSY Bilateral     BREAST BIOPSY Right 3/29/2021    RIGHT BREAST NEEDLE LOCALIZED LUMPECTOMY X2 performed by Curry Nunes MD at [place] [address] COLONOSCOPY  2012    normal; internal hemorrhoid    HYSTERECTOMY (CERVIX STATUS UNKNOWN)  [id number] [place] [address] Right 6/1/2022    RIGHT SENTINEL NODE BIOPSY MAG TRACE performed by Dionna Vidal MD at [address] [place] [address] 20 Right 6/1/2022    RIGHT BREAST MASTECTOMY performed by [facility] at [facility] Left 6/1/2022    PORT INSERTION performed by Dionna [facility]idal MD at [facility] Bilateral     Left 2015, Right 7/29/20    US BREAST BIOPSY NEEDLE ADDITIONAL RIGHT Right 2/25/2021    US BREAST BIOPSY NEEDLE ADDITIONAL RIGHT 2/25/2021 [facility]    US BREAST NEEDLE BIOPSY RIGHT Right 2/25/2021    US BREAST NEEDLE BIOPSY RIGHT 2/25/2021 [facility]    US BREAST NEEDLE BIOPSY RIGHT Right 4/12/2022    US BREAST NEEDLE BIOPSY RIGHT 4/12/2022 [facility]    US GUIDED NEEDLE LOC BREAST ADDL RIGHT Right 3/29/2021    US GUIDED NEEDLE LOC BREAST ADDL RIGHT 3/29/2021 [facility]    US GUIDED NEEDLE LOC OF RIGHT BREAST Right 3/29/2021    US GUIDED NEEDLE LOC OF RIGHT BREAST 3/29/2021 [facility]     Family History   Problem Relation Age of Onset    No Known Problems Mother     Heart Disease Father     Breast Cancer Sister     Stroke Father      Social History     Socioeconomic History    Marital status:      Spouse name: Not on file    Number of children: Not on file    Years of education: Not on file    Highest education level: Not on file   Occupational History    Not on file   Tobacco Use    Smoking status: Never Smoker    Smokeless tobacco: Never Used   Substance and Sexual Activity    Alcohol use: No    Drug use: No    Sexual activity: Not on file   Other Topics Concern    Not on file   Social History Narrative    Not on file     Social Determinants of Health     Financial Resource Strain:     Difficulty of Paying Living Expenses: Not on file   Food Insecurity:     Worried About Running Out of Food in the Last Year: Not on file    Lydia of Food in the Last Year: Not on file   Transportation Needs:     Lack of Transportation (Medical): Not on file    Lack of Transportation (Non-Medical):  Not on file   Physical Activity:     Days of Exercise per Week: Not on file    Minutes of Exercise per Session: Not on file   Stress:     Feeling of Stress : Not on file   Social Connections:     Frequency of Communication with Friends and Family: Not on file    Frequency of Social Gatherings with Friends and Family: Not on file    Attends Rastafari Services: Not on file    Active Member of Clubs or Organizations: Not on file    Attends Club or Organization Meetings: Not on file    Marital Status: Not on file   Intimate Partner Violence:     Fear of Current or Ex-Partner: Not on file    Emotionally Abused: Not on file    Physically Abused: Not on file    Sexually Abused: Not on file   Housing Stability:     Unable to Pay for Housing in the Last Year: Not on file    Number of Places Lived in the Last Year: Not on file    Unstable Housing in the Last Year: Not on file     Current Outpatient Medications   Medication Sig Dispense Refill    acetaminophen (TYLENOL) 500 MG tablet Take 1,000 mg by mouth every 6 hours as needed for Pain.  Multiple Vitamin (MULTIVITAMIN ADULT PO) Take 1 tablet by mouth daily.  Cholecalciferol (VITAMIN D3) 1.25 MG (09804 UT) CAPS Take 1 capsule by mouth daily.  Carboxymethylcellul-Glycerin (REFRESH OPTIVE OP) Place 1 drop into both eyes daily.  ibuprofen (ADVIL;MOTRIN) 400 MG tablet Take by mouth every 6 hours as needed      levothyroxine (SYNTHROID) 100 MCG tablet TAKE 1 TABLET EVERY DAY BEFORE BREAKFAST FOR LOW THYROID HORMONE LEVELS      losartan (COZAAR) 100 MG tablet TAKE 1 TABLET EVERY DAY FOR HIGH BLOOD PRESSURE      bupivacaine 0.5% (MARCAINE) 100 mLs by Infiltration route continuous. OnQ PainBuster (Patient not taking: Reported on 6/27/2022)       No current facility-administered medications for this visit. OBJECTIVE:  /71 (Site: Left Upper Arm, Position: Standing)   Pulse 85   Temp 98 °F (36.7 °C)   Resp 18   Ht 5' 3\" (1.6 m)   Wt 210 lb 11.2 oz (95.6 kg)   SpO2 99%   BMI 37.32 kg/m²     Physical Exam:  Constitutional: Well developed, well nourished female in no acute distress, sitting comfortably on the examination table. HEENT: Normocephalic and atraumatic. Sclerae anicteric. Skin Warm and dry. No bruising and no rash noted. No erythema. No pallor. Cardiopulmonary Deferred. Neuro Grossly nonfocal with no obvious sensory or motor deficits. MSK Normal range of motion in general.  No edema and no tenderness. Psych Appropriate mood and affect. Labs:  No results found for this or any previous visit (from the past 96 hour(s)).     Imaging:  MRI of the Breasts with and without contrast     CLINICAL INDICATION:  New diagnosis of right 12:00 breast cancer status post   biopsy demonstrating infiltrating ductal carcinoma high-grade, poorly   differentiated. Evaluate for extent of disease, staging, therapy planning. Patient had right benign papillomas removed in 2021, and a left benign biopsy in   2011 demonstrating a 2:00 fibroadenoma. Family history of her sister having   breast cancer. .       COMPARISON: Ultrasound and mammography 4/12/2022, 3/25/2022, 3/23/2022 and prior   breast MRI 3/3/2021.       TECHNIQUE: Standard MRI sequences were obtained through the breasts in multiple   planes. Images were obtained before and after intravenous infusion of 20 mL of   Prohance contrast. Images were reviewed with PACS and with [employer] CAD software.       FINDINGS: The breasts demonstrate mild-moderate bilateral glandularity and mild   background enhancement.       Right breast: The new 12:00 biopsy clip is within an irregular heterogeneously   enhancing 1.2 x 0.8 x 1.1 cm malignant mass. As seen on prior exam a large area   of the right upper breast spanning 11:00-12:00 anterior, middle, posterior depth   (overall about 10-12 cm) contains multiple scattered masses of varying sizes and   shapes. There are greater than 10 masses total. Anteriorly at 12:00 10 cm from   nipple an irregular heterogeneously enhancing mass measures up to 1.2 x 1.0 cm   (previously 0.6 x 0.4 cm). Posteriorly near 11:00 14 cm from nipple a lobulated   mass measures up to 1.1 x 0.4 cm (previously 0.8 x 0.3 cm).        Left breast: No evidence of suspicious enhancing mass, and no dominant or unique   nonmass enhancement, to suggest additional malignancy.       Lymph nodes: Right axilla demonstrates a dysmorphic 1.2 x 0.8 cm node (series 4   image 351) which has developed since the prior exam. No other axillary or   internal mammary lymphadenopathy is evident.       Elsewhere: Limited visualization of the partially included thorax and upper   abdomen shows no acute abnormality.                Impression       1. Right anterior 12:00 breast cancer measures up to 1.2 cm. 2. Multiple (greater than 10 total) other right upper breast masses, some of   which have enlarged, concerning for additional malignancy. 3. Right axillary indeterminate lymph node. 4. No suspicious left breast finding.       Recommend continued malignancy management as directed clinically.       BI-RADS Assessment Category 6: Known Biopsy Proven Malignancy             Pathology:  DIAGNOSIS        A:  \"RIGHT BREAST MASTECTOMY\":  INVASIVE DUCTAL CARCINOMA, HIGH GRADE     (3 + 3 + 2), 15 MILLIMETER IN GREATEST DIMENSION, MARGINS UNINVOLVED;   FIBROCYSTIC MASTOPATHY.          B :  \"SENTINEL NODE #1\":  BENIGN LYMPH NODE, NEGATIVE FOR TUMOR.        C:  \"ADDITIONAL AXILLARY TISSUE\":  FOUR BENIGN LYMPH NODES, ALL   NEGATIVE FOR TUMOR (0/4). * * *DIAGNOSIS* * * * * * * * * * * * * * * * * * * * * * * * * * * * * * *            A:  \" RIGHT BREAST, 12:00 POSITION, 7 CM FROM NIPPLE, CORE BIOPSY\":         INFILTRATING DUCTAL CARCINOMA WITH APOCRINE FEATURES, HIGH GRADE   (POORLY DIFFERENTIATED). DEFINITE IN SITU COMPONENT AND LYMPHOVASCULAR   INVASION ARE NOT IDENTIFIED             jtl/4/13/2022     * * *Electronically Signed Out* * *         Sign Out Date: 4/13/2022  JAH Gomez M.D.           * * *PROCEDURES/ADDENDA* * * * * * * * * * * * * * * * * * * * *  * * * *                         STF- ER/MA/CGX5FYS BY IHC                                                                                   Status:  Reviewed By:   Neelam Peter MD on 4/19/2022                                 Interpretation                       i[facility] IHC Quantitative Breast Panel     TEST NAME:                         RESULTS:               INTERNAL   CONTROLS:     ESTROGEN RECEPTOR:               Positive (76%)               Present,   Positive   PROGESTERONE RECEPTOR:          Negative (0.0%)          Present, Positive   HER-2/KELVIN:                         Positive (3+)               Percentage   of Cells with Uniform        Intense Complete Membrane   Stainin%       ASSESSMENT:   Diagnosis Orders   1. Malignant neoplasm of right breast in female, estrogen receptor positive, unspecified site of breast [facility])           PLAN:  Lab studies were personally reviewed. Breast cancer: discovered incidentally on mammogram, 6mm on ultrasound, biopsy proven, high grade IDC, ER 76%, [place] negative, HER2 positive. MRI shows greater than 10 lesions in the breast with the dominant lesion 1.2 cm, a dysmorphic indeterminate lymph node in the right axilla, and no evidence of contralateral or distant disease. Her MRI imaging has made things considerably more complicated - if she was obviously T1N0, then upfront surgery would be appropriate, lumpectomy/sentinel node followed by adjuvant Taxol/Herceptin. However, she seems to have multiple lesions that are indeterminate which make it possible that her disease is multifocal, as well as an indeterminate  axillary node. For this reason, we should at least consider neoadjuvant chemotherapy. However, I would strongly advise at least two additional biopsies in that case, ultrasound guided biopsy of the axillary node, and possibly MRI guided biopsy (if not  visualized on ultrasound) of another in-breast lesion to confirm pathology. She is reluctant to consider additional biopsy, her rationale is that she will ultimately need surgery anyway. I explained that she will likely need mastectomy if she has upfront surgery and she is prepared for this. If she has upfront surgery, decisions on adjuvant therapy will be made based on surgical path. If she is T1N0 pathologically, then we will recommend TH, if T2N0, consider TCH, or if N1, at least consider adjuvant TCHP. Here for follow-up.   She is now about 4 weeks post-op from her mastectomy and sentinel node biopsy, she is healing well. She did also have a port put in during the procedure in anticipation of HER2 directed systemic adjuvant therapy. Path reviewed, thankfully it appears that the indeterminate lesions in the breast were benign, with the final tumor dimension being only 1.5 cm, and 4 nodes negative for tumor. Therefore, she is [id number], and would be eligible for the adjuvant paclitaxel and trastuzumab regimen. Potential side effects were reviewed including fatigue, nausea, alopecia, infusion reaction, neuropathy, less likely cytopenias or serious AEs. We also discussed risk of cardiomyopathy with Herceptin, she will have TTE prior to initiation of therapy. She understands and agrees to proceed. After TH she will complete a year of Herceptin, as well as endocrine therapy with AI. She will not require radiation due to the T1N0 disease and mastectomy. All questions were asked and answered to the best of my ability. In all, I spent 30 minutes in the care of  Ms. Bocanegra today, over 50% of which was in direct counseling and coordination of care. F/u for cycle 1 TH in 2-3 weeks.            Joe Kothari MD, MD  [facility]  [address] [place] [address]  Office : [phone]  Fax : [phone]

## 2022-06-27 NOTE — ONCOLOGY
START ON PATHWAY REGIMEN - Breast    [id number]        Trastuzumab-xxxx       Paclitaxel (Taxol)       Trastuzumab-xxxx       Paclitaxel (Taxol)       Trastuzumab-xxxx     **Always confirm dose/schedule in your pharmacy ordering system**    Patient Characteristics:  Postoperative without Neoadjuvant Therapy (Pathologic Staging), Invasive Disease, Adjuvant Therapy, HER2 Positive, ER Positive, Node Negative, pT1c, pN0/N1mi  Therapeutic Status: Postoperative without Neoadjuvant Therapy (Pathologic Staging)  AJCC Grade: G3  AJCC N Category: pN0  AJCC M Category: cM0  ER Status: Positive (+)  AJCC 8 Stage Grouping: IA  HER2 Status: Positive (+)  Oncotype Dx Recurrence Score: Not Appropriate  AJCC T Category: pT1c  VT Status: Negative (-)  Adjuvant Therapy Status: No Adjuvant Therapy Received Yet or Changing Initial Adjuvant Regimen due to Tolerance  Intent of Therapy:  Curative Intent, Discussed with Patient

## 2022-06-28 LAB — HBV CORE AB SERPL QL IA: NEGATIVE

## 2022-06-29 ENCOUNTER — OFFICE VISIT (OUTPATIENT)
Dept: INTERNAL MEDICINE CLINIC | Facility: CLINIC | Age: 77
End: 2022-06-29
Payer: MEDICARE

## 2022-06-29 VITALS
BODY MASS INDEX: 37.74 KG/M2 | SYSTOLIC BLOOD PRESSURE: 130 MMHG | DIASTOLIC BLOOD PRESSURE: 80 MMHG | OXYGEN SATURATION: 100 % | TEMPERATURE: 98.6 F | WEIGHT: 213 LBS | HEART RATE: 76 BPM | HEIGHT: 63 IN

## 2022-06-29 DIAGNOSIS — E55.9 VITAMIN D DEFICIENCY: ICD-10-CM

## 2022-06-29 DIAGNOSIS — I10 ESSENTIAL HYPERTENSION: ICD-10-CM

## 2022-06-29 DIAGNOSIS — Z12.31 ENCOUNTER FOR SCREENING MAMMOGRAM FOR MALIGNANT NEOPLASM OF BREAST: ICD-10-CM

## 2022-06-29 DIAGNOSIS — R73.01 IMPAIRED FASTING GLUCOSE: ICD-10-CM

## 2022-06-29 DIAGNOSIS — E03.9 ACQUIRED HYPOTHYROIDISM: ICD-10-CM

## 2022-06-29 DIAGNOSIS — Z12.4 CERVICAL CANCER SCREENING: ICD-10-CM

## 2022-06-29 DIAGNOSIS — E78.00 PURE HYPERCHOLESTEROLEMIA: ICD-10-CM

## 2022-06-29 DIAGNOSIS — M85.80 OSTEOPENIA, UNSPECIFIED LOCATION: ICD-10-CM

## 2022-06-29 DIAGNOSIS — E66.01 SEVERE OBESITY (HCC): ICD-10-CM

## 2022-06-29 DIAGNOSIS — Z12.11 COLON CANCER SCREENING: ICD-10-CM

## 2022-06-29 PROBLEM — F51.01 PRIMARY INSOMNIA: Status: ACTIVE | Noted: 2017-05-19

## 2022-06-29 PROBLEM — E78.5 HYPERLIPIDEMIA: Status: ACTIVE | Noted: 2017-12-01

## 2022-06-29 PROBLEM — C50.919 BREAST CANCER (HCC): Status: ACTIVE | Noted: 2020-12-21

## 2022-06-29 PROBLEM — G47.00 INSOMNIA: Status: ACTIVE | Noted: 2017-05-19

## 2022-06-29 PROBLEM — M65.331 TRIGGER MIDDLE FINGER OF RIGHT HAND: Status: ACTIVE | Noted: 2021-12-23

## 2022-06-29 PROCEDURE — 99214 OFFICE O/P EST MOD 30 MIN: CPT | Performed by: INTERNAL MEDICINE

## 2022-06-29 PROCEDURE — 1123F ACP DISCUSS/DSCN MKR DOCD: CPT | Performed by: INTERNAL MEDICINE

## 2022-06-29 PROCEDURE — 3017F COLORECTAL CA SCREEN DOC REV: CPT | Performed by: INTERNAL MEDICINE

## 2022-06-29 PROCEDURE — G8428 CUR MEDS NOT DOCUMENT: HCPCS | Performed by: INTERNAL MEDICINE

## 2022-06-29 PROCEDURE — G8417 CALC BMI ABV UP PARAM F/U: HCPCS | Performed by: INTERNAL MEDICINE

## 2022-06-29 PROCEDURE — 1036F TOBACCO NON-USER: CPT | Performed by: INTERNAL MEDICINE

## 2022-06-29 PROCEDURE — G8399 PT W/DXA RESULTS DOCUMENT: HCPCS | Performed by: INTERNAL MEDICINE

## 2022-06-29 PROCEDURE — 1090F PRES/ABSN URINE INCON ASSESS: CPT | Performed by: INTERNAL MEDICINE

## 2022-06-29 RX ORDER — LEVOTHYROXINE SODIUM 0.12 MG/1
125 TABLET ORAL DAILY
Qty: 90 TABLET | Refills: 1 | Status: SHIPPED | OUTPATIENT
Start: 2022-06-29 | End: 2022-09-27 | Stop reason: SDUPTHER

## 2022-06-29 ASSESSMENT — ENCOUNTER SYMPTOMS
EYE PAIN: 0
RECTAL PAIN: 0
VOICE CHANGE: 0
STRIDOR: 0

## 2022-06-29 NOTE — PROGRESS NOTES
FOLLOWUP VISIT    Subjective:    Ms. Yamil Laurent is a 77 y.o., female,   Chief Complaint   Patient presents with    Follow-up Chronic Condition     6m    Discuss Labs     draw 6/21/22       HPI:    Patient presents today for follow up of two or more chronic medical problems and review of labs. Since her last visit she was diagnosed with high-grade invasive ductal breast cancer of her right breast.  She underwent right mastectomy and negative lymph node biopsy. Estrogen receptor positive progesterone negative HER2 positive. Now being treated by oncologist Dr. Ronnie Simon. Scheduled for chemotherapy and then subsequent hormonal therapy. Provided patient with emotional support today and reviewed records. The patient has hypertension. The patient has been on an attempted low sodium diet and has been trying to exercise and maintain a healthy weight. The patient reports good compliance with the blood pressure medications and good blood pressure readings on home / ambulatory monitoring. The patient has hypothyroidism. The patient denies any symptoms of hypo- or hyperthyroidism on the current dose of levothyroxine. The following portions of the patient's history were reviewed and updated as appropriate:      Past Medical History:   Diagnosis Date    Anxiety     Depression 5/19/2017    Essential hypertension 05/19/2017    Fibromyalgia     History of postoperative nausea and vomiting     Hyperlipidemia     Hypothyroid     Impaired fasting glucose     Insomnia     Nausea & vomiting     Need for hepatitis C screening test 12/20/2020    3/15/21 HCV ab negative.       Obesity     Osteoarthritis 12/15/2015    Stress incontinence, female     Vitamin D deficiency        Past Surgical History:   Procedure Laterality Date    BREAST BIOPSY Bilateral     BREAST BIOPSY Right 3/29/2021    RIGHT BREAST NEEDLE LOCALIZED LUMPECTOMY X2 performed by Olivia Carrillo MD at [address] [place] [address]  COLONOSCOPY  2012    normal; internal hemorrhoid    HYSTERECTOMY (CERVIX STATUS UNKNOWN)  1980    LYMPH NODE BIOPSY Right 6/1/2022    RIGHT SENTINEL NODE BIOPSY MAG TRACE performed by Florencio Baldwin MD at [address] [place] [address] [address] Right 6/1/2022    RIGHT BREAST MASTECTOMY performed by Florencio Baldwin MD at [facility] Left 6/1/2022    PORT INSERTION performed [facility]ncio Baldwin [facility] at [facility] Bilateral     Left 2015, Right 7/29/20    US BREAST BIOPSY NEEDLE ADDITIONAL RIGHT Right 2/25/2021    US BREAST BIOPSY NEEDLE ADDITIONAL RIGHT 2/25/2021 [facility]    US BREAST NEEDLE BIOPSY RIGHT Right 2/25/2021    US BREAST NEEDLE BIOPSY RIGHT 2/25/2021 [facility]    US BREAST NEEDLE BIOPSY RIGHT Right 4/12/2022    US BREAST NEEDLE BIOPSY RIGHT 4/12/2022 [facility]    US GUIDED NEEDLE LOC BREAST ADDL RIGHT Right 3/29/2021    US GUIDED NEEDLE LOC BREAST ADDL RIGHT 3/29/2021 [facility]    US GUIDED NEEDLE LOC OF RIGHT BREAST Right 3/29/2021    US GUIDED NEEDLE LOC OF RIGHT BREAST 3/29/2021 [facility]       Family History   Problem Relation Age of Onset    No Known Problems Mother     Heart Disease Father     Breast Cancer Sister     Stroke Father        Social History     Socioeconomic History    Marital status:       Spouse name: Not on file    Number of children: Not on file    Years of education: Not on file    Highest education level: Not on file   Occupational History    Not on file   Tobacco Use    Smoking status: Never Smoker    Smokeless tobacco: Never Used   Substance and Sexual Activity    Alcohol use: No    Drug use: No    Sexual activity: Not on file   Other Topics Concern    Not on file   Social History Narrative    Not on file     Social Determinants of Health     Financial Resource Strain:     Difficulty of Paying Living Expenses: Not on file   Food Insecurity:     Worried About Running Out of Food in the Last Year: Not on file    Ran Out of Food in the Last Year: Not on file   Transportation Needs:     Lack of Transportation (Medical): Not on file    Lack of Transportation (Non-Medical): Not on file   Physical Activity:     Days of Exercise per Week: Not on file    Minutes of Exercise per Session: Not on file   Stress:     Feeling of Stress : Not on file   Social Connections:     Frequency of Communication with Friends and Family: Not on file    Frequency of Social Gatherings with Friends and Family: Not on file    Attends Jain Services: Not on file    Active Member of [address] [place] [address] [employer] or Organizations: Not on file    Attends Club or Organization Meetings: Not on file    Marital Status: Not on file   Intimate Partner Violence:     Fear of Current or Ex-Partner: Not on file    Emotionally Abused: Not on file    Physically Abused: Not on file    Sexually Abused: Not on file   Housing Stability:     Unable to Pay for Housing in the Last Year: Not on file    Number of [place] in the Last Year: Not on file    Unstable Housing in the Last Year: Not on file       Current Outpatient Medications   Medication Sig Dispense Refill    levothyroxine (SYNTHROID) 125 MCG tablet Take 1 tablet by mouth Daily TAKE 1 TABLET EVERY DAY BEFORE BREAKFAST FOR LOW THYROID HORMONE LEVELS 90 tablet 1    acetaminophen (TYLENOL) 500 MG tablet Take 1,000 mg by mouth every 6 hours as needed for Pain.  Multiple Vitamin (MULTIVITAMIN ADULT PO) Take 1 tablet by mouth daily.  Cholecalciferol (VITAMIN D3) 1.25 MG (48460 UT) CAPS Take 1 capsule by mouth daily.  Carboxymethylcellul-Glycerin (REFRESH OPTIVE OP) Place 1 drop into both eyes daily.  ibuprofen (ADVIL;MOTRIN) 400 MG tablet Take by mouth every 6 hours as needed      losartan (COZAAR) 100 MG tablet TAKE 1 TABLET EVERY DAY FOR HIGH BLOOD PRESSURE       No current facility-administered medications for this visit.        Allergies as of 06/29/2022 - Fully Reviewed 06/29/2022   Allergen Reaction Noted    Sulfamethoxazole-trimethoprim Nausea And Vomiting 04/16/2016    Meloxicam Other (See Comments) 11/23/2015    Oxaprozin Swelling 11/23/2015       Review of Systems   Constitutional: Negative for activity change and appetite change. HENT: Negative for drooling and voice change. Eyes: Negative for pain. Respiratory: Negative for stridor. Gastrointestinal: Negative for rectal pain. Endocrine: Negative for polydipsia and polyphagia. Genitourinary: Negative for dyspareunia and enuresis. Musculoskeletal: Negative for gait problem and neck stiffness. Skin: Negative for pallor. Neurological: Negative for facial asymmetry and speech difficulty. Hematological: Does not bruise/bleed easily. Psychiatric/Behavioral: Negative for self-injury. The patient is not hyperactive. Patient Care Team:  Reina Aldrich MD as PCP - General  Reina Aldrich MD as PCP - [facility]  General Benjamin MD as Physician  Jacquie Donato MD as Surgeon  Jacquie Donato MD as Surgeon (General Surgery)  Quintin Durham, DINORA as Nurse Navigator (Oncology)  Cierra العلي, RN as Nurse Navigator (Oncology)    Objective:    /80 (Site: Left Upper Arm, Position: Sitting)   Pulse 76   Temp 98.6 °F (37 °C) (Temporal)   Ht 5' 3\" (1.6 m)   Wt 213 lb (96.6 kg)   SpO2 100%   BMI 37.73 kg/m²     Physical Exam  Vitals reviewed. Constitutional:       General: She is not in acute distress. Appearance: She is not toxic-appearing. HENT:      Head: Normocephalic and atraumatic. Right Ear: Tympanic membrane, ear canal and external ear normal.      Left Ear: Tympanic membrane, ear canal and external ear normal.      Nose: Nose normal.      Mouth/Throat:      Mouth: Mucous membranes are moist.      Pharynx: Oropharynx is clear. Eyes:      General: No scleral icterus. Extraocular Movements: Extraocular movements intact. Pupils: Pupils are equal, round, and reactive to light. Cardiovascular:      Rate and Rhythm: Normal rate and regular rhythm. Pulses: Normal pulses. Heart sounds: Normal heart sounds. Pulmonary:      Effort: Pulmonary effort is normal. No respiratory distress. Breath sounds: Normal breath sounds. No stridor. Abdominal:      General: Abdomen is flat. Bowel sounds are normal.      Palpations: Abdomen is soft. There is no mass. Tenderness: There is no guarding or rebound. Musculoskeletal:         General: Normal range of motion. Cervical back: Normal range of motion and neck supple. Skin:     General: Skin is warm and dry. Coloration: Skin is not jaundiced. Neurological:      Mental Status: She is alert and oriented to person, place, and time. Mental status is at baseline. Psychiatric:         Behavior: Behavior normal.         Thought Content:  Thought content normal.              Hospital Outpatient Visit on 06/27/2022   Component Date Value Ref Range Status    WBC 06/27/2022 6.1  4.3 - 11.1 K/uL Final    RBC 06/27/2022 4.55  4.05 - 5.2 M/uL Final    Hemoglobin 06/27/2022 13.4  11.7 - 15.4 g/dL Final    Hematocrit 06/27/2022 41.4  35.8 - 46.3 % Final    MCV 06/27/2022 91.0  79.6 - 97.8 FL Final    MCH 06/27/2022 29.5  26.1 - 32.9 PG Final    MCHC 06/27/2022 32.4  31.4 - 35.0 g/dL Final    RDW 06/27/2022 14.4  11.9 - 14.6 % Final    Platelets 89/30/3606 193  150 - 450 K/uL Final    MPV 06/27/2022 11.0  9.4 - 12.3 FL Final    nRBC 06/27/2022 0.00  0.0 - 0.2 K/uL Final    **Note: Absolute NRBC parameter is now reported with Hemogram**    Seg Neutrophils 06/27/2022 64  43 - 78 % Final    Lymphocytes 06/27/2022 22  13 - 44 % Final    Monocytes 06/27/2022 10  4.0 - 12.0 % Final    Eosinophils % 06/27/2022 3  0.5 - 7.8 % Final    Basophils 06/27/2022 1  0.0 - 2.0 % Final    Immature Granulocytes 06/27/2022 0  0.0 - 5.0 % Final    Segs Absolute 06/27/2022 4.0  1.7 - 8.2 K/UL Final    Absolute Lymph # 06/27/2022 1.3  0.5 - 4.6 K/UL Final    Absolute Mono # 06/27/2022 0.6  0.1 - 1.3 K/UL Final    Absolute Eos # 06/27/2022 0.2  0.0 - 0.8 K/UL Final    Basophils Absolute 06/27/2022 0.0  0.0 - 0.2 K/UL Final    Absolute Immature Granulocyte 06/27/2022 0.0  0.0 - 0.5 K/UL Final    Differential Type 06/27/2022 AUTOMATED    Final    Sodium 06/27/2022 139  136 - 145 mmol/L Final    Potassium 06/27/2022 4.2  3.5 - 5.1 mmol/L Final    Chloride 06/27/2022 106  98 - 107 mmol/L Final    CO2 06/27/2022 29  21 - 32 mmol/L Final    Anion Gap 06/27/2022 4* 7 - 16 mmol/L Final    Glucose 06/27/2022 104* 65 - 100 mg/dL Final    BUN 06/27/2022 12  8 - 23 MG/DL Final    CREATININE 06/27/2022 0.90  0.6 - 1.0 MG/DL Final    GFR  06/27/2022 >60  >60 ml/min/1.73m2 Final    GFR Non- 06/27/2022 >60  >60 ml/min/1.73m2 Final    Comment:      Estimated GFR is calculated using the Modification of Diet in Renal Disease (MDRD) Study equation, reported for both  Americans (GFRAA) and non- Americans (GFRNA), and normalized to 1.73m2 body surface area. The physician must decide which value applies to the patient. The MDRD study equation should only be used in individuals age 25 or older. It has not been validated for the following: pregnant women, patients with serious comorbid conditions,or on certain medications, or persons with extremes of body size, muscle mass, or nutritional status.       Calcium 06/27/2022 9.1  8.3 - 10.4 MG/DL Final    Total Bilirubin 06/27/2022 0.8  0.2 - 1.1 MG/DL Final    ALT 06/27/2022 23  12 - 65 U/L Final    AST 06/27/2022 25  15 - 37 U/L Final    Alk Phosphatase 06/27/2022 97  50 - 136 U/L Final    Total Protein 06/27/2022 7.4  6.3 - 8.2 g/dL Final    Albumin 06/27/2022 3.7  3.2 - 4.6 g/dL Final    Globulin 06/27/2022 3.7* 2.3 - 3.5 g/dL Final    Albumin/Globulin Ratio 06/27/2022 1.0* 1.2 - 3.5   Final    Hep B Core Total Ab 06/27/2022 Negative  Negative   Final    Comment: (NOTE)  Performed At: [facility] [place]  [address] [facility] [id number]  Donny Gaytan MD :[id number]      Hep A IgM 06/27/2022 NONREACTIVE  NR   Final    Hep B Core Ab, IgM 06/27/2022 NONREACTIVE  NR   Final    Hepatitis B Surface Ag 06/27/2022 NONREACTIVE  NR   Final    Hepatitis C Ab 06/27/2022 NONREACTIVE  NR   Final    Hep B S Ab 06/27/2022 <3.10  mIU/mL Final    Patient is considered not to have protective immunity to HBV infection. Assessent & Plan:        1. Vitamin D deficiency  Overview:  6/21/22 25 vitamin D 30.3  6/22/18 25 vitamin D 17.5    Labs were reviewed and discussed with the patient. Continue OTC 25 vitamin D 2000 I. U. daily with a meal containing fat. 2. Severe obesity ([address]  Overview:  Reviewed the patient's BMI. Discussed the need to lose weight in order to avoid many preventable illnesses. Discussed diet, exercise, and weight loss strategies. 3. Osteopenia, unspecified location  Overview:  3/23/22 DEXA - right femoral neck 0.791 gm/cm2 with T -1.8.  10 yr FRAX fracture risk 11 / 2.4%. 10/12/18 DEXA - left femoral neck 0.891 gm/cm2 with T score -1.1. Reviewed / discussed the patient's most recent DEXA scan results and calculated FRAX ten year fracture risk. The patient was counseled regarding adequate calcium and vitamin D intake. Also discussed fall avoidance and weight bearing exercise as tolerated. 4. Impaired fasting glucose  Overview:  6/21/22 FBS 86  12/16/21 fasting glucose 90, A1C 5.1    Labs were reviewed and discussed with patient. The patient was educated regarding \"prediabetes\" and the risk for progression over time to diabetes mellitus. We discussed strategies to prevent progression including dietary changes, exercise, and weight loss. 5. Pure hypercholesterolemia  Overview:  12/16/21 total 169 HDL 67 LDL 89 TG 66 on diet therapy.   Labs were reviewed and discussed with patient. The patient is not interested in taking medication to lower her cholesterol. She prefers to maintain diet therapy. Her HDL is > 60 and   LDL < 100 on diet therapy. The patient will continue the current treatment. 6. Essential hypertension  Overview:  Well controlled on losartan 100 mg daily. The patient will continue the current treatment. 7. Encounter for screening mammogram for malignant neoplasm of breast  Overview:  S/P right mastectomy 6/1/22 (Dr Ac Gaytan). Negative SNL. High grade invasive ductal carcinoma. ER+/NC-/HER2 positive. Scheduled to receive chemotherapy then hormonal therapy. Followed by oncology Dr. Geovanni Tavarez. 8. Acquired hypothyroidism  Overview:  6/21/22 TSH 4.500 on levothyroxine 100 mcg daily. 3/15/21 TSH 7.300 on levothyroxine 88 mcg daily. Labs were reviewed and discussed with the patient. Will increase dose to 125 mcg daily. Repeat labs before next visit. Orders:  -     levothyroxine (SYNTHROID) 125 MCG tablet; Take 1 tablet by mouth Daily TAKE 1 TABLET EVERY DAY BEFORE BREAKFAST FOR LOW THYROID HORMONE LEVELS, Disp-90 tablet, R-1Normal  -     TSH; Future  9. Colon cancer screening  Overview:  1/2/2019 colonoscopy - normal.    2012 colonoscopy - internal hemorrhoids, otherwise normal  10. Cervical cancer screening  Overview:  Declines additional.  Lifelong normal.  Status post ANTHEN in the 1980's for benign disease. The patient and/or patient representative voiced understanding and agreement with the current diagnoses, recommendations, and possible side effects. Return in about 3 months (around 9/29/2022) for review labs, follow up of chronic medical problems.

## 2022-07-05 ENCOUNTER — OFFICE VISIT (OUTPATIENT)
Dept: SURGERY | Age: 77
End: 2022-07-05

## 2022-07-05 DIAGNOSIS — C50.911 HER2-POSITIVE CARCINOMA OF RIGHT BREAST (HCC): ICD-10-CM

## 2022-07-05 DIAGNOSIS — Z90.11 S/P MASTECTOMY, RIGHT: Primary | ICD-10-CM

## 2022-07-05 PROCEDURE — 99024 POSTOP FOLLOW-UP VISIT: CPT | Performed by: SURGERY

## 2022-07-05 NOTE — PROGRESS NOTES
S/p right mastectomy/SLN, left sided port 6/1    Still with sensitivity of right chest wall skin. Concerned about medial dog-ear  Not looking forward to chemo- to start taxol-herceptin on 7/11    Exam:  Right mastectomy site healed. Some skin sensation. No palpable seroma  Medial dog-ear noted, sl >1cm  Mildly restricted ROM    1. S/P mastectomy, right    2. HER2-positive carcinoma of right breast ([facility] [address]       Allow skin to full heal before considering dog ear revision- next spring. Reviewed RUE ROM exercises- she declines PT referral and I do not believe it will be necessary    Will return for port removal  Follow-up and Dispositions    · Return for as needed.

## 2022-07-06 ENCOUNTER — CLINICAL DOCUMENTATION (OUTPATIENT)
Dept: ONCOLOGY | Age: 77
End: 2022-07-06

## 2022-07-06 ENCOUNTER — OFFICE VISIT (OUTPATIENT)
Dept: ONCOLOGY | Age: 77
End: 2022-07-06
Payer: MEDICARE

## 2022-07-06 VITALS
DIASTOLIC BLOOD PRESSURE: 73 MMHG | WEIGHT: 212.7 LBS | HEIGHT: 63 IN | HEART RATE: 72 BPM | TEMPERATURE: 97.8 F | RESPIRATION RATE: 17 BRPM | BODY MASS INDEX: 37.69 KG/M2 | SYSTOLIC BLOOD PRESSURE: 145 MMHG | OXYGEN SATURATION: 99 %

## 2022-07-06 DIAGNOSIS — C50.911 MALIGNANT NEOPLASM OF RIGHT BREAST IN FEMALE, ESTROGEN RECEPTOR POSITIVE, UNSPECIFIED SITE OF BREAST (HCC): Primary | ICD-10-CM

## 2022-07-06 DIAGNOSIS — Z71.9 ENCOUNTER FOR EDUCATION: ICD-10-CM

## 2022-07-06 DIAGNOSIS — Z17.0 MALIGNANT NEOPLASM OF RIGHT BREAST IN FEMALE, ESTROGEN RECEPTOR POSITIVE, UNSPECIFIED SITE OF BREAST (HCC): Primary | ICD-10-CM

## 2022-07-06 PROBLEM — Z12.11 COLON CANCER SCREENING: Status: RESOLVED | Noted: 2020-12-20 | Resolved: 2022-07-06

## 2022-07-06 PROBLEM — Z23 ENCOUNTER FOR IMMUNIZATION: Status: RESOLVED | Noted: 2020-12-20 | Resolved: 2022-07-06

## 2022-07-06 PROBLEM — Z00.00 MEDICARE ANNUAL WELLNESS VISIT, SUBSEQUENT: Status: RESOLVED | Noted: 2018-10-12 | Resolved: 2022-07-06

## 2022-07-06 PROBLEM — G89.18 POST-OPERATIVE PAIN: Status: RESOLVED | Noted: 2022-06-01 | Resolved: 2022-07-06

## 2022-07-06 PROBLEM — C50.919 BREAST CANCER (HCC): Status: RESOLVED | Noted: 2020-12-21 | Resolved: 2022-07-06

## 2022-07-06 PROBLEM — Z12.4 CERVICAL CANCER SCREENING: Status: RESOLVED | Noted: 2020-12-21 | Resolved: 2022-07-06

## 2022-07-06 PROCEDURE — 1036F TOBACCO NON-USER: CPT | Performed by: NURSE PRACTITIONER

## 2022-07-06 PROCEDURE — G8427 DOCREV CUR MEDS BY ELIG CLIN: HCPCS | Performed by: NURSE PRACTITIONER

## 2022-07-06 PROCEDURE — G8417 CALC BMI ABV UP PARAM F/U: HCPCS | Performed by: NURSE PRACTITIONER

## 2022-07-06 PROCEDURE — G8399 PT W/DXA RESULTS DOCUMENT: HCPCS | Performed by: NURSE PRACTITIONER

## 2022-07-06 PROCEDURE — 99215 OFFICE O/P EST HI 40 MIN: CPT | Performed by: NURSE PRACTITIONER

## 2022-07-06 PROCEDURE — 3017F COLORECTAL CA SCREEN DOC REV: CPT | Performed by: NURSE PRACTITIONER

## 2022-07-06 PROCEDURE — 1123F ACP DISCUSS/DSCN MKR DOCD: CPT | Performed by: NURSE PRACTITIONER

## 2022-07-06 PROCEDURE — 1090F PRES/ABSN URINE INCON ASSESS: CPT | Performed by: NURSE PRACTITIONER

## 2022-07-06 RX ORDER — PROCHLORPERAZINE MALEATE 10 MG
10 TABLET ORAL EVERY 6 HOURS PRN
Qty: 60 TABLET | Refills: 2 | Status: SHIPPED | OUTPATIENT
Start: 2022-07-06

## 2022-07-06 RX ORDER — ONDANSETRON 4 MG/1
8 TABLET, FILM COATED ORAL 3 TIMES DAILY PRN
Qty: 90 TABLET | Refills: 2 | Status: SHIPPED | OUTPATIENT
Start: 2022-07-06

## 2022-07-06 RX ORDER — LIDOCAINE AND PRILOCAINE 25; 25 MG/G; MG/G
CREAM TOPICAL
Qty: 30 G | Refills: 2 | Status: SHIPPED | OUTPATIENT
Start: 2022-07-06

## 2022-07-06 ASSESSMENT — PATIENT HEALTH QUESTIONNAIRE - PHQ9
1. LITTLE INTEREST OR PLEASURE IN DOING THINGS: 0
SUM OF ALL RESPONSES TO PHQ QUESTIONS 1-9: 0
2. FEELING DOWN, DEPRESSED OR HOPELESS: 0
SUM OF ALL RESPONSES TO PHQ QUESTIONS 1-9: 0
SUM OF ALL RESPONSES TO PHQ9 QUESTIONS 1 & 2: 0
SUM OF ALL RESPONSES TO PHQ QUESTIONS 1-9: 0
SUM OF ALL RESPONSES TO PHQ QUESTIONS 1-9: 0

## 2022-07-06 NOTE — PROGRESS NOTES
V Chemotherapy/Biotherapy Education:  Jamison Tang  is a 77y.o. year old female with breast cancer who presents for chemotherapy education for the following medication(s): Taxol and Herceptin  Schedule and frequency of chemotherapy were discussed with the patient. The patient was given handouts published by the [facility] titled \"Chemotherapy and You\" and \"Eating Hints Before, During, and After Cancer Treatment\" for reference. Medication specific information was printed from [employer] and distributed to the patient. Self-care guidelines were distributed and discussed with the patient and included the followin) Potential long term and short term side effects of therapy including fertility risks for appropriate patients    2) Symptoms and side effects that require the patient to contact [address] [place] [address] or require immediate attention    3) Symptoms or events that require immediate discontinuation of oral or self-administered treatments    4) Procedures for handling medications at home, including storage, safe handling, and management of unused medications    5) Procedures for handling bodily fluids and waste in the home    6) The [address] [place] [address]'s contact information with availability and instructions on who and when to call    7) The [address] [place] [address] appointment policy and expectations for rescheduling or canceling    Patient denies any needs or referrals at this time. Prescriptions for zofran, compazine, and emla cream have been sent electronically to the local pharmacy. Proper use and frequency of these meds were reviewed with patient and patient verbalized understanding of the treatment recommendations. Patient asked appropriate questions and was very involved and engaged during the educational session. There were no barriers to learning that were observed or demonstrated during the encounter.   Preference in learning

## 2022-07-07 DIAGNOSIS — Z17.0 MALIGNANT NEOPLASM OF RIGHT BREAST IN FEMALE, ESTROGEN RECEPTOR POSITIVE, UNSPECIFIED SITE OF BREAST (HCC): Primary | ICD-10-CM

## 2022-07-07 DIAGNOSIS — C50.911 MALIGNANT NEOPLASM OF RIGHT BREAST IN FEMALE, ESTROGEN RECEPTOR POSITIVE, UNSPECIFIED SITE OF BREAST (HCC): Primary | ICD-10-CM

## 2022-07-08 ENCOUNTER — CLINICAL DOCUMENTATION (OUTPATIENT)
Dept: ONCOLOGY | Age: 77
End: 2022-07-08

## 2022-07-08 NOTE — PROGRESS NOTES
I spoke with Cyndee Lang regarding her [facility] insurance coverage, potential oral medication authorizations, enrollment in the [address] [place] [address] ([facility]) and the [id number] [place] [address] ([address]), and assistance organization resource sheet. Patient will review the cancer programs. Next, I spoke with Cyndee Precious regarding her [facility] insurance coverage. Next, I spoke with Cyndee Lang regarding the Oncology Care Model Notification Letter. I answered questions regarding the costs associated with Medicare Benefits. I explained to Cyndee Lang the estimated cost of treatment and services for six months under the [employer]. Cyndee Lang was advised to contact Medicare or their healthcare provider for questions or concerns related to service of care. The Oncology Care Model provides different options of contact for Cyndee Lang regarding concerns and complaints of treatments and services. The cost of 6 months of medical treatments and services can be estimated to be $3,948.00. Next, I spoke with Cyndee Lang regarding her Prescription Drug Benefits. Next, I spoke with Cyndee Lang about the financial assistance application. Next, I spoke with Cyndee Lang about billing questions and treatment services. We discussed copayments, deductibles, and out of pocket maximums. We discussed cost associated with Carboplatin medication. Next, I spoke with Cyndee Lang regarding the Limited Power of [employer] document. After reading the form, Cyndee Lang asked if she could review the document and sign at a later time. Next, I spoke with Cyndee Lang regarding potential oral medication authorizations. I told her that if she ever had any problems getting her oral medications filled to give the dedicated [facility]  a call. Most of the time, it is simply an authorization that needs to be done with the insurance company.   Lastly, I gave Cyndee Lang a form with various resource

## 2022-07-11 ENCOUNTER — HOSPITAL ENCOUNTER (OUTPATIENT)
Dept: INFUSION THERAPY | Age: 77
Discharge: HOME | End: 2022-07-11
Payer: MEDICARE

## 2022-07-11 ENCOUNTER — OFFICE VISIT (OUTPATIENT)
Dept: ONCOLOGY | Age: 77
End: 2022-07-11
Payer: MEDICARE

## 2022-07-11 VITALS
SYSTOLIC BLOOD PRESSURE: 144 MMHG | HEART RATE: 78 BPM | TEMPERATURE: 98.5 F | WEIGHT: 214 LBS | DIASTOLIC BLOOD PRESSURE: 69 MMHG | RESPIRATION RATE: 16 BRPM | OXYGEN SATURATION: 97 % | BODY MASS INDEX: 37.91 KG/M2

## 2022-07-11 DIAGNOSIS — Z17.0 MALIGNANT NEOPLASM OF RIGHT BREAST IN FEMALE, ESTROGEN RECEPTOR POSITIVE, UNSPECIFIED SITE OF BREAST (HCC): Primary | ICD-10-CM

## 2022-07-11 DIAGNOSIS — C50.911 MALIGNANT NEOPLASM OF RIGHT BREAST IN FEMALE, ESTROGEN RECEPTOR POSITIVE, UNSPECIFIED SITE OF BREAST (HCC): ICD-10-CM

## 2022-07-11 DIAGNOSIS — C50.911 MALIGNANT NEOPLASM OF RIGHT BREAST IN FEMALE, ESTROGEN RECEPTOR POSITIVE, UNSPECIFIED SITE OF BREAST (HCC): Primary | ICD-10-CM

## 2022-07-11 DIAGNOSIS — Z17.0 MALIGNANT NEOPLASM OF RIGHT BREAST IN FEMALE, ESTROGEN RECEPTOR POSITIVE, UNSPECIFIED SITE OF BREAST (HCC): ICD-10-CM

## 2022-07-11 DIAGNOSIS — F41.9 ANXIETY: ICD-10-CM

## 2022-07-11 LAB
ALBUMIN SERPL-MCNC: 3.4 G/DL (ref 3.2–4.6)
ALBUMIN/GLOB SERPL: 1 {RATIO} (ref 1.2–3.5)
ALP SERPL-CCNC: 114 U/L (ref 50–136)
ALT SERPL-CCNC: 23 U/L (ref 12–65)
ANION GAP SERPL CALC-SCNC: 6 MMOL/L (ref 7–16)
AST SERPL-CCNC: 19 U/L (ref 15–37)
BASOPHILS # BLD: 0.1 K/UL (ref 0–0.2)
BASOPHILS NFR BLD: 1 % (ref 0–2)
BILIRUB SERPL-MCNC: 0.6 MG/DL (ref 0.2–1.1)
BUN SERPL-MCNC: 18 MG/DL (ref 8–23)
CALCIUM SERPL-MCNC: 9 MG/DL (ref 8.3–10.4)
CHLORIDE SERPL-SCNC: 108 MMOL/L (ref 98–107)
CO2 SERPL-SCNC: 27 MMOL/L (ref 21–32)
CREAT SERPL-MCNC: 0.8 MG/DL (ref 0.6–1)
DIFFERENTIAL METHOD BLD: NORMAL
EOSINOPHIL # BLD: 0.1 K/UL (ref 0–0.8)
EOSINOPHIL NFR BLD: 2 % (ref 0.5–7.8)
ERYTHROCYTE [DISTWIDTH] IN BLOOD BY AUTOMATED COUNT: 13.9 % (ref 11.9–14.6)
GLOBULIN SER CALC-MCNC: 3.5 G/DL (ref 2.3–3.5)
GLUCOSE SERPL-MCNC: 125 MG/DL (ref 65–100)
HCT VFR BLD AUTO: 38.1 % (ref 35.8–46.3)
HGB BLD-MCNC: 12.3 G/DL (ref 11.7–15.4)
IMM GRANULOCYTES # BLD AUTO: 0 K/UL (ref 0–0.5)
IMM GRANULOCYTES NFR BLD AUTO: 0 % (ref 0–5)
LYMPHOCYTES # BLD: 1.4 K/UL (ref 0.5–4.6)
LYMPHOCYTES NFR BLD: 22 % (ref 13–44)
MCH RBC QN AUTO: 29.1 PG (ref 26.1–32.9)
MCHC RBC AUTO-ENTMCNC: 32.3 G/DL (ref 31.4–35)
MCV RBC AUTO: 90.3 FL (ref 79.6–97.8)
MONOCYTES # BLD: 0.6 K/UL (ref 0.1–1.3)
MONOCYTES NFR BLD: 9 % (ref 4–12)
NEUTS SEG # BLD: 4.3 K/UL (ref 1.7–8.2)
NEUTS SEG NFR BLD: 66 % (ref 43–78)
NRBC # BLD: 0 K/UL (ref 0–0.2)
PLATELET # BLD AUTO: 151 K/UL (ref 150–450)
PMV BLD AUTO: 11 FL (ref 9.4–12.3)
POTASSIUM SERPL-SCNC: 3.9 MMOL/L (ref 3.5–5.1)
PROT SERPL-MCNC: 6.9 G/DL (ref 6.3–8.2)
RBC # BLD AUTO: 4.22 M/UL (ref 4.05–5.2)
SODIUM SERPL-SCNC: 141 MMOL/L (ref 136–145)
WBC # BLD AUTO: 6.4 K/UL (ref 4.3–11.1)

## 2022-07-11 PROCEDURE — 1090F PRES/ABSN URINE INCON ASSESS: CPT | Performed by: NURSE PRACTITIONER

## 2022-07-11 PROCEDURE — 6360000002 HC RX W HCPCS: Performed by: INTERNAL MEDICINE

## 2022-07-11 PROCEDURE — G8399 PT W/DXA RESULTS DOCUMENT: HCPCS | Performed by: NURSE PRACTITIONER

## 2022-07-11 PROCEDURE — G8427 DOCREV CUR MEDS BY ELIG CLIN: HCPCS | Performed by: NURSE PRACTITIONER

## 2022-07-11 PROCEDURE — A4216 STERILE WATER/SALINE, 10 ML: HCPCS | Performed by: INTERNAL MEDICINE

## 2022-07-11 PROCEDURE — 2580000003 HC RX 258: Performed by: INTERNAL MEDICINE

## 2022-07-11 PROCEDURE — 96417 CHEMO IV INFUS EACH ADDL SEQ: CPT

## 2022-07-11 PROCEDURE — 2500000003 HC RX 250 WO HCPCS: Performed by: INTERNAL MEDICINE

## 2022-07-11 PROCEDURE — 80053 COMPREHEN METABOLIC PANEL: CPT

## 2022-07-11 PROCEDURE — 96413 CHEMO IV INFUSION 1 HR: CPT

## 2022-07-11 PROCEDURE — 36591 DRAW BLOOD OFF VENOUS DEVICE: CPT

## 2022-07-11 PROCEDURE — G8417 CALC BMI ABV UP PARAM F/U: HCPCS | Performed by: NURSE PRACTITIONER

## 2022-07-11 PROCEDURE — 1036F TOBACCO NON-USER: CPT | Performed by: NURSE PRACTITIONER

## 2022-07-11 PROCEDURE — 96375 TX/PRO/DX INJ NEW DRUG ADDON: CPT

## 2022-07-11 PROCEDURE — 96415 CHEMO IV INFUSION ADDL HR: CPT

## 2022-07-11 PROCEDURE — 3017F COLORECTAL CA SCREEN DOC REV: CPT | Performed by: NURSE PRACTITIONER

## 2022-07-11 PROCEDURE — 85025 COMPLETE CBC W/AUTO DIFF WBC: CPT

## 2022-07-11 PROCEDURE — 1123F ACP DISCUSS/DSCN MKR DOCD: CPT | Performed by: NURSE PRACTITIONER

## 2022-07-11 PROCEDURE — 99214 OFFICE O/P EST MOD 30 MIN: CPT | Performed by: NURSE PRACTITIONER

## 2022-07-11 RX ORDER — HEPARIN SODIUM (PORCINE) LOCK FLUSH IV SOLN 100 UNIT/ML 100 UNIT/ML
500 SOLUTION INTRAVENOUS PRN
Status: CANCELLED | OUTPATIENT
Start: 2022-07-11

## 2022-07-11 RX ORDER — DIPHENHYDRAMINE HYDROCHLORIDE 50 MG/ML
50 INJECTION INTRAMUSCULAR; INTRAVENOUS
Status: CANCELLED | OUTPATIENT
Start: 2022-07-25

## 2022-07-11 RX ORDER — ACETAMINOPHEN 325 MG/1
650 TABLET ORAL
Status: CANCELLED | OUTPATIENT
Start: 2022-07-11

## 2022-07-11 RX ORDER — SODIUM CHLORIDE 9 MG/ML
5-250 INJECTION, SOLUTION INTRAVENOUS PRN
Status: CANCELLED | OUTPATIENT
Start: 2022-07-18

## 2022-07-11 RX ORDER — MEPERIDINE HYDROCHLORIDE 25 MG/ML
12.5 INJECTION INTRAMUSCULAR; INTRAVENOUS; SUBCUTANEOUS PRN
Status: CANCELLED | OUTPATIENT
Start: 2022-07-25

## 2022-07-11 RX ORDER — MEPERIDINE HYDROCHLORIDE 25 MG/ML
12.5 INJECTION INTRAMUSCULAR; INTRAVENOUS; SUBCUTANEOUS PRN
Status: CANCELLED | OUTPATIENT
Start: 2022-07-18

## 2022-07-11 RX ORDER — EPINEPHRINE 1 MG/ML
0.3 INJECTION, SOLUTION, CONCENTRATE INTRAVENOUS PRN
Status: CANCELLED | OUTPATIENT
Start: 2022-07-25

## 2022-07-11 RX ORDER — DIPHENHYDRAMINE HYDROCHLORIDE 50 MG/ML
50 INJECTION INTRAMUSCULAR; INTRAVENOUS ONCE
Status: COMPLETED | OUTPATIENT
Start: 2022-07-11 | End: 2022-07-11

## 2022-07-11 RX ORDER — DEXAMETHASONE SODIUM PHOSPHATE 10 MG/ML
10 INJECTION INTRAMUSCULAR; INTRAVENOUS ONCE
Status: CANCELLED | OUTPATIENT
Start: 2022-07-25 | End: 2022-07-25

## 2022-07-11 RX ORDER — SODIUM CHLORIDE 0.9 % (FLUSH) 0.9 %
5-40 SYRINGE (ML) INJECTION PRN
Status: DISCONTINUED | OUTPATIENT
Start: 2022-07-11 | End: 2022-07-12 | Stop reason: HOSPADM

## 2022-07-11 RX ORDER — SODIUM CHLORIDE 9 MG/ML
5-40 INJECTION INTRAVENOUS PRN
Status: CANCELLED | OUTPATIENT
Start: 2022-07-11

## 2022-07-11 RX ORDER — MEPERIDINE HYDROCHLORIDE 25 MG/ML
12.5 INJECTION INTRAMUSCULAR; INTRAVENOUS; SUBCUTANEOUS PRN
Status: DISCONTINUED | OUTPATIENT
Start: 2022-07-11 | End: 2022-07-12 | Stop reason: HOSPADM

## 2022-07-11 RX ORDER — ONDANSETRON 2 MG/ML
8 INJECTION INTRAMUSCULAR; INTRAVENOUS
Status: CANCELLED | OUTPATIENT
Start: 2022-07-18

## 2022-07-11 RX ORDER — ACETAMINOPHEN 325 MG/1
650 TABLET ORAL
Status: CANCELLED | OUTPATIENT
Start: 2022-07-25

## 2022-07-11 RX ORDER — SODIUM CHLORIDE 9 MG/ML
INJECTION, SOLUTION INTRAVENOUS CONTINUOUS
Status: CANCELLED | OUTPATIENT
Start: 2022-07-25

## 2022-07-11 RX ORDER — ALBUTEROL SULFATE 90 UG/1
4 AEROSOL, METERED RESPIRATORY (INHALATION) PRN
Status: CANCELLED | OUTPATIENT
Start: 2022-07-11

## 2022-07-11 RX ORDER — SODIUM CHLORIDE 9 MG/ML
INJECTION, SOLUTION INTRAVENOUS CONTINUOUS
Status: CANCELLED | OUTPATIENT
Start: 2022-07-11

## 2022-07-11 RX ORDER — ALBUTEROL SULFATE 90 UG/1
4 AEROSOL, METERED RESPIRATORY (INHALATION) PRN
Status: CANCELLED | OUTPATIENT
Start: 2022-07-25

## 2022-07-11 RX ORDER — SODIUM CHLORIDE 9 MG/ML
5-250 INJECTION, SOLUTION INTRAVENOUS PRN
Status: CANCELLED | OUTPATIENT
Start: 2022-07-25

## 2022-07-11 RX ORDER — ACETAMINOPHEN 325 MG/1
650 TABLET ORAL
Status: CANCELLED | OUTPATIENT
Start: 2022-07-18

## 2022-07-11 RX ORDER — ONDANSETRON 2 MG/ML
8 INJECTION INTRAMUSCULAR; INTRAVENOUS
Status: CANCELLED | OUTPATIENT
Start: 2022-07-11

## 2022-07-11 RX ORDER — ALBUTEROL SULFATE 90 UG/1
4 AEROSOL, METERED RESPIRATORY (INHALATION) PRN
Status: CANCELLED | OUTPATIENT
Start: 2022-07-18

## 2022-07-11 RX ORDER — SODIUM CHLORIDE 9 MG/ML
INJECTION, SOLUTION INTRAVENOUS CONTINUOUS
Status: CANCELLED | OUTPATIENT
Start: 2022-07-18

## 2022-07-11 RX ORDER — ONDANSETRON 2 MG/ML
8 INJECTION INTRAMUSCULAR; INTRAVENOUS
Status: CANCELLED | OUTPATIENT
Start: 2022-07-25

## 2022-07-11 RX ORDER — SODIUM CHLORIDE 9 MG/ML
5-250 INJECTION, SOLUTION INTRAVENOUS PRN
Status: DISCONTINUED | OUTPATIENT
Start: 2022-07-11 | End: 2022-07-12 | Stop reason: HOSPADM

## 2022-07-11 RX ORDER — SODIUM CHLORIDE 9 MG/ML
5-40 INJECTION INTRAVENOUS PRN
Status: CANCELLED | OUTPATIENT
Start: 2022-07-18

## 2022-07-11 RX ORDER — DIPHENHYDRAMINE HYDROCHLORIDE 50 MG/ML
50 INJECTION INTRAMUSCULAR; INTRAVENOUS
Status: DISPENSED | OUTPATIENT
Start: 2022-07-11 | End: 2022-07-11

## 2022-07-11 RX ORDER — DIPHENHYDRAMINE HYDROCHLORIDE 50 MG/ML
50 INJECTION INTRAMUSCULAR; INTRAVENOUS
Status: CANCELLED | OUTPATIENT
Start: 2022-07-18

## 2022-07-11 RX ORDER — DIPHENHYDRAMINE HYDROCHLORIDE 50 MG/ML
50 INJECTION INTRAMUSCULAR; INTRAVENOUS ONCE
Status: CANCELLED | OUTPATIENT
Start: 2022-07-25 | End: 2022-07-25

## 2022-07-11 RX ORDER — DEXAMETHASONE SODIUM PHOSPHATE 10 MG/ML
10 INJECTION INTRAMUSCULAR; INTRAVENOUS ONCE
Status: CANCELLED | OUTPATIENT
Start: 2022-07-18 | End: 2022-07-18

## 2022-07-11 RX ORDER — SODIUM CHLORIDE 0.9 % (FLUSH) 0.9 %
5-40 SYRINGE (ML) INJECTION PRN
Status: CANCELLED | OUTPATIENT
Start: 2022-07-18

## 2022-07-11 RX ORDER — SODIUM CHLORIDE 0.9 % (FLUSH) 0.9 %
5-40 SYRINGE (ML) INJECTION PRN
Status: CANCELLED | OUTPATIENT
Start: 2022-07-25

## 2022-07-11 RX ORDER — EPINEPHRINE 1 MG/ML
0.3 INJECTION, SOLUTION, CONCENTRATE INTRAVENOUS PRN
Status: CANCELLED | OUTPATIENT
Start: 2022-07-11

## 2022-07-11 RX ORDER — SODIUM CHLORIDE 9 MG/ML
5-40 INJECTION INTRAVENOUS PRN
Status: CANCELLED | OUTPATIENT
Start: 2022-07-25

## 2022-07-11 RX ORDER — HEPARIN SODIUM (PORCINE) LOCK FLUSH IV SOLN 100 UNIT/ML 100 UNIT/ML
500 SOLUTION INTRAVENOUS PRN
Status: CANCELLED | OUTPATIENT
Start: 2022-07-25

## 2022-07-11 RX ORDER — ONDANSETRON 2 MG/ML
8 INJECTION INTRAMUSCULAR; INTRAVENOUS
Status: ACTIVE | OUTPATIENT
Start: 2022-07-11 | End: 2022-07-11

## 2022-07-11 RX ORDER — SODIUM CHLORIDE 9 MG/ML
5-250 INJECTION, SOLUTION INTRAVENOUS PRN
Status: CANCELLED | OUTPATIENT
Start: 2022-07-11

## 2022-07-11 RX ORDER — DIPHENHYDRAMINE HYDROCHLORIDE 50 MG/ML
50 INJECTION INTRAMUSCULAR; INTRAVENOUS ONCE
Status: CANCELLED | OUTPATIENT
Start: 2022-07-18 | End: 2022-07-18

## 2022-07-11 RX ORDER — HEPARIN SODIUM (PORCINE) LOCK FLUSH IV SOLN 100 UNIT/ML 100 UNIT/ML
500 SOLUTION INTRAVENOUS PRN
Status: CANCELLED | OUTPATIENT
Start: 2022-07-18

## 2022-07-11 RX ORDER — DEXAMETHASONE SODIUM PHOSPHATE 10 MG/ML
10 INJECTION INTRAMUSCULAR; INTRAVENOUS ONCE
Status: COMPLETED | OUTPATIENT
Start: 2022-07-11 | End: 2022-07-11

## 2022-07-11 RX ORDER — EPINEPHRINE 1 MG/ML
0.3 INJECTION, SOLUTION, CONCENTRATE INTRAVENOUS PRN
Status: CANCELLED | OUTPATIENT
Start: 2022-07-18

## 2022-07-11 RX ADMIN — DEXAMETHASONE SODIUM PHOSPHATE 10 MG: 10 INJECTION INTRAMUSCULAR; INTRAVENOUS at 16:17

## 2022-07-11 RX ADMIN — SODIUM CHLORIDE 25 ML/HR: 9 INJECTION, SOLUTION INTRAVENOUS at 14:00

## 2022-07-11 RX ADMIN — SODIUM CHLORIDE 378 MG: 9 INJECTION, SOLUTION INTRAVENOUS at 14:24

## 2022-07-11 RX ADMIN — Medication 10 ML: at 12:25

## 2022-07-11 RX ADMIN — PACLITAXEL 162 MG: 6 INJECTION, SOLUTION, CONCENTRATE INTRAVENOUS at 16:55

## 2022-07-11 RX ADMIN — SODIUM CHLORIDE, PRESERVATIVE FREE 10 ML: 5 INJECTION INTRAVENOUS at 18:25

## 2022-07-11 RX ADMIN — DIPHENHYDRAMINE HYDROCHLORIDE 50 MG: 50 INJECTION, SOLUTION INTRAMUSCULAR; INTRAVENOUS at 16:13

## 2022-07-11 RX ADMIN — FAMOTIDINE 20 MG: 10 INJECTION, SOLUTION INTRAVENOUS at 16:10

## 2022-07-11 RX ADMIN — SODIUM CHLORIDE, PRESERVATIVE FREE 10 ML: 5 INJECTION INTRAVENOUS at 13:55

## 2022-07-11 ASSESSMENT — PATIENT HEALTH QUESTIONNAIRE - PHQ9
2. FEELING DOWN, DEPRESSED OR HOPELESS: 0
SUM OF ALL RESPONSES TO PHQ QUESTIONS 1-9: 0
SUM OF ALL RESPONSES TO PHQ QUESTIONS 1-9: 0
SUM OF ALL RESPONSES TO PHQ9 QUESTIONS 1 & 2: 0
1. LITTLE INTEREST OR PLEASURE IN DOING THINGS: 0
SUM OF ALL RESPONSES TO PHQ QUESTIONS 1-9: 0
SUM OF ALL RESPONSES TO PHQ QUESTIONS 1-9: 0

## 2022-07-11 NOTE — PROGRESS NOTES
Patient arrived to port lab for port access and lab draw   Izabel Newman 45 accessed and labs drawn per protocol   *Port remains accessed   Patient discharged from port lab ambulatory*

## 2022-07-11 NOTE — PROGRESS NOTES
Arrived to the [facility]. C1D1 herceptin/taxol infusions completed. Patient tolerated well. Any issues or concerns during appointment: no  Patient aware of next infusion appointment on 7/18/2022 at 1100. Patient aware of next lab and [facility] office visit on 7/18/2022 at 21 [phone]. Patient instructed to call provider with temperature of 100.4 or greater or nausea/vomiting/ diarrhea or pain not controlled by medications  Discharged ambulatory.

## 2022-07-11 NOTE — PROGRESS NOTES
[facility] Hematology and Oncology: Office Visit Established Patient    Chief Complaint:    Chief Complaint   Patient presents with    Follow-up         History of Present Illness:  Ms. Lucy Funk is a 77 y.o. female who returns today for management of breast cancer. She initially presented for a routine bilateral screening mammogram on 3/23/22 which identified a right breast mass near the 11:00-12:00 position, 6-7 cm from the nipple and other small circumscribed round masses in the right upper inner breast which had not definitely changed from prior examinations. Further evaluation with right breast ultrasound on 3/25/22 confirmed a 6 mm hypoechoic shadowing mass in the 12:00 right breast at 7 cm from the nipple. She presented to Dr. Papito Gautam on 4/7/22 to discuss whether to proceed with recommended biopsy, he recommended that biopsy be performed. Ultrasound-guided biopsy was subsequently completed on 4/12/22 with pathology revealing high grade infiltrating ductal carcinoma, ER 76%, TN negative, and HER2 positive. MRI of the bilateral breasts was completed on 4/14/22 demonstrating a right anterior 12:00 breast cancer measuring up to 1.2 cm; multiple (greater than 10 total) other right upper breast masses, some of which had enlarged, concerning for additional malignancy; an indeterminate 1.2 cm right axillary lymph node; and no suspicious left breast finding. We recommended upfront surgery because of the MRI findings and the inconclusive size/clinical stage of her cancer. Path reviewed, thankfully it appears that the indeterminate lesions in the breast were benign, with the final tumor dimension being only 1.5 cm, and 4 nodes negative for tumor. Therefore, she is [id number], and would be eligible for the adjuvant paclitaxel and trastuzumab regimen. Here for follow-up and to start treatment with week 1 HP. She had a port-a-cath placed and she has had formal chemotherapy education.  Her echocardiogram was normal with EF of 60-65%. She is feeling well overall. She is anxious about having nausea from her chemotherapy. Review of Systems:  Constitutional: Negative. HENT: Negative. Eyes: Negative. Respiratory: Negative. Cardiovascular: Negative. Gastrointestinal: Negative. Genitourinary: Negative. Musculoskeletal: Negative. Skin: Negative. Neurological: Negative. Endo/Heme/Allergies: Negative. Psychiatric/Behavioral: Negative. All other systems reviewed and are negative. Allergies   Allergen Reactions    Sulfamethoxazole-Trimethoprim Nausea And Vomiting    Meloxicam Other (See Comments)    Oxaprozin Swelling     Past Medical History:   Diagnosis Date    Anxiety     Depression 5/19/2017    Essential hypertension 05/19/2017    Fibromyalgia     History of postoperative nausea and vomiting     Hyperlipidemia     Hypothyroid     Impaired fasting glucose     Insomnia     Nausea & vomiting     Need for hepatitis C screening test 12/20/2020    3/15/21 HCV ab negative.       Obesity     Osteoarthritis 12/15/2015    Stress incontinence, female     Vitamin D deficiency      Past Surgical History:   Procedure Laterality Date    BREAST BIOPSY Bilateral     BREAST BIOPSY Right 3/29/2021    RIGHT BREAST NEEDLE LOCALIZED LUMPECTOMY X2 performed by Curtis Hagan MD at [place] [address] COLONOSCOPY  2012    normal; internal hemorrhoid    HYSTERECTOMY (CERVIX STATUS UNKNOWN)  [id number] [facility] Right 6/1/2022    RIGHT SENTINEL NODE BIOPSY MAG TRACE performed by Emily Gama MD at [address] [place], [place] [address]y 20 Right 6/1/2022    RIGHT BREAST MASTECTOMY performed by Emily Gama MD at [facility] Left 6/1/2022    PORT INSERTION performed by Emily Gama MD at [facility] Bilateral     Left 2015, Right 7/29/20    US BREAST BIOPSY NEEDLE ADDITIONAL RIGHT Right 2/25/2021    US BREAST BIOPSY NEEDLE ADDITIONAL Active Member of Clubs or Organizations: Not on file    Attends Club or Organization Meetings: Not on file    Marital Status: Not on file   Intimate Partner Violence:     Fear of Current or Ex-Partner: Not on file    Emotionally Abused: Not on file    Physically Abused: Not on file    Sexually Abused: Not on file   Housing Stability:     Unable to Pay for Housing in the Last Year: Not on file    Number of [place] in the Last Year: Not on file    Unstable Housing in the Last Year: Not on file     Current Outpatient Medications   Medication Sig Dispense Refill    lidocaine-prilocaine (EMLA) 2.5-2.5 % cream Apply topically as needed. 30 g 2    ondansetron (ZOFRAN) 4 MG tablet Take 2 tablets by mouth 3 times daily as needed for Nausea or Vomiting 90 tablet 2    prochlorperazine (COMPAZINE) 10 MG tablet Take 1 tablet by mouth every 6 hours as needed (nausea) 60 tablet 2    levothyroxine (SYNTHROID) 125 MCG tablet Take 1 tablet by mouth Daily TAKE 1 TABLET EVERY DAY BEFORE BREAKFAST FOR LOW THYROID HORMONE LEVELS 90 tablet 1    acetaminophen (TYLENOL) 500 MG tablet Take 1,000 mg by mouth every 6 hours as needed for Pain.  Multiple Vitamin (MULTIVITAMIN ADULT PO) Take 1 tablet by mouth daily.  Cholecalciferol (VITAMIN D3) 1.25 MG (16234 UT) CAPS Take 1 capsule by mouth daily.  Carboxymethylcellul-Glycerin (REFRESH OPTIVE OP) Place 1 drop into both eyes daily.  ibuprofen (ADVIL;MOTRIN) 400 MG tablet Take by mouth every 6 hours as needed      losartan (COZAAR) 100 MG tablet TAKE 1 TABLET EVERY DAY FOR HIGH BLOOD PRESSURE       No current facility-administered medications for this visit.      Facility-Administered Medications Ordered in Other Visits   Medication Dose Route Frequency Provider Last Rate Last Admin    sodium chloride flush 0.9 % injection 5-40 mL  5-40 mL IntraVENous PRN Jackie Schaffer MD   10 mL at 07/11/22 1225       OBJECTIVE:  BP (!) 144/69 Comment: standing  Pulse 78   Temp 98.5 °F (36.9 °C) (Oral)   Resp 16   Wt 214 lb (97.1 kg)   SpO2 97%   BMI 37.91 kg/m²     Physical Exam:  Constitutional: Well developed, well nourished female in no acute distress, sitting comfortably on the examination table. HEENT: Normocephalic and atraumatic. Sclerae anicteric. Skin Warm and dry. No bruising and no rash noted. No erythema. No pallor. Respiratory  Bilateral breath sounds are clear. There is no use of accessory muscles. Cardiac Heart rate and rhythm regular. No gallops, rubs, or murmurs. GI Positive bowel sounds. No tenderness, no distention. Neuro Grossly nonfocal with no obvious sensory or motor deficits. MSK Normal range of motion in general.  No edema and no tenderness. Psych Appropriate mood and affect.          Labs:  Recent Results (from the past 96 hour(s))   CBC with Auto Differential    Collection Time: 07/11/22 12:12 PM   Result Value Ref Range    WBC 6.4 4.3 - 11.1 K/uL    RBC 4.22 4.05 - 5.2 M/uL    Hemoglobin 12.3 11.7 - 15.4 g/dL    Hematocrit 38.1 35.8 - 46.3 %    MCV 90.3 79.6 - 97.8 FL    MCH 29.1 26.1 - 32.9 PG    MCHC 32.3 31.4 - 35.0 g/dL    RDW 13.9 11.9 - 14.6 %    Platelets 793 416 - 217 K/uL    MPV 11.0 9.4 - 12.3 FL    nRBC 0.00 0.0 - 0.2 K/uL    Seg Neutrophils 66 43 - 78 %    Lymphocytes 22 13 - 44 %    Monocytes 9 4.0 - 12.0 %    Eosinophils % 2 0.5 - 7.8 %    Basophils 1 0.0 - 2.0 %    Immature Granulocytes 0 0.0 - 5.0 %    Segs Absolute 4.3 1.7 - 8.2 K/UL    Absolute Lymph # 1.4 0.5 - 4.6 K/UL    Absolute Mono # 0.6 0.1 - 1.3 K/UL    Absolute Eos # 0.1 0.0 - 0.8 K/UL    Basophils Absolute 0.1 0.0 - 0.2 K/UL    Absolute Immature Granulocyte 0.0 0.0 - 0.5 K/UL    Differential Type AUTOMATED     Comprehensive Metabolic Panel    Collection Time: 07/11/22 12:12 PM   Result Value Ref Range    Sodium 141 136 - 145 mmol/L    Potassium 3.9 3.5 - 5.1 mmol/L    Chloride 108 (H) 98 - 107 mmol/L    CO2 27 21 - 32 mmol/L    Anion Gap 6 (L) 7 - 16 mmol/L    Glucose 125 (H) 65 - 100 mg/dL    BUN 18 8 - 23 MG/DL    CREATININE 0.80 0.6 - 1.0 MG/DL    GFR African American >60 >60 ml/min/1.73m2    GFR Non- >60 >60 ml/min/1.73m2    Calcium 9.0 8.3 - 10.4 MG/DL    Total Bilirubin 0.6 0.2 - 1.1 MG/DL    ALT 23 12 - 65 U/L    AST 19 15 - 37 U/L    Alk Phosphatase 114 50 - 136 U/L    Total Protein 6.9 6.3 - 8.2 g/dL    Albumin 3.4 3.2 - 4.6 g/dL    Globulin 3.5 2.3 - 3.5 g/dL    Albumin/Globulin Ratio 1.0 (L) 1.2 - 3.5         Imaging:  MRI of the Breasts with and without contrast       CLINICAL INDICATION:  New diagnosis of right 12:00 breast cancer status post   biopsy demonstrating infiltrating ductal carcinoma high-grade, poorly   differentiated. Evaluate for extent of disease, staging, therapy planning. Patient had right benign papillomas removed in 2021, and a left benign biopsy in   2011 demonstrating a 2:00 fibroadenoma. Family history of her sister having   breast cancer. .       COMPARISON: Ultrasound and mammography 4/12/2022, 3/25/2022, 3/23/2022 and prior   breast MRI 3/3/2021.       TECHNIQUE: Standard MRI sequences were obtained through the breasts in multiple   planes. Images were obtained before and after intravenous infusion of 20 mL of   Prohance contrast. Images were reviewed with PACS and with [employer] CAD software.       FINDINGS: The breasts demonstrate mild-moderate bilateral glandularity and mild   background enhancement.       Right breast: The new 12:00 biopsy clip is within an irregular heterogeneously   enhancing 1.2 x 0.8 x 1.1 cm malignant mass. As seen on prior exam a large area   of the right upper breast spanning 11:00-12:00 anterior, middle, posterior depth   (overall about 10-12 cm) contains multiple scattered masses of varying sizes and   shapes.  There are greater than 10 masses total. Anteriorly at 12:00 10 cm from   nipple an irregular heterogeneously enhancing mass measures up to 1.2 x 1.0 cm   (previously 0.6 x 0.4 cm). Posteriorly near 11:00 14 cm from nipple a lobulated   mass measures up to 1.1 x 0.4 cm (previously 0.8 x 0.3 cm).     Left breast: No evidence of suspicious enhancing mass, and no dominant or unique   nonmass enhancement, to suggest additional malignancy.       Lymph nodes: Right axilla demonstrates a dysmorphic 1.2 x 0.8 cm node (series 4   image 351) which has developed since the prior exam. No other axillary or   internal mammary lymphadenopathy is evident.       Elsewhere: Limited visualization of the partially included thorax and upper   abdomen shows no acute abnormality.                Impression       1. Right anterior 12:00 breast cancer measures up to 1.2 cm. 2. Multiple (greater than 10 total) other right upper breast masses, some of   which have enlarged, concerning for additional malignancy. 3. Right axillary indeterminate lymph node. 4. No suspicious left breast finding.       Recommend continued malignancy management as directed clinically.       BI-RADS Assessment Category 6: Known Biopsy Proven Malignancy             Pathology:  DIAGNOSIS        A:  \"RIGHT BREAST MASTECTOMY\":  INVASIVE DUCTAL CARCINOMA, HIGH GRADE     (3 + 3 + 2), 15 MILLIMETER IN GREATEST DIMENSION, MARGINS UNINVOLVED;   FIBROCYSTIC MASTOPATHY.          B :  \"SENTINEL NODE #1\":  BENIGN LYMPH NODE, NEGATIVE FOR TUMOR.        C:  \"ADDITIONAL AXILLARY TISSUE\":  FOUR BENIGN LYMPH NODES, ALL   NEGATIVE FOR TUMOR (0/4). * * *DIAGNOSIS* * * * * * * * * * * * * * * * * * * * * * * * * * * * * * *            A:  \" RIGHT BREAST, 12:00 POSITION, 7 CM FROM NIPPLE, CORE BIOPSY\":         INFILTRATING DUCTAL CARCINOMA WITH APOCRINE FEATURES, HIGH GRADE   (POORLY DIFFERENTIATED). DEFINITE IN SITU COMPONENT AND LYMPHOVASCULAR   INVASION ARE NOT IDENTIFIED             rae/4/13/2022     * * *Electronically Signed Out* * *         Sign Out Date: 4/13/2022  JAH Phipps M.D.           * * *PROCEDURES/ADDENDA* * * lesion to confirm pathology. She is reluctant to consider additional biopsy, her rationale is that she will ultimately need surgery anyway. I explained that she will likely need mastectomy if she has upfront surgery and she is prepared for this. If she has upfront surgery, decisions on adjuvant therapy will be made based on surgical path. If she is T1N0 pathologically, then we will recommend TH, if T2N0, consider TCH, or if N1, at least consider adjuvant TCHP. Here for follow-up and to start treatment with week 1 HP. She had a port-a-cath placed and she has had formal chemotherapy education. Her echocardiogram was normal with EF of 60-65%. She is feeling well overall. She is anxious about having nausea from her chemotherapy. We discussed side effects and how to combat. She felt better after we discussed again. She has anti-emetics to use as needed. Labs reviewed and okay to proceed with week 1 HP. Continue good oral nutrition and hydration. Encouraged frequent activity throughout the day and rest as needed to combat fatigue. Call with any fevers, uncontrolled side effects from treatment or any other worrisome/concerning symptoms. Follow up in one week for cycle 2 or sooner if needed. All questions were asked and answered to the best of my ability.            Michelle Li, NP-C  [facility] Hematology and Oncology  [address] [place] [address]  Office : [phone]  Fax : [phone]

## 2022-07-11 NOTE — PROGRESS NOTES
Arrived to the [facility]. D1C1 Herceptin and Taxol completed. Patient tolerated without difficulty or reaction. Any issues or concerns during appointment: None. Patient aware of next infusion appointment on 07/18/2022 (date) at 80 (time). Patient aware of next lab and [facility] office visit on 07/18/2022 (date) at 56 with lab followed by OV at [facility] (time). Patient instructed to call provider with temperature of 100.4 or greater or nausea/vomiting/ diarrhea or pain not controlled by medications  Discharged Ambulatory with son.

## 2022-07-11 NOTE — PROGRESS NOTES
Report received from [facility]. Patient aware of transfer of care for the remainder of infusion.

## 2022-07-18 ENCOUNTER — HOSPITAL ENCOUNTER (OUTPATIENT)
Dept: INFUSION THERAPY | Age: 77
Discharge: HOME | End: 2022-07-18
Payer: MEDICARE

## 2022-07-18 ENCOUNTER — OFFICE VISIT (OUTPATIENT)
Dept: ONCOLOGY | Age: 77
End: 2022-07-18
Payer: MEDICARE

## 2022-07-18 VITALS
WEIGHT: 215.2 LBS | SYSTOLIC BLOOD PRESSURE: 150 MMHG | HEIGHT: 63 IN | RESPIRATION RATE: 19 BRPM | BODY MASS INDEX: 38.13 KG/M2 | HEART RATE: 75 BPM | OXYGEN SATURATION: 100 % | DIASTOLIC BLOOD PRESSURE: 74 MMHG | TEMPERATURE: 97.9 F

## 2022-07-18 DIAGNOSIS — Z17.0 MALIGNANT NEOPLASM OF RIGHT BREAST IN FEMALE, ESTROGEN RECEPTOR POSITIVE, UNSPECIFIED SITE OF BREAST (HCC): Primary | ICD-10-CM

## 2022-07-18 DIAGNOSIS — C50.911 MALIGNANT NEOPLASM OF RIGHT BREAST IN FEMALE, ESTROGEN RECEPTOR POSITIVE, UNSPECIFIED SITE OF BREAST (HCC): Primary | ICD-10-CM

## 2022-07-18 DIAGNOSIS — R53.83 FATIGUE DUE TO TREATMENT: ICD-10-CM

## 2022-07-18 DIAGNOSIS — K52.1 CHEMOTHERAPY INDUCED DIARRHEA: ICD-10-CM

## 2022-07-18 DIAGNOSIS — T45.1X5A CHEMOTHERAPY INDUCED DIARRHEA: ICD-10-CM

## 2022-07-18 DIAGNOSIS — Z17.0 MALIGNANT NEOPLASM OF RIGHT BREAST IN FEMALE, ESTROGEN RECEPTOR POSITIVE, UNSPECIFIED SITE OF BREAST (HCC): ICD-10-CM

## 2022-07-18 DIAGNOSIS — C50.911 MALIGNANT NEOPLASM OF RIGHT BREAST IN FEMALE, ESTROGEN RECEPTOR POSITIVE, UNSPECIFIED SITE OF BREAST (HCC): ICD-10-CM

## 2022-07-18 DIAGNOSIS — G47.00 INSOMNIA, UNSPECIFIED TYPE: ICD-10-CM

## 2022-07-18 LAB
ALBUMIN SERPL-MCNC: 3.5 G/DL (ref 3.2–4.6)
ALBUMIN/GLOB SERPL: 1.1 {RATIO} (ref 1.2–3.5)
ALP SERPL-CCNC: 115 U/L (ref 50–136)
ALT SERPL-CCNC: 26 U/L (ref 12–65)
ANION GAP SERPL CALC-SCNC: 4 MMOL/L (ref 7–16)
AST SERPL-CCNC: 21 U/L (ref 15–37)
BASOPHILS # BLD: 0.1 K/UL (ref 0–0.2)
BASOPHILS NFR BLD: 1 % (ref 0–2)
BILIRUB SERPL-MCNC: 0.7 MG/DL (ref 0.2–1.1)
BUN SERPL-MCNC: 13 MG/DL (ref 8–23)
CALCIUM SERPL-MCNC: 8.9 MG/DL (ref 8.3–10.4)
CHLORIDE SERPL-SCNC: 109 MMOL/L (ref 98–107)
CO2 SERPL-SCNC: 27 MMOL/L (ref 21–32)
CREAT SERPL-MCNC: 0.8 MG/DL (ref 0.6–1)
DIFFERENTIAL METHOD BLD: ABNORMAL
EOSINOPHIL # BLD: 0.2 K/UL (ref 0–0.8)
EOSINOPHIL NFR BLD: 3 % (ref 0.5–7.8)
ERYTHROCYTE [DISTWIDTH] IN BLOOD BY AUTOMATED COUNT: 13.6 % (ref 11.9–14.6)
GLOBULIN SER CALC-MCNC: 3.2 G/DL (ref 2.3–3.5)
GLUCOSE SERPL-MCNC: 98 MG/DL (ref 65–100)
HCT VFR BLD AUTO: 36 % (ref 35.8–46.3)
HGB BLD-MCNC: 11.7 G/DL (ref 11.7–15.4)
IMM GRANULOCYTES # BLD AUTO: 0.1 K/UL (ref 0–0.5)
IMM GRANULOCYTES NFR BLD AUTO: 1 % (ref 0–5)
LYMPHOCYTES # BLD: 1.1 K/UL (ref 0.5–4.6)
LYMPHOCYTES NFR BLD: 17 % (ref 13–44)
MCH RBC QN AUTO: 29.3 PG (ref 26.1–32.9)
MCHC RBC AUTO-ENTMCNC: 32.5 G/DL (ref 31.4–35)
MCV RBC AUTO: 90.2 FL (ref 79.6–97.8)
MONOCYTES # BLD: 0.5 K/UL (ref 0.1–1.3)
MONOCYTES NFR BLD: 8 % (ref 4–12)
NEUTS SEG # BLD: 4.4 K/UL (ref 1.7–8.2)
NEUTS SEG NFR BLD: 71 % (ref 43–78)
NRBC # BLD: 0 K/UL (ref 0–0.2)
PLATELET # BLD AUTO: 175 K/UL (ref 150–450)
PMV BLD AUTO: 11.4 FL (ref 9.4–12.3)
POTASSIUM SERPL-SCNC: 4.5 MMOL/L (ref 3.5–5.1)
PROT SERPL-MCNC: 6.7 G/DL (ref 6.3–8.2)
RBC # BLD AUTO: 3.99 M/UL (ref 4.05–5.2)
SODIUM SERPL-SCNC: 140 MMOL/L (ref 136–145)
WBC # BLD AUTO: 6.2 K/UL (ref 4.3–11.1)

## 2022-07-18 PROCEDURE — 36591 DRAW BLOOD OFF VENOUS DEVICE: CPT

## 2022-07-18 PROCEDURE — 85025 COMPLETE CBC W/AUTO DIFF WBC: CPT

## 2022-07-18 PROCEDURE — 99214 OFFICE O/P EST MOD 30 MIN: CPT | Performed by: NURSE PRACTITIONER

## 2022-07-18 PROCEDURE — 2500000003 HC RX 250 WO HCPCS: Performed by: INTERNAL MEDICINE

## 2022-07-18 PROCEDURE — 1123F ACP DISCUSS/DSCN MKR DOCD: CPT | Performed by: NURSE PRACTITIONER

## 2022-07-18 PROCEDURE — 2580000003 HC RX 258: Performed by: INTERNAL MEDICINE

## 2022-07-18 PROCEDURE — 6360000002 HC RX W HCPCS: Performed by: INTERNAL MEDICINE

## 2022-07-18 PROCEDURE — 80053 COMPREHEN METABOLIC PANEL: CPT

## 2022-07-18 PROCEDURE — A4216 STERILE WATER/SALINE, 10 ML: HCPCS | Performed by: INTERNAL MEDICINE

## 2022-07-18 PROCEDURE — 96375 TX/PRO/DX INJ NEW DRUG ADDON: CPT

## 2022-07-18 PROCEDURE — 96417 CHEMO IV INFUS EACH ADDL SEQ: CPT

## 2022-07-18 PROCEDURE — 96413 CHEMO IV INFUSION 1 HR: CPT

## 2022-07-18 RX ORDER — SODIUM CHLORIDE 9 MG/ML
INJECTION, SOLUTION INTRAVENOUS CONTINUOUS
Status: DISCONTINUED | OUTPATIENT
Start: 2022-07-18 | End: 2022-07-19 | Stop reason: HOSPADM

## 2022-07-18 RX ORDER — DIPHENHYDRAMINE HYDROCHLORIDE 50 MG/ML
50 INJECTION INTRAMUSCULAR; INTRAVENOUS
Status: ACTIVE | OUTPATIENT
Start: 2022-07-18 | End: 2022-07-18

## 2022-07-18 RX ORDER — SODIUM CHLORIDE 0.9 % (FLUSH) 0.9 %
5-40 SYRINGE (ML) INJECTION PRN
Status: DISCONTINUED | OUTPATIENT
Start: 2022-07-18 | End: 2022-07-19 | Stop reason: HOSPADM

## 2022-07-18 RX ORDER — DIPHENHYDRAMINE HYDROCHLORIDE 50 MG/ML
50 INJECTION INTRAMUSCULAR; INTRAVENOUS ONCE
Status: COMPLETED | OUTPATIENT
Start: 2022-07-18 | End: 2022-07-18

## 2022-07-18 RX ORDER — TEMAZEPAM 15 MG/1
15 CAPSULE ORAL NIGHTLY PRN
Qty: 30 CAPSULE | Refills: 0 | Status: SHIPPED | OUTPATIENT
Start: 2022-07-18 | End: 2022-08-01

## 2022-07-18 RX ORDER — SODIUM CHLORIDE 9 MG/ML
5-250 INJECTION, SOLUTION INTRAVENOUS PRN
Status: DISCONTINUED | OUTPATIENT
Start: 2022-07-18 | End: 2022-07-19 | Stop reason: HOSPADM

## 2022-07-18 RX ORDER — DEXAMETHASONE SODIUM PHOSPHATE 10 MG/ML
10 INJECTION INTRAMUSCULAR; INTRAVENOUS ONCE
Status: COMPLETED | OUTPATIENT
Start: 2022-07-18 | End: 2022-07-18

## 2022-07-18 RX ORDER — SODIUM CHLORIDE 0.9 % (FLUSH) 0.9 %
10 SYRINGE (ML) INJECTION PRN
Status: DISCONTINUED | OUTPATIENT
Start: 2022-07-18 | End: 2022-07-19 | Stop reason: HOSPADM

## 2022-07-18 RX ORDER — ONDANSETRON 2 MG/ML
8 INJECTION INTRAMUSCULAR; INTRAVENOUS
Status: ACTIVE | OUTPATIENT
Start: 2022-07-18 | End: 2022-07-18

## 2022-07-18 RX ADMIN — Medication 10 ML: at 10:35

## 2022-07-18 RX ADMIN — DIPHENHYDRAMINE HYDROCHLORIDE 50 MG: 50 INJECTION, SOLUTION INTRAMUSCULAR; INTRAVENOUS at 12:43

## 2022-07-18 RX ADMIN — SODIUM CHLORIDE, PRESERVATIVE FREE 10 ML: 5 INJECTION INTRAVENOUS at 14:24

## 2022-07-18 RX ADMIN — SODIUM CHLORIDE 189 MG: 9 INJECTION, SOLUTION INTRAVENOUS at 12:00

## 2022-07-18 RX ADMIN — FAMOTIDINE 20 MG: 10 INJECTION, SOLUTION INTRAVENOUS at 12:35

## 2022-07-18 RX ADMIN — DEXAMETHASONE SODIUM PHOSPHATE 10 MG: 10 INJECTION INTRAMUSCULAR; INTRAVENOUS at 12:38

## 2022-07-18 RX ADMIN — SODIUM CHLORIDE 25 ML/HR: 9 INJECTION, SOLUTION INTRAVENOUS at 11:38

## 2022-07-18 RX ADMIN — SODIUM CHLORIDE, PRESERVATIVE FREE 10 ML: 5 INJECTION INTRAVENOUS at 11:38

## 2022-07-18 RX ADMIN — PACLITAXEL 162 MG: 6 INJECTION, SOLUTION, CONCENTRATE INTRAVENOUS at 13:15

## 2022-07-18 ASSESSMENT — PATIENT HEALTH QUESTIONNAIRE - PHQ9
SUM OF ALL RESPONSES TO PHQ QUESTIONS 1-9: 0
1. LITTLE INTEREST OR PLEASURE IN DOING THINGS: 0
SUM OF ALL RESPONSES TO PHQ9 QUESTIONS 1 & 2: 0
SUM OF ALL RESPONSES TO PHQ QUESTIONS 1-9: 0
2. FEELING DOWN, DEPRESSED OR HOPELESS: 0
SUM OF ALL RESPONSES TO PHQ QUESTIONS 1-9: 0
SUM OF ALL RESPONSES TO PHQ QUESTIONS 1-9: 0

## 2022-07-18 NOTE — PROGRESS NOTES
[facility]: Office Visit Established Patient    Chief Complaint:    Chief Complaint   Patient presents with    Follow-up         History of Present Illness:  Ms. Chavez Viera is a 77 y.o. female who returns today for management of breast cancer. She initially presented for a routine bilateral screening mammogram on 3/23/22 which identified a right breast mass near the 11:00-12:00 position, 6-7 cm from the nipple and other small circumscribed round masses in the right upper inner breast which had not definitely changed from prior examinations. Further evaluation with right breast ultrasound on 3/25/22 confirmed a 6 mm hypoechoic shadowing mass in the 12:00 right breast at 7 cm from the nipple. She presented to Dr. Kamron Zuñiga on 4/7/22 to discuss whether to proceed with recommended biopsy, he recommended that biopsy be performed. Ultrasound-guided biopsy was subsequently completed on 4/12/22 with pathology revealing high grade infiltrating ductal carcinoma, ER 76%, UT negative, and HER2 positive. MRI of the bilateral breasts was completed on 4/14/22 demonstrating a right anterior 12:00 breast cancer measuring up to 1.2 cm; multiple (greater than 10 total) other right upper breast masses, some of which had enlarged, concerning for additional malignancy; an indeterminate 1.2 cm right axillary lymph node; and no suspicious left breast finding. We recommended upfront surgery because of the MRI findings and the inconclusive size/clinical stage of her cancer. Path reviewed, thankfully it appears that the indeterminate lesions in the breast were benign, with the final tumor dimension being only 1.5 cm, and 4 nodes negative for tumor. Therefore, she is [id number], and would be eligible for the adjuvant paclitaxel and trastuzumab regimen. Here for follow-up and week 2 HP. She had minimal nausea not requiring intervention. She did have several days of a couple of diarrhea episodes a day.  She did not take Imodium for this and she now has a sore rectal area. Her appetite is good. She denies any mucositis. No neuropathy. No respiratory complaints. No bleeding. No fevers, chills or other infectious symptoms. Fatigued but manageable. Review of Systems:  Constitutional: Positive for fatigue. HENT: Negative. Eyes: Negative. Respiratory: Negative. Cardiovascular: Negative. Gastrointestinal: Positive for diarrhea. Genitourinary: Negative. Musculoskeletal: Negative. Skin: Negative. Neurological: Negative. Endo/Heme/Allergies: Negative. Psychiatric/Behavioral: Positive for insomnia. All other systems reviewed and are negative. Allergies   Allergen Reactions    Sulfamethoxazole-Trimethoprim Nausea And Vomiting    Meloxicam Other (See Comments)    Oxaprozin Swelling     Past Medical History:   Diagnosis Date    Anxiety     Depression 5/19/2017    Essential hypertension 05/19/2017    Fibromyalgia     History of postoperative nausea and vomiting     Hyperlipidemia     Hypothyroid     Impaired fasting glucose     Insomnia     Nausea & vomiting     Need for hepatitis C screening test 12/20/2020    3/15/21 HCV ab negative.       Obesity     Osteoarthritis 12/15/2015    Stress incontinence, female     Vitamin D deficiency      Past Surgical History:   Procedure Laterality Date    BREAST BIOPSY Bilateral     BREAST BIOPSY Right 3/29/2021    RIGHT BREAST NEEDLE LOCALIZED LUMPECTOMY X2 performed by Bentley Goyal MD at [address] [facility]    COLONOSCOPY  2012    normal; internal hemorrhoid    HYSTERECTOMY ([address])  [address] [facility] Right 6/1/2022    RIGHT SENTINEL NODE BIOPSY MAG TRACE performed by Franklin Wiley MD at [facility] Right 6/1/2022    RIGHT BREAST MASTECTOMY performed by Franklin Wiley MD at [address] [facility] [address] Left 6/1/2022    PORT INSERTION performed by Franklin Wiley MD at [facility] [place] [address] ARTHROPLASTY Bilateral     Left 2015, Right 7/29/20    US BREAST BIOPSY NEEDLE ADDITIONAL RIGHT Right 2/25/2021    US BREAST BIOPSY NEEDLE ADDITIONAL RIGHT 2/25/2021 [facility]    US BREAST NEEDLE BIOPSY RIGHT Right 2/25/2021    US BREAST NEEDLE BIOPSY RIGHT 2/25/2021 [facility]    US BREAST NEEDLE BIOPSY RIGHT Right 4/12/2022    US BREAST NEEDLE BIOPSY RIGHT 4/12/2022 [facility]    US GUIDED NEEDLE LOC BREAST ADDL RIGHT Right 3/29/2021    US GUIDED NEEDLE LOC BREAST ADDL RIGHT 3/29/2021 [facility]    US GUIDED NEEDLE LOC OF RIGHT BREAST Right 3/29/2021    US GUIDED NEEDLE LOC OF RIGHT BREAST 3/29/2021 [facility]     Family History   Problem Relation Age of Onset    No Known Problems Mother     Heart Disease Father     Breast Cancer Sister     Stroke Father      Social History     Socioeconomic History    Marital status:      Spouse name: Not on file    Number of children: Not on file    Years of education: Not on file    Highest education level: Not on file   Occupational History    Not on file   Tobacco Use    Smoking status: Never    Smokeless tobacco: Never   Substance and Sexual Activity    Alcohol use: No    Drug use: No    Sexual activity: Not on file   Other Topics Concern    Not on file   Social History Narrative    Not on file     Social Determinants of Health     Financial Resource Strain: Not on file   Food Insecurity: Not on file   Transportation Needs: Not on file   Physical Activity: Not on file   Stress: Not on file   Social Connections: Not on file   Intimate Partner Violence: Not on file   Housing Stability: Not on file     Current Outpatient Medications   Medication Sig Dispense Refill    lidocaine-prilocaine (EMLA) 2.5-2.5 % cream Apply topically as needed.  30 g 2    ondansetron (ZOFRAN) 4 MG tablet Take 2 tablets by mouth 3 times daily as needed for Nausea or Vomiting 90 tablet 2    prochlorperazine (COMPAZINE) 10 MG tablet Take 1 tablet by mouth every 6 hours as needed (nausea) 60 tablet 2    levothyroxine (SYNTHROID) 125 MCG tablet Take 1 tablet by mouth Daily TAKE 1 TABLET EVERY DAY BEFORE BREAKFAST FOR LOW THYROID HORMONE LEVELS 90 tablet 1    acetaminophen (TYLENOL) 500 MG tablet Take 1,000 mg by mouth every 6 hours as needed for Pain. Multiple Vitamin (MULTIVITAMIN ADULT PO) Take 1 tablet by mouth daily. Cholecalciferol (VITAMIN D3) 1.25 MG (38052 UT) CAPS Take 1 capsule by mouth daily. Carboxymethylcellul-Glycerin (REFRESH OPTIVE OP) Place 1 drop into both eyes daily. ibuprofen (ADVIL;MOTRIN) 400 MG tablet Take by mouth every 6 hours as needed      losartan (COZAAR) 100 MG tablet TAKE 1 TABLET EVERY DAY FOR HIGH BLOOD PRESSURE       No current facility-administered medications for this visit.      Facility-Administered Medications Ordered in Other Visits   Medication Dose Route Frequency Provider Last Rate Last Admin    sodium chloride flush 0.9 % injection 10 mL  10 mL IntraVENous PRN Sheryl Callahan MD   10 mL at 07/18/22 1035    0.9 % sodium chloride infusion  5-250 mL/hr IntraVENous PRN Sheryl Callahan MD 25 mL/hr at 07/18/22 1138 25 mL/hr at 07/18/22 1138    trastuzumab-qyyp (TRAZIMERA) 189 mg in sodium chloride 0.9 % 250 mL chemo IVPB  2 mg/kg (Treatment Plan Recorded) IntraVENous Once Sheryl Callahan MD        famotidine (PEPCID) 20 mg in sodium chloride (PF) 10 mL injection  20 mg IntraVENous Once Sheryl Callahan MD        diphenhydrAMINE (BENADRYL) injection 50 mg  50 mg IntraVENous Once Sheryl Callahan MD        dexamethasone (DECADRON) injection 10 mg  10 mg IntraVENous Once Sheryl Callahan MD        PACLitaxel (TAXOL) 162 mg in sodium chloride 0.9 % 250 mL chemo infusion  80 mg/m2 (Treatment Plan Recorded) IntraVENous Once Sheryl Callahan MD        sodium chloride flush 0.9 % injection 5-40 mL  5-40 mL IntraVENous PRN Sheryl Callahan MD   10 mL at 07/18/22 1138       OBJECTIVE:  BP (!) 150/74 (Site: Left Upper Arm, Position: Standing, Cuff Size: Large Adult)   Pulse 75   Temp 97.9 °F (36.6 °C) (Oral)   Resp 19   Ht 5' 3\" (1.6 m)   Wt 215 lb 3.2 oz (97.6 kg)   SpO2 100%   BMI 38.12 kg/m²     Physical Exam:  Constitutional: Well developed, well nourished female in no acute distress, sitting comfortably on the examination table. HEENT: Normocephalic and atraumatic. Sclerae anicteric. Skin Warm and dry. No bruising and no rash noted. No erythema. No pallor. Respiratory  Bilateral breath sounds are clear. There is no use of accessory muscles. Cardiac Heart rate and rhythm regular. No gallops, rubs, or murmurs. GI Positive bowel sounds. No tenderness, no distention. Neuro Grossly nonfocal with no obvious sensory or motor deficits. MSK Normal range of motion in general.  No edema and no tenderness. Psych Appropriate mood and affect.          Labs:  Recent Results (from the past 96 hour(s))   CBC With Auto Differential    Collection Time: 07/18/22 10:24 AM   Result Value Ref Range    WBC 6.2 4.3 - 11.1 K/uL    RBC 3.99 (L) 4.05 - 5.2 M/uL    Hemoglobin 11.7 11.7 - 15.4 g/dL    Hematocrit 36.0 35.8 - 46.3 %    MCV 90.2 79.6 - 97.8 FL    MCH 29.3 26.1 - 32.9 PG    MCHC 32.5 31.4 - 35.0 g/dL    RDW 13.6 11.9 - 14.6 %    Platelets 153 812 - 248 K/uL    MPV 11.4 9.4 - 12.3 FL    nRBC 0.00 0.0 - 0.2 K/uL    Differential Type AUTOMATED      Seg Neutrophils 71 43 - 78 %    Lymphocytes 17 13 - 44 %    Monocytes 8 4.0 - 12.0 %    Eosinophils % 3 0.5 - 7.8 %    Basophils 1 0.0 - 2.0 %    Immature Granulocytes 1 0.0 - 5.0 %    Segs Absolute 4.4 1.7 - 8.2 K/UL    Absolute Lymph # 1.1 0.5 - 4.6 K/UL    Absolute Mono # 0.5 0.1 - 1.3 K/UL    Absolute Eos # 0.2 0.0 - 0.8 K/UL    Basophils Absolute 0.1 0.0 - 0.2 K/UL    Absolute Immature Granulocyte 0.1 0.0 - 0.5 K/UL   Comprehensive Metabolic Panel    Collection Time: 07/18/22 10:24 AM   Result Value Ref Range    Sodium 140 136 - 145 mmol/L    Potassium 4.5 3.5 - 5.1 mmol/L    Chloride 109 (H) 98 - 107 mmol/L    CO2 27 21 - 32 mmol/L    Anion Gap 4 (L) 7 - 16 mmol/L    Glucose 98 65 - 100 mg/dL    BUN 13 8 - 23 MG/DL    CREATININE 0.80 0.6 - 1.0 MG/DL    GFR African American >60 >60 ml/min/1.73m2    GFR Non- >60 >60 ml/min/1.73m2    Calcium 8.9 8.3 - 10.4 MG/DL    Total Bilirubin 0.7 0.2 - 1.1 MG/DL    ALT 26 12 - 65 U/L    AST 21 15 - 37 U/L    Alk Phosphatase 115 50 - 136 U/L    Total Protein 6.7 6.3 - 8.2 g/dL    Albumin 3.5 3.2 - 4.6 g/dL    Globulin 3.2 2.3 - 3.5 g/dL    Albumin/Globulin Ratio 1.1 (L) 1.2 - 3.5         Imaging:  MRI of the Breasts with and without contrast       CLINICAL INDICATION:  New diagnosis of right 12:00 breast cancer status post   biopsy demonstrating infiltrating ductal carcinoma high-grade, poorly   differentiated. Evaluate for extent of disease, staging, therapy planning. Patient had right benign papillomas removed in 2021, and a left benign biopsy in   2011 demonstrating a 2:00 fibroadenoma. Family history of her sister having   breast cancer. .       COMPARISON: Ultrasound and mammography 4/12/2022, 3/25/2022, 3/23/2022 and prior   breast MRI 3/3/2021. TECHNIQUE: Standard MRI sequences were obtained through the breasts in multiple   planes. Images were obtained before and after intravenous infusion of 20 mL of   Prohance contrast. Images were reviewed with PACS and with [employer]Doc CAD software. FINDINGS: The breasts demonstrate mild-moderate bilateral glandularity and mild   background enhancement. Right breast: The new 12:00 biopsy clip is within an irregular heterogeneously   enhancing 1.2 x 0.8 x 1.1 cm malignant mass. As seen on prior exam a large area   of the right upper breast spanning 11:00-12:00 anterior, middle, posterior depth   (overall about 10-12 cm) contains multiple scattered masses of varying sizes and   shapes.  There are greater than 10 masses total. Anteriorly at 12:00 10 cm from   nipple an irregular heterogeneously enhancing mass measures up to 1.2 x 1.0 cm   (previously 0.6 x 0.4 cm). Posteriorly near 11:00 14 cm from nipple a lobulated   mass measures up to 1.1 x 0.4 cm (previously 0.8 x 0.3 cm). Left breast: No evidence of suspicious enhancing mass, and no dominant or unique   nonmass enhancement, to suggest additional malignancy. Lymph nodes: Right axilla demonstrates a dysmorphic 1.2 x 0.8 cm node (series 4   image 351) which has developed since the prior exam. No other axillary or   internal mammary lymphadenopathy is evident. Elsewhere: Limited visualization of the partially included thorax and upper   abdomen shows no acute abnormality. Impression       1. Right anterior 12:00 breast cancer measures up to 1.2 cm. 2. Multiple (greater than 10 total) other right upper breast masses, some of   which have enlarged, concerning for additional malignancy. 3. Right axillary indeterminate lymph node. 4. No suspicious left breast finding. Recommend continued malignancy management as directed clinically. BI-RADS Assessment Category 6: Known Biopsy Proven Malignancy             Pathology:  DIAGNOSIS        A:  \"RIGHT BREAST MASTECTOMY\":  INVASIVE DUCTAL CARCINOMA, HIGH GRADE     (3 + 3 + 2), 15 MILLIMETER IN GREATEST DIMENSION, MARGINS UNINVOLVED;   FIBROCYSTIC MASTOPATHY. B :  \"SENTINEL NODE #1\":  BENIGN LYMPH NODE, NEGATIVE FOR TUMOR. C:  \"ADDITIONAL AXILLARY TISSUE\":  FOUR BENIGN LYMPH NODES, ALL   NEGATIVE FOR TUMOR (0/4). * * *DIAGNOSIS* * * * * * * * * * * * * * * * * * * * * * * * * * * * * * *            A:  \" RIGHT BREAST, 12:00 POSITION, 7 CM FROM NIPPLE, CORE BIOPSY\":         INFILTRATING DUCTAL CARCINOMA WITH APOCRINE FEATURES, HIGH GRADE   (POORLY DIFFERENTIATED).  DEFINITE IN SITU COMPONENT AND LYMPHOVASCULAR   INVASION ARE NOT IDENTIFIED             j/4/13/2022     * * *Electronically Signed Out* * *         Sign Out Date: 2022  JAH Sifuentes M.D.           * * *PROCEDURES/ADDENDA* * * * * * * * * * * * * * * * * * * * *  * * * *                         STF- ER/MO/[id number] BY IHC                                                                                   Status:  Reviewed By:    Ana Castellanos MD on 2022                                 Interpretation                       [employer] IHC Quantitative Breast Panel     TEST NAME:                         RESULTS:               INTERNAL   CONTROLS:     ESTROGEN RECEPTOR:               Positive (76%)               Present,   Positive   PROGESTERONE RECEPTOR:          Negative (0.0%)          Present, Positive   HER-2/KELVIN:                         Positive (3+)               Percentage   of Cells with Uniform        Intense Complete Membrane   Stainin%       ASSESSMENT:   Diagnosis Orders   1. Malignant neoplasm of right breast in female, estrogen receptor positive, unspecified site of breast ([facility] [address]  Comprehensive Metabolic Panel      2. Fatigue due to treatment        3. Chemotherapy induced diarrhea              PLAN:  Lab studies were personally reviewed. Breast cancer: discovered incidentally on mammogram, 6mm on ultrasound, biopsy proven, high grade IDC, ER 76%, MO negative, HER2 positive. MRI shows greater than 10 lesions in the breast with the dominant lesion 1.2 cm, a dysmorphic indeterminate lymph node in the right axilla, and no evidence of contralateral or distant disease. Her MRI imaging has made things considerably more complicated - if she was obviously T1N0, then upfront surgery would be appropriate, lumpectomy/sentinel node followed by adjuvant Taxol/Herceptin. However, she seems to have multiple lesions that are indeterminate which make it possible that her disease is multifocal, as well as an indeterminate  axillary node. For this reason, we should at least consider neoadjuvant chemotherapy.   However, I would strongly advise at least two additional biopsies in that case, ultrasound guided biopsy of the axillary node, and possibly MRI guided biopsy (if not  visualized on ultrasound) of another in-breast lesion to confirm pathology. She is reluctant to consider additional biopsy, her rationale is that she will ultimately need surgery anyway. I explained that she will likely need mastectomy if she has upfront surgery and she is prepared for this. If she has upfront surgery, decisions on adjuvant therapy will be made based on surgical path. If she is T1N0 pathologically, then we will recommend TH, if T2N0, consider TCH, or if N1, at least consider adjuvant TCHP. Here for follow-up and week 2 HP. She had minimal nausea not requiring intervention. She did have several days of a couple of diarrhea episodes a day. She did not take Imodium for this and she now has a sore rectal area. She can treat her rectal area with barrier cream and utilize flushable wipes for comfort. She is encourage to use Imodium as needed as well. Her appetite is good. Continue good oral nutrition and hydration. She denies any mucositis. No neuropathy. No respiratory complaints. No bleeding. No fevers, chills or other infectious symptoms. Fatigued but manageable. Encouraged frequent activity throughout the day and rest as needed to combat fatigue. She is having increased insomnia - she will try Restoril 15 mg prior to bed as needed - we discussed only nights with severe insomnia. Labs reviewed and okay to proceed with week 2 HP. Continue good oral nutrition and hydration. Encouraged frequent activity throughout the day and rest as needed to combat fatigue. Call with any fevers, uncontrolled side effects from treatment or any other worrisome/concerning symptoms. Follow up in one week for cycle 3 or sooner if needed. All questions were asked and answered to the best of my ability.            Lydia Prescott, HORACIO-C  [facility]  [id number], [address] [facility]  Office : [phone]  Fax : [phone]

## 2022-07-18 NOTE — PLAN OF CARE
Problem: Safety - Adult  Goal: Free from fall injury  7/18/2022 1438 by Bin Preciado RN  Outcome: Progressing Towards Goal

## 2022-07-18 NOTE — PROGRESS NOTES
Arrived to the [facility]. Trazimera/ Taxol completed. Patient tolerated well. Any issues or concerns during appointment: None. Patient aware of next infusion appointment on 7/25 (date) at 80 (time). Patient aware of next lab and [facility] office visit on 7/25 (date) at [phone] (time). Patient instructed to call provider with temperature of 100.4 or greater or nausea/vomiting/ diarrhea or pain not controlled by medications  Discharged ambulatory in stable condition.

## 2022-07-22 DIAGNOSIS — Z17.0 MALIGNANT NEOPLASM OF RIGHT BREAST IN FEMALE, ESTROGEN RECEPTOR POSITIVE, UNSPECIFIED SITE OF BREAST (HCC): Primary | ICD-10-CM

## 2022-07-22 DIAGNOSIS — C50.911 MALIGNANT NEOPLASM OF RIGHT BREAST IN FEMALE, ESTROGEN RECEPTOR POSITIVE, UNSPECIFIED SITE OF BREAST (HCC): Primary | ICD-10-CM

## 2022-07-22 RX ORDER — SODIUM CHLORIDE 0.9 % (FLUSH) 0.9 %
10 SYRINGE (ML) INJECTION PRN
Status: CANCELLED | OUTPATIENT
Start: 2022-07-22

## 2022-07-25 ENCOUNTER — HOSPITAL ENCOUNTER (OUTPATIENT)
Dept: INFUSION THERAPY | Age: 77
End: 2022-07-25
Payer: MEDICARE

## 2022-07-25 ENCOUNTER — HOSPITAL ENCOUNTER (OUTPATIENT)
Dept: INFUSION THERAPY | Age: 77
Discharge: HOME | End: 2022-07-25
Payer: MEDICARE

## 2022-07-25 ENCOUNTER — OFFICE VISIT (OUTPATIENT)
Dept: ONCOLOGY | Age: 77
End: 2022-07-25
Payer: MEDICARE

## 2022-07-25 VITALS
DIASTOLIC BLOOD PRESSURE: 76 MMHG | HEART RATE: 79 BPM | TEMPERATURE: 98.4 F | SYSTOLIC BLOOD PRESSURE: 118 MMHG | RESPIRATION RATE: 14 BRPM | BODY MASS INDEX: 37.58 KG/M2 | WEIGHT: 212.13 LBS | OXYGEN SATURATION: 98 %

## 2022-07-25 DIAGNOSIS — Z17.0 MALIGNANT NEOPLASM OF RIGHT BREAST IN FEMALE, ESTROGEN RECEPTOR POSITIVE, UNSPECIFIED SITE OF BREAST (HCC): ICD-10-CM

## 2022-07-25 DIAGNOSIS — R53.83 FATIGUE DUE TO TREATMENT: ICD-10-CM

## 2022-07-25 DIAGNOSIS — R11.0 CHEMOTHERAPY-INDUCED NAUSEA: ICD-10-CM

## 2022-07-25 DIAGNOSIS — C50.911 MALIGNANT NEOPLASM OF RIGHT BREAST IN FEMALE, ESTROGEN RECEPTOR POSITIVE, UNSPECIFIED SITE OF BREAST (HCC): Primary | ICD-10-CM

## 2022-07-25 DIAGNOSIS — Z17.0 MALIGNANT NEOPLASM OF RIGHT BREAST IN FEMALE, ESTROGEN RECEPTOR POSITIVE, UNSPECIFIED SITE OF BREAST (HCC): Primary | ICD-10-CM

## 2022-07-25 DIAGNOSIS — T45.1X5A CHEMOTHERAPY-INDUCED NAUSEA: ICD-10-CM

## 2022-07-25 DIAGNOSIS — G47.00 INSOMNIA, UNSPECIFIED TYPE: ICD-10-CM

## 2022-07-25 DIAGNOSIS — C50.911 MALIGNANT NEOPLASM OF RIGHT BREAST IN FEMALE, ESTROGEN RECEPTOR POSITIVE, UNSPECIFIED SITE OF BREAST (HCC): ICD-10-CM

## 2022-07-25 LAB
ALBUMIN SERPL-MCNC: 3.3 G/DL (ref 3.2–4.6)
ALBUMIN/GLOB SERPL: 1 {RATIO} (ref 1.2–3.5)
ALP SERPL-CCNC: 93 U/L (ref 50–136)
ALT SERPL-CCNC: 24 U/L (ref 12–65)
ANION GAP SERPL CALC-SCNC: 5 MMOL/L (ref 7–16)
AST SERPL-CCNC: 16 U/L (ref 15–37)
BASOPHILS # BLD: 0 K/UL (ref 0–0.2)
BASOPHILS NFR BLD: 1 % (ref 0–2)
BILIRUB SERPL-MCNC: 0.8 MG/DL (ref 0.2–1.1)
BUN SERPL-MCNC: 12 MG/DL (ref 8–23)
CALCIUM SERPL-MCNC: 8.9 MG/DL (ref 8.3–10.4)
CHLORIDE SERPL-SCNC: 107 MMOL/L (ref 98–107)
CO2 SERPL-SCNC: 28 MMOL/L (ref 21–32)
CREAT SERPL-MCNC: 0.9 MG/DL (ref 0.6–1)
DIFFERENTIAL METHOD BLD: ABNORMAL
EOSINOPHIL # BLD: 0 K/UL (ref 0–0.8)
EOSINOPHIL NFR BLD: 1 % (ref 0.5–7.8)
ERYTHROCYTE [DISTWIDTH] IN BLOOD BY AUTOMATED COUNT: 13.5 % (ref 11.9–14.6)
GLOBULIN SER CALC-MCNC: 3.4 G/DL (ref 2.3–3.5)
GLUCOSE SERPL-MCNC: 111 MG/DL (ref 65–100)
HCT VFR BLD AUTO: 33.5 % (ref 35.8–46.3)
HGB BLD-MCNC: 11.5 G/DL (ref 11.7–15.4)
IMM GRANULOCYTES # BLD AUTO: 0.1 K/UL (ref 0–0.5)
IMM GRANULOCYTES NFR BLD AUTO: 1 % (ref 0–5)
LYMPHOCYTES # BLD: 0.9 K/UL (ref 0.5–4.6)
LYMPHOCYTES NFR BLD: 16 % (ref 13–44)
MCH RBC QN AUTO: 29.3 PG (ref 26.1–32.9)
MCHC RBC AUTO-ENTMCNC: 34.3 G/DL (ref 31.4–35)
MCV RBC AUTO: 85.5 FL (ref 79.6–97.8)
MONOCYTES # BLD: 0.3 K/UL (ref 0.1–1.3)
MONOCYTES NFR BLD: 6 % (ref 4–12)
NEUTS SEG # BLD: 4.5 K/UL (ref 1.7–8.2)
NEUTS SEG NFR BLD: 77 % (ref 43–78)
NRBC # BLD: 0 K/UL (ref 0–0.2)
PLATELET # BLD AUTO: 193 K/UL (ref 150–450)
PMV BLD AUTO: 10.9 FL (ref 9.4–12.3)
POTASSIUM SERPL-SCNC: 4.1 MMOL/L (ref 3.5–5.1)
PROT SERPL-MCNC: 6.7 G/DL (ref 6.3–8.2)
RBC # BLD AUTO: 3.92 M/UL (ref 4.05–5.2)
SODIUM SERPL-SCNC: 140 MMOL/L (ref 136–145)
WBC # BLD AUTO: 5.8 K/UL (ref 4.3–11.1)

## 2022-07-25 PROCEDURE — 85025 COMPLETE CBC W/AUTO DIFF WBC: CPT

## 2022-07-25 PROCEDURE — 3017F COLORECTAL CA SCREEN DOC REV: CPT | Performed by: NURSE PRACTITIONER

## 2022-07-25 PROCEDURE — 36591 DRAW BLOOD OFF VENOUS DEVICE: CPT

## 2022-07-25 PROCEDURE — 2500000003 HC RX 250 WO HCPCS: Performed by: INTERNAL MEDICINE

## 2022-07-25 PROCEDURE — 1036F TOBACCO NON-USER: CPT | Performed by: NURSE PRACTITIONER

## 2022-07-25 PROCEDURE — G8399 PT W/DXA RESULTS DOCUMENT: HCPCS | Performed by: NURSE PRACTITIONER

## 2022-07-25 PROCEDURE — 96413 CHEMO IV INFUSION 1 HR: CPT

## 2022-07-25 PROCEDURE — 1090F PRES/ABSN URINE INCON ASSESS: CPT | Performed by: NURSE PRACTITIONER

## 2022-07-25 PROCEDURE — G8417 CALC BMI ABV UP PARAM F/U: HCPCS | Performed by: NURSE PRACTITIONER

## 2022-07-25 PROCEDURE — 80053 COMPREHEN METABOLIC PANEL: CPT

## 2022-07-25 PROCEDURE — 96375 TX/PRO/DX INJ NEW DRUG ADDON: CPT

## 2022-07-25 PROCEDURE — 2580000003 HC RX 258: Performed by: INTERNAL MEDICINE

## 2022-07-25 PROCEDURE — 96417 CHEMO IV INFUS EACH ADDL SEQ: CPT

## 2022-07-25 PROCEDURE — 99214 OFFICE O/P EST MOD 30 MIN: CPT | Performed by: NURSE PRACTITIONER

## 2022-07-25 PROCEDURE — G8427 DOCREV CUR MEDS BY ELIG CLIN: HCPCS | Performed by: NURSE PRACTITIONER

## 2022-07-25 PROCEDURE — A4216 STERILE WATER/SALINE, 10 ML: HCPCS | Performed by: INTERNAL MEDICINE

## 2022-07-25 PROCEDURE — 6360000002 HC RX W HCPCS: Performed by: INTERNAL MEDICINE

## 2022-07-25 PROCEDURE — 1123F ACP DISCUSS/DSCN MKR DOCD: CPT | Performed by: NURSE PRACTITIONER

## 2022-07-25 RX ORDER — DIPHENHYDRAMINE HYDROCHLORIDE 50 MG/ML
50 INJECTION INTRAMUSCULAR; INTRAVENOUS ONCE
Status: COMPLETED | OUTPATIENT
Start: 2022-07-25 | End: 2022-07-25

## 2022-07-25 RX ORDER — SODIUM CHLORIDE 0.9 % (FLUSH) 0.9 %
5-40 SYRINGE (ML) INJECTION PRN
Status: DISCONTINUED | OUTPATIENT
Start: 2022-07-25 | End: 2022-07-26 | Stop reason: HOSPADM

## 2022-07-25 RX ORDER — SODIUM CHLORIDE 0.9 % (FLUSH) 0.9 %
10 SYRINGE (ML) INJECTION PRN
Status: DISCONTINUED | OUTPATIENT
Start: 2022-07-25 | End: 2022-07-26 | Stop reason: HOSPADM

## 2022-07-25 RX ORDER — SODIUM CHLORIDE 9 MG/ML
5-250 INJECTION, SOLUTION INTRAVENOUS PRN
Status: DISCONTINUED | OUTPATIENT
Start: 2022-07-25 | End: 2022-07-26 | Stop reason: HOSPADM

## 2022-07-25 RX ORDER — DEXAMETHASONE SODIUM PHOSPHATE 10 MG/ML
10 INJECTION INTRAMUSCULAR; INTRAVENOUS ONCE
Status: COMPLETED | OUTPATIENT
Start: 2022-07-25 | End: 2022-07-25

## 2022-07-25 RX ADMIN — DIPHENHYDRAMINE HYDROCHLORIDE 50 MG: 50 INJECTION, SOLUTION INTRAMUSCULAR; INTRAVENOUS at 11:54

## 2022-07-25 RX ADMIN — SODIUM CHLORIDE 25 ML/HR: 9 INJECTION, SOLUTION INTRAVENOUS at 11:19

## 2022-07-25 RX ADMIN — FAMOTIDINE 20 MG: 10 INJECTION, SOLUTION INTRAVENOUS at 11:55

## 2022-07-25 RX ADMIN — SODIUM CHLORIDE, PRESERVATIVE FREE 10 ML: 5 INJECTION INTRAVENOUS at 13:42

## 2022-07-25 RX ADMIN — Medication 10 ML: at 09:49

## 2022-07-25 RX ADMIN — DEXAMETHASONE SODIUM PHOSPHATE 10 MG: 10 INJECTION INTRAMUSCULAR; INTRAVENOUS at 12:04

## 2022-07-25 RX ADMIN — SODIUM CHLORIDE 189 MG: 9 INJECTION, SOLUTION INTRAVENOUS at 11:20

## 2022-07-25 RX ADMIN — PACLITAXEL 162 MG: 6 INJECTION, SOLUTION, CONCENTRATE INTRAVENOUS at 12:37

## 2022-07-25 ASSESSMENT — PATIENT HEALTH QUESTIONNAIRE - PHQ9
SUM OF ALL RESPONSES TO PHQ QUESTIONS 1-9: 0
2. FEELING DOWN, DEPRESSED OR HOPELESS: 0

## 2022-07-25 NOTE — PROGRESS NOTES
Arrived to the [facility]. Trazimera and Taxol completed. Patient tolerated without problems. Any issues or concerns during appointment: no.  Patient aware of next infusion appointment on 8/1/22 (date) at 56 (time). Patient instructed to call provider with temperature of 100.4 or greater or nausea/vomiting/ diarrhea or pain not controlled by medications  Discharged ambulatory with family.

## 2022-07-25 NOTE — PROGRESS NOTES
[facility]: Office Visit Established Patient    Chief Complaint:    Chief Complaint   Patient presents with    Follow-up         History of Present Illness:  Ms. Ranell Kocher is a 77 y.o. female who returns today for management of breast cancer. She initially presented for a routine bilateral screening mammogram on 3/23/22 which identified a right breast mass near the 11:00-12:00 position, 6-7 cm from the nipple and other small circumscribed round masses in the right upper inner breast which had not definitely changed from prior examinations. Further evaluation with right breast ultrasound on 3/25/22 confirmed a 6 mm hypoechoic shadowing mass in the 12:00 right breast at 7 cm from the nipple. She presented to Dr. Danis Crawford on 4/7/22 to discuss whether to proceed with recommended biopsy, he recommended that biopsy be performed. Ultrasound-guided biopsy was subsequently completed on 4/12/22 with pathology revealing high grade infiltrating ductal carcinoma, ER 76%, CA negative, and HER2 positive. MRI of the bilateral breasts was completed on 4/14/22 demonstrating a right anterior 12:00 breast cancer measuring up to 1.2 cm; multiple (greater than 10 total) other right upper breast masses, some of which had enlarged, concerning for additional malignancy; an indeterminate 1.2 cm right axillary lymph node; and no suspicious left breast finding. We recommended upfront surgery because of the MRI findings and the inconclusive size/clinical stage of her cancer. Path reviewed, thankfully it appears that the indeterminate lesions in the breast were benign, with the final tumor dimension being only 1.5 cm, and 4 nodes negative for tumor. Therefore, she is [id number], and would be eligible for the adjuvant paclitaxel and trastuzumab regimen. She returns today for follow-up and week 3 TH. Nausea remains well controlled and there has been no more diarrhea, however rectal discomfort is ongoing, using jadiel Paste. She also reports increased gas. No mucositis. Appetite is good and weight is stable. Fatigue is present both from treatment and ongoing insomnia. Restoril 15 mg was not helpful. There is no cough, shortness of breath, or edema. She denies any pain or neuropathy. No rash. She denies any fevers or infectious symptoms. Review of Systems:  Constitutional: Positive for fatigue. HENT: Negative. Eyes: Negative. Respiratory: Negative. Cardiovascular: Negative. Gastrointestinal: Positive for nausea    Genitourinary: Negative. Musculoskeletal: Negative. Skin: Negative. Neurological: Negative. Endo/Heme/Allergies: Negative. Psychiatric/Behavioral: Positive for insomnia. All other systems reviewed and are negative. Allergies   Allergen Reactions    Sulfamethoxazole-Trimethoprim Nausea And Vomiting    Meloxicam Other (See Comments)    Oxaprozin Swelling     Past Medical History:   Diagnosis Date    Anxiety     Depression 5/19/2017    Essential hypertension 05/19/2017    Fibromyalgia     History of postoperative nausea and vomiting     Hyperlipidemia     Hypothyroid     Impaired fasting glucose     Insomnia     Nausea & vomiting     Need for hepatitis C screening test 12/20/2020    3/15/21 HCV ab negative.       Obesity     Osteoarthritis 12/15/2015    Stress incontinence, female     Vitamin D deficiency      Past Surgical History:   Procedure Laterality Date    BREAST BIOPSY Bilateral     BREAST BIOPSY Right 3/29/2021    RIGHT BREAST NEEDLE LOCALIZED LUMPECTOMY X2 performed by Denys Berger MD at [id number] [place] [address]    COLONOSCOPY  2012    normal; internal hemorrhoid    HYSTERECTOMY ([address])  [facility] Right 6/1/2022    RIGHT SENTINEL NODE BIOPSY MAG TRACE performed by Christi Wright MD at [facility] Right 6/1/2022    RIGHT BREAST MASTECTOMY performed by Christi Wright MD at [facility] 6/1/2022    PORT INSERTION performed by Kimberlee Garg MD at [address] [place] [address] Bilateral     Left 2015, Right 7/29/20    US BREAST BIOPSY NEEDLE ADDITIONAL RIGHT Right 2/25/2021    US BREAST BIOPSY NEEDLE ADDITIONAL RIGHT 2/25/2021 [facility]    US BREAST NEEDLE BIOPSY RIGHT Right 2/25/2021    US BREAST NEEDLE BIOPSY RIGHT 2/25/2021 [facility]    US BREAST NEEDLE BIOPSY RIGHT Right 4/12/2022    US BREAST NEEDLE BIOPSY RIGHT 4/12/2022 [facility]    US GUIDED NEEDLE LOC BREAST ADDL RIGHT Right 3/29/2021    US GUIDED NEEDLE LOC BREAST ADDL RIGHT 3/29/2021 [facility]    US GUIDED NEEDLE LOC OF RIGHT BREAST Right 3/29/2021    US GUIDED NEEDLE LOC OF RIGHT BREAST 3/29/2021 [facility]     Family History   Problem Relation Age of Onset    No Known Problems Mother     Heart Disease Father     Breast Cancer Sister     Stroke Father      Social History     Socioeconomic History    Marital status:      Spouse name: Not on file    Number of children: Not on file    Years of education: Not on file    Highest education level: Not on file   Occupational History    Not on file   Tobacco Use    Smoking status: Never    Smokeless tobacco: Never   Substance and Sexual Activity    Alcohol use: No    Drug use: No    Sexual activity: Not on file   Other Topics Concern    Not on file   Social History Narrative    Not on file     Social Determinants of Health     Financial Resource Strain: Not on file   Food Insecurity: Not on file   Transportation Needs: Not on file   Physical Activity: Not on file   Stress: Not on file   Social Connections: Not on file   Intimate Partner Violence: Not on file   Housing Stability: Not on file     Current Outpatient Medications   Medication Sig Dispense Refill    temazepam (RESTORIL) 15 MG capsule Take 1 capsule by mouth nightly as needed for Sleep for up to 14 days.  30 capsule 0    lidocaine-prilocaine (EMLA) 2.5-2.5 % cream Apply topically as needed. 30 g 2    ondansetron (ZOFRAN) 4 MG tablet Take 2 tablets by mouth 3 times daily as needed for Nausea or Vomiting 90 tablet 2    prochlorperazine (COMPAZINE) 10 MG tablet Take 1 tablet by mouth every 6 hours as needed (nausea) 60 tablet 2    levothyroxine (SYNTHROID) 125 MCG tablet Take 1 tablet by mouth Daily TAKE 1 TABLET EVERY DAY BEFORE BREAKFAST FOR LOW THYROID HORMONE LEVELS 90 tablet 1    Multiple Vitamin (MULTIVITAMIN ADULT PO) Take 1 tablet by mouth daily. Cholecalciferol (VITAMIN D3) 1.25 MG (57071 UT) CAPS Take 1 capsule by mouth daily. Carboxymethylcellul-Glycerin (REFRESH OPTIVE OP) Place 1 drop into both eyes daily. ibuprofen (ADVIL;MOTRIN) 400 MG tablet Take by mouth every 6 hours as needed      losartan (COZAAR) 100 MG tablet TAKE 1 TABLET EVERY DAY FOR HIGH BLOOD PRESSURE      acetaminophen (TYLENOL) 500 MG tablet Take 1,000 mg by mouth every 6 hours as needed for Pain. (Patient not taking: Reported on 7/25/2022)       No current facility-administered medications for this visit.      Facility-Administered Medications Ordered in Other Visits   Medication Dose Route Frequency Provider Last Rate Last Admin    sodium chloride flush 0.9 % injection 10 mL  10 mL IntraVENous PRN Joaquim Max MD   10 mL at 07/25/22 0949    0.9 % sodium chloride infusion  5-250 mL/hr IntraVENous PRN Joaquim Max MD 25 mL/hr at 07/25/22 1119 25 mL/hr at 07/25/22 1119    dexamethasone (DECADRON) injection 10 mg  10 mg IntraVENous Once Joaquim Max MD        PACLitaxel (TAXOL) 162 mg in sodium chloride 0.9 % 250 mL chemo infusion  80 mg/m2 (Treatment Plan Recorded) IntraVENous Once Joaquim Max MD           OBJECTIVE:  /76 (Site: Left Upper Arm, Position: Standing)   Pulse 79   Temp 98.4 °F (36.9 °C)   Resp 14   Wt 212 lb 2 oz (96.2 kg)   SpO2 98%   BMI 37.58 kg/m²     Physical Exam:  Constitutional: Well developed, well nourished female in no acute distress, sitting comfortably on the examination table. HEENT: Normocephalic and atraumatic. Sclerae anicteric. Skin Warm and dry. No bruising and no rash noted. No erythema. No pallor. Respiratory  Bilateral breath sounds are clear. There is no use of accessory muscles. Cardiac Heart rate and rhythm regular. No gallops, rubs, or murmurs. GI Positive bowel sounds. No tenderness, no distention. Neuro Grossly nonfocal with no obvious sensory or motor deficits. MSK Normal range of motion in general.  No edema and no tenderness. Psych Appropriate mood and affect.          Labs:  Recent Results (from the past 96 hour(s))   CBC With Auto Differential    Collection Time: 07/25/22  9:38 AM   Result Value Ref Range    WBC 5.8 4.3 - 11.1 K/uL    RBC 3.92 (L) 4.05 - 5.2 M/uL    Hemoglobin 11.5 (L) 11.7 - 15.4 g/dL    Hematocrit 33.5 (L) 35.8 - 46.3 %    MCV 85.5 79.6 - 97.8 FL    MCH 29.3 26.1 - 32.9 PG    MCHC 34.3 31.4 - 35.0 g/dL    RDW 13.5 11.9 - 14.6 %    Platelets 942 651 - 240 K/uL    MPV 10.9 9.4 - 12.3 FL    nRBC 0.00 0.0 - 0.2 K/uL    Differential Type AUTOMATED      Seg Neutrophils 77 43 - 78 %    Lymphocytes 16 13 - 44 %    Monocytes 6 4.0 - 12.0 %    Eosinophils % 1 0.5 - 7.8 %    Basophils 1 0.0 - 2.0 %    Immature Granulocytes 1 0.0 - 5.0 %    Segs Absolute 4.5 1.7 - 8.2 K/UL    Absolute Lymph # 0.9 0.5 - 4.6 K/UL    Absolute Mono # 0.3 0.1 - 1.3 K/UL    Absolute Eos # 0.0 0.0 - 0.8 K/UL    Basophils Absolute 0.0 0.0 - 0.2 K/UL    Absolute Immature Granulocyte 0.1 0.0 - 0.5 K/UL   Comprehensive Metabolic Panel    Collection Time: 07/25/22  9:38 AM   Result Value Ref Range    Sodium 140 136 - 145 mmol/L    Potassium 4.1 3.5 - 5.1 mmol/L    Chloride 107 98 - 107 mmol/L    CO2 28 21 - 32 mmol/L    Anion Gap 5 (L) 7 - 16 mmol/L    Glucose 111 (H) 65 - 100 mg/dL    BUN 12 8 - 23 MG/DL    Creatinine 0.90 0.6 - 1.0 MG/DL    GFR African American >60 >60 ml/min/1.73m2    GFR Non- >60 >60 ml/min/1.73m2    Calcium 8.9 8.3 - 10.4 MG/DL    Total Bilirubin 0.8 0.2 - 1.1 MG/DL    ALT 24 12 - 65 U/L    AST 16 15 - 37 U/L    Alk Phosphatase 93 50 - 136 U/L    Total Protein 6.7 6.3 - 8.2 g/dL    Albumin 3.3 3.2 - 4.6 g/dL    Globulin 3.4 2.3 - 3.5 g/dL    Albumin/Globulin Ratio 1.0 (L) 1.2 - 3.5         Imaging:  MRI of the Breasts with and without contrast       CLINICAL INDICATION:  New diagnosis of right 12:00 breast cancer status post   biopsy demonstrating infiltrating ductal carcinoma high-grade, poorly   differentiated. Evaluate for extent of disease, staging, therapy planning. Patient had right benign papillomas removed in 2021, and a left benign biopsy in   2011 demonstrating a 2:00 fibroadenoma. Family history of her sister having   breast cancer. .       COMPARISON: Ultrasound and mammography 4/12/2022, 3/25/2022, 3/23/2022 and prior   breast MRI 3/3/2021. TECHNIQUE: Standard MRI sequences were obtained through the breasts in multiple   planes. Images were obtained before and after intravenous infusion of 20 mL of   Prohance contrast. Images were reviewed with PACS and with [employer] CAD software. FINDINGS: The breasts demonstrate mild-moderate bilateral glandularity and mild   background enhancement. Right breast: The new 12:00 biopsy clip is within an irregular heterogeneously   enhancing 1.2 x 0.8 x 1.1 cm malignant mass. As seen on prior exam a large area   of the right upper breast spanning 11:00-12:00 anterior, middle, posterior depth   (overall about 10-12 cm) contains multiple scattered masses of varying sizes and   shapes. There are greater than 10 masses total. Anteriorly at 12:00 10 cm from   nipple an irregular heterogeneously enhancing mass measures up to 1.2 x 1.0 cm   (previously 0.6 x 0.4 cm). Posteriorly near 11:00 14 cm from nipple a lobulated   mass measures up to 1.1 x 0.4 cm (previously 0.8 x 0.3 cm).        Left breast: No evidence of suspicious enhancing mass, and no dominant or unique   nonmass enhancement, to suggest additional malignancy. Lymph nodes: Right axilla demonstrates a dysmorphic 1.2 x 0.8 cm node (series 4   image 351) which has developed since the prior exam. No other axillary or   internal mammary lymphadenopathy is evident. Elsewhere: Limited visualization of the partially included thorax and upper   abdomen shows no acute abnormality. Impression       1. Right anterior 12:00 breast cancer measures up to 1.2 cm. 2. Multiple (greater than 10 total) other right upper breast masses, some of   which have enlarged, concerning for additional malignancy. 3. Right axillary indeterminate lymph node. 4. No suspicious left breast finding. Recommend continued malignancy management as directed clinically. BI-RADS Assessment Category 6: Known Biopsy Proven Malignancy             Pathology:  DIAGNOSIS        A:  \"RIGHT BREAST MASTECTOMY\":  INVASIVE DUCTAL CARCINOMA, HIGH GRADE     (3 + 3 + 2), 15 MILLIMETER IN GREATEST DIMENSION, MARGINS UNINVOLVED;   FIBROCYSTIC MASTOPATHY. B :  \"SENTINEL NODE #1\":  BENIGN LYMPH NODE, NEGATIVE FOR TUMOR. C:  \"ADDITIONAL AXILLARY TISSUE\":  FOUR BENIGN LYMPH NODES, ALL   NEGATIVE FOR TUMOR (0/4). * * *DIAGNOSIS* * * * * * * * * * * * * * * * * * * * * * * * * * * * * * *            A:  \" RIGHT BREAST, 12:00 POSITION, 7 CM FROM NIPPLE, CORE BIOPSY\":         INFILTRATING DUCTAL CARCINOMA WITH APOCRINE FEATURES, HIGH GRADE   (POORLY DIFFERENTIATED). DEFINITE IN SITU COMPONENT AND LYMPHOVASCULAR   INVASION ARE NOT IDENTIFIED             [facility]/4/13/2022     * * *Electronically Signed Out* * *         Sign Out Date: 4/13/2022  JAH Gauthier M.D.           * * *PROCEDURES/ADDENDA* * * * * * * * * * * * * * * * * * * * *  * * * *                         STF- ER/IA/[id number] BY IHC                                                                                   Status: Reviewed By:    Cynthia Rivera MD on 2022                                 Interpretation                       [facility] IHC Quantitative Breast Panel     TEST NAME:                         RESULTS:               INTERNAL   CONTROLS:     ESTROGEN RECEPTOR:               Positive (76%)               Present,   Positive   PROGESTERONE RECEPTOR:          Negative (0.0%)          Present, Positive   HER-2/KELVIN:                         Positive (3+)               Percentage   of Cells with Uniform        Intense Complete Membrane   Stainin%       ASSESSMENT:   Diagnosis Orders   1. Malignant neoplasm of right breast in female, estrogen receptor positive, unspecified site of breast ([address]        2. Fatigue due to treatment        3. Insomnia, unspecified type        4. Chemotherapy-induced nausea              PLAN:  Lab studies were personally reviewed. Breast cancer: discovered incidentally on mammogram, 6mm on ultrasound, biopsy proven, high grade IDC, ER 76%, NJ negative, HER2 positive. MRI shows greater than 10 lesions in the breast with the dominant lesion 1.2 cm, a dysmorphic indeterminate lymph node in the right axilla, and no evidence of contralateral or distant disease. Her MRI imaging has made things considerably more complicated - if she was obviously T1N0, then upfront surgery would be appropriate, lumpectomy/sentinel node followed by adjuvant Taxol/Herceptin. However, she seems to have multiple lesions that are indeterminate which make it possible that her disease is multifocal, as well as an indeterminate  axillary node. For this reason, we should at least consider neoadjuvant chemotherapy. However, I would strongly advise at least two additional biopsies in that case, ultrasound guided biopsy of the axillary node, and possibly MRI guided biopsy (if not  visualized on ultrasound) of another in-breast lesion to confirm pathology.   She is reluctant to consider additional biopsy, her rationale is that she will ultimately need surgery anyway. I explained that she will likely need mastectomy if she has upfront surgery and she is prepared for this. If she has upfront surgery, decisions on adjuvant therapy will be made based on surgical path. If she is T1N0 pathologically, then we will recommend TH, if T2N0, consider TCH, or if N1, at least consider adjuvant TCHP. She returns today for follow-up and week 3 TH. Nausea remains well controlled and there has been no more diarrhea, however rectal discomfort is ongoing, using jadiel Paste. She also reports increased gas. No mucositis. Appetite is good and weight is stable. Fatigue is present both from treatment and ongoing insomnia. Restoril 15 mg was not helpful. There is no cough, shortness of breath, or edema. She denies any pain or neuropathy. No rash. She denies any fevers or infectious symptoms. Labs reviewed and okay to proceed with week 3 TH. Continue good oral nutrition and hydration. Continue with anti-emetics prn. Try increasing Restoril to 30 mg Encouraged frequent activity throughout the day and rest as needed to combat fatigue. Call with any fevers, uncontrolled side effects from treatment or any other worrisome/concerning symptoms. Follow up next week for week 4 or sooner if needed. All questions were asked and answered to the best of my ability.            EDISON Willingham  Hematology and Oncology  [id number] [facility], [address] [place] [address]  Office : [phone]  Fax : [phone]

## 2022-07-27 DIAGNOSIS — C50.911 MALIGNANT NEOPLASM OF RIGHT BREAST IN FEMALE, ESTROGEN RECEPTOR POSITIVE, UNSPECIFIED SITE OF BREAST (HCC): Primary | ICD-10-CM

## 2022-07-27 DIAGNOSIS — Z17.0 MALIGNANT NEOPLASM OF RIGHT BREAST IN FEMALE, ESTROGEN RECEPTOR POSITIVE, UNSPECIFIED SITE OF BREAST (HCC): Primary | ICD-10-CM

## 2022-08-01 ENCOUNTER — HOSPITAL ENCOUNTER (OUTPATIENT)
Dept: INFUSION THERAPY | Age: 77
Discharge: HOME | End: 2022-08-01
Payer: MEDICARE

## 2022-08-01 ENCOUNTER — OFFICE VISIT (OUTPATIENT)
Dept: ONCOLOGY | Age: 77
End: 2022-08-01
Payer: MEDICARE

## 2022-08-01 VITALS
HEART RATE: 71 BPM | TEMPERATURE: 98.6 F | HEIGHT: 63 IN | SYSTOLIC BLOOD PRESSURE: 150 MMHG | RESPIRATION RATE: 16 BRPM | WEIGHT: 213.1 LBS | BODY MASS INDEX: 37.76 KG/M2 | DIASTOLIC BLOOD PRESSURE: 70 MMHG | OXYGEN SATURATION: 98 %

## 2022-08-01 DIAGNOSIS — Z17.0 MALIGNANT NEOPLASM OF RIGHT BREAST IN FEMALE, ESTROGEN RECEPTOR POSITIVE, UNSPECIFIED SITE OF BREAST (HCC): Primary | ICD-10-CM

## 2022-08-01 DIAGNOSIS — C50.911 MALIGNANT NEOPLASM OF RIGHT BREAST IN FEMALE, ESTROGEN RECEPTOR POSITIVE, UNSPECIFIED SITE OF BREAST (HCC): Primary | ICD-10-CM

## 2022-08-01 DIAGNOSIS — C50.911 MALIGNANT NEOPLASM OF RIGHT BREAST IN FEMALE, ESTROGEN RECEPTOR POSITIVE, UNSPECIFIED SITE OF BREAST (HCC): ICD-10-CM

## 2022-08-01 DIAGNOSIS — Z17.0 MALIGNANT NEOPLASM OF RIGHT BREAST IN FEMALE, ESTROGEN RECEPTOR POSITIVE, UNSPECIFIED SITE OF BREAST (HCC): ICD-10-CM

## 2022-08-01 LAB
ALBUMIN SERPL-MCNC: 3.2 G/DL (ref 3.2–4.6)
ALBUMIN/GLOB SERPL: 0.9 {RATIO} (ref 1.2–3.5)
ALP SERPL-CCNC: 87 U/L (ref 50–136)
ALT SERPL-CCNC: 23 U/L (ref 12–65)
ANION GAP SERPL CALC-SCNC: 3 MMOL/L (ref 7–16)
AST SERPL-CCNC: 21 U/L (ref 15–37)
BASOPHILS # BLD: 0 K/UL (ref 0–0.2)
BASOPHILS NFR BLD: 1 % (ref 0–2)
BILIRUB SERPL-MCNC: 0.4 MG/DL (ref 0.2–1.1)
BUN SERPL-MCNC: 11 MG/DL (ref 8–23)
CALCIUM SERPL-MCNC: 9.1 MG/DL (ref 8.3–10.4)
CHLORIDE SERPL-SCNC: 109 MMOL/L (ref 98–107)
CO2 SERPL-SCNC: 27 MMOL/L (ref 21–32)
CREAT SERPL-MCNC: 0.7 MG/DL (ref 0.6–1)
DIFFERENTIAL METHOD BLD: ABNORMAL
EOSINOPHIL # BLD: 0 K/UL (ref 0–0.8)
EOSINOPHIL NFR BLD: 1 % (ref 0.5–7.8)
ERYTHROCYTE [DISTWIDTH] IN BLOOD BY AUTOMATED COUNT: 13.9 % (ref 11.9–14.6)
GLOBULIN SER CALC-MCNC: 3.4 G/DL (ref 2.3–3.5)
GLUCOSE SERPL-MCNC: 99 MG/DL (ref 65–100)
HCT VFR BLD AUTO: 34.4 % (ref 35.8–46.3)
HGB BLD-MCNC: 11.2 G/DL (ref 11.7–15.4)
IMM GRANULOCYTES # BLD AUTO: 0 K/UL (ref 0–0.5)
IMM GRANULOCYTES NFR BLD AUTO: 1 % (ref 0–5)
LYMPHOCYTES # BLD: 1.1 K/UL (ref 0.5–4.6)
LYMPHOCYTES NFR BLD: 27 % (ref 13–44)
MAGNESIUM SERPL-MCNC: 1.7 MG/DL (ref 1.8–2.4)
MCH RBC QN AUTO: 29.2 PG (ref 26.1–32.9)
MCHC RBC AUTO-ENTMCNC: 32.6 G/DL (ref 31.4–35)
MCV RBC AUTO: 89.8 FL (ref 79.6–97.8)
MONOCYTES # BLD: 0.3 K/UL (ref 0.1–1.3)
MONOCYTES NFR BLD: 8 % (ref 4–12)
NEUTS SEG # BLD: 2.5 K/UL (ref 1.7–8.2)
NEUTS SEG NFR BLD: 62 % (ref 43–78)
NRBC # BLD: 0 K/UL (ref 0–0.2)
PLATELET # BLD AUTO: 206 K/UL (ref 150–450)
PMV BLD AUTO: 10.8 FL (ref 9.4–12.3)
POTASSIUM SERPL-SCNC: 4.3 MMOL/L (ref 3.5–5.1)
PROT SERPL-MCNC: 6.6 G/DL (ref 6.3–8.2)
RBC # BLD AUTO: 3.83 M/UL (ref 4.05–5.2)
SODIUM SERPL-SCNC: 139 MMOL/L (ref 136–145)
WBC # BLD AUTO: 3.9 K/UL (ref 4.3–11.1)

## 2022-08-01 PROCEDURE — 83735 ASSAY OF MAGNESIUM: CPT

## 2022-08-01 PROCEDURE — 96375 TX/PRO/DX INJ NEW DRUG ADDON: CPT

## 2022-08-01 PROCEDURE — 80053 COMPREHEN METABOLIC PANEL: CPT

## 2022-08-01 PROCEDURE — 2580000003 HC RX 258: Performed by: INTERNAL MEDICINE

## 2022-08-01 PROCEDURE — A4216 STERILE WATER/SALINE, 10 ML: HCPCS | Performed by: INTERNAL MEDICINE

## 2022-08-01 PROCEDURE — 6360000002 HC RX W HCPCS: Performed by: INTERNAL MEDICINE

## 2022-08-01 PROCEDURE — 85025 COMPLETE CBC W/AUTO DIFF WBC: CPT

## 2022-08-01 PROCEDURE — 96413 CHEMO IV INFUSION 1 HR: CPT

## 2022-08-01 PROCEDURE — 2500000003 HC RX 250 WO HCPCS: Performed by: INTERNAL MEDICINE

## 2022-08-01 PROCEDURE — 1123F ACP DISCUSS/DSCN MKR DOCD: CPT | Performed by: INTERNAL MEDICINE

## 2022-08-01 PROCEDURE — 1036F TOBACCO NON-USER: CPT | Performed by: INTERNAL MEDICINE

## 2022-08-01 PROCEDURE — 96417 CHEMO IV INFUS EACH ADDL SEQ: CPT

## 2022-08-01 PROCEDURE — 3017F COLORECTAL CA SCREEN DOC REV: CPT | Performed by: INTERNAL MEDICINE

## 2022-08-01 PROCEDURE — 36591 DRAW BLOOD OFF VENOUS DEVICE: CPT

## 2022-08-01 PROCEDURE — 99214 OFFICE O/P EST MOD 30 MIN: CPT | Performed by: INTERNAL MEDICINE

## 2022-08-01 PROCEDURE — G8417 CALC BMI ABV UP PARAM F/U: HCPCS | Performed by: INTERNAL MEDICINE

## 2022-08-01 PROCEDURE — G8428 CUR MEDS NOT DOCUMENT: HCPCS | Performed by: INTERNAL MEDICINE

## 2022-08-01 PROCEDURE — 1090F PRES/ABSN URINE INCON ASSESS: CPT | Performed by: INTERNAL MEDICINE

## 2022-08-01 PROCEDURE — G8399 PT W/DXA RESULTS DOCUMENT: HCPCS | Performed by: INTERNAL MEDICINE

## 2022-08-01 RX ORDER — FAMOTIDINE 10 MG/ML
20 INJECTION, SOLUTION INTRAVENOUS ONCE
Status: CANCELLED | OUTPATIENT
Start: 2022-08-08 | End: 2022-08-08

## 2022-08-01 RX ORDER — DEXAMETHASONE SODIUM PHOSPHATE 10 MG/ML
10 INJECTION INTRAMUSCULAR; INTRAVENOUS ONCE
Status: COMPLETED | OUTPATIENT
Start: 2022-08-01 | End: 2022-08-01

## 2022-08-01 RX ORDER — HEPARIN SODIUM (PORCINE) LOCK FLUSH IV SOLN 100 UNIT/ML 100 UNIT/ML
500 SOLUTION INTRAVENOUS PRN
Status: CANCELLED | OUTPATIENT
Start: 2022-08-01

## 2022-08-01 RX ORDER — ONDANSETRON 2 MG/ML
8 INJECTION INTRAMUSCULAR; INTRAVENOUS
Status: CANCELLED | OUTPATIENT
Start: 2022-08-15

## 2022-08-01 RX ORDER — ALBUTEROL SULFATE 90 UG/1
4 AEROSOL, METERED RESPIRATORY (INHALATION) PRN
Status: CANCELLED | OUTPATIENT
Start: 2022-08-08

## 2022-08-01 RX ORDER — SODIUM CHLORIDE 9 MG/ML
5-250 INJECTION, SOLUTION INTRAVENOUS PRN
Status: CANCELLED | OUTPATIENT
Start: 2022-08-01

## 2022-08-01 RX ORDER — SODIUM CHLORIDE 9 MG/ML
5-250 INJECTION, SOLUTION INTRAVENOUS PRN
Status: DISCONTINUED | OUTPATIENT
Start: 2022-08-01 | End: 2022-08-02 | Stop reason: HOSPADM

## 2022-08-01 RX ORDER — SODIUM CHLORIDE 9 MG/ML
5-40 INJECTION INTRAVENOUS PRN
Status: CANCELLED | OUTPATIENT
Start: 2022-08-15

## 2022-08-01 RX ORDER — ACETAMINOPHEN 325 MG/1
650 TABLET ORAL
Status: CANCELLED | OUTPATIENT
Start: 2022-08-01

## 2022-08-01 RX ORDER — SODIUM CHLORIDE 9 MG/ML
5-40 INJECTION INTRAVENOUS PRN
Status: CANCELLED | OUTPATIENT
Start: 2022-08-08

## 2022-08-01 RX ORDER — SODIUM CHLORIDE 0.9 % (FLUSH) 0.9 %
10 SYRINGE (ML) INJECTION PRN
Status: DISCONTINUED | OUTPATIENT
Start: 2022-08-01 | End: 2022-08-02 | Stop reason: HOSPADM

## 2022-08-01 RX ORDER — EPINEPHRINE 1 MG/ML
0.3 INJECTION, SOLUTION, CONCENTRATE INTRAVENOUS PRN
Status: CANCELLED | OUTPATIENT
Start: 2022-08-08

## 2022-08-01 RX ORDER — ALBUTEROL SULFATE 90 UG/1
4 AEROSOL, METERED RESPIRATORY (INHALATION) PRN
Status: CANCELLED | OUTPATIENT
Start: 2022-08-15

## 2022-08-01 RX ORDER — MEPERIDINE HYDROCHLORIDE 50 MG/ML
12.5 INJECTION INTRAMUSCULAR; INTRAVENOUS; SUBCUTANEOUS PRN
Status: CANCELLED | OUTPATIENT
Start: 2022-08-08

## 2022-08-01 RX ORDER — ONDANSETRON 2 MG/ML
8 INJECTION INTRAMUSCULAR; INTRAVENOUS
Status: CANCELLED | OUTPATIENT
Start: 2022-08-01

## 2022-08-01 RX ORDER — FAMOTIDINE 10 MG/ML
20 INJECTION, SOLUTION INTRAVENOUS
Status: CANCELLED | OUTPATIENT
Start: 2022-08-01

## 2022-08-01 RX ORDER — HEPARIN SODIUM (PORCINE) LOCK FLUSH IV SOLN 100 UNIT/ML 100 UNIT/ML
500 SOLUTION INTRAVENOUS PRN
Status: CANCELLED | OUTPATIENT
Start: 2022-08-08

## 2022-08-01 RX ORDER — FAMOTIDINE 10 MG/ML
20 INJECTION, SOLUTION INTRAVENOUS ONCE
Status: CANCELLED | OUTPATIENT
Start: 2022-08-01 | End: 2022-08-01

## 2022-08-01 RX ORDER — FAMOTIDINE 10 MG/ML
20 INJECTION, SOLUTION INTRAVENOUS
Status: CANCELLED | OUTPATIENT
Start: 2022-08-15

## 2022-08-01 RX ORDER — SODIUM CHLORIDE 9 MG/ML
5-250 INJECTION, SOLUTION INTRAVENOUS PRN
Status: CANCELLED | OUTPATIENT
Start: 2022-08-15

## 2022-08-01 RX ORDER — DIPHENHYDRAMINE HYDROCHLORIDE 50 MG/ML
50 INJECTION INTRAMUSCULAR; INTRAVENOUS
Status: CANCELLED | OUTPATIENT
Start: 2022-08-15

## 2022-08-01 RX ORDER — MEPERIDINE HYDROCHLORIDE 50 MG/ML
12.5 INJECTION INTRAMUSCULAR; INTRAVENOUS; SUBCUTANEOUS PRN
Status: CANCELLED | OUTPATIENT
Start: 2022-08-01

## 2022-08-01 RX ORDER — DIPHENHYDRAMINE HYDROCHLORIDE 50 MG/ML
50 INJECTION INTRAMUSCULAR; INTRAVENOUS ONCE
Status: CANCELLED | OUTPATIENT
Start: 2022-08-08 | End: 2022-08-08

## 2022-08-01 RX ORDER — DIPHENHYDRAMINE HYDROCHLORIDE 50 MG/ML
50 INJECTION INTRAMUSCULAR; INTRAVENOUS
Status: CANCELLED | OUTPATIENT
Start: 2022-08-01

## 2022-08-01 RX ORDER — EPINEPHRINE 1 MG/ML
0.3 INJECTION, SOLUTION, CONCENTRATE INTRAVENOUS PRN
Status: CANCELLED | OUTPATIENT
Start: 2022-08-01

## 2022-08-01 RX ORDER — ACETAMINOPHEN 325 MG/1
650 TABLET ORAL
Status: CANCELLED | OUTPATIENT
Start: 2022-08-08

## 2022-08-01 RX ORDER — DIPHENHYDRAMINE HYDROCHLORIDE 50 MG/ML
50 INJECTION INTRAMUSCULAR; INTRAVENOUS ONCE
Status: COMPLETED | OUTPATIENT
Start: 2022-08-01 | End: 2022-08-01

## 2022-08-01 RX ORDER — SODIUM CHLORIDE 0.9 % (FLUSH) 0.9 %
5-40 SYRINGE (ML) INJECTION PRN
Status: CANCELLED | OUTPATIENT
Start: 2022-08-01

## 2022-08-01 RX ORDER — ALBUTEROL SULFATE 90 UG/1
4 AEROSOL, METERED RESPIRATORY (INHALATION) PRN
Status: CANCELLED | OUTPATIENT
Start: 2022-08-01

## 2022-08-01 RX ORDER — ONDANSETRON 2 MG/ML
8 INJECTION INTRAMUSCULAR; INTRAVENOUS
Status: CANCELLED | OUTPATIENT
Start: 2022-08-08

## 2022-08-01 RX ORDER — FAMOTIDINE 10 MG/ML
20 INJECTION, SOLUTION INTRAVENOUS
Status: CANCELLED | OUTPATIENT
Start: 2022-08-08

## 2022-08-01 RX ORDER — SODIUM CHLORIDE 9 MG/ML
5-250 INJECTION, SOLUTION INTRAVENOUS PRN
Status: CANCELLED | OUTPATIENT
Start: 2022-08-08

## 2022-08-01 RX ORDER — SODIUM CHLORIDE 9 MG/ML
INJECTION, SOLUTION INTRAVENOUS CONTINUOUS
Status: CANCELLED | OUTPATIENT
Start: 2022-08-01

## 2022-08-01 RX ORDER — ACETAMINOPHEN 325 MG/1
650 TABLET ORAL
Status: CANCELLED | OUTPATIENT
Start: 2022-08-15

## 2022-08-01 RX ORDER — SODIUM CHLORIDE 9 MG/ML
INJECTION, SOLUTION INTRAVENOUS CONTINUOUS
Status: CANCELLED | OUTPATIENT
Start: 2022-08-08

## 2022-08-01 RX ORDER — DIPHENHYDRAMINE HYDROCHLORIDE 50 MG/ML
50 INJECTION INTRAMUSCULAR; INTRAVENOUS ONCE
Status: CANCELLED | OUTPATIENT
Start: 2022-08-15 | End: 2022-08-15

## 2022-08-01 RX ORDER — SODIUM CHLORIDE 0.9 % (FLUSH) 0.9 %
5-40 SYRINGE (ML) INJECTION PRN
Status: CANCELLED | OUTPATIENT
Start: 2022-08-15

## 2022-08-01 RX ORDER — HEPARIN SODIUM (PORCINE) LOCK FLUSH IV SOLN 100 UNIT/ML 100 UNIT/ML
500 SOLUTION INTRAVENOUS PRN
Status: CANCELLED | OUTPATIENT
Start: 2022-08-15

## 2022-08-01 RX ORDER — SODIUM CHLORIDE 0.9 % (FLUSH) 0.9 %
5-40 SYRINGE (ML) INJECTION PRN
Status: DISCONTINUED | OUTPATIENT
Start: 2022-08-01 | End: 2022-08-02 | Stop reason: HOSPADM

## 2022-08-01 RX ORDER — DIPHENHYDRAMINE HYDROCHLORIDE 50 MG/ML
50 INJECTION INTRAMUSCULAR; INTRAVENOUS
Status: CANCELLED | OUTPATIENT
Start: 2022-08-08

## 2022-08-01 RX ORDER — SODIUM CHLORIDE 0.9 % (FLUSH) 0.9 %
5-40 SYRINGE (ML) INJECTION PRN
Status: CANCELLED | OUTPATIENT
Start: 2022-08-08

## 2022-08-01 RX ORDER — DIPHENHYDRAMINE HYDROCHLORIDE 50 MG/ML
50 INJECTION INTRAMUSCULAR; INTRAVENOUS ONCE
Status: CANCELLED | OUTPATIENT
Start: 2022-08-01 | End: 2022-08-01

## 2022-08-01 RX ORDER — EPINEPHRINE 1 MG/ML
0.3 INJECTION, SOLUTION, CONCENTRATE INTRAVENOUS PRN
Status: CANCELLED | OUTPATIENT
Start: 2022-08-15

## 2022-08-01 RX ORDER — SODIUM CHLORIDE 9 MG/ML
5-40 INJECTION INTRAVENOUS PRN
Status: CANCELLED | OUTPATIENT
Start: 2022-08-01

## 2022-08-01 RX ORDER — FAMOTIDINE 10 MG/ML
20 INJECTION, SOLUTION INTRAVENOUS ONCE
Status: CANCELLED | OUTPATIENT
Start: 2022-08-15 | End: 2022-08-15

## 2022-08-01 RX ORDER — SODIUM CHLORIDE 9 MG/ML
INJECTION, SOLUTION INTRAVENOUS CONTINUOUS
Status: CANCELLED | OUTPATIENT
Start: 2022-08-15

## 2022-08-01 RX ORDER — MEPERIDINE HYDROCHLORIDE 50 MG/ML
12.5 INJECTION INTRAMUSCULAR; INTRAVENOUS; SUBCUTANEOUS PRN
Status: CANCELLED | OUTPATIENT
Start: 2022-08-15

## 2022-08-01 RX ADMIN — Medication 10 ML: at 09:57

## 2022-08-01 RX ADMIN — SODIUM CHLORIDE 189 MG: 9 INJECTION, SOLUTION INTRAVENOUS at 12:11

## 2022-08-01 RX ADMIN — PACLITAXEL 162 MG: 6 INJECTION, SOLUTION, CONCENTRATE INTRAVENOUS at 13:23

## 2022-08-01 RX ADMIN — DIPHENHYDRAMINE HYDROCHLORIDE 50 MG: 50 INJECTION, SOLUTION INTRAMUSCULAR; INTRAVENOUS at 12:49

## 2022-08-01 RX ADMIN — SODIUM CHLORIDE 25 ML/HR: 9 INJECTION, SOLUTION INTRAVENOUS at 11:38

## 2022-08-01 RX ADMIN — DEXAMETHASONE SODIUM PHOSPHATE 10 MG: 10 INJECTION INTRAMUSCULAR; INTRAVENOUS at 12:52

## 2022-08-01 RX ADMIN — FAMOTIDINE 20 MG: 10 INJECTION, SOLUTION INTRAVENOUS at 12:48

## 2022-08-01 RX ADMIN — SODIUM CHLORIDE, PRESERVATIVE FREE 10 ML: 5 INJECTION INTRAVENOUS at 11:37

## 2022-08-01 ASSESSMENT — PATIENT HEALTH QUESTIONNAIRE - PHQ9
SUM OF ALL RESPONSES TO PHQ QUESTIONS 1-9: 0
2. FEELING DOWN, DEPRESSED OR HOPELESS: 0
SUM OF ALL RESPONSES TO PHQ QUESTIONS 1-9: 0
2. FEELING DOWN, DEPRESSED OR HOPELESS: 0
SUM OF ALL RESPONSES TO PHQ QUESTIONS 1-9: 0

## 2022-08-01 NOTE — PROGRESS NOTES
[facility] Hematology and Oncology: Office Visit Established Patient    Chief Complaint:    Chief Complaint   Patient presents with    Follow-up         History of Present Illness:  Ms. Deja Calero is a 77 y.o. female who returns today for management of breast cancer. She initially presented for a routine bilateral screening mammogram on 3/23/22 which identified a right breast mass near the 11:00-12:00 position, 6-7 cm from the nipple and other small circumscribed round masses in the right upper inner breast which had not definitely changed from prior examinations. Further evaluation with right breast ultrasound on 3/25/22 confirmed a 6 mm hypoechoic shadowing mass in the 12:00 right breast at 7 cm from the nipple. She presented to Dr. Shaista Echevarria on 4/7/22 to discuss whether to proceed with recommended biopsy, he recommended that biopsy be performed. Ultrasound-guided biopsy was subsequently completed on 4/12/22 with pathology revealing high grade infiltrating ductal carcinoma, ER 76%, W[place] negative, and HER2 positive. MRI of the bilateral breasts was completed on 4/14/22 demonstrating a right anterior 12:00 breast cancer measuring up to 1.2 cm; multiple (greater than 10 total) other right upper breast masses, some of which had enlarged, concerning for additional malignancy; an indeterminate 1.2 cm right axillary lymph node; and no suspicious left breast finding. We recommended upfront surgery because of the MRI findings and the inconclusive size/clinical stage of her cancer. Path reviewed, thankfully it appears that the indeterminate lesions in the breast were benign, with the final tumor dimension being only 1.5 cm, and 4 nodes negative for tumor. Therefore, she is [id number], and would be eligible for the adjuvant paclitaxel and trastuzumab regimen. She returns today for follow-up and week 4 TH. She is doing well overall, tolerating therapy better than she expected. She has mild nausea controlled with antiemetics. Diarrhea has slowed but she still has a lot of rectal irritation, not relieved with zinc oxide paste. Appetite is good and weight is stable. Fatigue is stable. She denies any pain or neuropathy. She denies any fevers or infectious symptoms. She is developing some alopecia. Review of Systems:  Constitutional: Positive for fatigue. HENT: Negative. Eyes: Negative. Respiratory: Negative. Cardiovascular: Negative. Gastrointestinal: Positive for nausea    Genitourinary: Negative. Musculoskeletal: Negative. Skin: Negative. Neurological: Negative. Endo/Heme/Allergies: Negative. Psychiatric/Behavioral: Negative. All other systems reviewed and are negative. Allergies   Allergen Reactions    Sulfamethoxazole-Trimethoprim Nausea And Vomiting    Meloxicam Other (See Comments)    Oxaprozin Swelling     Past Medical History:   Diagnosis Date    Anxiety     Depression 5/19/2017    Essential hypertension 05/19/2017    Fibromyalgia     History of postoperative nausea and vomiting     Hyperlipidemia     Hypothyroid     Impaired fasting glucose     Insomnia     Nausea & vomiting     Need for hepatitis C screening test 12/20/2020    3/15/21 HCV ab negative.       Obesity     Osteoarthritis 12/15/2015    Stress incontinence, female     Vitamin D deficiency      Past Surgical History:   Procedure Laterality Date    BREAST BIOPSY Bilateral     BREAST BIOPSY Right 3/29/2021    RIGHT BREAST NEEDLE LOCALIZED LUMPECTOMY X2 performed by Ashlee Wilkins MD at [id number] [place] [address]    COLONOSCOPY  2012    normal; internal hemorrhoid    HYSTERECTOMY ([address])  [address] [facility] Right 6/1/2022    RIGHT SENTINEL NODE BIOPSY MAG TRACE performed by Shelia Knox MD at [facility] Right 6/1/2022    RIGHT BREAST MASTECTOMY performed by Shelia Knox MD at [facility] Left 6/1/2022    PORT INSERTION performed by Shelia Knox MD at [facility] MAIN OR    TOTAL KNEE ARTHROPLASTY Bilateral     Left 2015, Right 7/29/20    US BREAST BIOPSY NEEDLE ADDITIONAL RIGHT Right 2/25/2021    US BREAST BIOPSY NEEDLE ADDITIONAL RIGHT 2/25/2021 [facility]    US BREAST NEEDLE BIOPSY RIGHT Right 2/25/2021    US BREAST NEEDLE BIOPSY RIGHT 2/25/2021 [facility]    US BREAST NEEDLE BIOPSY RIGHT Right 4/12/2022    US BREAST NEEDLE BIOPSY RIGHT 4/12/2022 [facility]    US GUIDED NEEDLE LOC BREAST ADDL RIGHT Right 3/29/2021    US GUIDED NEEDLE LOC BREAST ADDL RIGHT 3/29/2021 [facility]    US GUIDED NEEDLE LOC OF RIGHT BREAST Right 3/29/2021    US GUIDED NEEDLE LOC OF RIGHT BREAST 3/29/2021 [facility]     Family History   Problem Relation Age of Onset    No Known Problems Mother     Heart Disease Father     Breast Cancer Sister     Stroke Father      Social History     Socioeconomic History    Marital status:      Spouse name: Not on file    Number of children: Not on file    Years of education: Not on file    Highest education level: Not on file   Occupational History    Not on file   Tobacco Use    Smoking status: Never    Smokeless tobacco: Never   Substance and Sexual Activity    Alcohol use: No    Drug use: No    Sexual activity: Not on file   Other Topics Concern    Not on file   Social History Narrative    Not on file     Social Determinants of Health     Financial Resource Strain: Not on file   Food Insecurity: Not on file   Transportation Needs: Not on file   Physical Activity: Not on file   Stress: Not on file   Social Connections: Not on file   Intimate Partner Violence: Not on file   Housing Stability: Not on file     Current Outpatient Medications   Medication Sig Dispense Refill    temazepam (RESTORIL) 15 MG capsule Take 1 capsule by mouth nightly as needed for Sleep for up to 14 days. 30 capsule 0    lidocaine-prilocaine (EMLA) 2.5-2.5 % cream Apply topically as needed.  30 g 2    ondansetron (ZOFRAN) 4 MG tablet Take 2 tablets by mouth 3 times daily as needed for Nausea or Vomiting 90 tablet 2    prochlorperazine (COMPAZINE) 10 MG tablet Take 1 tablet by mouth every 6 hours as needed (nausea) 60 tablet 2    levothyroxine (SYNTHROID) 125 MCG tablet Take 1 tablet by mouth Daily TAKE 1 TABLET EVERY DAY BEFORE BREAKFAST FOR LOW THYROID HORMONE LEVELS 90 tablet 1    Multiple Vitamin (MULTIVITAMIN ADULT PO) Take 1 tablet by mouth daily. Cholecalciferol (VITAMIN D3) 1.25 MG (21103 UT) CAPS Take 1 capsule by mouth daily. Carboxymethylcellul-Glycerin (REFRESH OPTIVE OP) Place 1 drop into both eyes daily. ibuprofen (ADVIL;MOTRIN) 400 MG tablet Take by mouth every 6 hours as needed      losartan (COZAAR) 100 MG tablet TAKE 1 TABLET EVERY DAY FOR HIGH BLOOD PRESSURE      acetaminophen (TYLENOL) 500 MG tablet Take 1,000 mg by mouth every 6 hours as needed for Pain. (Patient not taking: Reported on 7/25/2022)       No current facility-administered medications for this visit. Facility-Administered Medications Ordered in Other Visits   Medication Dose Route Frequency Provider Last Rate Last Admin    sodium chloride flush 0.9 % injection 10 mL  10 mL IntraVENous PRN Nani Mccollum MD   10 mL at 08/01/22 0957       OBJECTIVE:  BP (!) 150/70 (Position: Sitting)   Pulse 71   Temp 98.6 °F (37 °C)   Resp 16   Ht 5' 3\" (1.6 m)   Wt 213 lb 1.6 oz (96.7 kg)   SpO2 98%   BMI 37.75 kg/m²     Physical Exam:  Constitutional: Well developed, well nourished female in no acute distress, sitting comfortably on the examination table. HEENT: Normocephalic and atraumatic. Sclerae anicteric. Skin Warm and dry. No bruising and no rash noted. No erythema. No pallor. Respiratory  Bilateral breath sounds are clear. There is no use of accessory muscles. Cardiac Heart rate and rhythm regular. No gallops, rubs, or murmurs. GI Positive bowel sounds. No tenderness, no distention.     Neuro Grossly nonfocal with no obvious sensory or motor deficits. MSK Normal range of motion in general.  No edema and no tenderness. Psych Appropriate mood and affect.          Labs:  Recent Results (from the past 96 hour(s))   CBC with Auto Differential    Collection Time: 08/01/22  9:43 AM   Result Value Ref Range    WBC 3.9 (L) 4.3 - 11.1 K/uL    RBC 3.83 (L) 4.05 - 5.2 M/uL    Hemoglobin 11.2 (L) 11.7 - 15.4 g/dL    Hematocrit 34.4 (L) 35.8 - 46.3 %    MCV 89.8 79.6 - 97.8 FL    MCH 29.2 26.1 - 32.9 PG    MCHC 32.6 31.4 - 35.0 g/dL    RDW 13.9 11.9 - 14.6 %    Platelets 428 778 - 631 K/uL    MPV 10.8 9.4 - 12.3 FL    nRBC 0.00 0.0 - 0.2 K/uL    Differential Type AUTOMATED      Seg Neutrophils 62 43 - 78 %    Lymphocytes 27 13 - 44 %    Monocytes 8 4.0 - 12.0 %    Eosinophils % 1 0.5 - 7.8 %    Basophils 1 0.0 - 2.0 %    Immature Granulocytes 1 0.0 - 5.0 %    Segs Absolute 2.5 1.7 - 8.2 K/UL    Absolute Lymph # 1.1 0.5 - 4.6 K/UL    Absolute Mono # 0.3 0.1 - 1.3 K/UL    Absolute Eos # 0.0 0.0 - 0.8 K/UL    Basophils Absolute 0.0 0.0 - 0.2 K/UL    Absolute Immature Granulocyte 0.0 0.0 - 0.5 K/UL   Comprehensive Metabolic Panel    Collection Time: 08/01/22  9:43 AM   Result Value Ref Range    Sodium 139 136 - 145 mmol/L    Potassium 4.3 3.5 - 5.1 mmol/L    Chloride 109 (H) 98 - 107 mmol/L    CO2 27 21 - 32 mmol/L    Anion Gap 3 (L) 7 - 16 mmol/L    Glucose 99 65 - 100 mg/dL    BUN 11 8 - 23 MG/DL    Creatinine 0.70 0.6 - 1.0 MG/DL    GFR African American >60 >60 ml/min/1.73m2    GFR Non- >60 >60 ml/min/1.73m2    Calcium 9.1 8.3 - 10.4 MG/DL    Total Bilirubin 0.4 0.2 - 1.1 MG/DL    ALT 23 12 - 65 U/L    AST 21 15 - 37 U/L    Alk Phosphatase 87 50 - 136 U/L    Total Protein 6.6 6.3 - 8.2 g/dL    Albumin 3.2 3.2 - 4.6 g/dL    Globulin 3.4 2.3 - 3.5 g/dL    Albumin/Globulin Ratio 0.9 (L) 1.2 - 3.5     Magnesium    Collection Time: 08/01/22  9:43 AM   Result Value Ref Range    Magnesium 1.7 (L) 1.8 - 2.4 mg/dL       Imaging:  MRI of the Breasts with and without contrast       CLINICAL INDICATION:  New diagnosis of right 12:00 breast cancer status post   biopsy demonstrating infiltrating ductal carcinoma high-grade, poorly   differentiated. Evaluate for extent of disease, staging, therapy planning. Patient had right benign papillomas removed in 2021, and a left benign biopsy in   2011 demonstrating a 2:00 fibroadenoma. Family history of her sister having   breast cancer. .       COMPARISON: Ultrasound and mammography 4/12/2022, 3/25/2022, 3/23/2022 and prior   breast MRI 3/3/2021. TECHNIQUE: Standard MRI sequences were obtained through the breasts in multiple   planes. Images were obtained before and after intravenous infusion of 20 mL of   Prohance contrast. Images were reviewed with PACS and with [employer] CAD software. FINDINGS: The breasts demonstrate mild-moderate bilateral glandularity and mild   background enhancement. Right breast: The new 12:00 biopsy clip is within an irregular heterogeneously   enhancing 1.2 x 0.8 x 1.1 cm malignant mass. As seen on prior exam a large area   of the right upper breast spanning 11:00-12:00 anterior, middle, posterior depth   (overall about 10-12 cm) contains multiple scattered masses of varying sizes and   shapes. There are greater than 10 masses total. Anteriorly at 12:00 10 cm from   nipple an irregular heterogeneously enhancing mass measures up to 1.2 x 1.0 cm   (previously 0.6 x 0.4 cm). Posteriorly near 11:00 14 cm from nipple a lobulated   mass measures up to 1.1 x 0.4 cm (previously 0.8 x 0.3 cm). Left breast: No evidence of suspicious enhancing mass, and no dominant or unique   nonmass enhancement, to suggest additional malignancy. Lymph nodes: Right axilla demonstrates a dysmorphic 1.2 x 0.8 cm node (series 4   image 351) which has developed since the prior exam. No other axillary or   internal mammary lymphadenopathy is evident.        Elsewhere: Limited visualization of the partially included thorax and upper   abdomen shows no acute abnormality. Impression       1. Right anterior 12:00 breast cancer measures up to 1.2 cm. 2. Multiple (greater than 10 total) other right upper breast masses, some of   which have enlarged, concerning for additional malignancy. 3. Right axillary indeterminate lymph node. 4. No suspicious left breast finding. Recommend continued malignancy management as directed clinically. BI-RADS Assessment Category 6: Known Biopsy Proven Malignancy             Pathology:  DIAGNOSIS        A:  \"RIGHT BREAST MASTECTOMY\":  INVASIVE DUCTAL CARCINOMA, HIGH GRADE     (3 + 3 + 2), 15 MILLIMETER IN GREATEST DIMENSION, MARGINS UNINVOLVED;   FIBROCYSTIC MASTOPATHY. B :  \"SENTINEL NODE #1\":  BENIGN LYMPH NODE, NEGATIVE FOR TUMOR. C:  \"ADDITIONAL AXILLARY TISSUE\":  FOUR BENIGN LYMPH NODES, ALL   NEGATIVE FOR TUMOR (0/4). * * *DIAGNOSIS* * * * * * * * * * * * * * * * * * * * * * * * * * * * * * *            A:  \" RIGHT BREAST, 12:00 POSITION, 7 CM FROM NIPPLE, CORE BIOPSY\":         INFILTRATING DUCTAL CARCINOMA WITH APOCRINE FEATURES, HIGH GRADE   (POORLY DIFFERENTIATED). DEFINITE IN SITU COMPONENT AND LYMPHOVASCULAR   INVASION ARE NOT IDENTIFIED             jtl/4/13/2022     * * *Electronically Signed Out* * *         Sign Out Date: 4/13/2022  J. Mortimer Rodney., M.D.           * * *PROCEDURES/ADDENDA* * * * * * * * * * * * * * * * * * * * *  * * * *                         STF- ER/IL/[id number] BY IHC                                                                                   Status:  Reviewed By:    Mak Orantes MD on 4/19/2022                                 Interpretation                       [employer] IHC Quantitative Breast Panel     TEST NAME:                         RESULTS:               INTERNAL   CONTROLS:     ESTROGEN RECEPTOR:               Positive (76%)               Present,   Positive PROGESTERONE RECEPTOR:          Negative (0.0%)          Present, Positive   HER-2/KELVIN:                         Positive (3+)               Percentage   of Cells with Uniform        Intense Complete Membrane   Stainin%       ASSESSMENT:   Diagnosis Orders   1. Malignant neoplasm of right breast in female, estrogen receptor positive, unspecified site of breast ([facility] [address]  CBC With Auto Differential    Comprehensive metabolic panel    CBC With Auto Differential    CBC With Auto Differential              PLAN:  Lab studies were personally reviewed. Breast cancer: discovered incidentally on mammogram, 6mm on ultrasound, biopsy proven, high grade IDC, ER 76%, DE negative, HER2 positive. MRI shows greater than 10 lesions in the breast with the dominant lesion 1.2 cm, a dysmorphic indeterminate lymph node in the right axilla, and no evidence of contralateral or distant disease. Her MRI imaging made things more complicated - if she was obviously T1N0, then upfront surgery would be appropriate, lumpectomy/sentinel node followed by adjuvant Taxol/Herceptin. However, she seemed to have multiple lesions that are indeterminate which make it possible that her disease is multifocal, as well as an indeterminate  axillary node. For this reason, we should at least consider neoadjuvant chemotherapy. However, I would strongly advise at least two additional biopsies in that case, ultrasound guided biopsy of the axillary node, and possibly MRI guided biopsy (if not  visualized on ultrasound) of another in-breast lesion to confirm pathology. She was reluctant to consider additional biopsy, so ultimately the decision was made to proceed with mastectomy. Path reviewed, thankfully it appears that the indeterminate lesions in the breast were benign, with the final tumor dimension being only 1.5 cm, and 4 nodes negative for tumor.   Therefore, she is [id number], and would be eligible for the adjuvant paclitaxel and trastuzumab regimen. After TH she will complete a year of Herceptin, as well as endocrine therapy with AI. She will not require radiation due to the T1N0 disease and mastectomy. She returns today for follow-up and week 4 TH. She is doing well overall, tolerating therapy better than she expected. She has mild nausea controlled with antiemetics. Diarrhea has slowed but she still has a lot of rectal irritation, not relieved with zinc oxide paste. Appetite is good and weight is stable. Fatigue is stable. She denies any pain or neuropathy. She denies any fevers or infectious symptoms. She is developing some alopecia. Labs reviewed and okay to proceed with week 4 TH. We will try some hydrocortisone for her ongoing rectal discomfort. Continue with anti-emetics prn. Continue good oral nutrition and hydration. Call with any fevers, uncontrolled side effects from treatment or any other worrisome/concerning symptoms. Follow up next week for week 5, OV week 6 or sooner if needed. All questions were asked and answered to the best of my ability.            Jackie Schaffer MD, MD  [address] [place] [address] Hematology and Oncology  [address] [place] [address]  Office : [phone]  Fax : [phone]

## 2022-08-01 NOTE — PATIENT INSTRUCTIONS
Patient Instructions from Today's Visit    Reason for Visit:  Follow up - Breast    Diagnosis Information:  [email or web address]      Plan:  - labs reviewed, ok for treatment    Follow Up:  - as scheduled    Recent Lab Results:  Hospital Outpatient Visit on 08/01/2022   Component Date Value Ref Range Status    WBC 08/01/2022 3.9 (A) 4.3 - 11.1 K/uL Final    RESULTS CHECKED X 2    RBC 08/01/2022 3.83 (A) 4.05 - 5.2 M/uL Final    Hemoglobin 08/01/2022 11.2 (A) 11.7 - 15.4 g/dL Final    Hematocrit 08/01/2022 34.4 (A) 35.8 - 46.3 % Final    MCV 08/01/2022 89.8  79.6 - 97.8 FL Final    MCH 08/01/2022 29.2  26.1 - 32.9 PG Final    MCHC 08/01/2022 32.6  31.4 - 35.0 g/dL Final    RDW 08/01/2022 13.9  11.9 - 14.6 % Final    Platelets 96/14/6199 206  150 - 450 K/uL Final    MPV 08/01/2022 10.8  9.4 - 12.3 FL Final    nRBC 08/01/2022 0.00  0.0 - 0.2 K/uL Final    **Note: Absolute NRBC parameter is now reported with Hemogram**    Differential Type 08/01/2022 AUTOMATED    Final    Seg Neutrophils 08/01/2022 62  43 - 78 % Final    Lymphocytes 08/01/2022 27  13 - 44 % Final    Monocytes 08/01/2022 8  4.0 - 12.0 % Final    Eosinophils % 08/01/2022 1  0.5 - 7.8 % Final    Basophils 08/01/2022 1  0.0 - 2.0 % Final    Immature Granulocytes 08/01/2022 1  0.0 - 5.0 % Final    Segs Absolute 08/01/2022 2.5  1.7 - 8.2 K/UL Final    Absolute Lymph # 08/01/2022 1.1  0.5 - 4.6 K/UL Final    Absolute Mono # 08/01/2022 0.3  0.1 - 1.3 K/UL Final    Absolute Eos # 08/01/2022 0.0  0.0 - 0.8 K/UL Final    Basophils Absolute 08/01/2022 0.0  0.0 - 0.2 K/UL Final    Absolute Immature Granulocyte 08/01/2022 0.0  0.0 - 0.5 K/UL Final         Treatment Summary has been discussed and given to patient: n/a        -------------------------------------------------------------------------------------------------------------------  Please call our office at [phone] if you have any  of the following symptoms:   Fever of 100.5 or greater  Chills  Shortness of breath  Swelling or pain in one leg    After office hours an answering service is available and will contact a provider for emergencies or if you are experiencing any of the above symptoms. Patient has My Chart. My Chart log in information explained on the after visit summary printout at the [facility] desk.     Mak Bains RN

## 2022-08-01 NOTE — PROGRESS NOTES
Patient arrived to infusion center. C2D1 Taxol/Herceptin completed. Patient tolerated well. Port deaccessed. Patient discharged ambulatory. Aware of next infusion on 8/8.

## 2022-08-02 ENCOUNTER — TELEPHONE (OUTPATIENT)
Dept: ONCOLOGY | Age: 77
End: 2022-08-02

## 2022-08-02 NOTE — PROGRESS NOTES
Januzs Mora Patient Age: 61 year old  MESSAGE:   Rita with [employer] is requesting MRSA results to be fax to [phone].  Message confirm with caller.       WEIGHT AND HEIGHT:   Wt Readings from Last 1 Encounters:   08/21/19 85.7 kg (189 lb)     Ht Readings from Last 1 Encounters:   08/07/19 5' 10\" (1.778 m)     BMI Readings from Last 1 Encounters:   08/21/19 27.12 kg/m²       ALLERGIES:  Ace inhibitors; Statins; Amoxicillin; and Amoxicillin-pot clavulanate  Current Outpatient Medications   Medication   • rosuvastatin (CRESTOR) 10 MG tablet   • losartan (COZAAR) 25 MG tablet   • amitriptyline (ELAVIL) 10 MG tablet   • gabapentin (NEURONTIN) 100 MG capsule   • polyethylene glycol (MIRALAX) powder   • gabapentin (NEURONTIN) 400 MG capsule   • pantoprazole (PROTONIX) 20 MG tablet   • azelastine (ASTELIN) 0.1 % nasal spray   • aspirin 81 MG chewable tablet   • coenzyme Q10 100 MG capsule   • Glucosamine-Chondroit-Vit C-Mn (GLUCOSAMINE CHONDR 500 COMPLEX) tablet   • Homeopathic Products ([employer] LEG CRAMPS PAIN RELIEF) Tab   • Omega-3 Krill Oil 300 MG Cap   • MAGNESIUM PO   • psyllium (TGT PSYLLIUM FIBER) 0.52 g capsule   • VITAMIN D, ERGOCALCIFEROL, PO     No current facility-administered medications for this visit.      PHARMACY to use: Please ask patient if needed            Pharmacy preference(s) on file:   [id number] - [place], [place] - [address]  [address] [facility] [facility] [address] [address]  Phone: [phone] Fax: [phone]      CALL BACK INFO: Ok to leave response (including medical information) on answering machine  ROUTING: Patient's physician/staff        PCP: Filomena Cobb MD         INS: Payor: [employer] DIRECT HMO - ADVOCATE / Plan: [employer] DIRECT HMO - ADVOCATE / Product Type: Advocate Risk   PATIENT ADDRESS:  [address] [place] [id number]   insurance will do this for alternatives:   [employer]'s covered (formulary) drugs are Procto-Jude topical cream perineal applicator, hydrocortisone topical cream with perineal applicator, Proctosol HC topical cream perineal applicator AND Procto-Med HC topical cream perineal applicator Proctozone-HC topical cream perineal applicator. If a covered (formulary) drug is prescribed, a review by [address] [place] [address] may not be required.  *Please acknowledge

## 2022-08-08 ENCOUNTER — HOSPITAL ENCOUNTER (OUTPATIENT)
Dept: INFUSION THERAPY | Age: 77
Discharge: HOME | End: 2022-08-08
Payer: MEDICARE

## 2022-08-08 VITALS
TEMPERATURE: 98.3 F | OXYGEN SATURATION: 99 % | BODY MASS INDEX: 37.62 KG/M2 | WEIGHT: 212.4 LBS | SYSTOLIC BLOOD PRESSURE: 160 MMHG | RESPIRATION RATE: 16 BRPM | HEART RATE: 83 BPM | DIASTOLIC BLOOD PRESSURE: 72 MMHG

## 2022-08-08 DIAGNOSIS — C50.911 MALIGNANT NEOPLASM OF RIGHT BREAST IN FEMALE, ESTROGEN RECEPTOR POSITIVE, UNSPECIFIED SITE OF BREAST (HCC): Primary | ICD-10-CM

## 2022-08-08 DIAGNOSIS — Z17.0 MALIGNANT NEOPLASM OF RIGHT BREAST IN FEMALE, ESTROGEN RECEPTOR POSITIVE, UNSPECIFIED SITE OF BREAST (HCC): Primary | ICD-10-CM

## 2022-08-08 DIAGNOSIS — G47.00 INSOMNIA, UNSPECIFIED TYPE: ICD-10-CM

## 2022-08-08 LAB
ALBUMIN SERPL-MCNC: 3.4 G/DL (ref 3.2–4.6)
ALBUMIN/GLOB SERPL: 1.1 {RATIO} (ref 1.2–3.5)
ALP SERPL-CCNC: 97 U/L (ref 50–136)
ALT SERPL-CCNC: 30 U/L (ref 12–65)
ANION GAP SERPL CALC-SCNC: 4 MMOL/L (ref 7–16)
AST SERPL-CCNC: 21 U/L (ref 15–37)
BASOPHILS # BLD: 0.1 K/UL (ref 0–0.2)
BASOPHILS NFR BLD: 2 % (ref 0–2)
BILIRUB SERPL-MCNC: 0.4 MG/DL (ref 0.2–1.1)
BUN SERPL-MCNC: 12 MG/DL (ref 8–23)
CALCIUM SERPL-MCNC: 8.5 MG/DL (ref 8.3–10.4)
CHLORIDE SERPL-SCNC: 107 MMOL/L (ref 98–107)
CO2 SERPL-SCNC: 28 MMOL/L (ref 21–32)
CREAT SERPL-MCNC: 0.9 MG/DL (ref 0.6–1)
DIFFERENTIAL METHOD BLD: ABNORMAL
EOSINOPHIL # BLD: 0.1 K/UL (ref 0–0.8)
EOSINOPHIL NFR BLD: 2 % (ref 0.5–7.8)
ERYTHROCYTE [DISTWIDTH] IN BLOOD BY AUTOMATED COUNT: 14.3 % (ref 11.9–14.6)
GLOBULIN SER CALC-MCNC: 3.2 G/DL (ref 2.3–3.5)
GLUCOSE SERPL-MCNC: 88 MG/DL (ref 65–100)
HCT VFR BLD AUTO: 35.2 % (ref 35.8–46.3)
HGB BLD-MCNC: 11.6 G/DL (ref 11.7–15.4)
IMM GRANULOCYTES # BLD AUTO: 0 K/UL (ref 0–0.5)
IMM GRANULOCYTES NFR BLD AUTO: 1 % (ref 0–5)
LYMPHOCYTES # BLD: 0.9 K/UL (ref 0.5–4.6)
LYMPHOCYTES NFR BLD: 27 % (ref 13–44)
MCH RBC QN AUTO: 30 PG (ref 26.1–32.9)
MCHC RBC AUTO-ENTMCNC: 33 G/DL (ref 31.4–35)
MCV RBC AUTO: 91 FL (ref 79.6–97.8)
MONOCYTES # BLD: 0.4 K/UL (ref 0.1–1.3)
MONOCYTES NFR BLD: 12 % (ref 4–12)
NEUTS SEG # BLD: 2 K/UL (ref 1.7–8.2)
NEUTS SEG NFR BLD: 56 % (ref 43–78)
NRBC # BLD: 0 K/UL (ref 0–0.2)
PLATELET # BLD AUTO: 210 K/UL (ref 150–450)
PMV BLD AUTO: 10.4 FL (ref 9.4–12.3)
POTASSIUM SERPL-SCNC: 3.8 MMOL/L (ref 3.5–5.1)
PROT SERPL-MCNC: 6.6 G/DL (ref 6.3–8.2)
RBC # BLD AUTO: 3.87 M/UL (ref 4.05–5.2)
SODIUM SERPL-SCNC: 139 MMOL/L (ref 136–145)
WBC # BLD AUTO: 3.4 K/UL (ref 4.3–11.1)

## 2022-08-08 PROCEDURE — 96375 TX/PRO/DX INJ NEW DRUG ADDON: CPT

## 2022-08-08 PROCEDURE — 96413 CHEMO IV INFUSION 1 HR: CPT

## 2022-08-08 PROCEDURE — 36593 DECLOT VASCULAR DEVICE: CPT

## 2022-08-08 PROCEDURE — 6360000002 HC RX W HCPCS: Performed by: INTERNAL MEDICINE

## 2022-08-08 PROCEDURE — A4216 STERILE WATER/SALINE, 10 ML: HCPCS | Performed by: INTERNAL MEDICINE

## 2022-08-08 PROCEDURE — 2500000003 HC RX 250 WO HCPCS: Performed by: INTERNAL MEDICINE

## 2022-08-08 PROCEDURE — 2580000003 HC RX 258: Performed by: INTERNAL MEDICINE

## 2022-08-08 PROCEDURE — 85025 COMPLETE CBC W/AUTO DIFF WBC: CPT

## 2022-08-08 PROCEDURE — 80053 COMPREHEN METABOLIC PANEL: CPT

## 2022-08-08 PROCEDURE — 96417 CHEMO IV INFUS EACH ADDL SEQ: CPT

## 2022-08-08 RX ORDER — HEPARIN SODIUM (PORCINE) LOCK FLUSH IV SOLN 100 UNIT/ML 100 UNIT/ML
500 SOLUTION INTRAVENOUS PRN
Status: DISCONTINUED | OUTPATIENT
Start: 2022-08-08 | End: 2022-08-09 | Stop reason: HOSPADM

## 2022-08-08 RX ORDER — DIPHENHYDRAMINE HYDROCHLORIDE 50 MG/ML
50 INJECTION INTRAMUSCULAR; INTRAVENOUS ONCE
Status: COMPLETED | OUTPATIENT
Start: 2022-08-08 | End: 2022-08-08

## 2022-08-08 RX ORDER — SODIUM CHLORIDE 0.9 % (FLUSH) 0.9 %
5-40 SYRINGE (ML) INJECTION PRN
Status: DISCONTINUED | OUTPATIENT
Start: 2022-08-08 | End: 2022-08-09 | Stop reason: HOSPADM

## 2022-08-08 RX ORDER — TEMAZEPAM 30 MG/1
30 CAPSULE ORAL NIGHTLY PRN
Qty: 30 CAPSULE | Refills: 0 | Status: SHIPPED | OUTPATIENT
Start: 2022-08-08 | End: 2022-09-06 | Stop reason: SDUPTHER

## 2022-08-08 RX ORDER — DEXAMETHASONE SODIUM PHOSPHATE 10 MG/ML
10 INJECTION INTRAMUSCULAR; INTRAVENOUS ONCE
Status: COMPLETED | OUTPATIENT
Start: 2022-08-08 | End: 2022-08-08

## 2022-08-08 RX ORDER — SODIUM CHLORIDE 9 MG/ML
5-250 INJECTION, SOLUTION INTRAVENOUS PRN
Status: DISCONTINUED | OUTPATIENT
Start: 2022-08-08 | End: 2022-08-09 | Stop reason: HOSPADM

## 2022-08-08 RX ADMIN — DIPHENHYDRAMINE HYDROCHLORIDE 50 MG: 50 INJECTION, SOLUTION INTRAMUSCULAR; INTRAVENOUS at 13:01

## 2022-08-08 RX ADMIN — DEXAMETHASONE SODIUM PHOSPHATE 10 MG: 10 INJECTION INTRAMUSCULAR; INTRAVENOUS at 13:03

## 2022-08-08 RX ADMIN — ALTEPLASE 2 MG: 2.2 INJECTION, POWDER, LYOPHILIZED, FOR SOLUTION INTRAVENOUS at 11:28

## 2022-08-08 RX ADMIN — SODIUM CHLORIDE 100 ML/HR: 9 INJECTION, SOLUTION INTRAVENOUS at 12:28

## 2022-08-08 RX ADMIN — SODIUM CHLORIDE 189 MG: 9 INJECTION, SOLUTION INTRAVENOUS at 12:31

## 2022-08-08 RX ADMIN — SODIUM CHLORIDE, PRESERVATIVE FREE 10 ML: 5 INJECTION INTRAVENOUS at 12:30

## 2022-08-08 RX ADMIN — FAMOTIDINE 20 MG: 10 INJECTION, SOLUTION INTRAVENOUS at 13:05

## 2022-08-08 RX ADMIN — PACLITAXEL 162 MG: 6 INJECTION, SOLUTION, CONCENTRATE INTRAVENOUS at 13:22

## 2022-08-08 NOTE — ADDENDUM NOTE
Encounter addended by: TOD Gonzalez [facility] [place] on: 7/25/2022 10:45 AM   Actions taken: i-Vent created or edited
Encounter addended by: TOD Haynes D [facility] - [place] on: 8/8/2022 12:04 PM   Actions taken: i-Vent created or edited
Encounter addended by: TOD Murrieta D [facility] - [place] on: 7/18/2022 11:34 AM   Actions taken: i-Vent created or edited
,DirectAddress_Unknown

## 2022-08-08 NOTE — TELEPHONE ENCOUNTER
Patient calling for refill on her restoril. She had been prescribed 15 mg which did not help so she had been instructed to go up to 30 mg each evening on 7/25/22  I have reviewed the patients controlled substance prescription history, as maintained in the [place] prescription monitoring program, so that the prescription(s) for a  controlled substance can be given. Restoril 15 mg last filled 7/18/22.

## 2022-08-10 RX ORDER — DIPHENHYDRAMINE HYDROCHLORIDE 50 MG/ML
25 INJECTION INTRAMUSCULAR; INTRAVENOUS ONCE
Status: CANCELLED | OUTPATIENT
Start: 2022-08-15 | End: 2022-08-15

## 2022-08-11 DIAGNOSIS — Z17.0 MALIGNANT NEOPLASM OF RIGHT BREAST IN FEMALE, ESTROGEN RECEPTOR POSITIVE, UNSPECIFIED SITE OF BREAST (HCC): Primary | ICD-10-CM

## 2022-08-11 DIAGNOSIS — C50.911 MALIGNANT NEOPLASM OF RIGHT BREAST IN FEMALE, ESTROGEN RECEPTOR POSITIVE, UNSPECIFIED SITE OF BREAST (HCC): Primary | ICD-10-CM

## 2022-08-15 ENCOUNTER — HOSPITAL ENCOUNTER (OUTPATIENT)
Dept: INFUSION THERAPY | Age: 77
Discharge: HOME | End: 2022-08-15
Payer: MEDICARE

## 2022-08-15 ENCOUNTER — OFFICE VISIT (OUTPATIENT)
Dept: ONCOLOGY | Age: 77
End: 2022-08-15
Payer: MEDICARE

## 2022-08-15 VITALS
HEIGHT: 63 IN | WEIGHT: 213.8 LBS | RESPIRATION RATE: 20 BRPM | TEMPERATURE: 98.1 F | DIASTOLIC BLOOD PRESSURE: 68 MMHG | HEART RATE: 78 BPM | OXYGEN SATURATION: 100 % | SYSTOLIC BLOOD PRESSURE: 135 MMHG | BODY MASS INDEX: 37.88 KG/M2

## 2022-08-15 DIAGNOSIS — C50.911 MALIGNANT NEOPLASM OF RIGHT BREAST IN FEMALE, ESTROGEN RECEPTOR POSITIVE, UNSPECIFIED SITE OF BREAST (HCC): Primary | ICD-10-CM

## 2022-08-15 DIAGNOSIS — Z17.0 MALIGNANT NEOPLASM OF RIGHT BREAST IN FEMALE, ESTROGEN RECEPTOR POSITIVE, UNSPECIFIED SITE OF BREAST (HCC): Primary | ICD-10-CM

## 2022-08-15 DIAGNOSIS — Z17.0 MALIGNANT NEOPLASM OF RIGHT BREAST IN FEMALE, ESTROGEN RECEPTOR POSITIVE, UNSPECIFIED SITE OF BREAST (HCC): ICD-10-CM

## 2022-08-15 DIAGNOSIS — K62.89 RECTAL DISCOMFORT: ICD-10-CM

## 2022-08-15 DIAGNOSIS — C50.911 MALIGNANT NEOPLASM OF RIGHT BREAST IN FEMALE, ESTROGEN RECEPTOR POSITIVE, UNSPECIFIED SITE OF BREAST (HCC): ICD-10-CM

## 2022-08-15 DIAGNOSIS — R04.0 EPISTAXIS: ICD-10-CM

## 2022-08-15 DIAGNOSIS — R53.83 FATIGUE DUE TO TREATMENT: ICD-10-CM

## 2022-08-15 LAB
ALBUMIN SERPL-MCNC: 3.2 G/DL (ref 3.2–4.6)
ALBUMIN/GLOB SERPL: 0.9 {RATIO} (ref 1.2–3.5)
ALP SERPL-CCNC: 97 U/L (ref 50–136)
ALT SERPL-CCNC: 27 U/L (ref 12–65)
ANION GAP SERPL CALC-SCNC: 4 MMOL/L (ref 7–16)
AST SERPL-CCNC: 18 U/L (ref 15–37)
BASOPHILS # BLD: 0 K/UL (ref 0–0.2)
BASOPHILS NFR BLD: 1 % (ref 0–2)
BILIRUB SERPL-MCNC: 0.5 MG/DL (ref 0.2–1.1)
BUN SERPL-MCNC: 18 MG/DL (ref 8–23)
CALCIUM SERPL-MCNC: 8.7 MG/DL (ref 8.3–10.4)
CHLORIDE SERPL-SCNC: 108 MMOL/L (ref 98–107)
CO2 SERPL-SCNC: 26 MMOL/L (ref 21–32)
CREAT SERPL-MCNC: 0.9 MG/DL (ref 0.6–1)
DIFFERENTIAL METHOD BLD: ABNORMAL
EOSINOPHIL # BLD: 0.1 K/UL (ref 0–0.8)
EOSINOPHIL NFR BLD: 1 % (ref 0.5–7.8)
ERYTHROCYTE [DISTWIDTH] IN BLOOD BY AUTOMATED COUNT: 14.7 % (ref 11.9–14.6)
GLOBULIN SER CALC-MCNC: 3.4 G/DL (ref 2.3–3.5)
GLUCOSE SERPL-MCNC: 102 MG/DL (ref 65–100)
HCT VFR BLD AUTO: 35 % (ref 35.8–46.3)
HGB BLD-MCNC: 11.7 G/DL (ref 11.7–15.4)
IMM GRANULOCYTES # BLD AUTO: 0.1 K/UL (ref 0–0.5)
IMM GRANULOCYTES NFR BLD AUTO: 1 % (ref 0–5)
LYMPHOCYTES # BLD: 1 K/UL (ref 0.5–4.6)
LYMPHOCYTES NFR BLD: 21 % (ref 13–44)
MAGNESIUM SERPL-MCNC: 1.9 MG/DL (ref 1.8–2.4)
MCH RBC QN AUTO: 30.1 PG (ref 26.1–32.9)
MCHC RBC AUTO-ENTMCNC: 33.4 G/DL (ref 31.4–35)
MCV RBC AUTO: 90 FL (ref 79.6–97.8)
MONOCYTES # BLD: 0.5 K/UL (ref 0.1–1.3)
MONOCYTES NFR BLD: 11 % (ref 4–12)
NEUTS SEG # BLD: 3.2 K/UL (ref 1.7–8.2)
NEUTS SEG NFR BLD: 65 % (ref 43–78)
NRBC # BLD: 0 K/UL (ref 0–0.2)
PLATELET # BLD AUTO: 217 K/UL (ref 150–450)
PMV BLD AUTO: 10.2 FL (ref 9.4–12.3)
POTASSIUM SERPL-SCNC: 4.2 MMOL/L (ref 3.5–5.1)
PROT SERPL-MCNC: 6.6 G/DL (ref 6.3–8.2)
RBC # BLD AUTO: 3.89 M/UL (ref 4.05–5.2)
SODIUM SERPL-SCNC: 138 MMOL/L (ref 136–145)
WBC # BLD AUTO: 5 K/UL (ref 4.3–11.1)

## 2022-08-15 PROCEDURE — 99214 OFFICE O/P EST MOD 30 MIN: CPT | Performed by: NURSE PRACTITIONER

## 2022-08-15 PROCEDURE — 96413 CHEMO IV INFUSION 1 HR: CPT

## 2022-08-15 PROCEDURE — A4216 STERILE WATER/SALINE, 10 ML: HCPCS | Performed by: INTERNAL MEDICINE

## 2022-08-15 PROCEDURE — 2500000003 HC RX 250 WO HCPCS: Performed by: INTERNAL MEDICINE

## 2022-08-15 PROCEDURE — 96375 TX/PRO/DX INJ NEW DRUG ADDON: CPT

## 2022-08-15 PROCEDURE — 1036F TOBACCO NON-USER: CPT | Performed by: NURSE PRACTITIONER

## 2022-08-15 PROCEDURE — 2580000003 HC RX 258: Performed by: INTERNAL MEDICINE

## 2022-08-15 PROCEDURE — 3017F COLORECTAL CA SCREEN DOC REV: CPT | Performed by: NURSE PRACTITIONER

## 2022-08-15 PROCEDURE — 85025 COMPLETE CBC W/AUTO DIFF WBC: CPT

## 2022-08-15 PROCEDURE — 1090F PRES/ABSN URINE INCON ASSESS: CPT | Performed by: NURSE PRACTITIONER

## 2022-08-15 PROCEDURE — 96417 CHEMO IV INFUS EACH ADDL SEQ: CPT

## 2022-08-15 PROCEDURE — 6360000002 HC RX W HCPCS: Performed by: INTERNAL MEDICINE

## 2022-08-15 PROCEDURE — 80053 COMPREHEN METABOLIC PANEL: CPT

## 2022-08-15 PROCEDURE — 1123F ACP DISCUSS/DSCN MKR DOCD: CPT | Performed by: NURSE PRACTITIONER

## 2022-08-15 PROCEDURE — G8417 CALC BMI ABV UP PARAM F/U: HCPCS | Performed by: NURSE PRACTITIONER

## 2022-08-15 PROCEDURE — 36591 DRAW BLOOD OFF VENOUS DEVICE: CPT

## 2022-08-15 PROCEDURE — 6360000002 HC RX W HCPCS: Performed by: NURSE PRACTITIONER

## 2022-08-15 PROCEDURE — 83735 ASSAY OF MAGNESIUM: CPT

## 2022-08-15 PROCEDURE — G8399 PT W/DXA RESULTS DOCUMENT: HCPCS | Performed by: NURSE PRACTITIONER

## 2022-08-15 PROCEDURE — G8427 DOCREV CUR MEDS BY ELIG CLIN: HCPCS | Performed by: NURSE PRACTITIONER

## 2022-08-15 RX ORDER — DIPHENHYDRAMINE HYDROCHLORIDE 50 MG/ML
25 INJECTION INTRAMUSCULAR; INTRAVENOUS ONCE
Status: COMPLETED | OUTPATIENT
Start: 2022-08-15 | End: 2022-08-15

## 2022-08-15 RX ORDER — DEXAMETHASONE SODIUM PHOSPHATE 10 MG/ML
10 INJECTION INTRAMUSCULAR; INTRAVENOUS ONCE
Status: COMPLETED | OUTPATIENT
Start: 2022-08-15 | End: 2022-08-15

## 2022-08-15 RX ORDER — SODIUM CHLORIDE 0.9 % (FLUSH) 0.9 %
5-40 SYRINGE (ML) INJECTION PRN
Status: DISCONTINUED | OUTPATIENT
Start: 2022-08-15 | End: 2022-08-16 | Stop reason: HOSPADM

## 2022-08-15 RX ORDER — HYDROCORTISONE 25 MG/G
CREAM TOPICAL 2 TIMES DAILY
Status: SHIPPED | OUTPATIENT
Start: 2022-08-15

## 2022-08-15 RX ORDER — SODIUM CHLORIDE 0.9 % (FLUSH) 0.9 %
10 SYRINGE (ML) INJECTION PRN
Status: DISCONTINUED | OUTPATIENT
Start: 2022-08-15 | End: 2022-08-16 | Stop reason: HOSPADM

## 2022-08-15 RX ORDER — SODIUM CHLORIDE 9 MG/ML
5-250 INJECTION, SOLUTION INTRAVENOUS PRN
Status: DISCONTINUED | OUTPATIENT
Start: 2022-08-15 | End: 2022-08-16 | Stop reason: HOSPADM

## 2022-08-15 RX ADMIN — SODIUM CHLORIDE 189 MG: 9 INJECTION, SOLUTION INTRAVENOUS at 10:48

## 2022-08-15 RX ADMIN — PACLITAXEL 162 MG: 6 INJECTION, SOLUTION, CONCENTRATE INTRAVENOUS at 11:50

## 2022-08-15 RX ADMIN — SODIUM CHLORIDE, PRESERVATIVE FREE 10 ML: 5 INJECTION INTRAVENOUS at 11:22

## 2022-08-15 RX ADMIN — SODIUM CHLORIDE, PRESERVATIVE FREE 10 ML: 5 INJECTION INTRAVENOUS at 10:05

## 2022-08-15 RX ADMIN — DIPHENHYDRAMINE HYDROCHLORIDE 25 MG: 50 INJECTION, SOLUTION INTRAMUSCULAR; INTRAVENOUS at 11:23

## 2022-08-15 RX ADMIN — SODIUM CHLORIDE 100 ML/HR: 9 INJECTION, SOLUTION INTRAVENOUS at 10:05

## 2022-08-15 RX ADMIN — DEXAMETHASONE SODIUM PHOSPHATE 10 MG: 10 INJECTION INTRAMUSCULAR; INTRAVENOUS at 11:27

## 2022-08-15 RX ADMIN — Medication 10 ML: at 09:19

## 2022-08-15 RX ADMIN — SODIUM CHLORIDE, PRESERVATIVE FREE 10 ML: 5 INJECTION INTRAVENOUS at 13:00

## 2022-08-15 RX ADMIN — FAMOTIDINE 20 MG: 10 INJECTION, SOLUTION INTRAVENOUS at 11:25

## 2022-08-15 ASSESSMENT — PATIENT HEALTH QUESTIONNAIRE - PHQ9
1. LITTLE INTEREST OR PLEASURE IN DOING THINGS: 0
SUM OF ALL RESPONSES TO PHQ QUESTIONS 1-9: 0
SUM OF ALL RESPONSES TO PHQ9 QUESTIONS 1 & 2: 0
SUM OF ALL RESPONSES TO PHQ QUESTIONS 1-9: 0
2. FEELING DOWN, DEPRESSED OR HOPELESS: 0

## 2022-08-15 NOTE — PROGRESS NOTES
Arrived to the [facility]. Elieser & Taxol completed. Patient tolerated without difficulty. Any issues or concerns during appointment: None. Patient aware of next infusion appointment on 8/22 (date) at 56 (time). Patient instructed to call provider with temperature of 100.4 or greater or nausea/vomiting/ diarrhea or pain not controlled by medications  Discharged ambulatory with son.

## 2022-08-15 NOTE — PROGRESS NOTES
[facility] Hematology and Oncology: Office Visit Established Patient    Chief Complaint:    Chief Complaint   Patient presents with    Follow-up         History of Present Illness:  Ms. Twyla Hernandez is a 77 y.o. female who returns today for management of breast cancer. She initially presented for a routine bilateral screening mammogram on 3/23/22 which identified a right breast mass near the 11:00-12:00 position, 6-7 cm from the nipple and other small circumscribed round masses in the right upper inner breast which had not definitely changed from prior examinations. Further evaluation with right breast ultrasound on 3/25/22 confirmed a 6 mm hypoechoic shadowing mass in the 12:00 right breast at 7 cm from the nipple. She presented to Dr. Omar Soriano on 4/7/22 to discuss whether to proceed with recommended biopsy, he recommended that biopsy be performed. Ultrasound-guided biopsy was subsequently completed on 4/12/22 with pathology revealing high grade infiltrating ductal carcinoma, ER 76%, MI negative, and HER2 positive. MRI of the bilateral breasts was completed on 4/14/22 demonstrating a right anterior 12:00 breast cancer measuring up to 1.2 cm; multiple (greater than 10 total) other right upper breast masses, some of which had enlarged, concerning for additional malignancy; an indeterminate 1.2 cm right axillary lymph node; and no suspicious left breast finding. We recommended upfront surgery because of the MRI findings and the inconclusive size/clinical stage of her cancer. Path reviewed, thankfully it appears that the indeterminate lesions in the breast were benign, with the final tumor dimension being only 1.5 cm, and 4 nodes negative for tumor. Therefore, she is [id number], and would be eligible for the adjuvant paclitaxel and trastuzumab regimen. She returns today for follow-up and week 6 TH. She has not been great in the last 2 weeks since she was last seen.  She has had 2 episodes of epistaxis, the most recent being 3 days ago. She was able to get them stopped and has been using saline nasal spray. Her biggest complaint is feeling \"weak from the knees down\" and admits that she has slowed down considerably in the last couple weeks (although she was able to mow the yard on riding lawn-mower recently). She has mild nausea controlled with antiemetics. Diarrhea has resolved and she is now taking every other day miralax. She still has rectal discomfort - insurance would not cover proctofoam and is asking about an alternative today. Appetite is good and weight is stable. Fatigue is stable. She denies any pain or neuropathy but feels \"sensitivity\" in the pads of her fingers. She denies any fevers or infectious symptoms. She is developing some alopecia. Review of Systems:  Constitutional: Positive for fatigue. HENT: Positive for epistaxis. Eyes: Negative. Respiratory: Negative. Cardiovascular: Negative. Gastrointestinal: Positive for rectal discomfort. Genitourinary: Negative. Musculoskeletal: Negative. Skin: Negative. Neurological: Negative. Endo/Heme/Allergies: Negative. Psychiatric/Behavioral: Negative. All other systems reviewed and are negative. Allergies   Allergen Reactions    Sulfamethoxazole-Trimethoprim Nausea And Vomiting    Meloxicam Other (See Comments)    Oxaprozin Swelling     Past Medical History:   Diagnosis Date    Anxiety     Depression 5/19/2017    Essential hypertension 05/19/2017    Fibromyalgia     History of postoperative nausea and vomiting     Hyperlipidemia     Hypothyroid     Impaired fasting glucose     Insomnia     Nausea & vomiting     Need for hepatitis C screening test 12/20/2020    3/15/21 HCV ab negative.       Obesity     Osteoarthritis 12/15/2015    Stress incontinence, female     Vitamin D deficiency      Past Surgical History:   Procedure Laterality Date    BREAST BIOPSY Bilateral     BREAST BIOPSY Right 3/29/2021    RIGHT BREAST NEEDLE LOCALIZED LUMPECTOMY X2 performed by Yanni Hollingsworth MD at [address] [facility]    COLONOSCOPY  2012    normal; internal hemorrhoid    HYSTERECTOMY ([address])  [id number] [facility] Right 6/1/2022    RIGHT SENTINEL NODE BIOPSY MAG TRACE performed by Jagdish Smyth MD at [facility] Right 6/1/2022    RIGHT BREAST MASTECTOMY performed by Jagdish Symth MD at [facility] Left 6/1/2022    PORT INSERTION performed by Jagdish Smyth MD at [address] [facility] Bilateral     Left 2015, Right 7/29/20    US BREAST BIOPSY NEEDLE ADDITIONAL RIGHT Right 2/25/2021    US BREAST BIOPSY NEEDLE ADDITIONAL RIGHT 2/25/2021 [facility]    US BREAST NEEDLE BIOPSY RIGHT Right 2/25/2021    US BREAST NEEDLE BIOPSY RIGHT 2/25/2021 [facility]    US BREAST NEEDLE BIOPSY RIGHT Right 4/12/2022    US BREAST NEEDLE BIOPSY RIGHT 4/12/2022 [facility]    US GUIDED NEEDLE LOC BREAST ADDL RIGHT Right 3/29/2021    US GUIDED NEEDLE LOC BREAST ADDL RIGHT 3/29/2021 [facility]    US GUIDED NEEDLE LOC OF RIGHT BREAST Right 3/29/2021    US GUIDED NEEDLE LOC OF RIGHT BREAST 3/29/2021 [facility]     Family History   Problem Relation Age of Onset    No Known Problems Mother     Heart Disease Father     Breast Cancer Sister     Stroke Father      Social History     Socioeconomic History    Marital status:       Spouse name: Not on file    Number of children: Not on file    Years of education: Not on file    Highest education level: Not on file   Occupational History    Not on file   Tobacco Use    Smoking status: Never    Smokeless tobacco: Never   Substance and Sexual Activity    Alcohol use: No    Drug use: No    Sexual activity: Not on file   Other Topics Concern    Not on file   Social History Narrative    Not on file     Social Determinants of Health     Financial Resource Strain: Not on file   Food Insecurity: Not on file mL IntraVENous PRN Ren Chapa MD   10 mL at 08/15/22 0919       OBJECTIVE:  /68 (Site: Left Upper Arm, Position: Sitting, Cuff Size: Large Adult)   Pulse 78   Temp 98.1 °F (36.7 °C) (Oral)   Resp 20   Ht 5' 3\" (1.6 m)   Wt 213 lb 12.8 oz (97 kg)   SpO2 100%   BMI 37.87 kg/m²     Physical Exam:  Constitutional: Well developed, well nourished female in no acute distress, sitting comfortably on the examination table. HEENT: Normocephalic and atraumatic. Sclerae anicteric. Skin Warm and dry. No bruising and no rash noted. No erythema. No pallor. Respiratory  Bilateral breath sounds are clear. There is no use of accessory muscles. Cardiac Heart rate and rhythm regular. No gallops, rubs, or murmurs. GI Positive bowel sounds. No tenderness, no distention. Neuro Grossly nonfocal with no obvious sensory or motor deficits. MSK Normal range of motion in general.  No edema and no tenderness. Psych Appropriate mood and affect.          Labs:  Recent Results (from the past 96 hour(s))   CBC with Auto Differential    Collection Time: 08/15/22  9:06 AM   Result Value Ref Range    WBC 5.0 4.3 - 11.1 K/uL    RBC 3.89 (L) 4.05 - 5.2 M/uL    Hemoglobin 11.7 11.7 - 15.4 g/dL    Hematocrit 35.0 (L) 35.8 - 46.3 %    MCV 90.0 79.6 - 97.8 FL    MCH 30.1 26.1 - 32.9 PG    MCHC 33.4 31.4 - 35.0 g/dL    RDW 14.7 (H) 11.9 - 14.6 %    Platelets 796 359 - 952 K/uL    MPV 10.2 9.4 - 12.3 FL    nRBC 0.00 0.0 - 0.2 K/uL    Seg Neutrophils 65 43 - 78 %    Lymphocytes 21 13 - 44 %    Monocytes 11 4.0 - 12.0 %    Eosinophils % 1 0.5 - 7.8 %    Basophils 1 0.0 - 2.0 %    Immature Granulocytes 1 0.0 - 5.0 %    Segs Absolute 3.2 1.7 - 8.2 K/UL    Absolute Lymph # 1.0 0.5 - 4.6 K/UL    Absolute Mono # 0.5 0.1 - 1.3 K/UL    Absolute Eos # 0.1 0.0 - 0.8 K/UL    Basophils Absolute 0.0 0.0 - 0.2 K/UL    Absolute Immature Granulocyte 0.1 0.0 - 0.5 K/UL    Differential Type AUTOMATED     Comprehensive Metabolic Panel Collection Time: 08/15/22  9:06 AM   Result Value Ref Range    Sodium 138 136 - 145 mmol/L    Potassium 4.2 3.5 - 5.1 mmol/L    Chloride 108 (H) 98 - 107 mmol/L    CO2 26 21 - 32 mmol/L    Anion Gap 4 (L) 7 - 16 mmol/L    Glucose 102 (H) 65 - 100 mg/dL    BUN 18 8 - 23 MG/DL    Creatinine 0.90 0.6 - 1.0 MG/DL    GFR African American >60 >60 ml/min/1.73m2    GFR Non- >60 >60 ml/min/1.73m2    Calcium 8.7 8.3 - 10.4 MG/DL    Total Bilirubin 0.5 0.2 - 1.1 MG/DL    ALT 27 12 - 65 U/L    AST 18 15 - 37 U/L    Alk Phosphatase 97 50 - 136 U/L    Total Protein 6.6 6.3 - 8.2 g/dL    Albumin 3.2 3.2 - 4.6 g/dL    Globulin 3.4 2.3 - 3.5 g/dL    Albumin/Globulin Ratio 0.9 (L) 1.2 - 3.5     Magnesium    Collection Time: 08/15/22  9:06 AM   Result Value Ref Range    Magnesium 1.9 1.8 - 2.4 mg/dL         Imaging:  MRI of the Breasts with and without contrast       CLINICAL INDICATION:  New diagnosis of right 12:00 breast cancer status post   biopsy demonstrating infiltrating ductal carcinoma high-grade, poorly   differentiated. Evaluate for extent of disease, staging, therapy planning. Patient had right benign papillomas removed in 2021, and a left benign biopsy in   2011 demonstrating a 2:00 fibroadenoma. Family history of her sister having   breast cancer. .       COMPARISON: Ultrasound and mammography 4/12/2022, 3/25/2022, 3/23/2022 and prior   breast MRI 3/3/2021. TECHNIQUE: Standard MRI sequences were obtained through the breasts in multiple   planes. Images were obtained before and after intravenous infusion of 20 mL of   Prohance contrast. Images were reviewed with PACS and with [employer] CAD software. FINDINGS: The breasts demonstrate mild-moderate bilateral glandularity and mild   background enhancement. Right breast: The new 12:00 biopsy clip is within an irregular heterogeneously   enhancing 1.2 x 0.8 x 1.1 cm malignant mass.  As seen on prior exam a large area   of the right upper breast spanning 11:00-12:00 anterior, middle, posterior depth   (overall about 10-12 cm) contains multiple scattered masses of varying sizes and   shapes. There are greater than 10 masses total. Anteriorly at 12:00 10 cm from   nipple an irregular heterogeneously enhancing mass measures up to 1.2 x 1.0 cm   (previously 0.6 x 0.4 cm). Posteriorly near 11:00 14 cm from nipple a lobulated   mass measures up to 1.1 x 0.4 cm (previously 0.8 x 0.3 cm). Left breast: No evidence of suspicious enhancing mass, and no dominant or unique   nonmass enhancement, to suggest additional malignancy. Lymph nodes: Right axilla demonstrates a dysmorphic 1.2 x 0.8 cm node (series 4   image 351) which has developed since the prior exam. No other axillary or   internal mammary lymphadenopathy is evident. Elsewhere: Limited visualization of the partially included thorax and upper   abdomen shows no acute abnormality. Impression       1. Right anterior 12:00 breast cancer measures up to 1.2 cm. 2. Multiple (greater than 10 total) other right upper breast masses, some of   which have enlarged, concerning for additional malignancy. 3. Right axillary indeterminate lymph node. 4. No suspicious left breast finding. Recommend continued malignancy management as directed clinically. BI-RADS Assessment Category 6: Known Biopsy Proven Malignancy             Pathology:  DIAGNOSIS        A:  \"RIGHT BREAST MASTECTOMY\":  INVASIVE DUCTAL CARCINOMA, HIGH GRADE     (3 + 3 + 2), 15 MILLIMETER IN GREATEST DIMENSION, MARGINS UNINVOLVED;   FIBROCYSTIC MASTOPATHY. B :  \"SENTINEL NODE #1\":  BENIGN LYMPH NODE, NEGATIVE FOR TUMOR. C:  \"ADDITIONAL AXILLARY TISSUE\":  FOUR BENIGN LYMPH NODES, ALL   NEGATIVE FOR TUMOR (0/4).        * * *DIAGNOSIS* * * * * * * * * * * * * * * * * * * * * * * * * * * * * * *            A:  \" RIGHT BREAST, 12:00 POSITION, 7 CM FROM NIPPLE, CORE BIOPSY\":         INFILTRATING DUCTAL possible that her disease is multifocal, as well as an indeterminate  axillary node. For this reason, we should at least consider neoadjuvant chemotherapy. However, I would strongly advise at least two additional biopsies in that case, ultrasound guided biopsy of the axillary node, and possibly MRI guided biopsy (if not  visualized on ultrasound) of another in-breast lesion to confirm pathology. She was reluctant to consider additional biopsy, so ultimately the decision was made to proceed with mastectomy. Path reviewed, thankfully it appears that the indeterminate lesions in the breast were benign, with the final tumor dimension being only 1.5 cm, and 4 nodes negative for tumor. Therefore, she is [id number], and would be eligible for the adjuvant paclitaxel and trastuzumab regimen. After TH she will complete a year of Herceptin, as well as endocrine therapy with AI. She will not require radiation due to the T1N0 disease and mastectomy. She returns today for follow-up and week 6 TH. She has not been great in the last 2 weeks since she was last seen. She has had 2 episodes of epistaxis, the most recent being 3 days ago. She was able to get them stopped and has been using saline nasal spray. Her biggest complaint is feeling \"weak from the knees down\" and admits that she has slowed down considerably in the last couple weeks (although she was able to mow the yard on riding lawnShowpitchmower recently). She has mild nausea controlled with antiemetics. Diarrhea has resolved and she is now taking every other day miralax. She still has rectal discomfort - insurance would not cover proctofoam and is asking about an alternative today. Appetite is good and weight is stable. Fatigue is stable. She denies any pain or neuropathy but feels \"sensitivity\" in the pads of her fingers. She denies any fevers or infectious symptoms. She is developing some alopecia. Labs reviewed, adequate to proceed with week 6 TH.  Plts 217k discussed humidifier and continuing saline nasal spray. Encouraged frequent activity to combat fatigue. Will send in anusol cream (which appears to be covered by insurance. Continue with anti-emetics prn. Continue good oral nutrition and hydration. Call with any fevers, uncontrolled side effects from treatment or any other worrisome/concerning symptoms. Follow up next week for week 7, OV week 8 or sooner if needed. All questions were asked and answered to the best of my ability.            EDISON Burch [facility] Hematology and Oncology  [address] [place] [address]  Office : [phone]  Fax : [phone]

## 2022-08-16 RX ORDER — HYDROCORTISONE 25 MG/G
CREAM TOPICAL 2 TIMES DAILY
Qty: 28 G | Refills: 0 | Status: SHIPPED | OUTPATIENT
Start: 2022-08-16

## 2022-08-22 ENCOUNTER — CLINICAL DOCUMENTATION (OUTPATIENT)
Dept: ONCOLOGY | Age: 77
End: 2022-08-22

## 2022-08-22 ENCOUNTER — HOSPITAL ENCOUNTER (OUTPATIENT)
Dept: INFUSION THERAPY | Age: 77
Discharge: HOME | End: 2022-08-22
Payer: MEDICARE

## 2022-08-22 VITALS
WEIGHT: 216.4 LBS | DIASTOLIC BLOOD PRESSURE: 72 MMHG | TEMPERATURE: 98.3 F | HEART RATE: 88 BPM | RESPIRATION RATE: 18 BRPM | SYSTOLIC BLOOD PRESSURE: 163 MMHG | OXYGEN SATURATION: 99 % | BODY MASS INDEX: 38.33 KG/M2

## 2022-08-22 DIAGNOSIS — C50.911 MALIGNANT NEOPLASM OF RIGHT BREAST IN FEMALE, ESTROGEN RECEPTOR POSITIVE, UNSPECIFIED SITE OF BREAST (HCC): Primary | ICD-10-CM

## 2022-08-22 DIAGNOSIS — Z17.0 MALIGNANT NEOPLASM OF RIGHT BREAST IN FEMALE, ESTROGEN RECEPTOR POSITIVE, UNSPECIFIED SITE OF BREAST (HCC): Primary | ICD-10-CM

## 2022-08-22 LAB
ALBUMIN SERPL-MCNC: 3.1 G/DL (ref 3.2–4.6)
ALBUMIN/GLOB SERPL: 0.9 {RATIO} (ref 1.2–3.5)
ALP SERPL-CCNC: 97 U/L (ref 50–136)
ALT SERPL-CCNC: 30 U/L (ref 12–65)
ANION GAP SERPL CALC-SCNC: 5 MMOL/L (ref 7–16)
AST SERPL-CCNC: 24 U/L (ref 15–37)
BASOPHILS # BLD: 0 K/UL (ref 0–0.2)
BASOPHILS NFR BLD: 1 % (ref 0–2)
BILIRUB SERPL-MCNC: 0.5 MG/DL (ref 0.2–1.1)
BUN SERPL-MCNC: 13 MG/DL (ref 8–23)
CALCIUM SERPL-MCNC: 8.8 MG/DL (ref 8.3–10.4)
CHLORIDE SERPL-SCNC: 108 MMOL/L (ref 98–107)
CO2 SERPL-SCNC: 25 MMOL/L (ref 21–32)
CREAT SERPL-MCNC: 0.7 MG/DL (ref 0.6–1)
DIFFERENTIAL METHOD BLD: ABNORMAL
EOSINOPHIL # BLD: 0.1 K/UL (ref 0–0.8)
EOSINOPHIL NFR BLD: 1 % (ref 0.5–7.8)
ERYTHROCYTE [DISTWIDTH] IN BLOOD BY AUTOMATED COUNT: 15.1 % (ref 11.9–14.6)
GLOBULIN SER CALC-MCNC: 3.4 G/DL (ref 2.3–3.5)
GLUCOSE SERPL-MCNC: 98 MG/DL (ref 65–100)
HCT VFR BLD AUTO: 33.1 % (ref 35.8–46.3)
HGB BLD-MCNC: 10.8 G/DL (ref 11.7–15.4)
IMM GRANULOCYTES # BLD AUTO: 0.1 K/UL (ref 0–0.5)
IMM GRANULOCYTES NFR BLD AUTO: 1 % (ref 0–5)
LYMPHOCYTES # BLD: 0.8 K/UL (ref 0.5–4.6)
LYMPHOCYTES NFR BLD: 16 % (ref 13–44)
MCH RBC QN AUTO: 29.7 PG (ref 26.1–32.9)
MCHC RBC AUTO-ENTMCNC: 32.6 G/DL (ref 31.4–35)
MCV RBC AUTO: 90.9 FL (ref 79.6–97.8)
MONOCYTES # BLD: 0.4 K/UL (ref 0.1–1.3)
MONOCYTES NFR BLD: 9 % (ref 4–12)
NEUTS SEG # BLD: 3.6 K/UL (ref 1.7–8.2)
NEUTS SEG NFR BLD: 72 % (ref 43–78)
NRBC # BLD: 0 K/UL (ref 0–0.2)
PLATELET # BLD AUTO: 189 K/UL (ref 150–450)
PMV BLD AUTO: 11.1 FL (ref 9.4–12.3)
POTASSIUM SERPL-SCNC: 4 MMOL/L (ref 3.5–5.1)
PROT SERPL-MCNC: 6.5 G/DL (ref 6.3–8.2)
RBC # BLD AUTO: 3.64 M/UL (ref 4.05–5.2)
SODIUM SERPL-SCNC: 138 MMOL/L (ref 136–145)
WBC # BLD AUTO: 4.9 K/UL (ref 4.3–11.1)

## 2022-08-22 PROCEDURE — 96413 CHEMO IV INFUSION 1 HR: CPT

## 2022-08-22 PROCEDURE — 80053 COMPREHEN METABOLIC PANEL: CPT

## 2022-08-22 PROCEDURE — 85025 COMPLETE CBC W/AUTO DIFF WBC: CPT

## 2022-08-22 PROCEDURE — 96375 TX/PRO/DX INJ NEW DRUG ADDON: CPT

## 2022-08-22 PROCEDURE — 2500000003 HC RX 250 WO HCPCS: Performed by: NURSE PRACTITIONER

## 2022-08-22 PROCEDURE — 96417 CHEMO IV INFUS EACH ADDL SEQ: CPT

## 2022-08-22 PROCEDURE — 36591 DRAW BLOOD OFF VENOUS DEVICE: CPT

## 2022-08-22 PROCEDURE — 2580000003 HC RX 258: Performed by: NURSE PRACTITIONER

## 2022-08-22 PROCEDURE — 2580000003 HC RX 258: Performed by: INTERNAL MEDICINE

## 2022-08-22 PROCEDURE — 6360000002 HC RX W HCPCS: Performed by: NURSE PRACTITIONER

## 2022-08-22 PROCEDURE — A4216 STERILE WATER/SALINE, 10 ML: HCPCS | Performed by: NURSE PRACTITIONER

## 2022-08-22 RX ORDER — ALBUTEROL SULFATE 90 UG/1
4 AEROSOL, METERED RESPIRATORY (INHALATION) PRN
Status: CANCELLED | OUTPATIENT
Start: 2022-08-22

## 2022-08-22 RX ORDER — SODIUM CHLORIDE 9 MG/ML
INJECTION, SOLUTION INTRAVENOUS CONTINUOUS
Status: CANCELLED | OUTPATIENT
Start: 2022-08-22

## 2022-08-22 RX ORDER — SODIUM CHLORIDE 0.9 % (FLUSH) 0.9 %
5-40 SYRINGE (ML) INJECTION PRN
Status: DISCONTINUED | OUTPATIENT
Start: 2022-08-22 | End: 2022-08-23 | Stop reason: HOSPADM

## 2022-08-22 RX ORDER — MEPERIDINE HYDROCHLORIDE 25 MG/ML
12.5 INJECTION INTRAMUSCULAR; INTRAVENOUS; SUBCUTANEOUS PRN
Status: CANCELLED | OUTPATIENT
Start: 2022-08-22

## 2022-08-22 RX ORDER — ONDANSETRON 2 MG/ML
8 INJECTION INTRAMUSCULAR; INTRAVENOUS
Status: CANCELLED | OUTPATIENT
Start: 2022-08-22

## 2022-08-22 RX ORDER — ACETAMINOPHEN 325 MG/1
650 TABLET ORAL
Status: CANCELLED | OUTPATIENT
Start: 2022-08-22

## 2022-08-22 RX ORDER — HEPARIN SODIUM (PORCINE) LOCK FLUSH IV SOLN 100 UNIT/ML 100 UNIT/ML
500 SOLUTION INTRAVENOUS PRN
Status: CANCELLED | OUTPATIENT
Start: 2022-08-22

## 2022-08-22 RX ORDER — DIPHENHYDRAMINE HYDROCHLORIDE 50 MG/ML
25 INJECTION INTRAMUSCULAR; INTRAVENOUS ONCE
Status: COMPLETED | OUTPATIENT
Start: 2022-08-22 | End: 2022-08-22

## 2022-08-22 RX ORDER — SODIUM CHLORIDE 9 MG/ML
5-40 INJECTION INTRAVENOUS PRN
Status: CANCELLED | OUTPATIENT
Start: 2022-08-22

## 2022-08-22 RX ORDER — EPINEPHRINE 1 MG/ML
0.3 INJECTION, SOLUTION, CONCENTRATE INTRAVENOUS PRN
Status: CANCELLED | OUTPATIENT
Start: 2022-08-22

## 2022-08-22 RX ORDER — SODIUM CHLORIDE 9 MG/ML
5-250 INJECTION, SOLUTION INTRAVENOUS PRN
Status: CANCELLED | OUTPATIENT
Start: 2022-08-22

## 2022-08-22 RX ORDER — SODIUM CHLORIDE 9 MG/ML
5-250 INJECTION, SOLUTION INTRAVENOUS PRN
Status: DISCONTINUED | OUTPATIENT
Start: 2022-08-22 | End: 2022-08-23 | Stop reason: HOSPADM

## 2022-08-22 RX ORDER — DEXAMETHASONE SODIUM PHOSPHATE 10 MG/ML
10 INJECTION INTRAMUSCULAR; INTRAVENOUS ONCE
Status: COMPLETED | OUTPATIENT
Start: 2022-08-22 | End: 2022-08-22

## 2022-08-22 RX ORDER — SODIUM CHLORIDE 0.9 % (FLUSH) 0.9 %
10 SYRINGE (ML) INJECTION PRN
Status: DISCONTINUED | OUTPATIENT
Start: 2022-08-22 | End: 2022-08-23 | Stop reason: HOSPADM

## 2022-08-22 RX ORDER — DIPHENHYDRAMINE HYDROCHLORIDE 50 MG/ML
50 INJECTION INTRAMUSCULAR; INTRAVENOUS
Status: CANCELLED | OUTPATIENT
Start: 2022-08-22

## 2022-08-22 RX ADMIN — SODIUM CHLORIDE, PRESERVATIVE FREE 10 ML: 5 INJECTION INTRAVENOUS at 09:38

## 2022-08-22 RX ADMIN — SODIUM CHLORIDE 100 ML/HR: 9 INJECTION, SOLUTION INTRAVENOUS at 11:20

## 2022-08-22 RX ADMIN — SODIUM CHLORIDE, PRESERVATIVE FREE 10 ML: 5 INJECTION INTRAVENOUS at 14:35

## 2022-08-22 RX ADMIN — FAMOTIDINE 20 MG: 10 INJECTION, SOLUTION INTRAVENOUS at 12:44

## 2022-08-22 RX ADMIN — DIPHENHYDRAMINE HYDROCHLORIDE 25 MG: 50 INJECTION, SOLUTION INTRAMUSCULAR; INTRAVENOUS at 12:42

## 2022-08-22 RX ADMIN — SODIUM CHLORIDE, PRESERVATIVE FREE 10 ML: 5 INJECTION INTRAVENOUS at 11:20

## 2022-08-22 RX ADMIN — DEXAMETHASONE SODIUM PHOSPHATE 10 MG: 10 INJECTION INTRAMUSCULAR; INTRAVENOUS at 12:46

## 2022-08-22 RX ADMIN — SODIUM CHLORIDE 189 MG: 9 INJECTION, SOLUTION INTRAVENOUS at 12:08

## 2022-08-22 RX ADMIN — PACLITAXEL 162 MG: 6 INJECTION, SOLUTION, CONCENTRATE INTRAVENOUS at 13:25

## 2022-08-22 ASSESSMENT — PAIN DESCRIPTION - ORIENTATION: ORIENTATION: RIGHT;LEFT

## 2022-08-22 ASSESSMENT — PAIN DESCRIPTION - PAIN TYPE: TYPE: ACUTE PAIN

## 2022-08-22 ASSESSMENT — PAIN SCALES - GENERAL: PAINLEVEL_OUTOF10: 3

## 2022-08-22 ASSESSMENT — PAIN DESCRIPTION - FREQUENCY: FREQUENCY: CONTINUOUS

## 2022-08-22 ASSESSMENT — PAIN DESCRIPTION - ONSET: ONSET: GRADUAL

## 2022-08-22 ASSESSMENT — PAIN DESCRIPTION - DESCRIPTORS: DESCRIPTORS: ACHING

## 2022-08-22 ASSESSMENT — PAIN DESCRIPTION - LOCATION: LOCATION: KNEE

## 2022-08-22 ASSESSMENT — PAIN - FUNCTIONAL ASSESSMENT: PAIN_FUNCTIONAL_ASSESSMENT: PREVENTS OR INTERFERES SOME ACTIVE ACTIVITIES AND ADLS

## 2022-08-22 NOTE — PROGRESS NOTES
Full review of patient's billing charges were completed by Hardeep. Patient responsibility was determined to be $94.59 at this time.

## 2022-08-22 NOTE — PROGRESS NOTES
Arrived to the [facility]. Cornelius Chavez completed. Patient tolerated well. Any issues or concerns during appointment: none. Patient aware of next infusion appointment on 8/29 at 11:00 am.  Patient aware of next lab and [facility] office visit on 8/29 at 10:00 am.  Patient instructed to call provider with temperature of 100.4 or greater or nausea/vomiting/ diarrhea or pain not controlled by medications  Discharged ambulatory to home.

## 2022-08-22 NOTE — PROGRESS NOTES
Patient arrived to port lab for port access and lab draw   Im Paty 45 accessed and labs drawn per protocol   *Port remains accessed for infusion   Patient discharged from port lab ambulatory*

## 2022-08-24 ENCOUNTER — CLINICAL DOCUMENTATION (OUTPATIENT)
Dept: ONCOLOGY | Age: 77
End: 2022-08-24

## 2022-08-24 NOTE — PROGRESS NOTES
I spoke with Hawa Danielle son regarding a billing issue for 8/8/2022 DOS. Information sent to billing for review.

## 2022-08-26 ENCOUNTER — TELEPHONE (OUTPATIENT)
Dept: ONCOLOGY | Age: 77
End: 2022-08-26

## 2022-08-26 DIAGNOSIS — R19.00 SWELLING ABDOMEN: ICD-10-CM

## 2022-08-26 DIAGNOSIS — C50.911 MALIGNANT NEOPLASM OF RIGHT BREAST IN FEMALE, ESTROGEN RECEPTOR POSITIVE, UNSPECIFIED SITE OF BREAST (HCC): Primary | ICD-10-CM

## 2022-08-26 DIAGNOSIS — Z79.899 ENCOUNTER FOR MONITORING CARDIOTOXIC DRUG THERAPY: ICD-10-CM

## 2022-08-26 DIAGNOSIS — Z17.0 MALIGNANT NEOPLASM OF RIGHT BREAST IN FEMALE, ESTROGEN RECEPTOR POSITIVE, UNSPECIFIED SITE OF BREAST (HCC): Primary | ICD-10-CM

## 2022-08-26 DIAGNOSIS — R06.02 SOB (SHORTNESS OF BREATH): ICD-10-CM

## 2022-08-26 DIAGNOSIS — Z09 CHEMOTHERAPY FOLLOW-UP EXAMINATION: ICD-10-CM

## 2022-08-26 DIAGNOSIS — Z51.81 ENCOUNTER FOR MONITORING CARDIOTOXIC DRUG THERAPY: ICD-10-CM

## 2022-08-26 DIAGNOSIS — R60.1 GENERALIZED EDEMA: ICD-10-CM

## 2022-08-26 DIAGNOSIS — M79.89 SWELLING OF BOTH LOWER EXTREMITIES: ICD-10-CM

## 2022-08-26 NOTE — TELEPHONE ENCOUNTER
Swelling in legs since yesterday and legs were up yesterday. Swelling at belly button and above. Pt SOB when walking across the yard and she is not SOB with sitting. I reported to NP Jaiden Bates   last Echo 7/6/22 60-65 % EF. Per NP we are to repeat the echo. I am unable to get the echo in the chart . Call to [facility] cardiology. Appt set for 3:30 today. I called the patient and she will be at [address] at 4:12.  She Verbalizes understanding of instructions

## 2022-08-29 ENCOUNTER — HOSPITAL ENCOUNTER (OUTPATIENT)
Dept: INFUSION THERAPY | Age: 77
Discharge: HOME | End: 2022-08-29
Payer: MEDICARE

## 2022-08-29 ENCOUNTER — OFFICE VISIT (OUTPATIENT)
Dept: ONCOLOGY | Age: 77
End: 2022-08-29
Payer: MEDICARE

## 2022-08-29 VITALS
WEIGHT: 213 LBS | DIASTOLIC BLOOD PRESSURE: 74 MMHG | HEIGHT: 63 IN | HEART RATE: 71 BPM | OXYGEN SATURATION: 100 % | TEMPERATURE: 98.5 F | BODY MASS INDEX: 37.74 KG/M2 | SYSTOLIC BLOOD PRESSURE: 165 MMHG | RESPIRATION RATE: 22 BRPM

## 2022-08-29 DIAGNOSIS — Z17.0 MALIGNANT NEOPLASM OF RIGHT BREAST IN FEMALE, ESTROGEN RECEPTOR POSITIVE, UNSPECIFIED SITE OF BREAST (HCC): Primary | ICD-10-CM

## 2022-08-29 DIAGNOSIS — C50.911 MALIGNANT NEOPLASM OF RIGHT BREAST IN FEMALE, ESTROGEN RECEPTOR POSITIVE, UNSPECIFIED SITE OF BREAST (HCC): ICD-10-CM

## 2022-08-29 DIAGNOSIS — Z17.0 MALIGNANT NEOPLASM OF RIGHT BREAST IN FEMALE, ESTROGEN RECEPTOR POSITIVE, UNSPECIFIED SITE OF BREAST (HCC): ICD-10-CM

## 2022-08-29 DIAGNOSIS — C50.911 MALIGNANT NEOPLASM OF RIGHT BREAST IN FEMALE, ESTROGEN RECEPTOR POSITIVE, UNSPECIFIED SITE OF BREAST (HCC): Primary | ICD-10-CM

## 2022-08-29 LAB
ALBUMIN SERPL-MCNC: 3.3 G/DL (ref 3.2–4.6)
ALBUMIN/GLOB SERPL: 1 {RATIO} (ref 1.2–3.5)
ALP SERPL-CCNC: 89 U/L (ref 50–136)
ALT SERPL-CCNC: 30 U/L (ref 12–65)
ANION GAP SERPL CALC-SCNC: 3 MMOL/L (ref 7–16)
AST SERPL-CCNC: 24 U/L (ref 15–37)
BASOPHILS # BLD: 0.1 K/UL (ref 0–0.2)
BASOPHILS NFR BLD: 1 % (ref 0–2)
BILIRUB SERPL-MCNC: 0.6 MG/DL (ref 0.2–1.1)
BUN SERPL-MCNC: 11 MG/DL (ref 8–23)
CALCIUM SERPL-MCNC: 8.6 MG/DL (ref 8.3–10.4)
CHLORIDE SERPL-SCNC: 111 MMOL/L (ref 98–107)
CO2 SERPL-SCNC: 26 MMOL/L (ref 21–32)
CREAT SERPL-MCNC: 0.8 MG/DL (ref 0.6–1)
DIFFERENTIAL METHOD BLD: ABNORMAL
EOSINOPHIL # BLD: 0.1 K/UL (ref 0–0.8)
EOSINOPHIL NFR BLD: 1 % (ref 0.5–7.8)
ERYTHROCYTE [DISTWIDTH] IN BLOOD BY AUTOMATED COUNT: 15.6 % (ref 11.9–14.6)
GLOBULIN SER CALC-MCNC: 3.3 G/DL (ref 2.3–3.5)
GLUCOSE SERPL-MCNC: 108 MG/DL (ref 65–100)
HCT VFR BLD AUTO: 33.2 % (ref 35.8–46.3)
HGB BLD-MCNC: 10.9 G/DL (ref 11.7–15.4)
IMM GRANULOCYTES # BLD AUTO: 0.1 K/UL (ref 0–0.5)
IMM GRANULOCYTES NFR BLD AUTO: 1 % (ref 0–5)
LYMPHOCYTES # BLD: 0.9 K/UL (ref 0.5–4.6)
LYMPHOCYTES NFR BLD: 19 % (ref 13–44)
MAGNESIUM SERPL-MCNC: 2.1 MG/DL (ref 1.8–2.4)
MCH RBC QN AUTO: 29.6 PG (ref 26.1–32.9)
MCHC RBC AUTO-ENTMCNC: 32.8 G/DL (ref 31.4–35)
MCV RBC AUTO: 90.2 FL (ref 79.6–97.8)
MONOCYTES # BLD: 0.5 K/UL (ref 0.1–1.3)
MONOCYTES NFR BLD: 10 % (ref 4–12)
NEUTS SEG # BLD: 3.1 K/UL (ref 1.7–8.2)
NEUTS SEG NFR BLD: 68 % (ref 43–78)
NRBC # BLD: 0 K/UL (ref 0–0.2)
PLATELET # BLD AUTO: 210 K/UL (ref 150–450)
PMV BLD AUTO: 10.5 FL (ref 9.4–12.3)
POTASSIUM SERPL-SCNC: 4.4 MMOL/L (ref 3.5–5.1)
PROT SERPL-MCNC: 6.6 G/DL (ref 6.3–8.2)
RBC # BLD AUTO: 3.68 M/UL (ref 4.05–5.2)
SODIUM SERPL-SCNC: 140 MMOL/L (ref 136–145)
WBC # BLD AUTO: 4.6 K/UL (ref 4.3–11.1)

## 2022-08-29 PROCEDURE — 2500000003 HC RX 250 WO HCPCS: Performed by: INTERNAL MEDICINE

## 2022-08-29 PROCEDURE — G8417 CALC BMI ABV UP PARAM F/U: HCPCS | Performed by: INTERNAL MEDICINE

## 2022-08-29 PROCEDURE — G8399 PT W/DXA RESULTS DOCUMENT: HCPCS | Performed by: INTERNAL MEDICINE

## 2022-08-29 PROCEDURE — 6360000002 HC RX W HCPCS: Performed by: INTERNAL MEDICINE

## 2022-08-29 PROCEDURE — 2580000003 HC RX 258: Performed by: INTERNAL MEDICINE

## 2022-08-29 PROCEDURE — G8428 CUR MEDS NOT DOCUMENT: HCPCS | Performed by: INTERNAL MEDICINE

## 2022-08-29 PROCEDURE — 83735 ASSAY OF MAGNESIUM: CPT

## 2022-08-29 PROCEDURE — 1123F ACP DISCUSS/DSCN MKR DOCD: CPT | Performed by: INTERNAL MEDICINE

## 2022-08-29 PROCEDURE — 96413 CHEMO IV INFUSION 1 HR: CPT

## 2022-08-29 PROCEDURE — 1090F PRES/ABSN URINE INCON ASSESS: CPT | Performed by: INTERNAL MEDICINE

## 2022-08-29 PROCEDURE — 85025 COMPLETE CBC W/AUTO DIFF WBC: CPT

## 2022-08-29 PROCEDURE — A4216 STERILE WATER/SALINE, 10 ML: HCPCS | Performed by: INTERNAL MEDICINE

## 2022-08-29 PROCEDURE — 96417 CHEMO IV INFUS EACH ADDL SEQ: CPT

## 2022-08-29 PROCEDURE — 99214 OFFICE O/P EST MOD 30 MIN: CPT | Performed by: INTERNAL MEDICINE

## 2022-08-29 PROCEDURE — 80053 COMPREHEN METABOLIC PANEL: CPT

## 2022-08-29 PROCEDURE — 36591 DRAW BLOOD OFF VENOUS DEVICE: CPT

## 2022-08-29 PROCEDURE — 96375 TX/PRO/DX INJ NEW DRUG ADDON: CPT

## 2022-08-29 PROCEDURE — 1036F TOBACCO NON-USER: CPT | Performed by: INTERNAL MEDICINE

## 2022-08-29 PROCEDURE — 3017F COLORECTAL CA SCREEN DOC REV: CPT | Performed by: INTERNAL MEDICINE

## 2022-08-29 RX ORDER — SODIUM CHLORIDE 9 MG/ML
INJECTION, SOLUTION INTRAVENOUS CONTINUOUS
Status: CANCELLED | OUTPATIENT
Start: 2022-09-05

## 2022-08-29 RX ORDER — SODIUM CHLORIDE 9 MG/ML
5-250 INJECTION, SOLUTION INTRAVENOUS PRN
Status: CANCELLED | OUTPATIENT
Start: 2022-08-29

## 2022-08-29 RX ORDER — EPINEPHRINE 1 MG/ML
0.3 INJECTION, SOLUTION, CONCENTRATE INTRAVENOUS PRN
Status: CANCELLED | OUTPATIENT
Start: 2022-08-29

## 2022-08-29 RX ORDER — SODIUM CHLORIDE 0.9 % (FLUSH) 0.9 %
5-40 SYRINGE (ML) INJECTION PRN
Status: CANCELLED | OUTPATIENT
Start: 2022-08-29

## 2022-08-29 RX ORDER — SODIUM CHLORIDE 9 MG/ML
5-250 INJECTION, SOLUTION INTRAVENOUS PRN
Status: DISCONTINUED | OUTPATIENT
Start: 2022-08-29 | End: 2022-08-30 | Stop reason: HOSPADM

## 2022-08-29 RX ORDER — DIPHENHYDRAMINE HYDROCHLORIDE 50 MG/ML
25 INJECTION INTRAMUSCULAR; INTRAVENOUS ONCE
Status: CANCELLED | OUTPATIENT
Start: 2022-09-05 | End: 2022-09-05

## 2022-08-29 RX ORDER — EPINEPHRINE 1 MG/ML
0.3 INJECTION, SOLUTION, CONCENTRATE INTRAVENOUS PRN
Status: CANCELLED | OUTPATIENT
Start: 2022-09-05

## 2022-08-29 RX ORDER — ONDANSETRON 2 MG/ML
8 INJECTION INTRAMUSCULAR; INTRAVENOUS
Status: CANCELLED | OUTPATIENT
Start: 2022-08-29

## 2022-08-29 RX ORDER — ONDANSETRON 2 MG/ML
8 INJECTION INTRAMUSCULAR; INTRAVENOUS
Status: CANCELLED | OUTPATIENT
Start: 2022-09-05

## 2022-08-29 RX ORDER — DEXAMETHASONE SODIUM PHOSPHATE 10 MG/ML
10 INJECTION INTRAMUSCULAR; INTRAVENOUS ONCE
Status: COMPLETED | OUTPATIENT
Start: 2022-08-29 | End: 2022-08-29

## 2022-08-29 RX ORDER — FAMOTIDINE 10 MG/ML
20 INJECTION, SOLUTION INTRAVENOUS ONCE
Status: CANCELLED | OUTPATIENT
Start: 2022-09-05 | End: 2022-09-05

## 2022-08-29 RX ORDER — FAMOTIDINE 10 MG/ML
20 INJECTION, SOLUTION INTRAVENOUS
Status: CANCELLED | OUTPATIENT
Start: 2022-09-05

## 2022-08-29 RX ORDER — MEPERIDINE HYDROCHLORIDE 50 MG/ML
12.5 INJECTION INTRAMUSCULAR; INTRAVENOUS; SUBCUTANEOUS PRN
Status: CANCELLED | OUTPATIENT
Start: 2022-09-05

## 2022-08-29 RX ORDER — MEPERIDINE HYDROCHLORIDE 50 MG/ML
12.5 INJECTION INTRAMUSCULAR; INTRAVENOUS; SUBCUTANEOUS PRN
Status: CANCELLED | OUTPATIENT
Start: 2022-08-29

## 2022-08-29 RX ORDER — ACETAMINOPHEN 325 MG/1
650 TABLET ORAL
Status: CANCELLED | OUTPATIENT
Start: 2022-09-05

## 2022-08-29 RX ORDER — DIPHENHYDRAMINE HYDROCHLORIDE 50 MG/ML
25 INJECTION INTRAMUSCULAR; INTRAVENOUS ONCE
Status: COMPLETED | OUTPATIENT
Start: 2022-08-29 | End: 2022-08-29

## 2022-08-29 RX ORDER — SODIUM CHLORIDE 9 MG/ML
5-40 INJECTION INTRAVENOUS PRN
Status: CANCELLED | OUTPATIENT
Start: 2022-08-29

## 2022-08-29 RX ORDER — HEPARIN SODIUM (PORCINE) LOCK FLUSH IV SOLN 100 UNIT/ML 100 UNIT/ML
500 SOLUTION INTRAVENOUS PRN
Status: CANCELLED | OUTPATIENT
Start: 2022-08-29

## 2022-08-29 RX ORDER — FAMOTIDINE 10 MG/ML
20 INJECTION, SOLUTION INTRAVENOUS
Status: CANCELLED | OUTPATIENT
Start: 2022-08-29

## 2022-08-29 RX ORDER — DIPHENHYDRAMINE HYDROCHLORIDE 50 MG/ML
50 INJECTION INTRAMUSCULAR; INTRAVENOUS
Status: CANCELLED | OUTPATIENT
Start: 2022-08-29

## 2022-08-29 RX ORDER — SODIUM CHLORIDE 0.9 % (FLUSH) 0.9 %
5-40 SYRINGE (ML) INJECTION PRN
Status: CANCELLED | OUTPATIENT
Start: 2022-09-05

## 2022-08-29 RX ORDER — SODIUM CHLORIDE 9 MG/ML
5-250 INJECTION, SOLUTION INTRAVENOUS PRN
Status: CANCELLED | OUTPATIENT
Start: 2022-09-05

## 2022-08-29 RX ORDER — SODIUM CHLORIDE 0.9 % (FLUSH) 0.9 %
10 SYRINGE (ML) INJECTION PRN
Status: DISCONTINUED | OUTPATIENT
Start: 2022-08-29 | End: 2022-08-30 | Stop reason: HOSPADM

## 2022-08-29 RX ORDER — ALBUTEROL SULFATE 90 UG/1
4 AEROSOL, METERED RESPIRATORY (INHALATION) PRN
Status: CANCELLED | OUTPATIENT
Start: 2022-08-29

## 2022-08-29 RX ORDER — SODIUM CHLORIDE 9 MG/ML
INJECTION, SOLUTION INTRAVENOUS CONTINUOUS
Status: CANCELLED | OUTPATIENT
Start: 2022-08-29

## 2022-08-29 RX ORDER — HEPARIN SODIUM (PORCINE) LOCK FLUSH IV SOLN 100 UNIT/ML 100 UNIT/ML
500 SOLUTION INTRAVENOUS PRN
Status: CANCELLED | OUTPATIENT
Start: 2022-09-05

## 2022-08-29 RX ORDER — SODIUM CHLORIDE 9 MG/ML
5-40 INJECTION INTRAVENOUS PRN
Status: CANCELLED | OUTPATIENT
Start: 2022-09-05

## 2022-08-29 RX ORDER — ALBUTEROL SULFATE 90 UG/1
4 AEROSOL, METERED RESPIRATORY (INHALATION) PRN
Status: CANCELLED | OUTPATIENT
Start: 2022-09-05

## 2022-08-29 RX ORDER — DIPHENHYDRAMINE HYDROCHLORIDE 50 MG/ML
50 INJECTION INTRAMUSCULAR; INTRAVENOUS
Status: CANCELLED | OUTPATIENT
Start: 2022-09-05

## 2022-08-29 RX ORDER — ACETAMINOPHEN 325 MG/1
650 TABLET ORAL
Status: CANCELLED | OUTPATIENT
Start: 2022-08-29

## 2022-08-29 RX ORDER — SODIUM CHLORIDE 0.9 % (FLUSH) 0.9 %
5-40 SYRINGE (ML) INJECTION PRN
Status: DISCONTINUED | OUTPATIENT
Start: 2022-08-29 | End: 2022-08-30 | Stop reason: HOSPADM

## 2022-08-29 RX ORDER — DIPHENHYDRAMINE HYDROCHLORIDE 50 MG/ML
25 INJECTION INTRAMUSCULAR; INTRAVENOUS ONCE
Status: CANCELLED | OUTPATIENT
Start: 2022-08-29 | End: 2022-08-29

## 2022-08-29 RX ORDER — FAMOTIDINE 10 MG/ML
20 INJECTION, SOLUTION INTRAVENOUS ONCE
Status: CANCELLED | OUTPATIENT
Start: 2022-08-29 | End: 2022-08-29

## 2022-08-29 RX ADMIN — SODIUM CHLORIDE, PRESERVATIVE FREE 10 ML: 5 INJECTION INTRAVENOUS at 11:12

## 2022-08-29 RX ADMIN — Medication 10 ML: at 10:09

## 2022-08-29 RX ADMIN — FAMOTIDINE 20 MG: 10 INJECTION, SOLUTION INTRAVENOUS at 12:18

## 2022-08-29 RX ADMIN — SODIUM CHLORIDE 25 ML/HR: 9 INJECTION, SOLUTION INTRAVENOUS at 11:12

## 2022-08-29 RX ADMIN — PACLITAXEL 132 MG: 6 INJECTION, SOLUTION, CONCENTRATE INTRAVENOUS at 12:59

## 2022-08-29 RX ADMIN — DEXAMETHASONE SODIUM PHOSPHATE 10 MG: 10 INJECTION INTRAMUSCULAR; INTRAVENOUS at 12:26

## 2022-08-29 RX ADMIN — DIPHENHYDRAMINE HYDROCHLORIDE 25 MG: 50 INJECTION INTRAMUSCULAR; INTRAVENOUS at 12:21

## 2022-08-29 RX ADMIN — SODIUM CHLORIDE 189 MG: 9 INJECTION, SOLUTION INTRAVENOUS at 11:42

## 2022-08-29 ASSESSMENT — PATIENT HEALTH QUESTIONNAIRE - PHQ9
1. LITTLE INTEREST OR PLEASURE IN DOING THINGS: 0
SUM OF ALL RESPONSES TO PHQ QUESTIONS 1-9: 0
2. FEELING DOWN, DEPRESSED OR HOPELESS: 0
SUM OF ALL RESPONSES TO PHQ QUESTIONS 1-9: 0
SUM OF ALL RESPONSES TO PHQ9 QUESTIONS 1 & 2: 0

## 2022-08-29 NOTE — PROGRESS NOTES
Patient arrived ambulatory to infusion center. C3D8 Taxol/Herceptin completed. Patient tolerated without difficulty. Port deaccessed. Discharged ambulatory. Patient aware of next infusion on 9/5.

## 2022-08-29 NOTE — PATIENT INSTRUCTIONS
Patient Instructions from Today's Visit    Reason for Visit:  Follow up-Breast    Diagnosis Information:  [email or web address]/. net/about-us/asco-answers-patient-education-materials/abcp-vbyspzy-euxa-sheets      Plan:  The echo didn't show anything of significance. The burning and tingling is a side effect of the Taxol you can expect this to get worse as we get further into treatment unless we make some adjustments. We will make a reduction in the dose of the Taxol. You have gotten 7 treatments this far and even we exclude the Taxol at this point we do think you would still be in a good place. You can try wearing compression stockings on your legs to help with the swelling in your legs.   Follow Up:  As scheduled    Recent Lab Results:  Hospital Outpatient Visit on 08/29/2022   Component Date Value Ref Range Status    WBC 08/29/2022 4.6  4.3 - 11.1 K/uL Final    RBC 08/29/2022 3.68 (A) 4.05 - 5.2 M/uL Final    Hemoglobin 08/29/2022 10.9 (A) 11.7 - 15.4 g/dL Final    Hematocrit 08/29/2022 33.2 (A) 35.8 - 46.3 % Final    MCV 08/29/2022 90.2  79.6 - 97.8 FL Final    MCH 08/29/2022 29.6  26.1 - 32.9 PG Final    MCHC 08/29/2022 32.8  31.4 - 35.0 g/dL Final    RDW 08/29/2022 15.6 (A) 11.9 - 14.6 % Final    Platelets 24/21/3423 210  150 - 450 K/uL Final    MPV 08/29/2022 10.5  9.4 - 12.3 FL Final    nRBC 08/29/2022 0.00  0.0 - 0.2 K/uL Final    **Note: Absolute NRBC parameter is now reported with Hemogram**    Seg Neutrophils 08/29/2022 68  43 - 78 % Final    Lymphocytes 08/29/2022 19  13 - 44 % Final    Monocytes 08/29/2022 10  4.0 - 12.0 % Final    Eosinophils % 08/29/2022 1  0.5 - 7.8 % Final    Basophils 08/29/2022 1  0.0 - 2.0 % Final    Immature Granulocytes 08/29/2022 1  0.0 - 5.0 % Final    Segs Absolute 08/29/2022 3.1  1.7 - 8.2 K/UL Final    Absolute Lymph # 08/29/2022 0.9  0.5 - 4.6 K/UL Final    Absolute Mono # 08/29/2022 0.5  0.1 - 1.3 K/UL Final    Absolute Eos # 08/29/2022 0.1  0.0 - 0.8 K/UL Final

## 2022-08-29 NOTE — PROGRESS NOTES
[facility] Hematology and Oncology: Office Visit Established Patient    Chief Complaint:    Chief Complaint   Patient presents with    Follow-up         History of Present Illness:  Ms. Ranell Kocher is a 77 y.o. female who returns today for management of breast cancer. She initially presented for a routine bilateral screening mammogram on 3/23/22 which identified a right breast mass near the 11:00-12:00 position, 6-7 cm from the nipple and other small circumscribed round masses in the right upper inner breast which had not definitely changed from prior examinations. Further evaluation with right breast ultrasound on 3/25/22 confirmed a 6 mm hypoechoic shadowing mass in the 12:00 right breast at 7 cm from the nipple. She presented to Dr. Danis Crawford on 4/7/22 to discuss whether to proceed with recommended biopsy, he recommended that biopsy be performed. Ultrasound-guided biopsy was subsequently completed on 4/12/22 with pathology revealing high grade infiltrating ductal carcinoma, ER 76%, OR negative, and HER2 positive. MRI of the bilateral breasts was completed on 4/14/22 demonstrating a right anterior 12:00 breast cancer measuring up to 1.2 cm; multiple (greater than 10 total) other right upper breast masses, some of which had enlarged, concerning for additional malignancy; an indeterminate 1.2 cm right axillary lymph node; and no suspicious left breast finding. We recommended upfront surgery because of the MRI findings and the inconclusive size/clinical stage of her cancer. Path reviewed, thankfully it appears that the indeterminate lesions in the breast were benign, with the final tumor dimension being only 1.5 cm, and 4 nodes negative for tumor. Therefore, she is [id number], and would be eligible for the adjuvant paclitaxel and trastuzumab regimen. She returns today for follow-up and week 8 TH. She is doing OK.   She still has multiple symptoms including worsening LE edema that developed last week, a repeat TTE showed preserved EF and the symptoms have gradually improved. She has attempted elevation of the legs but has not used compression. No changes in her activity or PO intake, she feels both of these have still been quite adequate. Fatigue is stable but she is still able to work in her yard and around the house. She has rash most prominent over her hands but also over her face, this feels like \"burning\". Some neuropathy in the fingers, she is able to do all ADLs but admits that things like buttoning shirts are taking much longer. She has mild nausea controlled with antiemetics. Diarrhea has resolved and she is now taking intermittent Miralax with firm but regular stools. Her rectal discomfort is improved with more regular stools and with Anusol HC. No fevers or infectious symptoms. Review of Systems:  Constitutional: Positive for fatigue. HENT: Negative. Eyes: Negative. Respiratory: Negative. Cardiovascular: Positive for edema (BLEs). Gastrointestinal: Positive for rectal discomfort. Genitourinary: Negative. Musculoskeletal: Negative. Skin: Negative. Neurological: Positive for neuropathy. Endo/Heme/Allergies: Negative. Psychiatric/Behavioral: Negative. All other systems reviewed and are negative. Allergies   Allergen Reactions    Sulfamethoxazole-Trimethoprim Nausea And Vomiting    Meloxicam Other (See Comments)    Oxaprozin Swelling     Past Medical History:   Diagnosis Date    Anxiety     Depression 5/19/2017    Essential hypertension 05/19/2017    Fibromyalgia     History of postoperative nausea and vomiting     Hyperlipidemia     Hypothyroid     Impaired fasting glucose     Insomnia     Nausea & vomiting     Need for hepatitis C screening test 12/20/2020    3/15/21 HCV ab negative.       Obesity     Osteoarthritis 12/15/2015    Stress incontinence, female     Vitamin D deficiency      Past Surgical History:   Procedure Laterality Date    BREAST BIOPSY Bilateral     BREAST BIOPSY Right 3/29/2021    RIGHT BREAST NEEDLE LOCALIZED LUMPECTOMY X2 performed by Radha Carlson MD at [address] [facility]    COLONOSCOPY  2012    normal; internal hemorrhoid    HYSTERECTOMY ([address])  [address] Right 6/1/2022    RIGHT SENTINEL NODE BIOPSY MAG TRACE performed by Duyen Ramirez MD at [facility] Right 6/1/2022    RIGHT BREAST MASTECTOMY performed by Duyen Ramirez MD at [facility] Left 6/1/2022    PORT INSERTION performed by Duyen Ramirez MD at [facility] Bilateral     Left 2015, Right 7/29/20    US BREAST BIOPSY NEEDLE ADDITIONAL RIGHT Right 2/25/2021    US BREAST BIOPSY NEEDLE ADDITIONAL RIGHT 2/25/2021 [facility]    US BREAST NEEDLE BIOPSY RIGHT Right 2/25/2021    US BREAST NEEDLE BIOPSY RIGHT 2/25/2021 [facility]    US BREAST NEEDLE BIOPSY RIGHT Right 4/12/2022    US BREAST NEEDLE BIOPSY RIGHT 4/12/2022 [facility]    US GUIDED NEEDLE LOC BREAST ADDL RIGHT Right 3/29/2021    US GUIDED NEEDLE LOC BREAST ADDL RIGHT 3/29/2021 [facility]    US GUIDED NEEDLE LOC OF RIGHT BREAST Right 3/29/2021    US GUIDED NEEDLE LOC OF RIGHT BREAST 3/29/2021 [facility]     Family History   Problem Relation Age of Onset    No Known Problems Mother     Heart Disease Father     Breast Cancer Sister     Stroke Father      Social History     Socioeconomic History    Marital status:       Spouse name: Not on file    Number of children: Not on file    Years of education: Not on file    Highest education level: Not on file   Occupational History    Not on file   Tobacco Use    Smoking status: Never    Smokeless tobacco: Never   Substance and Sexual Activity    Alcohol use: No    Drug use: No    Sexual activity: Not on file   Other Topics Concern    Not on file   Social History Narrative    Not on file     Social Determinants of Health     Financial Resource Strain: Not on file   Food Insecurity: Not on file   Transportation Needs: Not on file   Physical Activity: Not on file   Stress: Not on file   Social Connections: Not on file   Intimate Partner Violence: Not on file   Housing Stability: Not on file     Current Outpatient Medications   Medication Sig Dispense Refill    hydrocortisone (ANUSOL-HC) 2.5 % CREA rectal cream Place rectally 2 times daily Apply to affected area BID 28 g 0    temazepam (RESTORIL) 30 MG capsule Take 1 capsule by mouth nightly as needed for Sleep for up to 30 days. 30 capsule 0    lidocaine-prilocaine (EMLA) 2.5-2.5 % cream Apply topically as needed. 30 g 2    ondansetron (ZOFRAN) 4 MG tablet Take 2 tablets by mouth 3 times daily as needed for Nausea or Vomiting 90 tablet 2    prochlorperazine (COMPAZINE) 10 MG tablet Take 1 tablet by mouth every 6 hours as needed (nausea) 60 tablet 2    levothyroxine (SYNTHROID) 125 MCG tablet Take 1 tablet by mouth Daily TAKE 1 TABLET EVERY DAY BEFORE BREAKFAST FOR LOW THYROID HORMONE LEVELS 90 tablet 1    Multiple Vitamin (MULTIVITAMIN ADULT PO) Take 1 tablet by mouth daily. Cholecalciferol (VITAMIN D3) 1.25 MG (81037 UT) CAPS Take 1 capsule by mouth daily. Carboxymethylcellul-Glycerin (REFRESH OPTIVE OP) Place 1 drop into both eyes daily. ibuprofen (ADVIL;MOTRIN) 400 MG tablet Take by mouth every 6 hours as needed      losartan (COZAAR) 100 MG tablet TAKE 1 TABLET EVERY DAY FOR HIGH BLOOD PRESSURE      Hydrocort-Pramoxine, Perianal, (PROCTOFOAM-HC) 1-1 % rectal foam Place rectally 2 times daily (Patient not taking: No sig reported) 1 each 1    acetaminophen (TYLENOL) 500 MG tablet Take 1,000 mg by mouth every 6 hours as needed for Pain.  (Patient not taking: No sig reported)       Current Facility-Administered Medications   Medication Dose Route Frequency Provider Last Rate Last Admin    hydrocortisone (ANUSOL-HC) 2.5 % rectal cream   Rectal BID Elizabeth Vega, APRN - CNP Facility-Administered Medications Ordered in Other Visits   Medication Dose Route Frequency Provider Last Rate Last Admin    sodium chloride flush 0.9 % injection 10 mL  10 mL IntraVENous PRN Ant Traore MD   10 mL at 08/29/22 1009       OBJECTIVE:  BP (!) 165/74 (Site: Left Upper Arm, Position: Sitting, Cuff Size: Large Adult)   Pulse 71   Temp 98.5 °F (36.9 °C) (Oral)   Resp 22   Ht 5' 3\" (1.6 m)   Wt 213 lb (96.6 kg)   SpO2 100%   BMI 37.73 kg/m²     Physical Exam:  Constitutional: Well developed, well nourished female in no acute distress, sitting comfortably in the exam room chair. HEENT: Normocephalic and atraumatic. Oropharynx is clear, mucous membranes are moist.  Sclerae anicteric. Neck supple without JVD. No thyromegaly present. Lymph node   No palpable submandibular, cervical, supraclavicular, axillary lymph nodes. Skin Warm and dry. No bruising noted. Maculopapular rash over both hands more prominent on left. No pallor. Respiratory Lungs are clear to auscultation bilaterally without wheezes, rales or rhonchi, normal air exchange without accessory muscle use. CVS Normal rate, regular rhythm and normal S1 and S2. No murmurs, gallops, or rubs. Neuro Grossly nonfocal with no obvious sensory or motor deficits. MSK Normal range of motion in general.  2+ BLE edema and no tenderness. Psych Appropriate mood and affect.           Labs:  Recent Results (from the past 96 hour(s))   Transthoracic echocardiogram (TTE) limited with contrast, bubble, strain, and 3D PRN    Collection Time: 08/26/22  3:55 PM   Result Value Ref Range    IVSd 1.0 (A) 0.6 - 0.9 cm    LVIDd 4.4 3.9 - 5.3 cm    LVIDs 2.7 cm    LVPWd 1.0 (A) 0.6 - 0.9 cm    EF BP 64 55 - 100 %    LV EDV A2C 70 mL    LV EDV A4C 71 mL    LV EDV BP 75 56 - 104 mL    LV ESV A2C 32 mL    LV ESV A4C 22 mL    LV ESV BP 27 19 - 49 mL    RVIDd 3.1 cm    RVSP 33 mmHg    LA Volume A/L 57 mL    LA Volume 2C 54 (A) 22 - 52 mL    LA Volume 4C 53 (A) 22 - 52 mL    LA Volume BP 54 (A) 22 - 52 mL    Est. RA Pressure 3 mmHg    PV Peak Gradient 13 mmHg    PV Max Velocity 1.8 m/s    TR Peak Gradient 30 mmHg    TR Max Velocity 2.74 m/s    Fractional Shortening 2D 39 28 - 44 %    LV RWT Ratio 0.45     LV Mass 2D 147.8 67 - 162 g   CBC with Auto Differential    Collection Time: 08/29/22  9:58 AM   Result Value Ref Range    WBC 4.6 4.3 - 11.1 K/uL    RBC 3.68 (L) 4.05 - 5.2 M/uL    Hemoglobin 10.9 (L) 11.7 - 15.4 g/dL    Hematocrit 33.2 (L) 35.8 - 46.3 %    MCV 90.2 79.6 - 97.8 FL    MCH 29.6 26.1 - 32.9 PG    MCHC 32.8 31.4 - 35.0 g/dL    RDW 15.6 (H) 11.9 - 14.6 %    Platelets 278 800 - 786 K/uL    MPV 10.5 9.4 - 12.3 FL    nRBC 0.00 0.0 - 0.2 K/uL    Seg Neutrophils 68 43 - 78 %    Lymphocytes 19 13 - 44 %    Monocytes 10 4.0 - 12.0 %    Eosinophils % 1 0.5 - 7.8 %    Basophils 1 0.0 - 2.0 %    Immature Granulocytes 1 0.0 - 5.0 %    Segs Absolute 3.1 1.7 - 8.2 K/UL    Absolute Lymph # 0.9 0.5 - 4.6 K/UL    Absolute Mono # 0.5 0.1 - 1.3 K/UL    Absolute Eos # 0.1 0.0 - 0.8 K/UL    Basophils Absolute 0.1 0.0 - 0.2 K/UL    Absolute Immature Granulocyte 0.1 0.0 - 0.5 K/UL    Differential Type AUTOMATED           Imaging:  MRI of the Breasts with and without contrast       CLINICAL INDICATION:  New diagnosis of right 12:00 breast cancer status post   biopsy demonstrating infiltrating ductal carcinoma high-grade, poorly   differentiated. Evaluate for extent of disease, staging, therapy planning. Patient had right benign papillomas removed in 2021, and a left benign biopsy in   2011 demonstrating a 2:00 fibroadenoma. Family history of her sister having   breast cancer. .       COMPARISON: Ultrasound and mammography 4/12/2022, 3/25/2022, 3/23/2022 and prior   breast MRI 3/3/2021. TECHNIQUE: Standard MRI sequences were obtained through the breasts in multiple   planes.  Images were obtained before and after intravenous infusion of 20 mL of   Prohance contrast. Images were reviewed with PACS and with [employer] CAD software. FINDINGS: The breasts demonstrate mild-moderate bilateral glandularity and mild   background enhancement. Right breast: The new 12:00 biopsy clip is within an irregular heterogeneously   enhancing 1.2 x 0.8 x 1.1 cm malignant mass. As seen on prior exam a large area   of the right upper breast spanning 11:00-12:00 anterior, middle, posterior depth   (overall about 10-12 cm) contains multiple scattered masses of varying sizes and   shapes. There are greater than 10 masses total. Anteriorly at 12:00 10 cm from   nipple an irregular heterogeneously enhancing mass measures up to 1.2 x 1.0 cm   (previously 0.6 x 0.4 cm). Posteriorly near 11:00 14 cm from nipple a lobulated   mass measures up to 1.1 x 0.4 cm (previously 0.8 x 0.3 cm). Left breast: No evidence of suspicious enhancing mass, and no dominant or unique   nonmass enhancement, to suggest additional malignancy. Lymph nodes: Right axilla demonstrates a dysmorphic 1.2 x 0.8 cm node (series 4   image 351) which has developed since the prior exam. No other axillary or   internal mammary lymphadenopathy is evident. Elsewhere: Limited visualization of the partially included thorax and upper   abdomen shows no acute abnormality. Impression       1. Right anterior 12:00 breast cancer measures up to 1.2 cm. 2. Multiple (greater than 10 total) other right upper breast masses, some of   which have enlarged, concerning for additional malignancy. 3. Right axillary indeterminate lymph node. 4. No suspicious left breast finding. Recommend continued malignancy management as directed clinically.        BI-RADS Assessment Category 6: Known Biopsy Proven Malignancy             Pathology:  DIAGNOSIS        A:  \"RIGHT BREAST MASTECTOMY\":  INVASIVE DUCTAL CARCINOMA, HIGH GRADE     (3 + 3 + 2), 15 MILLIMETER IN GREATEST DIMENSION, MARGINS UNINVOLVED; than 10 lesions in the breast with the dominant lesion 1.2 cm, a dysmorphic indeterminate lymph node in the right axilla, and no evidence of contralateral or distant disease. Her MRI imaging made things more complicated - if she was obviously T1N0, then upfront surgery would be appropriate, lumpectomy/sentinel node followed by adjuvant Taxol/Herceptin. However, she seemed to have multiple lesions that are indeterminate which make it possible that her disease is multifocal, as well as an indeterminate  axillary node. For this reason, we should at least consider neoadjuvant chemotherapy. However, I would strongly advise at least two additional biopsies in that case, ultrasound guided biopsy of the axillary node, and possibly MRI guided biopsy (if not  visualized on ultrasound) of another in-breast lesion to confirm pathology. She was reluctant to consider additional biopsy, so ultimately the decision was made to proceed with mastectomy. Path reviewed, thankfully it appears that the indeterminate lesions in the breast were benign, with the final tumor dimension being only 1.5 cm, and 4 nodes negative for tumor. Therefore, she is [id number], and would be eligible for the adjuvant paclitaxel and trastuzumab regimen. After TH she will complete a year of Herceptin, as well as endocrine therapy with AI. She will not require radiation due to the T1N0 disease and mastectomy. She returns today for follow-up and week 8 TH. She is doing OK. She still has multiple symptoms including worsening LE edema that developed last week, a repeat TTE showed preserved EF and the symptoms have gradually improved. She has attempted elevation of the legs but has not used compression. No changes in her activity or PO intake, she feels both of these have still been quite adequate. She will continue her regular activity and she will use compression if these worsen, could also consider short course of diuretics if needed.   Fatigue is stable but she is still able to work in her yard and around the house. She has rash most prominent over her hands but also over her face, this feels like \"burning\". Some neuropathy in the fingers, she is able to do all ADLs but admits that things like buttoning shirts are taking much longer. She has mild nausea controlled with antiemetics. Diarrhea has resolved and she is now taking intermittent Miralax with firm but regular stools. Her rectal discomfort is improved with more regular stools and with Anusol HC. No fevers or infectious symptoms. Labs reviewed, adequate to proceed with week 8 TH. However, based on her neuropathy and other symptoms, I would like to reduce her Taxol by 20%. She is in agreement. Call with any fevers, uncontrolled side effects from treatment or any other worrisome/concerning symptoms. Follow up next week for week 9, OV week 10 or sooner if needed. All questions were asked and answered to the best of my ability.            Sylvie Rosas MD, MD  [facility] Hematology and Oncology  [address] [place] [address]  Office : [phone]  Fax : [phone]

## 2022-08-31 NOTE — ADDENDUM NOTE
Encounter addended by: Kenneth Keita RN on: 8/31/2022 6:17 PM   Actions taken: MAR administration accepted

## 2022-09-05 ENCOUNTER — HOSPITAL ENCOUNTER (OUTPATIENT)
Dept: INFUSION THERAPY | Age: 77
Discharge: HOME | End: 2022-09-05
Payer: MEDICARE

## 2022-09-05 VITALS
HEART RATE: 72 BPM | DIASTOLIC BLOOD PRESSURE: 62 MMHG | TEMPERATURE: 98.9 F | WEIGHT: 211.6 LBS | BODY MASS INDEX: 37.48 KG/M2 | SYSTOLIC BLOOD PRESSURE: 145 MMHG

## 2022-09-05 DIAGNOSIS — C50.911 MALIGNANT NEOPLASM OF RIGHT BREAST IN FEMALE, ESTROGEN RECEPTOR POSITIVE, UNSPECIFIED SITE OF BREAST (HCC): Primary | ICD-10-CM

## 2022-09-05 DIAGNOSIS — Z17.0 MALIGNANT NEOPLASM OF RIGHT BREAST IN FEMALE, ESTROGEN RECEPTOR POSITIVE, UNSPECIFIED SITE OF BREAST (HCC): Primary | ICD-10-CM

## 2022-09-05 LAB
BASOPHILS # BLD: 0 K/UL (ref 0–0.2)
BASOPHILS NFR BLD: 1 % (ref 0–2)
DIFFERENTIAL METHOD BLD: ABNORMAL
EOSINOPHIL # BLD: 0 K/UL (ref 0–0.8)
EOSINOPHIL NFR BLD: 1 % (ref 0.5–7.8)
ERYTHROCYTE [DISTWIDTH] IN BLOOD BY AUTOMATED COUNT: 15.7 % (ref 11.9–14.6)
HCT VFR BLD AUTO: 33.9 % (ref 35.8–46.3)
HGB BLD-MCNC: 11.2 G/DL (ref 11.7–15.4)
IMM GRANULOCYTES # BLD AUTO: 0.1 K/UL (ref 0–0.5)
IMM GRANULOCYTES NFR BLD AUTO: 1 % (ref 0–5)
LYMPHOCYTES # BLD: 0.8 K/UL (ref 0.5–4.6)
LYMPHOCYTES NFR BLD: 18 % (ref 13–44)
MCH RBC QN AUTO: 30.2 PG (ref 26.1–32.9)
MCHC RBC AUTO-ENTMCNC: 33 G/DL (ref 31.4–35)
MCV RBC AUTO: 91.4 FL (ref 79.6–97.8)
MONOCYTES # BLD: 0.4 K/UL (ref 0.1–1.3)
MONOCYTES NFR BLD: 10 % (ref 4–12)
NEUTS SEG # BLD: 3.1 K/UL (ref 1.7–8.2)
NEUTS SEG NFR BLD: 69 % (ref 43–78)
NRBC # BLD: 0 K/UL (ref 0–0.2)
PLATELET # BLD AUTO: 195 K/UL (ref 150–450)
PMV BLD AUTO: 11 FL (ref 9.4–12.3)
RBC # BLD AUTO: 3.71 M/UL (ref 4.05–5.2)
WBC # BLD AUTO: 4.4 K/UL (ref 4.3–11.1)

## 2022-09-05 PROCEDURE — 6360000002 HC RX W HCPCS: Performed by: INTERNAL MEDICINE

## 2022-09-05 PROCEDURE — 96413 CHEMO IV INFUSION 1 HR: CPT

## 2022-09-05 PROCEDURE — 96417 CHEMO IV INFUS EACH ADDL SEQ: CPT

## 2022-09-05 PROCEDURE — 2580000003 HC RX 258: Performed by: INTERNAL MEDICINE

## 2022-09-05 PROCEDURE — A4216 STERILE WATER/SALINE, 10 ML: HCPCS | Performed by: INTERNAL MEDICINE

## 2022-09-05 PROCEDURE — 96375 TX/PRO/DX INJ NEW DRUG ADDON: CPT

## 2022-09-05 PROCEDURE — 85025 COMPLETE CBC W/AUTO DIFF WBC: CPT

## 2022-09-05 PROCEDURE — 2500000003 HC RX 250 WO HCPCS: Performed by: INTERNAL MEDICINE

## 2022-09-05 RX ORDER — SODIUM CHLORIDE 9 MG/ML
5-250 INJECTION, SOLUTION INTRAVENOUS PRN
Status: DISCONTINUED | OUTPATIENT
Start: 2022-09-05 | End: 2022-09-06 | Stop reason: HOSPADM

## 2022-09-05 RX ORDER — DIPHENHYDRAMINE HYDROCHLORIDE 50 MG/ML
25 INJECTION INTRAMUSCULAR; INTRAVENOUS ONCE
Status: COMPLETED | OUTPATIENT
Start: 2022-09-05 | End: 2022-09-05

## 2022-09-05 RX ORDER — DEXAMETHASONE SODIUM PHOSPHATE 10 MG/ML
10 INJECTION INTRAMUSCULAR; INTRAVENOUS ONCE
Status: COMPLETED | OUTPATIENT
Start: 2022-09-05 | End: 2022-09-05

## 2022-09-05 RX ORDER — SODIUM CHLORIDE 0.9 % (FLUSH) 0.9 %
5-40 SYRINGE (ML) INJECTION PRN
Status: DISCONTINUED | OUTPATIENT
Start: 2022-09-05 | End: 2022-09-06 | Stop reason: HOSPADM

## 2022-09-05 RX ADMIN — SODIUM CHLORIDE, PRESERVATIVE FREE 40 ML: 5 INJECTION INTRAVENOUS at 09:10

## 2022-09-05 RX ADMIN — SODIUM CHLORIDE, PRESERVATIVE FREE 10 ML: 5 INJECTION INTRAVENOUS at 10:36

## 2022-09-05 RX ADMIN — SODIUM CHLORIDE, PRESERVATIVE FREE 10 ML: 5 INJECTION INTRAVENOUS at 12:15

## 2022-09-05 RX ADMIN — SODIUM CHLORIDE 100 ML/HR: 9 INJECTION, SOLUTION INTRAVENOUS at 09:10

## 2022-09-05 RX ADMIN — PACLITAXEL 132 MG: 6 INJECTION, SOLUTION, CONCENTRATE INTRAVENOUS at 11:05

## 2022-09-05 RX ADMIN — FAMOTIDINE 20 MG: 10 INJECTION, SOLUTION INTRAVENOUS at 10:40

## 2022-09-05 RX ADMIN — DIPHENHYDRAMINE HYDROCHLORIDE 25 MG: 50 INJECTION INTRAMUSCULAR; INTRAVENOUS at 10:38

## 2022-09-05 RX ADMIN — SODIUM CHLORIDE 189 MG: 9 INJECTION, SOLUTION INTRAVENOUS at 10:04

## 2022-09-05 RX ADMIN — DEXAMETHASONE SODIUM PHOSPHATE 10 MG: 10 INJECTION INTRAMUSCULAR; INTRAVENOUS at 10:42

## 2022-09-05 NOTE — PROGRESS NOTES
Arrived to the [facility]. Labs & taxol, herceptin completed. Patient tolerated without difficulty. Any issues or concerns during appointment: None. Patient aware of next infusion appointment on 9/12 (date) at 80 (time). Patient instructed to call provider with temperature of 100.4 or greater or nausea/vomiting/ diarrhea or pain not controlled by medications  Discharged ambulatory.

## 2022-09-06 DIAGNOSIS — Z17.0 MALIGNANT NEOPLASM OF RIGHT BREAST IN FEMALE, ESTROGEN RECEPTOR POSITIVE, UNSPECIFIED SITE OF BREAST (HCC): ICD-10-CM

## 2022-09-06 DIAGNOSIS — G47.00 INSOMNIA, UNSPECIFIED TYPE: ICD-10-CM

## 2022-09-06 DIAGNOSIS — C50.911 MALIGNANT NEOPLASM OF RIGHT BREAST IN FEMALE, ESTROGEN RECEPTOR POSITIVE, UNSPECIFIED SITE OF BREAST (HCC): ICD-10-CM

## 2022-09-06 RX ORDER — TEMAZEPAM 30 MG/1
30 CAPSULE ORAL NIGHTLY PRN
Qty: 30 CAPSULE | Refills: 0 | Status: SHIPPED | OUTPATIENT
Start: 2022-09-06 | End: 2022-10-05 | Stop reason: SDUPTHER

## 2022-09-06 NOTE — TELEPHONE ENCOUNTER
Calling for refill on restoril. I have reviewed the patients controlled substance prescription history, as maintained in the [place] prescription monitoring program, so that the prescription(s) for a  controlled substance can be given.    Restoril last filled 8/8/22  Prescription pended to Dr. Delaney Appl

## 2022-09-12 ENCOUNTER — HOSPITAL ENCOUNTER (OUTPATIENT)
Dept: INFUSION THERAPY | Age: 77
Discharge: HOME | End: 2022-09-12
Payer: MEDICARE

## 2022-09-12 ENCOUNTER — OFFICE VISIT (OUTPATIENT)
Dept: ONCOLOGY | Age: 77
End: 2022-09-12
Payer: MEDICARE

## 2022-09-12 VITALS
HEIGHT: 63 IN | OXYGEN SATURATION: 99 % | WEIGHT: 213 LBS | SYSTOLIC BLOOD PRESSURE: 146 MMHG | TEMPERATURE: 98.5 F | BODY MASS INDEX: 37.74 KG/M2 | HEART RATE: 78 BPM | RESPIRATION RATE: 12 BRPM | DIASTOLIC BLOOD PRESSURE: 68 MMHG

## 2022-09-12 DIAGNOSIS — Z17.0 MALIGNANT NEOPLASM OF RIGHT BREAST IN FEMALE, ESTROGEN RECEPTOR POSITIVE, UNSPECIFIED SITE OF BREAST (HCC): Primary | ICD-10-CM

## 2022-09-12 DIAGNOSIS — C50.911 MALIGNANT NEOPLASM OF RIGHT BREAST IN FEMALE, ESTROGEN RECEPTOR POSITIVE, UNSPECIFIED SITE OF BREAST (HCC): Primary | ICD-10-CM

## 2022-09-12 DIAGNOSIS — C50.911 MALIGNANT NEOPLASM OF RIGHT BREAST IN FEMALE, ESTROGEN RECEPTOR POSITIVE, UNSPECIFIED SITE OF BREAST (HCC): ICD-10-CM

## 2022-09-12 DIAGNOSIS — Z17.0 MALIGNANT NEOPLASM OF RIGHT BREAST IN FEMALE, ESTROGEN RECEPTOR POSITIVE, UNSPECIFIED SITE OF BREAST (HCC): ICD-10-CM

## 2022-09-12 LAB
ALBUMIN SERPL-MCNC: 3.3 G/DL (ref 3.2–4.6)
ALBUMIN/GLOB SERPL: 1 {RATIO} (ref 1.2–3.5)
ALP SERPL-CCNC: 89 U/L (ref 50–136)
ALT SERPL-CCNC: 24 U/L (ref 12–65)
ANION GAP SERPL CALC-SCNC: 3 MMOL/L (ref 4–13)
AST SERPL-CCNC: 20 U/L (ref 15–37)
BASOPHILS # BLD: 0 K/UL (ref 0–0.2)
BASOPHILS NFR BLD: 1 % (ref 0–2)
BILIRUB SERPL-MCNC: 0.5 MG/DL (ref 0.2–1.1)
BUN SERPL-MCNC: 14 MG/DL (ref 8–23)
CALCIUM SERPL-MCNC: 8.9 MG/DL (ref 8.3–10.4)
CHLORIDE SERPL-SCNC: 109 MMOL/L (ref 101–110)
CO2 SERPL-SCNC: 27 MMOL/L (ref 21–32)
CREAT SERPL-MCNC: 0.8 MG/DL (ref 0.6–1)
DIFFERENTIAL METHOD BLD: ABNORMAL
EOSINOPHIL # BLD: 0 K/UL (ref 0–0.8)
EOSINOPHIL NFR BLD: 0 % (ref 0.5–7.8)
ERYTHROCYTE [DISTWIDTH] IN BLOOD BY AUTOMATED COUNT: 16.3 % (ref 11.9–14.6)
GLOBULIN SER CALC-MCNC: 3.3 G/DL (ref 2.3–3.5)
GLUCOSE SERPL-MCNC: 100 MG/DL (ref 65–100)
HCT VFR BLD AUTO: 34.4 % (ref 35.8–46.3)
HGB BLD-MCNC: 11.4 G/DL (ref 11.7–15.4)
IMM GRANULOCYTES # BLD AUTO: 0.1 K/UL (ref 0–0.5)
IMM GRANULOCYTES NFR BLD AUTO: 1 % (ref 0–5)
LYMPHOCYTES # BLD: 0.9 K/UL (ref 0.5–4.6)
LYMPHOCYTES NFR BLD: 18 % (ref 13–44)
MAGNESIUM SERPL-MCNC: 1.9 MG/DL (ref 1.8–2.4)
MCH RBC QN AUTO: 30.3 PG (ref 26.1–32.9)
MCHC RBC AUTO-ENTMCNC: 33.1 G/DL (ref 31.4–35)
MCV RBC AUTO: 91.5 FL (ref 79.6–97.8)
MONOCYTES # BLD: 0.6 K/UL (ref 0.1–1.3)
MONOCYTES NFR BLD: 11 % (ref 4–12)
NEUTS SEG # BLD: 3.7 K/UL (ref 1.7–8.2)
NEUTS SEG NFR BLD: 70 % (ref 43–78)
NRBC # BLD: 0 K/UL (ref 0–0.2)
PLATELET # BLD AUTO: 198 K/UL (ref 150–450)
PMV BLD AUTO: 10.8 FL (ref 9.4–12.3)
POTASSIUM SERPL-SCNC: 4.4 MMOL/L (ref 3.5–5.1)
PROT SERPL-MCNC: 6.6 G/DL (ref 6.3–8.2)
RBC # BLD AUTO: 3.76 M/UL (ref 4.05–5.2)
SODIUM SERPL-SCNC: 139 MMOL/L (ref 136–145)
WBC # BLD AUTO: 5.4 K/UL (ref 4.3–11.1)

## 2022-09-12 PROCEDURE — G8428 CUR MEDS NOT DOCUMENT: HCPCS | Performed by: INTERNAL MEDICINE

## 2022-09-12 PROCEDURE — 1036F TOBACCO NON-USER: CPT | Performed by: INTERNAL MEDICINE

## 2022-09-12 PROCEDURE — 2580000003 HC RX 258: Performed by: INTERNAL MEDICINE

## 2022-09-12 PROCEDURE — 80053 COMPREHEN METABOLIC PANEL: CPT

## 2022-09-12 PROCEDURE — 96413 CHEMO IV INFUSION 1 HR: CPT

## 2022-09-12 PROCEDURE — A4216 STERILE WATER/SALINE, 10 ML: HCPCS | Performed by: INTERNAL MEDICINE

## 2022-09-12 PROCEDURE — 99214 OFFICE O/P EST MOD 30 MIN: CPT | Performed by: INTERNAL MEDICINE

## 2022-09-12 PROCEDURE — 96417 CHEMO IV INFUS EACH ADDL SEQ: CPT

## 2022-09-12 PROCEDURE — 6360000002 HC RX W HCPCS: Performed by: INTERNAL MEDICINE

## 2022-09-12 PROCEDURE — G8417 CALC BMI ABV UP PARAM F/U: HCPCS | Performed by: INTERNAL MEDICINE

## 2022-09-12 PROCEDURE — 2500000003 HC RX 250 WO HCPCS: Performed by: INTERNAL MEDICINE

## 2022-09-12 PROCEDURE — 1090F PRES/ABSN URINE INCON ASSESS: CPT | Performed by: INTERNAL MEDICINE

## 2022-09-12 PROCEDURE — 85025 COMPLETE CBC W/AUTO DIFF WBC: CPT

## 2022-09-12 PROCEDURE — 36591 DRAW BLOOD OFF VENOUS DEVICE: CPT

## 2022-09-12 PROCEDURE — G8399 PT W/DXA RESULTS DOCUMENT: HCPCS | Performed by: INTERNAL MEDICINE

## 2022-09-12 PROCEDURE — 1123F ACP DISCUSS/DSCN MKR DOCD: CPT | Performed by: INTERNAL MEDICINE

## 2022-09-12 PROCEDURE — 96375 TX/PRO/DX INJ NEW DRUG ADDON: CPT

## 2022-09-12 PROCEDURE — 83735 ASSAY OF MAGNESIUM: CPT

## 2022-09-12 RX ORDER — DEXAMETHASONE SODIUM PHOSPHATE 10 MG/ML
10 INJECTION INTRAMUSCULAR; INTRAVENOUS ONCE
Status: COMPLETED | OUTPATIENT
Start: 2022-09-12 | End: 2022-09-12

## 2022-09-12 RX ORDER — ALBUTEROL SULFATE 90 UG/1
4 AEROSOL, METERED RESPIRATORY (INHALATION) PRN
Status: CANCELLED | OUTPATIENT
Start: 2022-09-26

## 2022-09-12 RX ORDER — HEPARIN SODIUM (PORCINE) LOCK FLUSH IV SOLN 100 UNIT/ML 100 UNIT/ML
500 SOLUTION INTRAVENOUS PRN
Status: CANCELLED | OUTPATIENT
Start: 2022-09-19

## 2022-09-12 RX ORDER — ONDANSETRON 2 MG/ML
8 INJECTION INTRAMUSCULAR; INTRAVENOUS
Status: CANCELLED | OUTPATIENT
Start: 2022-09-12

## 2022-09-12 RX ORDER — ACETAMINOPHEN 325 MG/1
650 TABLET ORAL
Status: CANCELLED | OUTPATIENT
Start: 2022-09-26

## 2022-09-12 RX ORDER — DIPHENHYDRAMINE HYDROCHLORIDE 50 MG/ML
25 INJECTION INTRAMUSCULAR; INTRAVENOUS ONCE
Status: CANCELLED | OUTPATIENT
Start: 2022-09-12 | End: 2022-09-12

## 2022-09-12 RX ORDER — ALBUTEROL SULFATE 90 UG/1
4 AEROSOL, METERED RESPIRATORY (INHALATION) PRN
Status: CANCELLED | OUTPATIENT
Start: 2022-09-19

## 2022-09-12 RX ORDER — SODIUM CHLORIDE 9 MG/ML
INJECTION, SOLUTION INTRAVENOUS CONTINUOUS
Status: CANCELLED | OUTPATIENT
Start: 2022-09-19

## 2022-09-12 RX ORDER — SODIUM CHLORIDE 0.9 % (FLUSH) 0.9 %
10 SYRINGE (ML) INJECTION PRN
Status: DISCONTINUED | OUTPATIENT
Start: 2022-09-12 | End: 2022-09-13 | Stop reason: HOSPADM

## 2022-09-12 RX ORDER — MEPERIDINE HYDROCHLORIDE 50 MG/ML
12.5 INJECTION INTRAMUSCULAR; INTRAVENOUS; SUBCUTANEOUS PRN
Status: CANCELLED | OUTPATIENT
Start: 2022-09-19

## 2022-09-12 RX ORDER — ONDANSETRON 2 MG/ML
8 INJECTION INTRAMUSCULAR; INTRAVENOUS
Status: CANCELLED | OUTPATIENT
Start: 2022-09-26

## 2022-09-12 RX ORDER — DIPHENHYDRAMINE HYDROCHLORIDE 50 MG/ML
25 INJECTION INTRAMUSCULAR; INTRAVENOUS ONCE
Status: CANCELLED | OUTPATIENT
Start: 2022-09-19 | End: 2022-09-19

## 2022-09-12 RX ORDER — ALBUTEROL SULFATE 90 UG/1
4 AEROSOL, METERED RESPIRATORY (INHALATION) PRN
Status: CANCELLED | OUTPATIENT
Start: 2022-09-12

## 2022-09-12 RX ORDER — EPINEPHRINE 1 MG/ML
0.3 INJECTION, SOLUTION, CONCENTRATE INTRAVENOUS PRN
Status: CANCELLED | OUTPATIENT
Start: 2022-09-12

## 2022-09-12 RX ORDER — FAMOTIDINE 10 MG/ML
20 INJECTION, SOLUTION INTRAVENOUS
Status: CANCELLED | OUTPATIENT
Start: 2022-09-12

## 2022-09-12 RX ORDER — SODIUM CHLORIDE 9 MG/ML
5-40 INJECTION INTRAVENOUS PRN
Status: CANCELLED | OUTPATIENT
Start: 2022-09-26

## 2022-09-12 RX ORDER — ONDANSETRON 2 MG/ML
8 INJECTION INTRAMUSCULAR; INTRAVENOUS
Status: CANCELLED | OUTPATIENT
Start: 2022-09-19

## 2022-09-12 RX ORDER — SODIUM CHLORIDE 0.9 % (FLUSH) 0.9 %
5-40 SYRINGE (ML) INJECTION PRN
Status: DISCONTINUED | OUTPATIENT
Start: 2022-09-12 | End: 2022-09-13 | Stop reason: HOSPADM

## 2022-09-12 RX ORDER — FAMOTIDINE 10 MG/ML
20 INJECTION, SOLUTION INTRAVENOUS ONCE
Status: CANCELLED | OUTPATIENT
Start: 2022-09-26 | End: 2022-09-26

## 2022-09-12 RX ORDER — SODIUM CHLORIDE 9 MG/ML
5-250 INJECTION, SOLUTION INTRAVENOUS PRN
Status: CANCELLED | OUTPATIENT
Start: 2022-09-12

## 2022-09-12 RX ORDER — ACETAMINOPHEN 325 MG/1
650 TABLET ORAL
Status: CANCELLED | OUTPATIENT
Start: 2022-09-12

## 2022-09-12 RX ORDER — SODIUM CHLORIDE 9 MG/ML
5-250 INJECTION, SOLUTION INTRAVENOUS PRN
Status: CANCELLED | OUTPATIENT
Start: 2022-09-19

## 2022-09-12 RX ORDER — FAMOTIDINE 10 MG/ML
20 INJECTION, SOLUTION INTRAVENOUS ONCE
Status: CANCELLED | OUTPATIENT
Start: 2022-09-12 | End: 2022-09-12

## 2022-09-12 RX ORDER — MEPERIDINE HYDROCHLORIDE 50 MG/ML
12.5 INJECTION INTRAMUSCULAR; INTRAVENOUS; SUBCUTANEOUS PRN
Status: CANCELLED | OUTPATIENT
Start: 2022-09-12

## 2022-09-12 RX ORDER — SODIUM CHLORIDE 9 MG/ML
INJECTION, SOLUTION INTRAVENOUS CONTINUOUS
Status: CANCELLED | OUTPATIENT
Start: 2022-09-26

## 2022-09-12 RX ORDER — FAMOTIDINE 10 MG/ML
20 INJECTION, SOLUTION INTRAVENOUS ONCE
Status: CANCELLED | OUTPATIENT
Start: 2022-09-19 | End: 2022-09-19

## 2022-09-12 RX ORDER — SODIUM CHLORIDE 9 MG/ML
INJECTION, SOLUTION INTRAVENOUS CONTINUOUS
Status: CANCELLED | OUTPATIENT
Start: 2022-09-12

## 2022-09-12 RX ORDER — SODIUM CHLORIDE 9 MG/ML
5-250 INJECTION, SOLUTION INTRAVENOUS PRN
Status: CANCELLED | OUTPATIENT
Start: 2022-09-26

## 2022-09-12 RX ORDER — DIPHENHYDRAMINE HYDROCHLORIDE 50 MG/ML
50 INJECTION INTRAMUSCULAR; INTRAVENOUS
Status: CANCELLED | OUTPATIENT
Start: 2022-09-12

## 2022-09-12 RX ORDER — HEPARIN SODIUM (PORCINE) LOCK FLUSH IV SOLN 100 UNIT/ML 100 UNIT/ML
500 SOLUTION INTRAVENOUS PRN
Status: CANCELLED | OUTPATIENT
Start: 2022-09-12

## 2022-09-12 RX ORDER — SODIUM CHLORIDE 0.9 % (FLUSH) 0.9 %
5-40 SYRINGE (ML) INJECTION PRN
Status: CANCELLED | OUTPATIENT
Start: 2022-09-12

## 2022-09-12 RX ORDER — DIPHENHYDRAMINE HYDROCHLORIDE 50 MG/ML
50 INJECTION INTRAMUSCULAR; INTRAVENOUS
Status: CANCELLED | OUTPATIENT
Start: 2022-09-26

## 2022-09-12 RX ORDER — DIPHENHYDRAMINE HYDROCHLORIDE 50 MG/ML
25 INJECTION INTRAMUSCULAR; INTRAVENOUS ONCE
Status: COMPLETED | OUTPATIENT
Start: 2022-09-12 | End: 2022-09-12

## 2022-09-12 RX ORDER — ACETAMINOPHEN 325 MG/1
650 TABLET ORAL
Status: CANCELLED | OUTPATIENT
Start: 2022-09-19

## 2022-09-12 RX ORDER — EPINEPHRINE 1 MG/ML
0.3 INJECTION, SOLUTION, CONCENTRATE INTRAVENOUS PRN
Status: CANCELLED | OUTPATIENT
Start: 2022-09-19

## 2022-09-12 RX ORDER — SODIUM CHLORIDE 9 MG/ML
5-40 INJECTION INTRAVENOUS PRN
Status: CANCELLED | OUTPATIENT
Start: 2022-09-19

## 2022-09-12 RX ORDER — FAMOTIDINE 10 MG/ML
20 INJECTION, SOLUTION INTRAVENOUS
Status: CANCELLED | OUTPATIENT
Start: 2022-09-19

## 2022-09-12 RX ORDER — MEPERIDINE HYDROCHLORIDE 50 MG/ML
12.5 INJECTION INTRAMUSCULAR; INTRAVENOUS; SUBCUTANEOUS PRN
Status: CANCELLED | OUTPATIENT
Start: 2022-09-26

## 2022-09-12 RX ORDER — SODIUM CHLORIDE 9 MG/ML
5-40 INJECTION INTRAVENOUS PRN
Status: CANCELLED | OUTPATIENT
Start: 2022-09-12

## 2022-09-12 RX ORDER — SODIUM CHLORIDE 9 MG/ML
5-250 INJECTION, SOLUTION INTRAVENOUS PRN
Status: DISCONTINUED | OUTPATIENT
Start: 2022-09-12 | End: 2022-09-13 | Stop reason: HOSPADM

## 2022-09-12 RX ORDER — EPINEPHRINE 1 MG/ML
0.3 INJECTION, SOLUTION, CONCENTRATE INTRAVENOUS PRN
Status: CANCELLED | OUTPATIENT
Start: 2022-09-26

## 2022-09-12 RX ORDER — FAMOTIDINE 10 MG/ML
20 INJECTION, SOLUTION INTRAVENOUS
Status: CANCELLED | OUTPATIENT
Start: 2022-09-26

## 2022-09-12 RX ORDER — SODIUM CHLORIDE 0.9 % (FLUSH) 0.9 %
5-40 SYRINGE (ML) INJECTION PRN
Status: CANCELLED | OUTPATIENT
Start: 2022-09-26

## 2022-09-12 RX ORDER — HEPARIN SODIUM (PORCINE) LOCK FLUSH IV SOLN 100 UNIT/ML 100 UNIT/ML
500 SOLUTION INTRAVENOUS PRN
Status: CANCELLED | OUTPATIENT
Start: 2022-09-26

## 2022-09-12 RX ORDER — SODIUM CHLORIDE 0.9 % (FLUSH) 0.9 %
5-40 SYRINGE (ML) INJECTION PRN
Status: CANCELLED | OUTPATIENT
Start: 2022-09-19

## 2022-09-12 RX ORDER — DIPHENHYDRAMINE HYDROCHLORIDE 50 MG/ML
50 INJECTION INTRAMUSCULAR; INTRAVENOUS
Status: CANCELLED | OUTPATIENT
Start: 2022-09-19

## 2022-09-12 RX ORDER — DIPHENHYDRAMINE HYDROCHLORIDE 50 MG/ML
25 INJECTION INTRAMUSCULAR; INTRAVENOUS ONCE
Status: CANCELLED | OUTPATIENT
Start: 2022-09-26 | End: 2022-09-26

## 2022-09-12 RX ADMIN — DIPHENHYDRAMINE HYDROCHLORIDE 25 MG: 50 INJECTION INTRAMUSCULAR; INTRAVENOUS at 11:53

## 2022-09-12 RX ADMIN — FAMOTIDINE 20 MG: 10 INJECTION, SOLUTION INTRAVENOUS at 11:54

## 2022-09-12 RX ADMIN — PACLITAXEL 132 MG: 6 INJECTION, SOLUTION, CONCENTRATE INTRAVENOUS at 12:36

## 2022-09-12 RX ADMIN — SODIUM CHLORIDE 189 MG: 9 INJECTION, SOLUTION INTRAVENOUS at 11:16

## 2022-09-12 RX ADMIN — SODIUM CHLORIDE, PRESERVATIVE FREE 10 ML: 5 INJECTION INTRAVENOUS at 13:43

## 2022-09-12 RX ADMIN — DEXAMETHASONE SODIUM PHOSPHATE 10 MG: 10 INJECTION INTRAMUSCULAR; INTRAVENOUS at 11:56

## 2022-09-12 RX ADMIN — SODIUM CHLORIDE 25 ML/HR: 9 INJECTION, SOLUTION INTRAVENOUS at 11:16

## 2022-09-12 RX ADMIN — Medication 10 ML: at 09:57

## 2022-09-12 ASSESSMENT — PATIENT HEALTH QUESTIONNAIRE - PHQ9
SUM OF ALL RESPONSES TO PHQ9 QUESTIONS 1 & 2: 0
SUM OF ALL RESPONSES TO PHQ QUESTIONS 1-9: 0
SUM OF ALL RESPONSES TO PHQ QUESTIONS 1-9: 0
1. LITTLE INTEREST OR PLEASURE IN DOING THINGS: 0
2. FEELING DOWN, DEPRESSED OR HOPELESS: 0
SUM OF ALL RESPONSES TO PHQ QUESTIONS 1-9: 0
SUM OF ALL RESPONSES TO PHQ QUESTIONS 1-9: 0

## 2022-09-12 NOTE — PROGRESS NOTES
Arrived to the [facility]. Trazimera and Taxol completed. Patient tolerated without problems. Any issues or concerns during appointment: no.  Patient aware of next infusion appointment on 9/26/22 (date) at 80 (time). Patient instructed to call provider with temperature of 100.4 or greater or nausea/vomiting/ diarrhea or pain not controlled by medications  Discharged ambulatory.

## 2022-09-12 NOTE — PROGRESS NOTES
[facility] Hematology and Oncology: Office Visit Established Patient    Chief Complaint:    Chief Complaint   Patient presents with    Follow-up         History of Present Illness:  Ms. Cyn Bonilla is a 69 y.o. female who returns today for management of breast cancer. She initially presented for a routine bilateral screening mammogram on 3/23/22 which identified a right breast mass near the 11:00-12:00 position, 6-7 cm from the nipple and other small circumscribed round masses in the right upper inner breast which had not definitely changed from prior examinations. Further evaluation with right breast ultrasound on 3/25/22 confirmed a 6 mm hypoechoic shadowing mass in the 12:00 right breast at 7 cm from the nipple. She presented to Dr. Martha Pedroza on 4/7/22 to discuss whether to proceed with recommended biopsy, he recommended that biopsy be performed. Ultrasound-guided biopsy was subsequently completed on 4/12/22 with pathology revealing high grade infiltrating ductal carcinoma, ER 76%, SC negative, and HER2 positive. MRI of the bilateral breasts was completed on 4/14/22 demonstrating a right anterior 12:00 breast cancer measuring up to 1.2 cm; multiple (greater than 10 total) other right upper breast masses, some of which had enlarged, concerning for additional malignancy; an indeterminate 1.2 cm right axillary lymph node; and no suspicious left breast finding. We recommended upfront surgery because of the MRI findings and the inconclusive size/clinical stage of her cancer. Path reviewed, thankfully it appears that the indeterminate lesions in the breast were benign, with the final tumor dimension being only 1.5 cm, and 4 nodes negative for tumor. Therefore, she is [id number], and would be eligible for the adjuvant paclitaxel and trastuzumab regimen. She returns today for follow-up and week 10 TH. She is doing OK. We reduced her Taxol 20% starting with week 8 because of neuropathy.   She reports that this is stable, she can still do ADLs (including buttoning shirts and putting in earrings) and has not pain. She is more fatigued but still feels this is manageable, associated shortness of breath as well. She has some nausea but controlled with antiemetics. No bowel symptoms. No fevers or infectious symptoms. Review of Systems:  Constitutional: Positive for fatigue. HENT: Negative. Eyes: Negative. Respiratory: Negative. Cardiovascular: Positive for edema (BLEs). Gastrointestinal: Negative. Genitourinary: Negative. Musculoskeletal: Negative. Skin: Negative. Neurological: Positive for neuropathy. Endo/Heme/Allergies: Negative. Psychiatric/Behavioral: Negative. All other systems reviewed and are negative. Allergies   Allergen Reactions    Sulfamethoxazole-Trimethoprim Nausea And Vomiting    Meloxicam Other (See Comments)    Oxaprozin Swelling     Past Medical History:   Diagnosis Date    Anxiety     Depression 5/19/2017    Essential hypertension 05/19/2017    Fibromyalgia     History of postoperative nausea and vomiting     Hyperlipidemia     Hypothyroid     Impaired fasting glucose     Insomnia     Nausea & vomiting     Need for hepatitis C screening test 12/20/2020    3/15/21 HCV ab negative.       Obesity     Osteoarthritis 12/15/2015    Stress incontinence, female     Vitamin D deficiency      Past Surgical History:   Procedure Laterality Date    BREAST BIOPSY Bilateral     BREAST BIOPSY Right 3/29/2021    RIGHT BREAST NEEDLE LOCALIZED LUMPECTOMY X2 performed by Sophia Whalen MD at [address] [place] [address]    COLONOSCOPY  2012    normal; internal hemorrhoid    HYSTERECTOMY ([address])  [address] [facility] Right 6/1/2022    RIGHT SENTINEL NODE BIOPSY MAG TRACE performed by Rupert Vergara MD at [facility] Right 6/1/2022    RIGHT BREAST MASTECTOMY performed by Rupert Vergara MD at [facility] Left 6/1/2022    PORT INSERTION performed by Bacilio Cm MD at [address] [place] [address] Bilateral     Left 2015, Right 7/29/20    US BREAST BIOPSY NEEDLE ADDITIONAL RIGHT Right 2/25/2021    US BREAST BIOPSY NEEDLE ADDITIONAL RIGHT 2/25/2021 [facility]    US BREAST NEEDLE BIOPSY RIGHT Right 2/25/2021    US BREAST NEEDLE BIOPSY RIGHT 2/25/2021 [facility]    US BREAST NEEDLE BIOPSY RIGHT Right 4/12/2022    US BREAST NEEDLE BIOPSY RIGHT 4/12/2022 [facility]    US GUIDED NEEDLE LOC BREAST ADDL RIGHT Right 3/29/2021    US GUIDED NEEDLE LOC BREAST ADDL RIGHT 3/29/2021 [facility]    US GUIDED NEEDLE LOC OF RIGHT BREAST Right 3/29/2021    US GUIDED NEEDLE LOC OF RIGHT BREAST 3/29/2021 [facility]     Family History   Problem Relation Age of Onset    No Known Problems Mother     Heart Disease Father     Breast Cancer Sister     Stroke Father      Social History     Socioeconomic History    Marital status:      Spouse name: Not on file    Number of children: Not on file    Years of education: Not on file    Highest education level: Not on file   Occupational History    Not on file   Tobacco Use    Smoking status: Never    Smokeless tobacco: Never   Substance and Sexual Activity    Alcohol use: No    Drug use: No    Sexual activity: Not on file   Other Topics Concern    Not on file   Social History Narrative    Not on file     Social Determinants of Health     Financial Resource Strain: Not on file   Food Insecurity: Not on file   Transportation Needs: Not on file   Physical Activity: Not on file   Stress: Not on file   Social Connections: Not on file   Intimate Partner Violence: Not on file   Housing Stability: Not on file     Current Outpatient Medications   Medication Sig Dispense Refill    temazepam (RESTORIL) 30 MG capsule Take 1 capsule by mouth nightly as needed for Sleep for up to 30 days.  30 capsule 0    hydrocortisone (ANUSOL-HC) 2.5 % CREA rectal cream Place rectally 2 times daily Apply to affected area BID 28 g 0    lidocaine-prilocaine (EMLA) 2.5-2.5 % cream Apply topically as needed. 30 g 2    ondansetron (ZOFRAN) 4 MG tablet Take 2 tablets by mouth 3 times daily as needed for Nausea or Vomiting 90 tablet 2    prochlorperazine (COMPAZINE) 10 MG tablet Take 1 tablet by mouth every 6 hours as needed (nausea) 60 tablet 2    levothyroxine (SYNTHROID) 125 MCG tablet Take 1 tablet by mouth Daily TAKE 1 TABLET EVERY DAY BEFORE BREAKFAST FOR LOW THYROID HORMONE LEVELS 90 tablet 1    Multiple Vitamin (MULTIVITAMIN ADULT PO) Take 1 tablet by mouth daily. Cholecalciferol (VITAMIN D3) 1.25 MG (93575 UT) CAPS Take 1 capsule by mouth daily. Carboxymethylcellul-Glycerin (REFRESH OPTIVE OP) Place 1 drop into both eyes daily. ibuprofen (ADVIL;MOTRIN) 400 MG tablet Take by mouth every 6 hours as needed      losartan (COZAAR) 100 MG tablet TAKE 1 TABLET EVERY DAY FOR HIGH BLOOD PRESSURE       Current Facility-Administered Medications   Medication Dose Route Frequency Provider Last Rate Last Admin    hydrocortisone (ANUSOL-HC) 2.5 % rectal cream   Rectal BID EDISON Johnson - CNP         Facility-Administered Medications Ordered in Other Visits   Medication Dose Route Frequency Provider Last Rate Last Admin    sodium chloride flush 0.9 % injection 10 mL  10 mL IntraVENous PRN Ricci Craig MD   10 mL at 09/12/22 0957       OBJECTIVE:  BP (!) 146/68 Comment: standing  Pulse 78   Temp 98.5 °F (36.9 °C) (Oral)   Resp 12   Ht 5' 3\" (1.6 m)   Wt 213 lb (96.6 kg)   SpO2 99%   BMI 37.73 kg/m²     Physical Exam:  Constitutional: Well developed, well nourished female in no acute distress, sitting comfortably in the exam room chair. HEENT: Normocephalic and atraumatic. Oropharynx is clear, mucous membranes are moist.  Sclerae anicteric. Neck supple without JVD. No thyromegaly present. Lymph node   No palpable submandibular, cervical, supraclavicular, axillary lymph nodes. Skin Warm and dry. No bruising noted. Maculopapular rash over both hands more prominent on left. No pallor. Respiratory Lungs are clear to auscultation bilaterally without wheezes, rales or rhonchi, normal air exchange without accessory muscle use. CVS Normal rate, regular rhythm and normal S1 and S2. No murmurs, gallops, or rubs. Neuro Grossly nonfocal with no obvious sensory or motor deficits. MSK Normal range of motion in general.  2+ BLE edema and no tenderness. Psych Appropriate mood and affect. Labs:  Recent Results (from the past 96 hour(s))   CBC with Auto Differential    Collection Time: 09/12/22  9:46 AM   Result Value Ref Range    WBC 5.4 4.3 - 11.1 K/uL    RBC 3.76 (L) 4.05 - 5.2 M/uL    Hemoglobin 11.4 (L) 11.7 - 15.4 g/dL    Hematocrit 34.4 (L) 35.8 - 46.3 %    MCV 91.5 79.6 - 97.8 FL    MCH 30.3 26.1 - 32.9 PG    MCHC 33.1 31.4 - 35.0 g/dL    RDW 16.3 (H) 11.9 - 14.6 %    Platelets 043 478 - 171 K/uL    MPV 10.8 9.4 - 12.3 FL    nRBC 0.00 0.0 - 0.2 K/uL    Differential Type AUTOMATED      Seg Neutrophils 70 43 - 78 %    Lymphocytes 18 13 - 44 %    Monocytes 11 4.0 - 12.0 %    Eosinophils % 0 (L) 0.5 - 7.8 %    Basophils 1 0.0 - 2.0 %    Immature Granulocytes 1 0.0 - 5.0 %    Segs Absolute 3.7 1.7 - 8.2 K/UL    Absolute Lymph # 0.9 0.5 - 4.6 K/UL    Absolute Mono # 0.6 0.1 - 1.3 K/UL    Absolute Eos # 0.0 0.0 - 0.8 K/UL    Basophils Absolute 0.0 0.0 - 0.2 K/UL    Absolute Immature Granulocyte 0.1 0.0 - 0.5 K/UL         Imaging:  MRI of the Breasts with and without contrast       CLINICAL INDICATION:  New diagnosis of right 12:00 breast cancer status post   biopsy demonstrating infiltrating ductal carcinoma high-grade, poorly   differentiated. Evaluate for extent of disease, staging, therapy planning. Patient had right benign papillomas removed in 2021, and a left benign biopsy in   2011 demonstrating a 2:00 fibroadenoma. Family history of her sister having   breast cancer. Ria Akers COMPARISON: Ultrasound and mammography 4/12/2022, 3/25/2022, 3/23/2022 and prior   breast MRI 3/3/2021. TECHNIQUE: Standard MRI sequences were obtained through the breasts in multiple   planes. Images were obtained before and after intravenous infusion of 20 mL of   Prohance contrast. Images were reviewed with PACS and with [employer] CAD software. FINDINGS: The breasts demonstrate mild-moderate bilateral glandularity and mild   background enhancement. Right breast: The new 12:00 biopsy clip is within an irregular heterogeneously   enhancing 1.2 x 0.8 x 1.1 cm malignant mass. As seen on prior exam a large area   of the right upper breast spanning 11:00-12:00 anterior, middle, posterior depth   (overall about 10-12 cm) contains multiple scattered masses of varying sizes and   shapes. There are greater than 10 masses total. Anteriorly at 12:00 10 cm from   nipple an irregular heterogeneously enhancing mass measures up to 1.2 x 1.0 cm   (previously 0.6 x 0.4 cm). Posteriorly near 11:00 14 cm from nipple a lobulated   mass measures up to 1.1 x 0.4 cm (previously 0.8 x 0.3 cm). Left breast: No evidence of suspicious enhancing mass, and no dominant or unique   nonmass enhancement, to suggest additional malignancy. Lymph nodes: Right axilla demonstrates a dysmorphic 1.2 x 0.8 cm node (series 4   image 351) which has developed since the prior exam. No other axillary or   internal mammary lymphadenopathy is evident. Elsewhere: Limited visualization of the partially included thorax and upper   abdomen shows no acute abnormality. Impression       1. Right anterior 12:00 breast cancer measures up to 1.2 cm. 2. Multiple (greater than 10 total) other right upper breast masses, some of   which have enlarged, concerning for additional malignancy. 3. Right axillary indeterminate lymph node. 4. No suspicious left breast finding.        Recommend continued malignancy management as directed clinically. BI-RADS Assessment Category 6: Known Biopsy Proven Malignancy             Pathology:  DIAGNOSIS        A:  \"RIGHT BREAST MASTECTOMY\":  INVASIVE DUCTAL CARCINOMA, HIGH GRADE     (3 + 3 + 2), 15 MILLIMETER IN GREATEST DIMENSION, MARGINS UNINVOLVED;   FIBROCYSTIC MASTOPATHY. B :  \"SENTINEL NODE #1\":  BENIGN LYMPH NODE, NEGATIVE FOR TUMOR. C:  \"ADDITIONAL AXILLARY TISSUE\":  FOUR BENIGN LYMPH NODES, ALL   NEGATIVE FOR TUMOR (0/4). * * *DIAGNOSIS* * * * * * * * * * * * * * * * * * * * * * * * * * * * * * *            A:  \" RIGHT BREAST, 12:00 POSITION, 7 CM FROM NIPPLE, CORE BIOPSY\":         INFILTRATING DUCTAL CARCINOMA WITH APOCRINE FEATURES, HIGH GRADE   (POORLY DIFFERENTIATED). DEFINITE IN SITU COMPONENT AND LYMPHOVASCULAR   INVASION ARE NOT IDENTIFIED             jtl/2022     * * *Electronically Signed Out* * *         Sign Out Date: 2022  JAH Dumont M.D.           * * *PROCEDURES/ADDENDA* * * * * * * * * * * * * * * * * * * * *  * * * *                         STF- ER/SD/CRE1WGF BY IHC                                                                                   Status:  Reviewed By:    Jose Lo MD on 2022                                 Interpretation                       Mastodon C IHC Quantitative Breast Panel     TEST NAME:                         RESULTS:               INTERNAL   CONTROLS:     ESTROGEN RECEPTOR:               Positive (76%)               Present,   Positive   PROGESTERONE RECEPTOR:          Negative (0.0%)          Present, Positive   HER-2/KELVIN:                         Positive (3+)               Percentage   of Cells with Uniform        Intense Complete Membrane   Stainin%       ASSESSMENT:   Diagnosis Orders   1.  Malignant neoplasm of right breast in female, estrogen receptor positive, unspecified site of breast ([facility] [address]  CBC With Auto Differential    Comprehensive metabolic panel    CBC With Auto Differential    CBC With Auto Differential                  PLAN:  Lab studies were personally reviewed. Breast cancer: discovered incidentally on mammogram, 6mm on ultrasound, biopsy proven, high grade IDC, ER 76%, NV negative, HER2 positive. MRI shows greater than 10 lesions in the breast with the dominant lesion 1.2 cm, a dysmorphic indeterminate lymph node in the right axilla, and no evidence of contralateral or distant disease. Her MRI imaging made things more complicated - if she was obviously T1N0, then upfront surgery would be appropriate, lumpectomy/sentinel node followed by adjuvant Taxol/Herceptin. However, she seemed to have multiple lesions that are indeterminate which make it possible that her disease is multifocal, as well as an indeterminate  axillary node. For this reason, we should at least consider neoadjuvant chemotherapy. However, I would strongly advise at least two additional biopsies in that case, ultrasound guided biopsy of the axillary node, and possibly MRI guided biopsy (if not  visualized on ultrasound) of another in-breast lesion to confirm pathology. She was reluctant to consider additional biopsy, so ultimately the decision was made to proceed with mastectomy. Path reviewed, thankfully it appears that the indeterminate lesions in the breast were benign, with the final tumor dimension being only 1.5 cm, and 4 nodes negative for tumor. Therefore, she is [id number], and would be eligible for the adjuvant paclitaxel and trastuzumab regimen. After TH she will complete a year of Herceptin, as well as endocrine therapy with AI. She will not require radiation due to the T1N0 disease and mastectomy. She returns today for follow-up and week 10 TH. She is doing OK. We reduced her Taxol 20% starting with week 8 because of neuropathy. She reports that this is stable, she can still do ADLs (including buttoning shirts and putting in earrings) and has not pain.   She is more fatigued but still feels this is manageable, associated shortness of breath as well. She has some nausea but controlled with antiemetics. No bowel symptoms. No fevers or infectious symptoms. Labs reviewed, adequate to proceed with week 10 TH. Continue 20% Taxol dose reduction. Call with any fevers, uncontrolled side effects from treatment or any other worrisome/concerning symptoms. Follow up next week for week 11, OV week 12 or sooner if needed. All questions were asked and answered to the best of my ability.            Andrea Kwan MD, MD  [facility] Hematology and Oncology  [address] [place] [address]  Office : [phone]  Fax : [phone]

## 2022-09-19 ENCOUNTER — HOSPITAL ENCOUNTER (OUTPATIENT)
Dept: INFUSION THERAPY | Age: 77
Discharge: HOME | End: 2022-09-19
Payer: MEDICARE

## 2022-09-19 VITALS
DIASTOLIC BLOOD PRESSURE: 80 MMHG | TEMPERATURE: 98.5 F | SYSTOLIC BLOOD PRESSURE: 156 MMHG | RESPIRATION RATE: 18 BRPM | OXYGEN SATURATION: 98 % | WEIGHT: 211.2 LBS | HEART RATE: 80 BPM | BODY MASS INDEX: 37.41 KG/M2

## 2022-09-19 DIAGNOSIS — C50.911 MALIGNANT NEOPLASM OF RIGHT BREAST IN FEMALE, ESTROGEN RECEPTOR POSITIVE, UNSPECIFIED SITE OF BREAST (HCC): Primary | ICD-10-CM

## 2022-09-19 DIAGNOSIS — Z17.0 MALIGNANT NEOPLASM OF RIGHT BREAST IN FEMALE, ESTROGEN RECEPTOR POSITIVE, UNSPECIFIED SITE OF BREAST (HCC): Primary | ICD-10-CM

## 2022-09-19 LAB
BASOPHILS # BLD: 0 K/UL (ref 0–0.2)
BASOPHILS NFR BLD: 1 % (ref 0–2)
DIFFERENTIAL METHOD BLD: ABNORMAL
EOSINOPHIL # BLD: 0 K/UL (ref 0–0.8)
EOSINOPHIL NFR BLD: 1 % (ref 0.5–7.8)
ERYTHROCYTE [DISTWIDTH] IN BLOOD BY AUTOMATED COUNT: 16.7 % (ref 11.9–14.6)
HCT VFR BLD AUTO: 33.9 % (ref 35.8–46.3)
HGB BLD-MCNC: 11.2 G/DL (ref 11.7–15.4)
IMM GRANULOCYTES # BLD AUTO: 0 K/UL (ref 0–0.5)
IMM GRANULOCYTES NFR BLD AUTO: 1 % (ref 0–5)
LYMPHOCYTES # BLD: 0.9 K/UL (ref 0.5–4.6)
LYMPHOCYTES NFR BLD: 16 % (ref 13–44)
MCH RBC QN AUTO: 30.1 PG (ref 26.1–32.9)
MCHC RBC AUTO-ENTMCNC: 33 G/DL (ref 31.4–35)
MCV RBC AUTO: 91.1 FL (ref 79.6–97.8)
MONOCYTES # BLD: 0.6 K/UL (ref 0.1–1.3)
MONOCYTES NFR BLD: 10 % (ref 4–12)
NEUTS SEG # BLD: 4 K/UL (ref 1.7–8.2)
NEUTS SEG NFR BLD: 72 % (ref 43–78)
NRBC # BLD: 0 K/UL (ref 0–0.2)
PLATELET # BLD AUTO: 206 K/UL (ref 150–450)
PMV BLD AUTO: 10.7 FL (ref 9.4–12.3)
RBC # BLD AUTO: 3.72 M/UL (ref 4.05–5.2)
WBC # BLD AUTO: 5.6 K/UL (ref 4.3–11.1)

## 2022-09-19 PROCEDURE — 96417 CHEMO IV INFUS EACH ADDL SEQ: CPT

## 2022-09-19 PROCEDURE — 96413 CHEMO IV INFUSION 1 HR: CPT

## 2022-09-19 PROCEDURE — 2500000003 HC RX 250 WO HCPCS: Performed by: INTERNAL MEDICINE

## 2022-09-19 PROCEDURE — 6360000002 HC RX W HCPCS: Performed by: INTERNAL MEDICINE

## 2022-09-19 PROCEDURE — 96375 TX/PRO/DX INJ NEW DRUG ADDON: CPT

## 2022-09-19 PROCEDURE — A4216 STERILE WATER/SALINE, 10 ML: HCPCS | Performed by: INTERNAL MEDICINE

## 2022-09-19 PROCEDURE — 85025 COMPLETE CBC W/AUTO DIFF WBC: CPT

## 2022-09-19 PROCEDURE — 2580000003 HC RX 258: Performed by: INTERNAL MEDICINE

## 2022-09-19 RX ORDER — SODIUM CHLORIDE 0.9 % (FLUSH) 0.9 %
5-40 SYRINGE (ML) INJECTION PRN
Status: DISCONTINUED | OUTPATIENT
Start: 2022-09-19 | End: 2022-09-20 | Stop reason: HOSPADM

## 2022-09-19 RX ORDER — DEXAMETHASONE SODIUM PHOSPHATE 10 MG/ML
10 INJECTION INTRAMUSCULAR; INTRAVENOUS ONCE
Status: COMPLETED | OUTPATIENT
Start: 2022-09-19 | End: 2022-09-19

## 2022-09-19 RX ORDER — SODIUM CHLORIDE 9 MG/ML
5-250 INJECTION, SOLUTION INTRAVENOUS PRN
Status: DISCONTINUED | OUTPATIENT
Start: 2022-09-19 | End: 2022-09-20 | Stop reason: HOSPADM

## 2022-09-19 RX ORDER — DIPHENHYDRAMINE HYDROCHLORIDE 50 MG/ML
25 INJECTION INTRAMUSCULAR; INTRAVENOUS ONCE
Status: COMPLETED | OUTPATIENT
Start: 2022-09-19 | End: 2022-09-19

## 2022-09-19 RX ADMIN — FAMOTIDINE 20 MG: 10 INJECTION, SOLUTION INTRAVENOUS at 11:33

## 2022-09-19 RX ADMIN — SODIUM CHLORIDE 25 ML/HR: 9 INJECTION, SOLUTION INTRAVENOUS at 10:50

## 2022-09-19 RX ADMIN — SODIUM CHLORIDE 189 MG: 9 INJECTION, SOLUTION INTRAVENOUS at 11:00

## 2022-09-19 RX ADMIN — PACLITAXEL 132 MG: 6 INJECTION, SOLUTION, CONCENTRATE INTRAVENOUS at 12:18

## 2022-09-19 RX ADMIN — DEXAMETHASONE SODIUM PHOSPHATE 10 MG: 10 INJECTION INTRAMUSCULAR; INTRAVENOUS at 11:35

## 2022-09-19 RX ADMIN — DIPHENHYDRAMINE HYDROCHLORIDE 25 MG: 50 INJECTION INTRAMUSCULAR; INTRAVENOUS at 11:38

## 2022-09-19 RX ADMIN — SODIUM CHLORIDE, PRESERVATIVE FREE 10 ML: 5 INJECTION INTRAVENOUS at 10:50

## 2022-09-19 NOTE — PROGRESS NOTES
Patient arrived ambulatory to infusion center. C4D8 Taxol/Herceptin completed. Patient tolerated well. Port deaccessed. Discharged ambulatory. Patient aware of next infusion appt on 9/26.

## 2022-09-26 ENCOUNTER — HOSPITAL ENCOUNTER (OUTPATIENT)
Dept: INFUSION THERAPY | Age: 77
Discharge: HOME | End: 2022-09-26
Payer: MEDICARE

## 2022-09-26 ENCOUNTER — OFFICE VISIT (OUTPATIENT)
Dept: ONCOLOGY | Age: 77
End: 2022-09-26
Payer: MEDICARE

## 2022-09-26 VITALS
HEART RATE: 84 BPM | SYSTOLIC BLOOD PRESSURE: 138 MMHG | RESPIRATION RATE: 18 BRPM | OXYGEN SATURATION: 99 % | BODY MASS INDEX: 37.21 KG/M2 | WEIGHT: 210 LBS | TEMPERATURE: 98.1 F | DIASTOLIC BLOOD PRESSURE: 81 MMHG | HEIGHT: 63 IN

## 2022-09-26 DIAGNOSIS — C50.911 MALIGNANT NEOPLASM OF RIGHT BREAST IN FEMALE, ESTROGEN RECEPTOR POSITIVE, UNSPECIFIED SITE OF BREAST (HCC): Primary | ICD-10-CM

## 2022-09-26 DIAGNOSIS — C50.911 MALIGNANT NEOPLASM OF RIGHT BREAST IN FEMALE, ESTROGEN RECEPTOR POSITIVE, UNSPECIFIED SITE OF BREAST (HCC): ICD-10-CM

## 2022-09-26 DIAGNOSIS — G62.9 NEUROPATHY: ICD-10-CM

## 2022-09-26 DIAGNOSIS — Z17.0 MALIGNANT NEOPLASM OF RIGHT BREAST IN FEMALE, ESTROGEN RECEPTOR POSITIVE, UNSPECIFIED SITE OF BREAST (HCC): Primary | ICD-10-CM

## 2022-09-26 DIAGNOSIS — Z17.0 MALIGNANT NEOPLASM OF RIGHT BREAST IN FEMALE, ESTROGEN RECEPTOR POSITIVE, UNSPECIFIED SITE OF BREAST (HCC): ICD-10-CM

## 2022-09-26 DIAGNOSIS — Z09 CHEMOTHERAPY FOLLOW-UP EXAMINATION: ICD-10-CM

## 2022-09-26 LAB
ALBUMIN SERPL-MCNC: 3.2 G/DL (ref 3.2–4.6)
ALBUMIN/GLOB SERPL: 1 {RATIO} (ref 1.2–3.5)
ALP SERPL-CCNC: 91 U/L (ref 50–136)
ALT SERPL-CCNC: 26 U/L (ref 12–65)
ANION GAP SERPL CALC-SCNC: 4 MMOL/L (ref 4–13)
AST SERPL-CCNC: 20 U/L (ref 15–37)
BASOPHILS # BLD: 0 K/UL (ref 0–0.2)
BASOPHILS NFR BLD: 1 % (ref 0–2)
BILIRUB SERPL-MCNC: 0.6 MG/DL (ref 0.2–1.1)
BUN SERPL-MCNC: 10 MG/DL (ref 8–23)
CALCIUM SERPL-MCNC: 8.6 MG/DL (ref 8.3–10.4)
CHLORIDE SERPL-SCNC: 110 MMOL/L (ref 101–110)
CO2 SERPL-SCNC: 27 MMOL/L (ref 21–32)
CREAT SERPL-MCNC: 0.8 MG/DL (ref 0.6–1)
DIFFERENTIAL METHOD BLD: ABNORMAL
EOSINOPHIL # BLD: 0 K/UL (ref 0–0.8)
EOSINOPHIL NFR BLD: 1 % (ref 0.5–7.8)
ERYTHROCYTE [DISTWIDTH] IN BLOOD BY AUTOMATED COUNT: 16.9 % (ref 11.9–14.6)
GLOBULIN SER CALC-MCNC: 3.3 G/DL (ref 2.3–3.5)
GLUCOSE SERPL-MCNC: 100 MG/DL (ref 65–100)
HCT VFR BLD AUTO: 34.3 % (ref 35.8–46.3)
HGB BLD-MCNC: 10.9 G/DL (ref 11.7–15.4)
IMM GRANULOCYTES # BLD AUTO: 0.1 K/UL (ref 0–0.5)
IMM GRANULOCYTES NFR BLD AUTO: 1 % (ref 0–5)
LYMPHOCYTES # BLD: 0.9 K/UL (ref 0.5–4.6)
LYMPHOCYTES NFR BLD: 17 % (ref 13–44)
MCH RBC QN AUTO: 29.8 PG (ref 26.1–32.9)
MCHC RBC AUTO-ENTMCNC: 31.8 G/DL (ref 31.4–35)
MCV RBC AUTO: 93.7 FL (ref 79.6–97.8)
MONOCYTES # BLD: 0.5 K/UL (ref 0.1–1.3)
MONOCYTES NFR BLD: 10 % (ref 4–12)
NEUTS SEG # BLD: 3.7 K/UL (ref 1.7–8.2)
NEUTS SEG NFR BLD: 71 % (ref 43–78)
NRBC # BLD: 0 K/UL (ref 0–0.2)
PLATELET # BLD AUTO: 223 K/UL (ref 150–450)
PMV BLD AUTO: 10.3 FL (ref 9.4–12.3)
POTASSIUM SERPL-SCNC: 4.2 MMOL/L (ref 3.5–5.1)
PROT SERPL-MCNC: 6.5 G/DL (ref 6.3–8.2)
RBC # BLD AUTO: 3.66 M/UL (ref 4.05–5.2)
SODIUM SERPL-SCNC: 141 MMOL/L (ref 136–145)
WBC # BLD AUTO: 5.2 K/UL (ref 4.3–11.1)

## 2022-09-26 PROCEDURE — 1036F TOBACCO NON-USER: CPT | Performed by: NURSE PRACTITIONER

## 2022-09-26 PROCEDURE — 80053 COMPREHEN METABOLIC PANEL: CPT

## 2022-09-26 PROCEDURE — 36415 COLL VENOUS BLD VENIPUNCTURE: CPT

## 2022-09-26 PROCEDURE — G8399 PT W/DXA RESULTS DOCUMENT: HCPCS | Performed by: NURSE PRACTITIONER

## 2022-09-26 PROCEDURE — 96375 TX/PRO/DX INJ NEW DRUG ADDON: CPT

## 2022-09-26 PROCEDURE — 96413 CHEMO IV INFUSION 1 HR: CPT

## 2022-09-26 PROCEDURE — 1123F ACP DISCUSS/DSCN MKR DOCD: CPT | Performed by: NURSE PRACTITIONER

## 2022-09-26 PROCEDURE — 2580000003 HC RX 258: Performed by: INTERNAL MEDICINE

## 2022-09-26 PROCEDURE — 96523 IRRIG DRUG DELIVERY DEVICE: CPT

## 2022-09-26 PROCEDURE — 96417 CHEMO IV INFUS EACH ADDL SEQ: CPT

## 2022-09-26 PROCEDURE — 6360000002 HC RX W HCPCS: Performed by: INTERNAL MEDICINE

## 2022-09-26 PROCEDURE — 99214 OFFICE O/P EST MOD 30 MIN: CPT | Performed by: NURSE PRACTITIONER

## 2022-09-26 PROCEDURE — 1090F PRES/ABSN URINE INCON ASSESS: CPT | Performed by: NURSE PRACTITIONER

## 2022-09-26 PROCEDURE — A4216 STERILE WATER/SALINE, 10 ML: HCPCS | Performed by: INTERNAL MEDICINE

## 2022-09-26 PROCEDURE — 85025 COMPLETE CBC W/AUTO DIFF WBC: CPT

## 2022-09-26 PROCEDURE — G8427 DOCREV CUR MEDS BY ELIG CLIN: HCPCS | Performed by: NURSE PRACTITIONER

## 2022-09-26 PROCEDURE — G8417 CALC BMI ABV UP PARAM F/U: HCPCS | Performed by: NURSE PRACTITIONER

## 2022-09-26 PROCEDURE — 2500000003 HC RX 250 WO HCPCS: Performed by: INTERNAL MEDICINE

## 2022-09-26 RX ORDER — SODIUM CHLORIDE 9 MG/ML
5-250 INJECTION, SOLUTION INTRAVENOUS PRN
Status: CANCELLED | OUTPATIENT
Start: 2022-10-03

## 2022-09-26 RX ORDER — DIPHENHYDRAMINE HYDROCHLORIDE 50 MG/ML
50 INJECTION INTRAMUSCULAR; INTRAVENOUS
Status: CANCELLED | OUTPATIENT
Start: 2022-10-03

## 2022-09-26 RX ORDER — EPINEPHRINE 1 MG/ML
0.3 INJECTION, SOLUTION, CONCENTRATE INTRAVENOUS PRN
Status: CANCELLED | OUTPATIENT
Start: 2022-10-03

## 2022-09-26 RX ORDER — SODIUM CHLORIDE 9 MG/ML
5-250 INJECTION, SOLUTION INTRAVENOUS PRN
Status: DISCONTINUED | OUTPATIENT
Start: 2022-09-26 | End: 2022-09-27 | Stop reason: HOSPADM

## 2022-09-26 RX ORDER — ACETAMINOPHEN 325 MG/1
650 TABLET ORAL
Status: CANCELLED | OUTPATIENT
Start: 2022-10-03

## 2022-09-26 RX ORDER — SODIUM CHLORIDE 0.9 % (FLUSH) 0.9 %
10 SYRINGE (ML) INJECTION PRN
Status: DISCONTINUED | OUTPATIENT
Start: 2022-09-26 | End: 2022-09-27 | Stop reason: HOSPADM

## 2022-09-26 RX ORDER — DIPHENHYDRAMINE HYDROCHLORIDE 50 MG/ML
25 INJECTION INTRAMUSCULAR; INTRAVENOUS ONCE
Status: COMPLETED | OUTPATIENT
Start: 2022-09-26 | End: 2022-09-26

## 2022-09-26 RX ORDER — SODIUM CHLORIDE 9 MG/ML
5-40 INJECTION INTRAVENOUS PRN
Status: CANCELLED | OUTPATIENT
Start: 2022-10-03

## 2022-09-26 RX ORDER — FAMOTIDINE 10 MG/ML
20 INJECTION, SOLUTION INTRAVENOUS
Status: CANCELLED | OUTPATIENT
Start: 2022-10-03

## 2022-09-26 RX ORDER — ALBUTEROL SULFATE 90 UG/1
4 AEROSOL, METERED RESPIRATORY (INHALATION) PRN
Status: CANCELLED | OUTPATIENT
Start: 2022-10-03

## 2022-09-26 RX ORDER — HEPARIN SODIUM (PORCINE) LOCK FLUSH IV SOLN 100 UNIT/ML 100 UNIT/ML
500 SOLUTION INTRAVENOUS PRN
Status: CANCELLED | OUTPATIENT
Start: 2022-10-03

## 2022-09-26 RX ORDER — SODIUM CHLORIDE 9 MG/ML
INJECTION, SOLUTION INTRAVENOUS CONTINUOUS
Status: CANCELLED | OUTPATIENT
Start: 2022-10-03

## 2022-09-26 RX ORDER — MEPERIDINE HYDROCHLORIDE 50 MG/ML
12.5 INJECTION INTRAMUSCULAR; INTRAVENOUS; SUBCUTANEOUS PRN
Status: CANCELLED | OUTPATIENT
Start: 2022-10-03

## 2022-09-26 RX ORDER — DEXAMETHASONE SODIUM PHOSPHATE 10 MG/ML
10 INJECTION INTRAMUSCULAR; INTRAVENOUS ONCE
Status: COMPLETED | OUTPATIENT
Start: 2022-09-26 | End: 2022-09-26

## 2022-09-26 RX ORDER — ONDANSETRON 2 MG/ML
8 INJECTION INTRAMUSCULAR; INTRAVENOUS
Status: CANCELLED | OUTPATIENT
Start: 2022-10-03

## 2022-09-26 RX ORDER — SODIUM CHLORIDE 0.9 % (FLUSH) 0.9 %
5-40 SYRINGE (ML) INJECTION PRN
Status: CANCELLED | OUTPATIENT
Start: 2022-10-03

## 2022-09-26 RX ORDER — SODIUM CHLORIDE 0.9 % (FLUSH) 0.9 %
5-40 SYRINGE (ML) INJECTION PRN
Status: DISCONTINUED | OUTPATIENT
Start: 2022-09-26 | End: 2022-09-27 | Stop reason: HOSPADM

## 2022-09-26 RX ADMIN — SODIUM CHLORIDE, PRESERVATIVE FREE 10 ML: 5 INJECTION INTRAVENOUS at 11:03

## 2022-09-26 RX ADMIN — FAMOTIDINE 20 MG: 10 INJECTION, SOLUTION INTRAVENOUS at 12:36

## 2022-09-26 RX ADMIN — PACLITAXEL 132 MG: 6 INJECTION, SOLUTION, CONCENTRATE INTRAVENOUS at 13:15

## 2022-09-26 RX ADMIN — SODIUM CHLORIDE 25 ML/HR: 9 INJECTION, SOLUTION INTRAVENOUS at 11:03

## 2022-09-26 RX ADMIN — DIPHENHYDRAMINE HYDROCHLORIDE 25 MG: 50 INJECTION INTRAMUSCULAR; INTRAVENOUS at 12:31

## 2022-09-26 RX ADMIN — DEXAMETHASONE SODIUM PHOSPHATE 10 MG: 10 INJECTION, SOLUTION INTRAMUSCULAR; INTRAVENOUS at 12:32

## 2022-09-26 RX ADMIN — SODIUM CHLORIDE, PRESERVATIVE FREE 10 ML: 5 INJECTION INTRAVENOUS at 14:32

## 2022-09-26 RX ADMIN — SODIUM CHLORIDE 189 MG: 9 INJECTION, SOLUTION INTRAVENOUS at 11:50

## 2022-09-26 RX ADMIN — Medication 10 ML: at 09:41

## 2022-09-26 ASSESSMENT — PATIENT HEALTH QUESTIONNAIRE - PHQ9
2. FEELING DOWN, DEPRESSED OR HOPELESS: 0
SUM OF ALL RESPONSES TO PHQ9 QUESTIONS 1 & 2: 0
1. LITTLE INTEREST OR PLEASURE IN DOING THINGS: 0
SUM OF ALL RESPONSES TO PHQ QUESTIONS 1-9: 0

## 2022-09-26 NOTE — PROGRESS NOTES
[facility]: Office Visit Established Patient    Chief Complaint:    Chief Complaint   Patient presents with    Follow-up         History of Present Illness:  Ms. Odetta Severance is a 69 y.o. female who returns today for management of breast cancer. She initially presented for a routine bilateral screening mammogram on 3/23/22 which identified a right breast mass near the 11:00-12:00 position, 6-7 cm from the nipple and other small circumscribed round masses in the right upper inner breast which had not definitely changed from prior examinations. Further evaluation with right breast ultrasound on 3/25/22 confirmed a 6 mm hypoechoic shadowing mass in the 12:00 right breast at 7 cm from the nipple. She presented to Dr. Blu Douglass on 4/7/22 to discuss whether to proceed with recommended biopsy, he recommended that biopsy be performed. Ultrasound-guided biopsy was subsequently completed on 4/12/22 with pathology revealing high grade infiltrating ductal carcinoma, ER 76%, SD negative, and HER2 positive. MRI of the bilateral breasts was completed on 4/14/22 demonstrating a right anterior 12:00 breast cancer measuring up to 1.2 cm; multiple (greater than 10 total) other right upper breast masses, some of which had enlarged, concerning for additional malignancy; an indeterminate 1.2 cm right axillary lymph node; and no suspicious left breast finding. We recommended upfront surgery because of the MRI findings and the inconclusive size/clinical stage of her cancer. Path reviewed, thankfully it appears that the indeterminate lesions in the breast were benign, with the final tumor dimension being only 1.5 cm, and 4 nodes negative for tumor. Therefore, she is [id number], and would be eligible for the adjuvant paclitaxel and trastuzumab regimen. She is here today for follow up and week 12 TH. We reduced her Taxol 20% starting with week 8 because of neuropathy.   She reports that this is stable, she can still do ADLs (including buttoning shirts and putting in earrings). She has occasional stabbing pains in the feet that are fleeting, otherwise no pain. She has intermittent nausea, zofran/compazine does help but not always with full relief of nausea. She has been eating/drinking well and maintaining her weight. She denies any bowel issues. She does have fatigue and shortness of breath (with over-exertion) which is stable. She denies any fevers or other infectious symptoms. Review of Systems:  Constitutional: Positive for fatigue. HENT: Negative. Eyes: Negative. Respiratory: Negative. Cardiovascular: Positive for edema (BLEs). Gastrointestinal: Negative. Genitourinary: Negative. Musculoskeletal: Negative. Skin: Negative. Neurological: Positive for neuropathy - stable. Endo/Heme/Allergies: Negative. Psychiatric/Behavioral: Negative. All other systems reviewed and are negative. Allergies   Allergen Reactions    Sulfamethoxazole-Trimethoprim Nausea And Vomiting    Meloxicam Other (See Comments)    Oxaprozin Swelling     Past Medical History:   Diagnosis Date    Anxiety     Depression 5/19/2017    Essential hypertension 05/19/2017    Fibromyalgia     History of postoperative nausea and vomiting     Hyperlipidemia     Hypothyroid     Impaired fasting glucose     Insomnia     Nausea & vomiting     Need for hepatitis C screening test 12/20/2020    3/15/21 HCV ab negative.       Obesity     Osteoarthritis 12/15/2015    Stress incontinence, female     Vitamin D deficiency      Past Surgical History:   Procedure Laterality Date    BREAST BIOPSY Bilateral     BREAST BIOPSY Right 3/29/2021    RIGHT BREAST NEEDLE LOCALIZED LUMPECTOMY X2 performed by Radha Carlson MD at [address] [place] [address]    COLONOSCOPY  2012    normal; internal hemorrhoid    HYSTERECTOMY ([facility])  [facility] Right 6/1/2022    RIGHT SENTINEL NODE BIOPSY MAG TRACE performed by Fahad Tristan MD at [facility] Right 6/1/2022    RIGHT BREAST MASTECTOMY performed by Fahad Tristan MD at [facility] Left 6/1/2022    PORT INSERTION performed by Fahad Tristan MD at [address] [place] [address] Bilateral     Left 2015, Right 7/29/20    US BREAST BIOPSY NEEDLE ADDITIONAL RIGHT Right 2/25/2021    US BREAST BIOPSY NEEDLE ADDITIONAL RIGHT 2/25/2021 [facility]    US BREAST NEEDLE BIOPSY RIGHT Right 2/25/2021    US BREAST NEEDLE BIOPSY RIGHT 2/25/2021 [facility]    US BREAST NEEDLE BIOPSY RIGHT Right 4/12/2022    US BREAST NEEDLE BIOPSY RIGHT 4/12/2022 [facility]    US GUIDED NEEDLE LOC BREAST ADDL RIGHT Right 3/29/2021    US GUIDED NEEDLE LOC BREAST ADDL RIGHT 3/29/2021 [facility]    US GUIDED NEEDLE LOC OF RIGHT BREAST Right 3/29/2021    US GUIDED NEEDLE LOC OF RIGHT BREAST 3/29/2021 [facility]     Family History   Problem Relation Age of Onset    No Known Problems Mother     Heart Disease Father     Breast Cancer Sister     Stroke Father      Social History     Socioeconomic History    Marital status:       Spouse name: Not on file    Number of children: Not on file    Years of education: Not on file    Highest education level: Not on file   Occupational History    Not on file   Tobacco Use    Smoking status: Never    Smokeless tobacco: Never   Substance and Sexual Activity    Alcohol use: No    Drug use: No    Sexual activity: Not on file   Other Topics Concern    Not on file   Social History Narrative    Not on file     Social Determinants of Health     Financial Resource Strain: Not on file   Food Insecurity: Not on file   Transportation Needs: Not on file   Physical Activity: Not on file   Stress: Not on file   Social Connections: Not on file   Intimate Partner Violence: Not on file   Housing Stability: Not on file     Current Outpatient Medications   Medication Sig Dispense Refill    temazepam (RESTORIL) 30 MG capsule Take 1 capsule by mouth nightly as needed for Sleep for up to 30 days. 30 capsule 0    hydrocortisone (ANUSOL-HC) 2.5 % CREA rectal cream Place rectally 2 times daily Apply to affected area BID 28 g 0    lidocaine-prilocaine (EMLA) 2.5-2.5 % cream Apply topically as needed. 30 g 2    ondansetron (ZOFRAN) 4 MG tablet Take 2 tablets by mouth 3 times daily as needed for Nausea or Vomiting 90 tablet 2    prochlorperazine (COMPAZINE) 10 MG tablet Take 1 tablet by mouth every 6 hours as needed (nausea) 60 tablet 2    levothyroxine (SYNTHROID) 125 MCG tablet Take 1 tablet by mouth Daily TAKE 1 TABLET EVERY DAY BEFORE BREAKFAST FOR LOW THYROID HORMONE LEVELS 90 tablet 1    Multiple Vitamin (MULTIVITAMIN ADULT PO) Take 1 tablet by mouth daily. Cholecalciferol (VITAMIN D3) 1.25 MG (88871 UT) CAPS Take 1 capsule by mouth daily. Carboxymethylcellul-Glycerin (REFRESH OPTIVE OP) Place 1 drop into both eyes daily. ibuprofen (ADVIL;MOTRIN) 400 MG tablet Take by mouth every 6 hours as needed      losartan (COZAAR) 100 MG tablet TAKE 1 TABLET EVERY DAY FOR HIGH BLOOD PRESSURE       Current Facility-Administered Medications   Medication Dose Route Frequency Provider Last Rate Last Admin    hydrocortisone (ANUSOL-HC) 2.5 % rectal cream   Rectal BID EDISON Wilhelm - CNP         Facility-Administered Medications Ordered in Other Visits   Medication Dose Route Frequency Provider Last Rate Last Admin    sodium chloride flush 0.9 % injection 10 mL  10 mL IntraVENous PRN Alba Leach MD   10 mL at 09/26/22 0941       OBJECTIVE:  /81 (Site: Left Upper Arm, Position: Standing)   Pulse 84   Temp 98.1 °F (36.7 °C) (Oral)   Resp 18   Ht 5' 3\" (1.6 m)   Wt 210 lb (95.3 kg)   SpO2 99%   BMI 37.20 kg/m²     Physical Exam:  Constitutional: Well developed, well nourished female in no acute distress, sitting comfortably in the exam room chair. HEENT: Normocephalic and atraumatic. Oropharynx is clear, mucous membranes are moist.  Sclerae anicteric. Neck supple without JVD. No thyromegaly present. Lymph node   No palpable submandibular, cervical, supraclavicular, axillary lymph nodes. Skin Warm and dry. No bruising noted. Maculopapular rash over both hands more prominent on left. No pallor. Respiratory Lungs are clear to auscultation bilaterally without wheezes, rales or rhonchi, normal air exchange without accessory muscle use. CVS Normal rate, regular rhythm and normal S1 and S2. No murmurs, gallops, or rubs. Neuro Grossly nonfocal with no obvious sensory or motor deficits. MSK Normal range of motion in general.  2+ BLE edema and no tenderness. Psych Appropriate mood and affect.           Labs:  Recent Results (from the past 96 hour(s))   CBC With Auto Differential    Collection Time: 09/26/22  9:40 AM   Result Value Ref Range    WBC 5.2 4.3 - 11.1 K/uL    RBC 3.66 (L) 4.05 - 5.2 M/uL    Hemoglobin 10.9 (L) 11.7 - 15.4 g/dL    Hematocrit 34.3 (L) 35.8 - 46.3 %    MCV 93.7 79.6 - 97.8 FL    MCH 29.8 26.1 - 32.9 PG    MCHC 31.8 31.4 - 35.0 g/dL    RDW 16.9 (H) 11.9 - 14.6 %    Platelets 197 518 - 667 K/uL    MPV 10.3 9.4 - 12.3 FL    nRBC 0.00 0.0 - 0.2 K/uL    Differential Type AUTOMATED      Seg Neutrophils 71 43 - 78 %    Lymphocytes 17 13 - 44 %    Monocytes 10 4.0 - 12.0 %    Eosinophils % 1 0.5 - 7.8 %    Basophils 1 0.0 - 2.0 %    Immature Granulocytes 1 0.0 - 5.0 %    Segs Absolute 3.7 1.7 - 8.2 K/UL    Absolute Lymph # 0.9 0.5 - 4.6 K/UL    Absolute Mono # 0.5 0.1 - 1.3 K/UL    Absolute Eos # 0.0 0.0 - 0.8 K/UL    Basophils Absolute 0.0 0.0 - 0.2 K/UL    Absolute Immature Granulocyte 0.1 0.0 - 0.5 K/UL   Comprehensive metabolic panel    Collection Time: 09/26/22  9:40 AM   Result Value Ref Range    Sodium 141 136 - 145 mmol/L    Potassium 4.2 3.5 - 5.1 mmol/L    Chloride 110 101 - 110 mmol/L    CO2 27 21 - 32 mmol/L    Anion Gap 4 4 - 13 mmol/L    Glucose 100 65 - 100 mg/dL    BUN 10 8 - 23 MG/DL    Creatinine 0.80 0.6 - 1.0 MG/DL    GFR African American >60 >60 ml/min/1.73m2    GFR Non- >60 >60 ml/min/1.73m2    Calcium 8.6 8.3 - 10.4 MG/DL    Total Bilirubin 0.6 0.2 - 1.1 MG/DL    ALT 26 12 - 65 U/L    AST 20 15 - 37 U/L    Alk Phosphatase 91 50 - 136 U/L    Total Protein 6.5 6.3 - 8.2 g/dL    Albumin 3.2 3.2 - 4.6 g/dL    Globulin 3.3 2.3 - 3.5 g/dL    Albumin/Globulin Ratio 1.0 (L) 1.2 - 3.5           Imaging:  MRI of the Breasts with and without contrast       CLINICAL INDICATION:  New diagnosis of right 12:00 breast cancer status post   biopsy demonstrating infiltrating ductal carcinoma high-grade, poorly   differentiated. Evaluate for extent of disease, staging, therapy planning. Patient had right benign papillomas removed in 2021, and a left benign biopsy in   2011 demonstrating a 2:00 fibroadenoma. Family history of her sister having   breast cancer. .       COMPARISON: Ultrasound and mammography 4/12/2022, 3/25/2022, 3/23/2022 and prior   breast MRI 3/3/2021. TECHNIQUE: Standard MRI sequences were obtained through the breasts in multiple   planes. Images were obtained before and after intravenous infusion of 20 mL of   Prohance contrast. Images were reviewed with PACS and with Xfluential CAD software. FINDINGS: The breasts demonstrate mild-moderate bilateral glandularity and mild   background enhancement. Right breast: The new 12:00 biopsy clip is within an irregular heterogeneously   enhancing 1.2 x 0.8 x 1.1 cm malignant mass. As seen on prior exam a large area   of the right upper breast spanning 11:00-12:00 anterior, middle, posterior depth   (overall about 10-12 cm) contains multiple scattered masses of varying sizes and   shapes. There are greater than 10 masses total. Anteriorly at 12:00 10 cm from   nipple an irregular heterogeneously enhancing mass measures up to 1.2 x 1.0 cm   (previously 0.6 x 0.4 cm).  Posteriorly near 11:00 14 cm from nipple a lobulated   mass measures up to 1.1 x 0.4 cm (previously 0.8 x 0.3 cm). Left breast: No evidence of suspicious enhancing mass, and no dominant or unique   nonmass enhancement, to suggest additional malignancy. Lymph nodes: Right axilla demonstrates a dysmorphic 1.2 x 0.8 cm node (series 4   image 351) which has developed since the prior exam. No other axillary or   internal mammary lymphadenopathy is evident. Elsewhere: Limited visualization of the partially included thorax and upper   abdomen shows no acute abnormality. Impression       1. Right anterior 12:00 breast cancer measures up to 1.2 cm. 2. Multiple (greater than 10 total) other right upper breast masses, some of   which have enlarged, concerning for additional malignancy. 3. Right axillary indeterminate lymph node. 4. No suspicious left breast finding. Recommend continued malignancy management as directed clinically. BI-RADS Assessment Category 6: Known Biopsy Proven Malignancy             Pathology:  DIAGNOSIS        A:  \"RIGHT BREAST MASTECTOMY\":  INVASIVE DUCTAL CARCINOMA, HIGH GRADE     (3 + 3 + 2), 15 MILLIMETER IN GREATEST DIMENSION, MARGINS UNINVOLVED;   FIBROCYSTIC MASTOPATHY. B :  \"SENTINEL NODE #1\":  BENIGN LYMPH NODE, NEGATIVE FOR TUMOR. C:  \"ADDITIONAL AXILLARY TISSUE\":  FOUR BENIGN LYMPH NODES, ALL   NEGATIVE FOR TUMOR (0/4). * * *DIAGNOSIS* * * * * * * * * * * * * * * * * * * * * * * * * * * * * * *            A:  \" RIGHT BREAST, 12:00 POSITION, 7 CM FROM NIPPLE, CORE BIOPSY\":         INFILTRATING DUCTAL CARCINOMA WITH APOCRINE FEATURES, HIGH GRADE   (POORLY DIFFERENTIATED). DEFINITE IN SITU COMPONENT AND LYMPHOVASCULAR   INVASION ARE NOT IDENTIFIED             raetl/4/13/2022     * * *Electronically Signed Out* * *         Sign Out Date: 4/13/2022  JAH Philippe M.D.           * * *PROCEDURES/ADDENDA* * * * * * * * * * * * * * * * * * * * * * * * *                         STF- ER/TN/NHH1TTO BY IHC                                                                                   Status:  Reviewed By:    Yuliana Akins MD on 2022                                 Interpretation                       [facility] IHC Quantitative Breast Panel     TEST NAME:                         RESULTS:               INTERNAL   CONTROLS:     ESTROGEN RECEPTOR:               Positive (76%)               Present,   Positive   PROGESTERONE RECEPTOR:          Negative (0.0%)          Present, Positive   HER-2/KELVIN:                         Positive (3+)               Percentage   of Cells with Uniform        Intense Complete Membrane   Stainin%       ASSESSMENT:   Diagnosis Orders   1. Malignant neoplasm of right breast in female, estrogen receptor positive, unspecified site of breast ([facility] [address]        2. Chemotherapy follow-up examination        3. Neuropathy                      PLAN:  Lab studies were personally reviewed. Breast cancer: discovered incidentally on mammogram, 6mm on ultrasound, biopsy proven, high grade IDC, ER 76%, TN negative, HER2 positive. MRI shows greater than 10 lesions in the breast with the dominant lesion 1.2 cm, a dysmorphic indeterminate lymph node in the right axilla, and no evidence of contralateral or distant disease. Her MRI imaging made things more complicated - if she was obviously T1N0, then upfront surgery would be appropriate, lumpectomy/sentinel node followed by adjuvant Taxol/Herceptin. However, she seemed to have multiple lesions that are indeterminate which make it possible that her disease is multifocal, as well as an indeterminate  axillary node. For this reason, we should at least consider neoadjuvant chemotherapy.   However, I would strongly advise at least two additional biopsies in that case, ultrasound guided biopsy of the axillary node, and possibly MRI guided biopsy (if not  visualized on ultrasound) of another in-breast lesion to confirm pathology. She was reluctant to consider additional biopsy, so ultimately the decision was made to proceed with mastectomy. Path reviewed, thankfully it appears that the indeterminate lesions in the breast were benign, with the final tumor dimension being only 1.5 cm, and 4 nodes negative for tumor. Therefore, she is [id number], and would be eligible for the adjuvant paclitaxel and trastuzumab regimen. After TH she will complete a year of Herceptin, as well as endocrine therapy with AI. She will not require radiation due to the T1N0 disease and mastectomy. She is here today for follow up and week 12 TH. We reduced her Taxol 20% starting with week 8 because of neuropathy. She reports that this is stable, she can still do ADLs (including buttoning shirts and putting in earrings). She has occasional stabbing pains in the feet that are fleeting, otherwise no pain. She has intermittent nausea, zofran/compazine does help but not always with full relief of nausea. She has been eating/drinking well and maintaining her weight. She denies any bowel issues. She does have fatigue and shortness of breath (with over-exertion) which is stable. She denies any fevers or other infectious symptoms. Labs reviewed and adequate for last weekly TH. Continue 20% Taxol dose reduction. Will return next week to start every three week Herceptin, 4 weeks for OV. Last echo 8/26/22 with EF 60-65%, will be due end of November. Call with any fevers, uncontrolled side effects from treatment or any other worrisome/concerning symptoms. All questions were asked and answered to the best of my ability.          Mendoza Baxtre, EDISON Lee NP  [facility]  [address] [place] [address]  Office : [phone]  Fax : [phone]

## 2022-09-26 NOTE — PROGRESS NOTES
Arrived to the [facility]. Trastuzumab/Taxol completed. Patient tolerated well. Any issues or concerns during appointment: None. Patient aware of next infusion appointment on 10/3 (date) at 0930 (time). Patient aware of next lab and [facility] office visit on 10/24 (date) at 5 (time). Patient instructed to call provider with temperature of 100.4 or greater or nausea/vomiting/ diarrhea or pain not controlled by medications  Discharged ambulatory in stable condition.

## 2022-09-27 ENCOUNTER — OFFICE VISIT (OUTPATIENT)
Dept: INTERNAL MEDICINE CLINIC | Facility: CLINIC | Age: 77
End: 2022-09-27
Payer: MEDICARE

## 2022-09-27 ENCOUNTER — TELEPHONE (OUTPATIENT)
Dept: INTERNAL MEDICINE CLINIC | Facility: CLINIC | Age: 77
End: 2022-09-27

## 2022-09-27 VITALS
WEIGHT: 212.4 LBS | SYSTOLIC BLOOD PRESSURE: 130 MMHG | HEIGHT: 63 IN | BODY MASS INDEX: 37.63 KG/M2 | HEART RATE: 88 BPM | TEMPERATURE: 98.6 F | DIASTOLIC BLOOD PRESSURE: 70 MMHG | OXYGEN SATURATION: 96 %

## 2022-09-27 DIAGNOSIS — E78.00 PURE HYPERCHOLESTEROLEMIA: ICD-10-CM

## 2022-09-27 DIAGNOSIS — F32.A DEPRESSION, UNSPECIFIED DEPRESSION TYPE: ICD-10-CM

## 2022-09-27 DIAGNOSIS — M85.80 OSTEOPENIA, UNSPECIFIED LOCATION: Primary | ICD-10-CM

## 2022-09-27 DIAGNOSIS — E03.9 ACQUIRED HYPOTHYROIDISM: ICD-10-CM

## 2022-09-27 DIAGNOSIS — I10 ESSENTIAL HYPERTENSION: ICD-10-CM

## 2022-09-27 DIAGNOSIS — R73.01 IMPAIRED FASTING GLUCOSE: ICD-10-CM

## 2022-09-27 PROBLEM — Z00.00 MEDICARE ANNUAL WELLNESS VISIT, SUBSEQUENT: Status: ACTIVE | Noted: 2018-10-12

## 2022-09-27 PROCEDURE — 1123F ACP DISCUSS/DSCN MKR DOCD: CPT | Performed by: INTERNAL MEDICINE

## 2022-09-27 PROCEDURE — G8417 CALC BMI ABV UP PARAM F/U: HCPCS | Performed by: INTERNAL MEDICINE

## 2022-09-27 PROCEDURE — 1090F PRES/ABSN URINE INCON ASSESS: CPT | Performed by: INTERNAL MEDICINE

## 2022-09-27 PROCEDURE — G8399 PT W/DXA RESULTS DOCUMENT: HCPCS | Performed by: INTERNAL MEDICINE

## 2022-09-27 PROCEDURE — 1036F TOBACCO NON-USER: CPT | Performed by: INTERNAL MEDICINE

## 2022-09-27 PROCEDURE — 99214 OFFICE O/P EST MOD 30 MIN: CPT | Performed by: INTERNAL MEDICINE

## 2022-09-27 PROCEDURE — G8428 CUR MEDS NOT DOCUMENT: HCPCS | Performed by: INTERNAL MEDICINE

## 2022-09-27 RX ORDER — LEVOTHYROXINE SODIUM 112 UG/1
112 TABLET ORAL DAILY
Qty: 90 TABLET | Refills: 1 | Status: SHIPPED | OUTPATIENT
Start: 2022-09-27

## 2022-09-27 ASSESSMENT — ENCOUNTER SYMPTOMS
VOICE CHANGE: 0
RECTAL PAIN: 0
STRIDOR: 0
EYE PAIN: 0

## 2022-09-27 NOTE — TELEPHONE ENCOUNTER
MD Helga Guerrier MA  Call patient. Her thyroid test revealed that 125 mcg dose of levothyroxine is slightly too much and previously 100 mcg dose was not enough. Therefore 112 mcg dose will be \"just right. \"  I sent new Rx for 112 mcg dose to [facility]. I will see her in January. Repeat thyroid blood test one week before January visit. Lab orders already entered.

## 2022-09-27 NOTE — PROGRESS NOTES
FOLLOWUP VISIT    Subjective:    Ms. Danielle Cowan is a 69 y.o., female,   Chief Complaint   Patient presents with    Follow-up Chronic Condition       HPI:    Patient presents today for follow up of two or more chronic medical problems and review of labs. The patient has hypothyroidism. The patient denies any symptoms of hypo- or hyperthyroidism on the current dose of levothyroxine. The patient has hypertension. The patient has been on an attempted low sodium diet and has been trying to exercise and maintain a healthy weight. The patient reports good compliance with the blood pressure medications and good blood pressure readings on home / ambulatory monitoring. The patient has hyperlipidemia. The patient has been following a low cholesterol diet. The following portions of the patient's history were reviewed and updated as appropriate:      Past Medical History:   Diagnosis Date    Anxiety     Depression 5/19/2017    Essential hypertension 05/19/2017    Fibromyalgia     History of postoperative nausea and vomiting     Hyperlipidemia     Hypothyroid     Impaired fasting glucose     Insomnia     Nausea & vomiting     Need for hepatitis C screening test 12/20/2020    3/15/21 HCV ab negative.       Obesity     Osteoarthritis 12/15/2015    Stress incontinence, female     Vitamin D deficiency        Past Surgical History:   Procedure Laterality Date    BREAST BIOPSY Bilateral     BREAST BIOPSY Right 3/29/2021    RIGHT BREAST NEEDLE LOCALIZED LUMPECTOMY X2 performed by Denys Berger MD at [id number] [place] [address]    COLONOSCOPY  2012    normal; internal hemorrhoid    HYSTERECTOMY ([address])  [facility] Right 6/1/2022    RIGHT SENTINEL NODE BIOPSY MAG TRACE performed by Christi Wright MD at [facility] Right 6/1/2022    RIGHT BREAST MASTECTOMY performed by Christi Wright MD at [facility] Left 6/1/2022    PORT INSERTION performed by Bacilio Cm MD at [address] [place] [address] Bilateral     Left 2015, Right 7/29/20    US BREAST BIOPSY NEEDLE ADDITIONAL RIGHT Right 2/25/2021    US BREAST BIOPSY NEEDLE ADDITIONAL RIGHT 2/25/2021 [facility]    US BREAST NEEDLE BIOPSY RIGHT Right 2/25/2021    US BREAST NEEDLE BIOPSY RIGHT 2/25/2021 [facility]    US BREAST NEEDLE BIOPSY RIGHT Right 4/12/2022    US BREAST NEEDLE BIOPSY RIGHT 4/12/2022 [facility]    US GUIDED NEEDLE LOC BREAST ADDL RIGHT Right 3/29/2021    US GUIDED NEEDLE LOC BREAST ADDL RIGHT 3/29/2021 [facility]    US GUIDED NEEDLE LOC OF RIGHT BREAST Right 3/29/2021    US GUIDED NEEDLE LOC OF RIGHT BREAST 3/29/2021 [facility]       Family History   Problem Relation Age of Onset    No Known Problems Mother     Heart Disease Father     Breast Cancer Sister     Stroke Father        Social History     Socioeconomic History    Marital status:       Spouse name: Not on file    Number of children: Not on file    Years of education: Not on file    Highest education level: Not on file   Occupational History    Not on file   Tobacco Use    Smoking status: Never    Smokeless tobacco: Never   Substance and Sexual Activity    Alcohol use: No    Drug use: No    Sexual activity: Not on file   Other Topics Concern    Not on file   Social History Narrative    Not on file     Social Determinants of Health     Financial Resource Strain: Not on file   Food Insecurity: Not on file   Transportation Needs: Not on file   Physical Activity: Not on file   Stress: Not on file   Social Connections: Not on file   Intimate Partner Violence: Not on file   Housing Stability: Not on file       Current Outpatient Medications   Medication Sig Dispense Refill    levothyroxine (SYNTHROID) 112 MCG tablet Take 1 tablet by mouth Daily TAKE 1 TABLET EVERY DAY BEFORE BREAKFAST FOR LOW THYROID HORMONE LEVELS 90 tablet 1    temazepam (RESTORIL) 30 MG capsule Take 1 capsule by mouth nightly as needed for Sleep for up to 30 days. 30 capsule 0    hydrocortisone (ANUSOL-HC) 2.5 % CREA rectal cream Place rectally 2 times daily Apply to affected area BID 28 g 0    lidocaine-prilocaine (EMLA) 2.5-2.5 % cream Apply topically as needed. 30 g 2    ondansetron (ZOFRAN) 4 MG tablet Take 2 tablets by mouth 3 times daily as needed for Nausea or Vomiting 90 tablet 2    prochlorperazine (COMPAZINE) 10 MG tablet Take 1 tablet by mouth every 6 hours as needed (nausea) 60 tablet 2    Multiple Vitamin (MULTIVITAMIN ADULT PO) Take 1 tablet by mouth daily. Cholecalciferol (VITAMIN D3) 1.25 MG (13334 UT) CAPS Take 1 capsule by mouth daily. Carboxymethylcellul-Glycerin (REFRESH OPTIVE OP) Place 1 drop into both eyes daily. ibuprofen (ADVIL;MOTRIN) 400 MG tablet Take by mouth every 6 hours as needed      losartan (COZAAR) 100 MG tablet TAKE 1 TABLET EVERY DAY FOR HIGH BLOOD PRESSURE       Current Facility-Administered Medications   Medication Dose Route Frequency Provider Last Rate Last Admin    hydrocortisone (ANUSOL-HC) 2.5 % rectal cream   Rectal BID Giselle Downey, APRN - CNP           Allergies as of 09/27/2022 - Fully Reviewed 09/26/2022   Allergen Reaction Noted    Sulfamethoxazole-trimethoprim Nausea And Vomiting 04/16/2016    Meloxicam Other (See Comments) 11/23/2015    Oxaprozin Swelling 11/23/2015       Review of Systems   Constitutional:  Negative for activity change and appetite change. HENT:  Negative for drooling and voice change. Eyes:  Negative for pain. Respiratory:  Negative for stridor. Gastrointestinal:  Negative for rectal pain. Endocrine: Negative for polydipsia and polyphagia. Genitourinary:  Negative for dyspareunia and enuresis. Musculoskeletal:  Negative for gait problem and neck stiffness. Skin:  Negative for pallor. Neurological:  Negative for facial asymmetry and speech difficulty. Hematological:  Does not bruise/bleed easily. Psychiatric/Behavioral:  Negative for self-injury. The patient is not hyperactive. Patient Care Team:  Regla Negron MD as PCP - General  Regla Negron MD as PCP - [address] [place] [address] Provider  Jackie Schaffer MD as Physician  Corrie Simon MD as Surgeon  Corrie Simon MD as Surgeon (General Surgery)  Wil Dominguez, RN as Nurse Navigator (Oncology)  Edwin Whaley, RN as Nurse Navigator (Oncology)    Objective:    /70 (Site: Left Upper Arm, Position: Sitting)   Pulse 88   Temp 98.6 °F (37 °C) (Temporal)   Ht 5' 3\" (1.6 m)   Wt 212 lb 6.4 oz (96.3 kg)   SpO2 96%   BMI 37.62 kg/m²     Physical Exam  Vitals reviewed. Constitutional:       General: She is not in acute distress. Appearance: She is not toxic-appearing. HENT:      Head: Normocephalic and atraumatic. Right Ear: Tympanic membrane, ear canal and external ear normal.      Left Ear: Tympanic membrane, ear canal and external ear normal.      Nose: Nose normal.      Mouth/Throat:      Mouth: Mucous membranes are moist.      Pharynx: Oropharynx is clear. Eyes:      General: No scleral icterus. Extraocular Movements: Extraocular movements intact. Pupils: Pupils are equal, round, and reactive to light. Cardiovascular:      Rate and Rhythm: Normal rate and regular rhythm. Pulses: Normal pulses. Heart sounds: Normal heart sounds. Pulmonary:      Effort: Pulmonary effort is normal. No respiratory distress. Breath sounds: Normal breath sounds. No stridor. Abdominal:      General: Abdomen is flat. Bowel sounds are normal.      Palpations: Abdomen is soft. There is no mass. Tenderness: There is no guarding or rebound. Musculoskeletal:         General: Normal range of motion. Cervical back: Normal range of motion and neck supple. Skin:     General: Skin is warm and dry. Coloration: Skin is not jaundiced.    Neurological:      Mental Status: She is alert and oriented to person, place, and time. Mental status is at baseline. Psychiatric:         Behavior: Behavior normal.         Thought Content:  Thought content normal.            Hospital Outpatient Visit on 09/26/2022   Component Date Value Ref Range Status    WBC 09/26/2022 5.2  4.3 - 11.1 K/uL Final    RBC 09/26/2022 3.66 (A)  4.05 - 5.2 M/uL Final    Hemoglobin 09/26/2022 10.9 (A)  11.7 - 15.4 g/dL Final    Hematocrit 09/26/2022 34.3 (A)  35.8 - 46.3 % Final    MCV 09/26/2022 93.7  79.6 - 97.8 FL Final    MCH 09/26/2022 29.8  26.1 - 32.9 PG Final    MCHC 09/26/2022 31.8  31.4 - 35.0 g/dL Final    RDW 09/26/2022 16.9 (A)  11.9 - 14.6 % Final    Platelets 71/04/0280 223  150 - 450 K/uL Final    MPV 09/26/2022 10.3  9.4 - 12.3 FL Final    nRBC 09/26/2022 0.00  0.0 - 0.2 K/uL Final    **Note: Absolute NRBC parameter is now reported with Hemogram**    Differential Type 09/26/2022 AUTOMATED    Final    Seg Neutrophils 09/26/2022 71  43 - 78 % Final    Lymphocytes 09/26/2022 17  13 - 44 % Final    Monocytes 09/26/2022 10  4.0 - 12.0 % Final    Eosinophils % 09/26/2022 1  0.5 - 7.8 % Final    Basophils 09/26/2022 1  0.0 - 2.0 % Final    Immature Granulocytes 09/26/2022 1  0.0 - 5.0 % Final    Segs Absolute 09/26/2022 3.7  1.7 - 8.2 K/UL Final    Absolute Lymph # 09/26/2022 0.9  0.5 - 4.6 K/UL Final    Absolute Mono # 09/26/2022 0.5  0.1 - 1.3 K/UL Final    Absolute Eos # 09/26/2022 0.0  0.0 - 0.8 K/UL Final    Basophils Absolute 09/26/2022 0.0  0.0 - 0.2 K/UL Final    Absolute Immature Granulocyte 09/26/2022 0.1  0.0 - 0.5 K/UL Final    Sodium 09/26/2022 141  136 - 145 mmol/L Final    Potassium 09/26/2022 4.2  3.5 - 5.1 mmol/L Final    Chloride 09/26/2022 110  101 - 110 mmol/L Final    CO2 09/26/2022 27  21 - 32 mmol/L Final    Anion Gap 09/26/2022 4  4 - 13 mmol/L Final    Glucose 09/26/2022 100  65 - 100 mg/dL Final    BUN 09/26/2022 10  8 - 23 MG/DL Final    Creatinine 09/26/2022 0.80  0.6 - 1.0 MG/DL Final    GFR  09/26/2022 >60 >60 ml/min/1.73m2 Final    GFR Non- 09/26/2022 >60  >60 ml/min/1.73m2 Final    Comment:      Estimated GFR is calculated using the Modification of Diet in Renal Disease (MDRD) Study equation, reported for both  Americans (GFRAA) and non- Americans (GFRNA), and normalized to 1.73m2 body surface area. The physician must decide which value applies to the patient. The MDRD study equation should only be used in individuals age 25 or older. It has not been validated for the following: pregnant women, patients with serious comorbid conditions,or on certain medications, or persons with extremes of body size, muscle mass, or nutritional status. Calcium 09/26/2022 8.6  8.3 - 10.4 MG/DL Final    Total Bilirubin 09/26/2022 0.6  0.2 - 1.1 MG/DL Final    ALT 09/26/2022 26  12 - 65 U/L Final    AST 09/26/2022 20  15 - 37 U/L Final    Alk Phosphatase 09/26/2022 91  50 - 136 U/L Final    Total Protein 09/26/2022 6.5  6.3 - 8.2 g/dL Final    Albumin 09/26/2022 3.2  3.2 - 4.6 g/dL Final    Globulin 09/26/2022 3.3  2.3 - 3.5 g/dL Final    Albumin/Globulin Ratio 09/26/2022 1.0 (A)  1.2 - 3.5   Final         Assessent & Plan:        1. Osteopenia, unspecified location  Overview:  3/23/22 DEXA - right femoral neck 0.791 gm/cm2 with T -1.8.  10 yr FRAX fracture risk 11 / 2.4%. 10/12/18 DEXA - left femoral neck 0.891 gm/cm2 with T score -1.1. Reviewed / discussed the patient's most recent DEXA scan results and calculated FRAX ten year fracture risk. The patient was counseled regarding adequate calcium and vitamin D intake. Also discussed fall avoidance and weight bearing exercise as tolerated. 2. Pure hypercholesterolemia  Overview:  12/16/21 total 169 HDL 67 LDL 89 TG 66 on diet therapy. Labs were reviewed and discussed with patient. The patient is not interested in taking medication to lower her cholesterol. She prefers to maintain diet therapy.    Her HDL is > 60 and LDL < 100 on diet therapy. The patient will continue the current treatment. Orders:  -     Lipid Panel; Future  3. Impaired fasting glucose  Overview:  6/21/22 FBS 86  12/16/21 fasting glucose 90, A1C 5.1    Labs were reviewed and discussed with patient. She has had prior elevated blood sugars but more recently controlled with diet and exercise therapy. The patient will continue the current treatment. 4. Acquired hypothyroidism  Overview:  9/20/22 TSH 0.376 on levothyroxine 125 mcg daily. 6/21/22 TSH 4.500 on levothyroxine 100 mcg daily. 3/15/21 TSH 7.300 on levothyroxine 88 mcg daily. Labs were reviewed and discussed with the patient. Will decrease dose to 112 mcg daily. Repeat labs before next visit. Orders:  -     levothyroxine (SYNTHROID) 112 MCG tablet; Take 1 tablet by mouth Daily TAKE 1 TABLET EVERY DAY BEFORE BREAKFAST FOR LOW THYROID HORMONE LEVELS, Disp-90 tablet, R-1Normal  -     TSH; Future  5. Depression, unspecified depression type  Overview:  Symptoms currently in remission. Previously treated with SSRI therapy. We will monitor for clinical recurrence. 6. Essential hypertension  Overview:  Well controlled on losartan 100 mg daily. The patient will continue the current treatment. The patient and/or patient representative voiced understanding and agreement with the current diagnoses, recommendations, and possible side effects. Return in about 4 months (around 1/27/2023) for annual wellness.

## 2022-10-03 ENCOUNTER — HOSPITAL ENCOUNTER (OUTPATIENT)
Dept: INFUSION THERAPY | Age: 77
Discharge: HOME | End: 2022-10-03
Payer: MEDICARE

## 2022-10-03 VITALS
BODY MASS INDEX: 37.27 KG/M2 | HEART RATE: 83 BPM | DIASTOLIC BLOOD PRESSURE: 72 MMHG | SYSTOLIC BLOOD PRESSURE: 123 MMHG | RESPIRATION RATE: 18 BRPM | WEIGHT: 210.4 LBS | OXYGEN SATURATION: 99 % | TEMPERATURE: 98.1 F

## 2022-10-03 DIAGNOSIS — C50.911 MALIGNANT NEOPLASM OF RIGHT BREAST IN FEMALE, ESTROGEN RECEPTOR POSITIVE, UNSPECIFIED SITE OF BREAST (HCC): Primary | ICD-10-CM

## 2022-10-03 DIAGNOSIS — Z17.0 MALIGNANT NEOPLASM OF RIGHT BREAST IN FEMALE, ESTROGEN RECEPTOR POSITIVE, UNSPECIFIED SITE OF BREAST (HCC): Primary | ICD-10-CM

## 2022-10-03 PROCEDURE — 96413 CHEMO IV INFUSION 1 HR: CPT

## 2022-10-03 PROCEDURE — 6360000002 HC RX W HCPCS: Performed by: NURSE PRACTITIONER

## 2022-10-03 PROCEDURE — 2580000003 HC RX 258: Performed by: NURSE PRACTITIONER

## 2022-10-03 RX ORDER — SODIUM CHLORIDE 9 MG/ML
5-250 INJECTION, SOLUTION INTRAVENOUS PRN
Status: DISCONTINUED | OUTPATIENT
Start: 2022-10-03 | End: 2022-10-04 | Stop reason: HOSPADM

## 2022-10-03 RX ORDER — SODIUM CHLORIDE 0.9 % (FLUSH) 0.9 %
5-40 SYRINGE (ML) INJECTION PRN
Status: DISCONTINUED | OUTPATIENT
Start: 2022-10-03 | End: 2022-10-04 | Stop reason: HOSPADM

## 2022-10-03 RX ADMIN — SODIUM CHLORIDE 567 MG: 9 INJECTION, SOLUTION INTRAVENOUS at 09:46

## 2022-10-03 RX ADMIN — SODIUM CHLORIDE 50 ML/HR: 9 INJECTION, SOLUTION INTRAVENOUS at 09:45

## 2022-10-03 RX ADMIN — SODIUM CHLORIDE, PRESERVATIVE FREE 10 ML: 5 INJECTION INTRAVENOUS at 09:45

## 2022-10-05 ENCOUNTER — TELEPHONE (OUTPATIENT)
Dept: ONCOLOGY | Age: 77
End: 2022-10-05

## 2022-10-05 DIAGNOSIS — C50.911 MALIGNANT NEOPLASM OF RIGHT BREAST IN FEMALE, ESTROGEN RECEPTOR POSITIVE, UNSPECIFIED SITE OF BREAST (HCC): ICD-10-CM

## 2022-10-05 DIAGNOSIS — Z17.0 MALIGNANT NEOPLASM OF RIGHT BREAST IN FEMALE, ESTROGEN RECEPTOR POSITIVE, UNSPECIFIED SITE OF BREAST (HCC): ICD-10-CM

## 2022-10-05 DIAGNOSIS — G47.00 INSOMNIA, UNSPECIFIED TYPE: ICD-10-CM

## 2022-10-05 RX ORDER — TEMAZEPAM 30 MG/1
30 CAPSULE ORAL NIGHTLY PRN
Qty: 30 CAPSULE | Refills: 1 | Status: SHIPPED | OUTPATIENT
Start: 2022-10-05 | End: 2022-12-04

## 2022-10-19 DIAGNOSIS — C50.911 MALIGNANT NEOPLASM OF RIGHT BREAST IN FEMALE, ESTROGEN RECEPTOR POSITIVE, UNSPECIFIED SITE OF BREAST (HCC): Primary | ICD-10-CM

## 2022-10-19 DIAGNOSIS — Z17.0 MALIGNANT NEOPLASM OF RIGHT BREAST IN FEMALE, ESTROGEN RECEPTOR POSITIVE, UNSPECIFIED SITE OF BREAST (HCC): Primary | ICD-10-CM

## 2022-10-24 ENCOUNTER — HOSPITAL ENCOUNTER (OUTPATIENT)
Dept: INFUSION THERAPY | Age: 77
Discharge: HOME | End: 2022-10-24
Payer: MEDICARE

## 2022-10-24 ENCOUNTER — OFFICE VISIT (OUTPATIENT)
Dept: ONCOLOGY | Age: 77
End: 2022-10-24
Payer: MEDICARE

## 2022-10-24 VITALS
BODY MASS INDEX: 36.85 KG/M2 | WEIGHT: 208 LBS | OXYGEN SATURATION: 99 % | HEART RATE: 75 BPM | DIASTOLIC BLOOD PRESSURE: 74 MMHG | RESPIRATION RATE: 16 BRPM | TEMPERATURE: 98.6 F | SYSTOLIC BLOOD PRESSURE: 159 MMHG

## 2022-10-24 DIAGNOSIS — C50.911 MALIGNANT NEOPLASM OF RIGHT BREAST IN FEMALE, ESTROGEN RECEPTOR POSITIVE, UNSPECIFIED SITE OF BREAST (HCC): Primary | ICD-10-CM

## 2022-10-24 DIAGNOSIS — Z17.0 MALIGNANT NEOPLASM OF RIGHT BREAST IN FEMALE, ESTROGEN RECEPTOR POSITIVE, UNSPECIFIED SITE OF BREAST (HCC): ICD-10-CM

## 2022-10-24 DIAGNOSIS — Z17.0 MALIGNANT NEOPLASM OF RIGHT BREAST IN FEMALE, ESTROGEN RECEPTOR POSITIVE, UNSPECIFIED SITE OF BREAST (HCC): Primary | ICD-10-CM

## 2022-10-24 DIAGNOSIS — C50.911 MALIGNANT NEOPLASM OF RIGHT BREAST IN FEMALE, ESTROGEN RECEPTOR POSITIVE, UNSPECIFIED SITE OF BREAST (HCC): ICD-10-CM

## 2022-10-24 LAB
ALBUMIN SERPL-MCNC: 3.4 G/DL (ref 3.2–4.6)
ALBUMIN/GLOB SERPL: 1.1 {RATIO} (ref 0.4–1.6)
ALP SERPL-CCNC: 91 U/L (ref 50–136)
ALT SERPL-CCNC: 24 U/L (ref 12–65)
ANION GAP SERPL CALC-SCNC: 5 MMOL/L (ref 2–11)
AST SERPL-CCNC: 23 U/L (ref 15–37)
BASOPHILS # BLD: 0.1 K/UL (ref 0–0.2)
BASOPHILS NFR BLD: 1 % (ref 0–2)
BILIRUB SERPL-MCNC: 0.8 MG/DL (ref 0.2–1.1)
BUN SERPL-MCNC: 12 MG/DL (ref 8–23)
CALCIUM SERPL-MCNC: 8.8 MG/DL (ref 8.3–10.4)
CHLORIDE SERPL-SCNC: 109 MMOL/L (ref 101–110)
CO2 SERPL-SCNC: 28 MMOL/L (ref 21–32)
CREAT SERPL-MCNC: 0.7 MG/DL (ref 0.6–1)
DIFFERENTIAL METHOD BLD: ABNORMAL
EOSINOPHIL # BLD: 0.2 K/UL (ref 0–0.8)
EOSINOPHIL NFR BLD: 2 % (ref 0.5–7.8)
ERYTHROCYTE [DISTWIDTH] IN BLOOD BY AUTOMATED COUNT: 15.3 % (ref 11.9–14.6)
GLOBULIN SER CALC-MCNC: 3.2 G/DL (ref 2.8–4.5)
GLUCOSE SERPL-MCNC: 95 MG/DL (ref 65–100)
HCT VFR BLD AUTO: 35.3 % (ref 35.8–46.3)
HGB BLD-MCNC: 11.5 G/DL (ref 11.7–15.4)
IMM GRANULOCYTES # BLD AUTO: 0 K/UL (ref 0–0.5)
IMM GRANULOCYTES NFR BLD AUTO: 0 % (ref 0–5)
LYMPHOCYTES # BLD: 1.3 K/UL (ref 0.5–4.6)
LYMPHOCYTES NFR BLD: 16 % (ref 13–44)
MAGNESIUM SERPL-MCNC: 2.1 MG/DL (ref 1.8–2.4)
MCH RBC QN AUTO: 30.4 PG (ref 26.1–32.9)
MCHC RBC AUTO-ENTMCNC: 32.6 G/DL (ref 31.4–35)
MCV RBC AUTO: 93.4 FL (ref 82–102)
MONOCYTES # BLD: 0.7 K/UL (ref 0.1–1.3)
MONOCYTES NFR BLD: 9 % (ref 4–12)
NEUTS SEG # BLD: 5.6 K/UL (ref 1.7–8.2)
NEUTS SEG NFR BLD: 72 % (ref 43–78)
NRBC # BLD: 0 K/UL (ref 0–0.2)
PLATELET # BLD AUTO: 156 K/UL (ref 150–450)
PMV BLD AUTO: 11 FL (ref 9.4–12.3)
POTASSIUM SERPL-SCNC: 4.2 MMOL/L (ref 3.5–5.1)
PROT SERPL-MCNC: 6.6 G/DL (ref 6.3–8.2)
RBC # BLD AUTO: 3.78 M/UL (ref 4.05–5.2)
SODIUM SERPL-SCNC: 142 MMOL/L (ref 133–143)
WBC # BLD AUTO: 7.8 K/UL (ref 4.3–11.1)

## 2022-10-24 PROCEDURE — 83735 ASSAY OF MAGNESIUM: CPT

## 2022-10-24 PROCEDURE — 36593 DECLOT VASCULAR DEVICE: CPT

## 2022-10-24 PROCEDURE — 1123F ACP DISCUSS/DSCN MKR DOCD: CPT | Performed by: INTERNAL MEDICINE

## 2022-10-24 PROCEDURE — 6360000002 HC RX W HCPCS: Performed by: INTERNAL MEDICINE

## 2022-10-24 PROCEDURE — 1090F PRES/ABSN URINE INCON ASSESS: CPT | Performed by: INTERNAL MEDICINE

## 2022-10-24 PROCEDURE — G8417 CALC BMI ABV UP PARAM F/U: HCPCS | Performed by: INTERNAL MEDICINE

## 2022-10-24 PROCEDURE — G8484 FLU IMMUNIZE NO ADMIN: HCPCS | Performed by: INTERNAL MEDICINE

## 2022-10-24 PROCEDURE — 99214 OFFICE O/P EST MOD 30 MIN: CPT | Performed by: INTERNAL MEDICINE

## 2022-10-24 PROCEDURE — 96523 IRRIG DRUG DELIVERY DEVICE: CPT

## 2022-10-24 PROCEDURE — 2580000003 HC RX 258: Performed by: INTERNAL MEDICINE

## 2022-10-24 PROCEDURE — 1036F TOBACCO NON-USER: CPT | Performed by: INTERNAL MEDICINE

## 2022-10-24 PROCEDURE — G8399 PT W/DXA RESULTS DOCUMENT: HCPCS | Performed by: INTERNAL MEDICINE

## 2022-10-24 PROCEDURE — G8428 CUR MEDS NOT DOCUMENT: HCPCS | Performed by: INTERNAL MEDICINE

## 2022-10-24 PROCEDURE — 80053 COMPREHEN METABOLIC PANEL: CPT

## 2022-10-24 PROCEDURE — 96413 CHEMO IV INFUSION 1 HR: CPT

## 2022-10-24 PROCEDURE — 85025 COMPLETE CBC W/AUTO DIFF WBC: CPT

## 2022-10-24 RX ORDER — SODIUM CHLORIDE 9 MG/ML
5-250 INJECTION, SOLUTION INTRAVENOUS PRN
Status: DISCONTINUED | OUTPATIENT
Start: 2022-10-24 | End: 2022-10-25 | Stop reason: HOSPADM

## 2022-10-24 RX ORDER — DIPHENHYDRAMINE HYDROCHLORIDE 50 MG/ML
50 INJECTION INTRAMUSCULAR; INTRAVENOUS
Status: CANCELLED | OUTPATIENT
Start: 2022-10-24

## 2022-10-24 RX ORDER — HEPARIN SODIUM (PORCINE) LOCK FLUSH IV SOLN 100 UNIT/ML 100 UNIT/ML
500 SOLUTION INTRAVENOUS PRN
Status: CANCELLED | OUTPATIENT
Start: 2022-10-24

## 2022-10-24 RX ORDER — HEPARIN SODIUM (PORCINE) LOCK FLUSH IV SOLN 100 UNIT/ML 100 UNIT/ML
500 SOLUTION INTRAVENOUS PRN
Status: CANCELLED | OUTPATIENT
Start: 2022-11-14

## 2022-10-24 RX ORDER — FAMOTIDINE 10 MG/ML
20 INJECTION, SOLUTION INTRAVENOUS
Status: CANCELLED | OUTPATIENT
Start: 2022-11-14

## 2022-10-24 RX ORDER — SODIUM CHLORIDE 0.9 % (FLUSH) 0.9 %
5-40 SYRINGE (ML) INJECTION PRN
Status: DISCONTINUED | OUTPATIENT
Start: 2022-10-24 | End: 2022-10-25 | Stop reason: HOSPADM

## 2022-10-24 RX ORDER — SODIUM CHLORIDE 9 MG/ML
5-250 INJECTION, SOLUTION INTRAVENOUS PRN
Status: CANCELLED | OUTPATIENT
Start: 2022-10-24

## 2022-10-24 RX ORDER — ALBUTEROL SULFATE 90 UG/1
4 AEROSOL, METERED RESPIRATORY (INHALATION) PRN
Status: CANCELLED | OUTPATIENT
Start: 2022-11-14

## 2022-10-24 RX ORDER — ONDANSETRON 2 MG/ML
8 INJECTION INTRAMUSCULAR; INTRAVENOUS
Status: CANCELLED | OUTPATIENT
Start: 2022-11-14

## 2022-10-24 RX ORDER — ONDANSETRON 2 MG/ML
8 INJECTION INTRAMUSCULAR; INTRAVENOUS
Status: CANCELLED | OUTPATIENT
Start: 2022-10-24

## 2022-10-24 RX ORDER — ACETAMINOPHEN 325 MG/1
650 TABLET ORAL
Status: CANCELLED | OUTPATIENT
Start: 2022-11-14

## 2022-10-24 RX ORDER — SODIUM CHLORIDE 9 MG/ML
5-40 INJECTION INTRAVENOUS PRN
Status: CANCELLED | OUTPATIENT
Start: 2022-11-14

## 2022-10-24 RX ORDER — SODIUM CHLORIDE 0.9 % (FLUSH) 0.9 %
5-40 SYRINGE (ML) INJECTION PRN
Status: CANCELLED | OUTPATIENT
Start: 2022-10-24

## 2022-10-24 RX ORDER — SODIUM CHLORIDE 9 MG/ML
5-40 INJECTION INTRAVENOUS PRN
Status: CANCELLED | OUTPATIENT
Start: 2022-10-24

## 2022-10-24 RX ORDER — FAMOTIDINE 10 MG/ML
20 INJECTION, SOLUTION INTRAVENOUS
Status: CANCELLED | OUTPATIENT
Start: 2022-10-24

## 2022-10-24 RX ORDER — ALBUTEROL SULFATE 90 UG/1
4 AEROSOL, METERED RESPIRATORY (INHALATION) PRN
Status: CANCELLED | OUTPATIENT
Start: 2022-10-24

## 2022-10-24 RX ORDER — MEPERIDINE HYDROCHLORIDE 50 MG/ML
12.5 INJECTION INTRAMUSCULAR; INTRAVENOUS; SUBCUTANEOUS PRN
Status: CANCELLED | OUTPATIENT
Start: 2022-10-24

## 2022-10-24 RX ORDER — SODIUM CHLORIDE 0.9 % (FLUSH) 0.9 %
10 SYRINGE (ML) INJECTION PRN
Status: DISCONTINUED | OUTPATIENT
Start: 2022-10-24 | End: 2022-10-25 | Stop reason: HOSPADM

## 2022-10-24 RX ORDER — ACETAMINOPHEN 325 MG/1
650 TABLET ORAL
Status: CANCELLED | OUTPATIENT
Start: 2022-10-24

## 2022-10-24 RX ORDER — SODIUM CHLORIDE 9 MG/ML
5-250 INJECTION, SOLUTION INTRAVENOUS PRN
Status: CANCELLED | OUTPATIENT
Start: 2022-11-14

## 2022-10-24 RX ORDER — EPINEPHRINE 1 MG/ML
0.3 INJECTION, SOLUTION, CONCENTRATE INTRAVENOUS PRN
Status: CANCELLED | OUTPATIENT
Start: 2022-11-14

## 2022-10-24 RX ORDER — SODIUM CHLORIDE 0.9 % (FLUSH) 0.9 %
5-40 SYRINGE (ML) INJECTION PRN
Status: CANCELLED | OUTPATIENT
Start: 2022-11-14

## 2022-10-24 RX ORDER — SODIUM CHLORIDE 9 MG/ML
INJECTION, SOLUTION INTRAVENOUS CONTINUOUS
Status: CANCELLED | OUTPATIENT
Start: 2022-11-14

## 2022-10-24 RX ORDER — EPINEPHRINE 1 MG/ML
0.3 INJECTION, SOLUTION, CONCENTRATE INTRAVENOUS PRN
Status: CANCELLED | OUTPATIENT
Start: 2022-10-24

## 2022-10-24 RX ORDER — MEPERIDINE HYDROCHLORIDE 50 MG/ML
12.5 INJECTION INTRAMUSCULAR; INTRAVENOUS; SUBCUTANEOUS PRN
Status: CANCELLED | OUTPATIENT
Start: 2022-11-14

## 2022-10-24 RX ORDER — DIPHENHYDRAMINE HYDROCHLORIDE 50 MG/ML
50 INJECTION INTRAMUSCULAR; INTRAVENOUS
Status: CANCELLED | OUTPATIENT
Start: 2022-11-14

## 2022-10-24 RX ORDER — SODIUM CHLORIDE 9 MG/ML
INJECTION, SOLUTION INTRAVENOUS CONTINUOUS
Status: CANCELLED | OUTPATIENT
Start: 2022-10-24

## 2022-10-24 RX ORDER — ANASTROZOLE 1 MG/1
1 TABLET ORAL DAILY
Qty: 30 TABLET | Refills: 3 | Status: SHIPPED | OUTPATIENT
Start: 2022-10-24

## 2022-10-24 RX ADMIN — ALTEPLASE 2 MG: 2.2 INJECTION, POWDER, LYOPHILIZED, FOR SOLUTION INTRAVENOUS at 11:15

## 2022-10-24 RX ADMIN — SODIUM CHLORIDE 567 MG: 9 INJECTION, SOLUTION INTRAVENOUS at 12:35

## 2022-10-24 RX ADMIN — SODIUM CHLORIDE, PRESERVATIVE FREE 10 ML: 5 INJECTION INTRAVENOUS at 09:28

## 2022-10-24 RX ADMIN — SODIUM CHLORIDE, PRESERVATIVE FREE 10 ML: 5 INJECTION INTRAVENOUS at 12:28

## 2022-10-24 RX ADMIN — SODIUM CHLORIDE, PRESERVATIVE FREE 10 ML: 5 INJECTION INTRAVENOUS at 13:11

## 2022-10-24 RX ADMIN — SODIUM CHLORIDE 150 ML/HR: 9 INJECTION, SOLUTION INTRAVENOUS at 12:30

## 2022-10-24 ASSESSMENT — PATIENT HEALTH QUESTIONNAIRE - PHQ9
SUM OF ALL RESPONSES TO PHQ QUESTIONS 1-9: 0
SUM OF ALL RESPONSES TO PHQ9 QUESTIONS 1 & 2: 0
2. FEELING DOWN, DEPRESSED OR HOPELESS: 0
SUM OF ALL RESPONSES TO PHQ QUESTIONS 1-9: 0
SUM OF ALL RESPONSES TO PHQ QUESTIONS 1-9: 0
1. LITTLE INTEREST OR PLEASURE IN DOING THINGS: 0
SUM OF ALL RESPONSES TO PHQ QUESTIONS 1-9: 0

## 2022-10-24 NOTE — PROGRESS NOTES
[facility] Hematology and Oncology: Office Visit Established Patient    Chief Complaint:    Chief Complaint   Patient presents with    Follow-up         History of Present Illness:  Ms. Mimi De is a 69 y.o. female who returns today for management of breast cancer. She initially presented for a routine bilateral screening mammogram on 3/23/22 which identified a right breast mass near the 11:00-12:00 position, 6-7 cm from the nipple and other small circumscribed round masses in the right upper inner breast which had not definitely changed from prior examinations. Further evaluation with right breast ultrasound on 3/25/22 confirmed a 6 mm hypoechoic shadowing mass in the 12:00 right breast at 7 cm from the nipple. She presented to Dr. Marcus Cruz on 4/7/22 to discuss whether to proceed with recommended biopsy, he recommended that biopsy be performed. Ultrasound-guided biopsy was subsequently completed on 4/12/22 with pathology revealing high grade infiltrating ductal carcinoma, ER 76%, IN negative, and HER2 positive. MRI of the bilateral breasts was completed on 4/14/22 demonstrating a right anterior 12:00 breast cancer measuring up to 1.2 cm; multiple (greater than 10 total) other right upper breast masses, some of which had enlarged, concerning for additional malignancy; an indeterminate 1.2 cm right axillary lymph node; and no suspicious left breast finding. We recommended upfront surgery because of the MRI findings and the inconclusive size/clinical stage of her cancer. Path reviewed, thankfully it appears that the indeterminate lesions in the breast were benign, with the final tumor dimension being only 1.5 cm, and 4 nodes negative for tumor. Therefore, she is [id number], and would be eligible for the adjuvant paclitaxel and trastuzumab regimen. She returns today for follow-up and next Herceptin monotherapy. She completed 12 weeks of TH, we reduced her Taxol 20% starting with week 8 because of neuropathy.   She is doing OK. Her main complaint is nail changes, her neuropathy seems to be slowly improving. No difficulty with ADLs. No GI symptoms. Energy levels have not recovered to baseline yet. No nausea or bowel symptoms. No fevers or infectious symptoms. Review of Systems:  Constitutional: Positive for fatigue. HENT: Negative. Eyes: Negative. Respiratory: Negative. Cardiovascular: Positive for edema (BLEs). Gastrointestinal: Negative. Genitourinary: Negative. Musculoskeletal: Negative. Skin: Negative. Neurological: Positive for neuropathy. Endo/Heme/Allergies: Negative. Psychiatric/Behavioral: Negative. All other systems reviewed and are negative. Allergies   Allergen Reactions    Sulfamethoxazole-Trimethoprim Nausea And Vomiting    Meloxicam Other (See Comments)    Oxaprozin Swelling     Past Medical History:   Diagnosis Date    Anxiety     Depression 5/19/2017    Essential hypertension 05/19/2017    Fibromyalgia     History of postoperative nausea and vomiting     Hyperlipidemia     Hypothyroid     Impaired fasting glucose     Insomnia     Nausea & vomiting     Need for hepatitis C screening test 12/20/2020    3/15/21 HCV ab negative.       Obesity     Osteoarthritis 12/15/2015    Stress incontinence, female     Vitamin D deficiency      Past Surgical History:   Procedure Laterality Date    BREAST BIOPSY Bilateral     BREAST BIOPSY Right 3/29/2021    RIGHT BREAST NEEDLE LOCALIZED LUMPECTOMY X2 performed by Bernice Benavidez MD at [address] [place] [address]    COLONOSCOPY  2012    normal; internal hemorrhoid    HYSTERECTOMY ([address])  [address] [facility] Right 6/1/2022    RIGHT SENTINEL NODE BIOPSY MAG TRACE performed by Kayla Diaz MD at [facility] Right 6/1/2022    RIGHT BREAST MASTECTOMY performed by Kayla Diaz MD at [facility] Left 6/1/2022    PORT INSERTION performed by Kayla Diaz MD at [facility] TOTAL KNEE ARTHROPLASTY Bilateral     Left 2015, Right 7/29/20    US BREAST BIOPSY NEEDLE ADDITIONAL RIGHT Right 2/25/2021    US BREAST BIOPSY NEEDLE ADDITIONAL RIGHT 2/25/2021 [facility]    US BREAST NEEDLE BIOPSY RIGHT Right 2/25/2021    US BREAST NEEDLE BIOPSY RIGHT 2/25/2021 [facility]    US BREAST NEEDLE BIOPSY RIGHT Right 4/12/2022    US BREAST NEEDLE BIOPSY RIGHT 4/12/2022 [facility]    US GUIDED NEEDLE LOC BREAST ADDL RIGHT Right 3/29/2021    US GUIDED NEEDLE LOC BREAST ADDL RIGHT 3/29/2021 [facility]    US GUIDED NEEDLE LOC OF RIGHT BREAST Right 3/29/2021    US GUIDED NEEDLE LOC OF RIGHT BREAST 3/29/2021 [facility]     Family History   Problem Relation Age of Onset    No Known Problems Mother     Heart Disease Father     Breast Cancer Sister     Stroke Father      Social History     Socioeconomic History    Marital status:      Spouse name: Not on file    Number of children: Not on file    Years of education: Not on file    Highest education level: Not on file   Occupational History    Not on file   Tobacco Use    Smoking status: Never    Smokeless tobacco: Never   Substance and Sexual Activity    Alcohol use: No    Drug use: No    Sexual activity: Not on file   Other Topics Concern    Not on file   Social History Narrative    Not on file     Social Determinants of Health     Financial Resource Strain: Not on file   Food Insecurity: Not on file   Transportation Needs: Not on file   Physical Activity: Not on file   Stress: Not on file   Social Connections: Not on file   Intimate Partner Violence: Not on file   Housing Stability: Not on file     Current Outpatient Medications   Medication Sig Dispense Refill    anastrozole (ARIMIDEX) 1 MG tablet Take 1 tablet by mouth daily 30 tablet 3    temazepam (RESTORIL) 30 MG capsule Take 1 capsule by mouth nightly as needed for Sleep for up to 60 days.  30 capsule 1    levothyroxine (SYNTHROID) 112 MCG tablet Take 1 tablet by mouth Daily TAKE 1 TABLET EVERY DAY BEFORE BREAKFAST FOR LOW THYROID HORMONE LEVELS 90 tablet 1    hydrocortisone (ANUSOL-HC) 2.5 % CREA rectal cream Place rectally 2 times daily Apply to affected area BID 28 g 0    lidocaine-prilocaine (EMLA) 2.5-2.5 % cream Apply topically as needed. 30 g 2    ondansetron (ZOFRAN) 4 MG tablet Take 2 tablets by mouth 3 times daily as needed for Nausea or Vomiting 90 tablet 2    prochlorperazine (COMPAZINE) 10 MG tablet Take 1 tablet by mouth every 6 hours as needed (nausea) 60 tablet 2    Multiple Vitamin (MULTIVITAMIN ADULT PO) Take 1 tablet by mouth daily. Cholecalciferol (VITAMIN D3) 1.25 MG (09806 UT) CAPS Take 1 capsule by mouth daily. Carboxymethylcellul-Glycerin (REFRESH OPTIVE OP) Place 1 drop into both eyes daily. ibuprofen (ADVIL;MOTRIN) 400 MG tablet Take by mouth every 6 hours as needed      losartan (COZAAR) 100 MG tablet TAKE 1 TABLET EVERY DAY FOR HIGH BLOOD PRESSURE       Current Facility-Administered Medications   Medication Dose Route Frequency Provider Last Rate Last Admin    hydrocortisone (ANUSOL-HC) 2.5 % rectal cream   Rectal BID EDISON Rivera - CNP         Facility-Administered Medications Ordered in Other Visits   Medication Dose Route Frequency Provider Last Rate Last Admin    sodium chloride flush 0.9 % injection 10 mL  10 mL IntraVENous PRN Rashid Conroy MD   10 mL at 10/24/22 0928    alteplase (CATHFLO) injection 2 mg  2 mg IntraCATHeter Once Rashid Conroy MD           OBJECTIVE:  BP (!) 159/74 Comment: standing  Pulse 75   Temp 98.6 °F (37 °C) (Oral)   Resp 16   Wt 208 lb (94.3 kg)   SpO2 99%   BMI 36.85 kg/m²     Physical Exam:  Constitutional: Well developed, well nourished female in no acute distress, sitting comfortably in the exam room chair. HEENT: Normocephalic and atraumatic. Oropharynx is clear, mucous membranes are moist.  Sclerae anicteric. Neck supple without JVD. No thyromegaly present. Lymph node   No palpable submandibular, cervical, supraclavicular, axillary lymph nodes. Skin Warm and dry. No bruising noted. Maculopapular rash over both hands more prominent on left. No pallor. Respiratory Lungs are clear to auscultation bilaterally without wheezes, rales or rhonchi, normal air exchange without accessory muscle use. CVS Normal rate, regular rhythm and normal S1 and S2. No murmurs, gallops, or rubs. Neuro Grossly nonfocal with no obvious sensory or motor deficits. MSK Normal range of motion in general.  2+ BLE edema and no tenderness. Psych Appropriate mood and affect. Labs:  No results found for this or any previous visit (from the past 96 hour(s)). Imaging:  MRI of the Breasts with and without contrast       CLINICAL INDICATION:  New diagnosis of right 12:00 breast cancer status post   biopsy demonstrating infiltrating ductal carcinoma high-grade, poorly   differentiated. Evaluate for extent of disease, staging, therapy planning. Patient had right benign papillomas removed in 2021, and a left benign biopsy in   2011 demonstrating a 2:00 fibroadenoma. Family history of her sister having   breast cancer. .       COMPARISON: Ultrasound and mammography 4/12/2022, 3/25/2022, 3/23/2022 and prior   breast MRI 3/3/2021. TECHNIQUE: Standard MRI sequences were obtained through the breasts in multiple   planes. Images were obtained before and after intravenous infusion of 20 mL of   Prohance contrast. Images were reviewed with PACS and with [employer] CAD software. FINDINGS: The breasts demonstrate mild-moderate bilateral glandularity and mild   background enhancement. Right breast: The new 12:00 biopsy clip is within an irregular heterogeneously   enhancing 1.2 x 0.8 x 1.1 cm malignant mass.  As seen on prior exam a large area   of the right upper breast spanning 11:00-12:00 anterior, middle, posterior depth   (overall about 10-12 cm) contains multiple scattered masses of varying sizes and   shapes. There are greater than 10 masses total. Anteriorly at 12:00 10 cm from   nipple an irregular heterogeneously enhancing mass measures up to 1.2 x 1.0 cm   (previously 0.6 x 0.4 cm). Posteriorly near 11:00 14 cm from nipple a lobulated   mass measures up to 1.1 x 0.4 cm (previously 0.8 x 0.3 cm). Left breast: No evidence of suspicious enhancing mass, and no dominant or unique   nonmass enhancement, to suggest additional malignancy. Lymph nodes: Right axilla demonstrates a dysmorphic 1.2 x 0.8 cm node (series 4   image 351) which has developed since the prior exam. No other axillary or   internal mammary lymphadenopathy is evident. Elsewhere: Limited visualization of the partially included thorax and upper   abdomen shows no acute abnormality. Impression       1. Right anterior 12:00 breast cancer measures up to 1.2 cm. 2. Multiple (greater than 10 total) other right upper breast masses, some of   which have enlarged, concerning for additional malignancy. 3. Right axillary indeterminate lymph node. 4. No suspicious left breast finding. Recommend continued malignancy management as directed clinically. BI-RADS Assessment Category 6: Known Biopsy Proven Malignancy             Pathology:  DIAGNOSIS        A:  \"RIGHT BREAST MASTECTOMY\":  INVASIVE DUCTAL CARCINOMA, HIGH GRADE     (3 + 3 + 2), 15 MILLIMETER IN GREATEST DIMENSION, MARGINS UNINVOLVED;   FIBROCYSTIC MASTOPATHY. B :  \"SENTINEL NODE #1\":  BENIGN LYMPH NODE, NEGATIVE FOR TUMOR. C:  \"ADDITIONAL AXILLARY TISSUE\":  FOUR BENIGN LYMPH NODES, ALL   NEGATIVE FOR TUMOR (0/4). * * *DIAGNOSIS* * * * * * * * * * * * * * * * * * * * * * * * * * * * * * *            A:  \" RIGHT BREAST, 12:00 POSITION, 7 CM FROM NIPPLE, CORE BIOPSY\":         INFILTRATING DUCTAL CARCINOMA WITH APOCRINE FEATURES, HIGH GRADE   (POORLY DIFFERENTIATED).  DEFINITE IN SITU COMPONENT AND LYMPHOVASCULAR   INVASION ARE NOT IDENTIFIED             j/2022     * * *Electronically Signed Out* * *         Sign Out Date: 2022  JAH Hills M.D.           * * *PROCEDURES/ADDENDA* * * * * * * * * * * * * * * * * * * * *  * * * *                         STF- ER/[id number] BY IHC                                                                                   Status:  Reviewed By:    Ward Vidales MD on 2022                                 Interpretation                       [employer] IHC Quantitative Breast Panel     TEST NAME:                         RESULTS:               INTERNAL   CONTROLS:     ESTROGEN RECEPTOR:               Positive (76%)               Present,   Positive   PROGESTERONE RECEPTOR:          Negative (0.0%)          Present, Positive   HER-2/KELVIN:                         Positive (3+)               Percentage   of Cells with Uniform        Intense Complete Membrane   Stainin%       ASSESSMENT:   Diagnosis Orders   1. Malignant neoplasm of right breast in female, estrogen receptor positive, unspecified site of breast ([facility] [address]  anastrozole (ARIMIDEX) 1 MG tablet    CBC with Auto Differential    Comprehensive Metabolic Panel    Magnesium                    PLAN:  Lab studies were personally reviewed. Breast cancer: discovered incidentally on mammogram, 6mm on ultrasound, biopsy proven, high grade IDC, ER 76%, WY negative, HER2 positive. MRI shows greater than 10 lesions in the breast with the dominant lesion 1.2 cm, a dysmorphic indeterminate lymph node in the right axilla, and no evidence of contralateral or distant disease. Her MRI imaging made things more complicated - if she was obviously T1N0, then upfront surgery would be appropriate, lumpectomy/sentinel node followed by adjuvant Taxol/Herceptin.   However, she seemed to have multiple lesions that are indeterminate which make it possible that her disease is multifocal, as well as an indeterminate  axillary node. For this reason, we should at least consider neoadjuvant chemotherapy. However, I would strongly advise at least two additional biopsies in that case, ultrasound guided biopsy of the axillary node, and possibly MRI guided biopsy (if not  visualized on ultrasound) of another in-breast lesion to confirm pathology. She was reluctant to consider additional biopsy, so ultimately the decision was made to proceed with mastectomy. Path reviewed, thankfully it appears that the indeterminate lesions in the breast were benign, with the final tumor dimension being only 1.5 cm, and 4 nodes negative for tumor. Therefore, she is [id number], and would be eligible for the adjuvant paclitaxel and trastuzumab regimen. After TH she will complete a year of Herceptin, as well as endocrine therapy with AI. She will not require radiation due to the T1N0 disease and mastectomy. She returns today for follow-up and next Herceptin monotherapy. She completed 12 weeks of TH, we reduced her Taxol 20% starting with week 8 because of neuropathy. She is doing OK. Her main complaint is nail changes, her neuropathy seems to be slowly improving. I expect these to both continue to get better but the nail changes will likely be several more months. No difficulty with ADLs. No GI symptoms. Energy levels have not recovered to baseline yet. No nausea or bowel symptoms. No fevers or infectious symptoms. Labs are pending. OK to proceed with next Herceptin. Next TTE due November 2022. She is also ready to start Arimidex since she has completed cytotoxic therapy and will not require XRT, side effects reviewed. DEXA March 2022 showed mild osteopenia, repeat March 2024. Call with any fevers, uncontrolled side effects from treatment or any other worrisome/concerning symptoms. Follow up 3 weeks for next Herceptin, OV in 6 weeks. All questions were asked and answered to the best of my ability.            Hilario Lang Madhuri Nagel MD, MD  [facility] Hematology and Oncology  [address] [place] [address]  Office : [phone]  Fax : [phone]

## 2022-10-24 NOTE — PROGRESS NOTES
[facility] Herceptin completed. Patient tolerated well. Any issues or concerns during appointment: cathflo given and positive blood return obtained from port. Patient aware of next infusion appointment on 11/14/22 (date) at 80 (time). Patient aware of next lab and [facility] office visit on 12/5/22 (date) at 56 (time). Patient instructed to call provider with temperature of 100.4 or greater or nausea/vomiting/ diarrhea or pain not controlled by medications  Discharged amb.

## 2022-10-24 NOTE — PATIENT INSTRUCTIONS
Patient Instructions from Today's Visit    Reason for Visit:  Follow up-Breast    Diagnosis Information:  [email or web address]      Plan: The fragileness of your nail beds should get better. We will proceed with infusion today as scheduled. Your echo is due in November  Follow Up:  Herceptin only in 3 weeks  NP in 6 weeks  Recent Lab Results:  No visits with results within 3 Day(s) from this visit.    Latest known visit with results is:   Hospital Outpatient Visit on 09/26/2022   Component Date Value Ref Range Status    WBC 09/26/2022 5.2  4.3 - 11.1 K/uL Final    RBC 09/26/2022 3.66 (A)  4.05 - 5.2 M/uL Final    Hemoglobin 09/26/2022 10.9 (A)  11.7 - 15.4 g/dL Final    Hematocrit 09/26/2022 34.3 (A)  35.8 - 46.3 % Final    MCV 09/26/2022 93.7  79.6 - 97.8 FL Final    MCH 09/26/2022 29.8  26.1 - 32.9 PG Final    MCHC 09/26/2022 31.8  31.4 - 35.0 g/dL Final    RDW 09/26/2022 16.9 (A)  11.9 - 14.6 % Final    Platelets 27/57/6582 223  150 - 450 K/uL Final    MPV 09/26/2022 10.3  9.4 - 12.3 FL Final    nRBC 09/26/2022 0.00  0.0 - 0.2 K/uL Final    **Note: Absolute NRBC parameter is now reported with Hemogram**    Differential Type 09/26/2022 AUTOMATED    Final    Seg Neutrophils 09/26/2022 71  43 - 78 % Final    Lymphocytes 09/26/2022 17  13 - 44 % Final    Monocytes 09/26/2022 10  4.0 - 12.0 % Final    Eosinophils % 09/26/2022 1  0.5 - 7.8 % Final    Basophils 09/26/2022 1  0.0 - 2.0 % Final    Immature Granulocytes 09/26/2022 1  0.0 - 5.0 % Final    Segs Absolute 09/26/2022 3.7  1.7 - 8.2 K/UL Final    Absolute Lymph # 09/26/2022 0.9  0.5 - 4.6 K/UL Final    Absolute Mono # 09/26/2022 0.5  0.1 - 1.3 K/UL Final    Absolute Eos # 09/26/2022 0.0  0.0 - 0.8 K/UL Final    Basophils Absolute 09/26/2022 0.0  0.0 - 0.2 K/UL Final    Absolute Immature Granulocyte 09/26/2022 0.1  0.0 - 0.5 K/UL Final    Sodium 09/26/2022 141  136 - 145 mmol/L Final Potassium 09/26/2022 4.2  3.5 - 5.1 mmol/L Final    Chloride 09/26/2022 110  101 - 110 mmol/L Final    CO2 09/26/2022 27  21 - 32 mmol/L Final    Anion Gap 09/26/2022 4  4 - 13 mmol/L Final    Glucose 09/26/2022 100  65 - 100 mg/dL Final    BUN 09/26/2022 10  8 - 23 MG/DL Final    Creatinine 09/26/2022 0.80  0.6 - 1.0 MG/DL Final    GFR  09/26/2022 >60  >60 ml/min/1.73m2 Final    GFR Non- 09/26/2022 >60  >60 ml/min/1.73m2 Final    Comment:      Estimated GFR is calculated using the Modification of Diet in Renal Disease (MDRD) Study equation, reported for both  Americans (GFRAA) and non- Americans (GFRNA), and normalized to 1.73m2 body surface area. The physician must decide which value applies to the patient. The MDRD study equation should only be used in individuals age 25 or older. It has not been validated for the following: pregnant women, patients with serious comorbid conditions,or on certain medications, or persons with extremes of body size, muscle mass, or nutritional status.       Calcium 09/26/2022 8.6  8.3 - 10.4 MG/DL Final    Total Bilirubin 09/26/2022 0.6  0.2 - 1.1 MG/DL Final    ALT 09/26/2022 26  12 - 65 U/L Final    AST 09/26/2022 20  15 - 37 U/L Final    Alk Phosphatase 09/26/2022 91  50 - 136 U/L Final    Total Protein 09/26/2022 6.5  6.3 - 8.2 g/dL Final    Albumin 09/26/2022 3.2  3.2 - 4.6 g/dL Final    Globulin 09/26/2022 3.3  2.3 - 3.5 g/dL Final    Albumin/Globulin Ratio 09/26/2022 1.0 (A)  1.2 - 3.5   Final         Treatment Summary has been discussed and given to patient: n/a        -------------------------------------------------------------------------------------------------------------------  Please call our office at [phone] if you have any  of the following symptoms:   Fever of 100.5 or greater  Chills  Shortness of breath  Swelling or pain in one leg    After office hours an answering service is available and will contact a provider for emergencies or if you are experiencing any of the above symptoms. Patient does express an interest in My Chart. My Chart log in information explained on the after visit summary printout at the [address] desk.     Sofya Huizar MA

## 2022-10-24 NOTE — PROGRESS NOTES
Patient arrived to port lab for port access and lab draw   [address] accessed and no blood return noted  *Port remains accessed. Port flushed and patient escorted to peripheral lab for blood draw.    Patient discharged from port lab ambulatory*

## 2022-10-27 PROBLEM — Z00.00 MEDICARE ANNUAL WELLNESS VISIT, SUBSEQUENT: Status: RESOLVED | Noted: 2018-10-12 | Resolved: 2022-10-27

## 2022-11-14 ENCOUNTER — HOSPITAL ENCOUNTER (OUTPATIENT)
Dept: INFUSION THERAPY | Age: 77
Discharge: HOME | End: 2022-11-14
Payer: MEDICARE

## 2022-11-14 VITALS
SYSTOLIC BLOOD PRESSURE: 155 MMHG | WEIGHT: 204.8 LBS | OXYGEN SATURATION: 99 % | DIASTOLIC BLOOD PRESSURE: 75 MMHG | HEART RATE: 77 BPM | TEMPERATURE: 98 F | RESPIRATION RATE: 18 BRPM | BODY MASS INDEX: 36.28 KG/M2

## 2022-11-14 DIAGNOSIS — C50.911 MALIGNANT NEOPLASM OF RIGHT BREAST IN FEMALE, ESTROGEN RECEPTOR POSITIVE, UNSPECIFIED SITE OF BREAST (HCC): Primary | ICD-10-CM

## 2022-11-14 DIAGNOSIS — Z17.0 MALIGNANT NEOPLASM OF RIGHT BREAST IN FEMALE, ESTROGEN RECEPTOR POSITIVE, UNSPECIFIED SITE OF BREAST (HCC): Primary | ICD-10-CM

## 2022-11-14 PROCEDURE — 6360000002 HC RX W HCPCS: Performed by: INTERNAL MEDICINE

## 2022-11-14 PROCEDURE — 2580000003 HC RX 258: Performed by: INTERNAL MEDICINE

## 2022-11-14 PROCEDURE — 96413 CHEMO IV INFUSION 1 HR: CPT

## 2022-11-14 RX ORDER — SODIUM CHLORIDE 9 MG/ML
5-250 INJECTION, SOLUTION INTRAVENOUS PRN
Status: DISCONTINUED | OUTPATIENT
Start: 2022-11-14 | End: 2022-11-15 | Stop reason: HOSPADM

## 2022-11-14 RX ORDER — HEPARIN SODIUM (PORCINE) LOCK FLUSH IV SOLN 100 UNIT/ML 100 UNIT/ML
500 SOLUTION INTRAVENOUS PRN
Status: DISCONTINUED | OUTPATIENT
Start: 2022-11-14 | End: 2022-11-15 | Stop reason: HOSPADM

## 2022-11-14 RX ORDER — SODIUM CHLORIDE 0.9 % (FLUSH) 0.9 %
5-40 SYRINGE (ML) INJECTION PRN
Status: DISCONTINUED | OUTPATIENT
Start: 2022-11-14 | End: 2022-11-15 | Stop reason: HOSPADM

## 2022-11-14 RX ADMIN — SODIUM CHLORIDE, PRESERVATIVE FREE 10 ML: 5 INJECTION INTRAVENOUS at 11:00

## 2022-11-14 RX ADMIN — SODIUM CHLORIDE 567 MG: 9 INJECTION, SOLUTION INTRAVENOUS at 11:49

## 2022-11-14 RX ADMIN — SODIUM CHLORIDE 100 ML/HR: 9 INJECTION, SOLUTION INTRAVENOUS at 11:00

## 2022-11-14 RX ADMIN — HEPARIN 500 UNITS: 100 SYRINGE at 12:35

## 2022-11-14 RX ADMIN — SODIUM CHLORIDE, PRESERVATIVE FREE 10 ML: 5 INJECTION INTRAVENOUS at 12:35

## 2022-11-14 NOTE — PROGRESS NOTES
Arrived to the [facility]. Herceptin completed. Patient tolerated well. Any issues or concerns during appointment: cathflo given and positive blood return obtained from port. Patient aware of next infusion appointment on 12/05/22 (date) at [address] (time). Patient aware of next lab and [facility] office visit on 12/5/22 (date) at 56 (time). Patient instructed to call provider with temperature of 100.4 or greater or nausea/vomiting/ diarrhea or pain not controlled by medications  Discharged amb.

## 2022-12-05 ENCOUNTER — HOSPITAL ENCOUNTER (OUTPATIENT)
Dept: INFUSION THERAPY | Age: 77
Discharge: HOME | End: 2022-12-05
Payer: MEDICARE

## 2022-12-05 ENCOUNTER — OFFICE VISIT (OUTPATIENT)
Dept: ONCOLOGY | Age: 77
End: 2022-12-05
Payer: MEDICARE

## 2022-12-05 VITALS
RESPIRATION RATE: 14 BRPM | WEIGHT: 203 LBS | BODY MASS INDEX: 35.97 KG/M2 | HEIGHT: 63 IN | DIASTOLIC BLOOD PRESSURE: 79 MMHG | OXYGEN SATURATION: 100 % | TEMPERATURE: 97.9 F | HEART RATE: 73 BPM | SYSTOLIC BLOOD PRESSURE: 133 MMHG

## 2022-12-05 DIAGNOSIS — T45.1X5A HOT FLASHES RELATED TO AROMATASE INHIBITOR THERAPY: ICD-10-CM

## 2022-12-05 DIAGNOSIS — R11.0 NAUSEA: ICD-10-CM

## 2022-12-05 DIAGNOSIS — Z17.0 MALIGNANT NEOPLASM OF RIGHT BREAST IN FEMALE, ESTROGEN RECEPTOR POSITIVE, UNSPECIFIED SITE OF BREAST (HCC): Primary | ICD-10-CM

## 2022-12-05 DIAGNOSIS — R53.83 FATIGUE DUE TO TREATMENT: ICD-10-CM

## 2022-12-05 DIAGNOSIS — R32 URINARY INCONTINENCE, UNSPECIFIED TYPE: ICD-10-CM

## 2022-12-05 DIAGNOSIS — C50.911 MALIGNANT NEOPLASM OF RIGHT BREAST IN FEMALE, ESTROGEN RECEPTOR POSITIVE, UNSPECIFIED SITE OF BREAST (HCC): ICD-10-CM

## 2022-12-05 DIAGNOSIS — G62.0 CHEMOTHERAPY-INDUCED NEUROPATHY (HCC): ICD-10-CM

## 2022-12-05 DIAGNOSIS — G47.00 INSOMNIA, UNSPECIFIED TYPE: ICD-10-CM

## 2022-12-05 DIAGNOSIS — Z17.0 MALIGNANT NEOPLASM OF RIGHT BREAST IN FEMALE, ESTROGEN RECEPTOR POSITIVE, UNSPECIFIED SITE OF BREAST (HCC): ICD-10-CM

## 2022-12-05 DIAGNOSIS — R23.2 HOT FLASHES RELATED TO AROMATASE INHIBITOR THERAPY: ICD-10-CM

## 2022-12-05 DIAGNOSIS — C50.911 MALIGNANT NEOPLASM OF RIGHT BREAST IN FEMALE, ESTROGEN RECEPTOR POSITIVE, UNSPECIFIED SITE OF BREAST (HCC): Primary | ICD-10-CM

## 2022-12-05 DIAGNOSIS — T45.1X5A CHEMOTHERAPY-INDUCED NEUROPATHY (HCC): ICD-10-CM

## 2022-12-05 LAB
ALBUMIN SERPL-MCNC: 3.5 G/DL (ref 3.2–4.6)
ALBUMIN/GLOB SERPL: 1 {RATIO} (ref 0.4–1.6)
ALP SERPL-CCNC: 100 U/L (ref 50–136)
ALT SERPL-CCNC: 24 U/L (ref 12–65)
ANION GAP SERPL CALC-SCNC: 4 MMOL/L (ref 2–11)
AST SERPL-CCNC: 24 U/L (ref 15–37)
BASOPHILS # BLD: 0.1 K/UL (ref 0–0.2)
BASOPHILS NFR BLD: 1 % (ref 0–2)
BILIRUB SERPL-MCNC: 0.6 MG/DL (ref 0.2–1.1)
BUN SERPL-MCNC: 15 MG/DL (ref 8–23)
CALCIUM SERPL-MCNC: 9.2 MG/DL (ref 8.3–10.4)
CHLORIDE SERPL-SCNC: 107 MMOL/L (ref 101–110)
CO2 SERPL-SCNC: 29 MMOL/L (ref 21–32)
CREAT SERPL-MCNC: 0.9 MG/DL (ref 0.6–1)
DIFFERENTIAL METHOD BLD: NORMAL
EOSINOPHIL # BLD: 0.2 K/UL (ref 0–0.8)
EOSINOPHIL NFR BLD: 4 % (ref 0.5–7.8)
ERYTHROCYTE [DISTWIDTH] IN BLOOD BY AUTOMATED COUNT: 13 % (ref 11.9–14.6)
GLOBULIN SER CALC-MCNC: 3.6 G/DL (ref 2.8–4.5)
GLUCOSE SERPL-MCNC: 96 MG/DL (ref 65–100)
HCT VFR BLD AUTO: 38.1 % (ref 35.8–46.3)
HGB BLD-MCNC: 12.3 G/DL (ref 11.7–15.4)
IMM GRANULOCYTES # BLD AUTO: 0 K/UL (ref 0–0.5)
IMM GRANULOCYTES NFR BLD AUTO: 0 % (ref 0–5)
LYMPHOCYTES # BLD: 1 K/UL (ref 0.5–4.6)
LYMPHOCYTES NFR BLD: 19 % (ref 13–44)
MAGNESIUM SERPL-MCNC: 2 MG/DL (ref 1.8–2.4)
MCH RBC QN AUTO: 29.6 PG (ref 26.1–32.9)
MCHC RBC AUTO-ENTMCNC: 32.3 G/DL (ref 31.4–35)
MCV RBC AUTO: 91.6 FL (ref 82–102)
MONOCYTES # BLD: 0.5 K/UL (ref 0.1–1.3)
MONOCYTES NFR BLD: 10 % (ref 4–12)
NEUTS SEG # BLD: 3.4 K/UL (ref 1.7–8.2)
NEUTS SEG NFR BLD: 66 % (ref 43–78)
NRBC # BLD: 0 K/UL (ref 0–0.2)
PLATELET # BLD AUTO: 155 K/UL (ref 150–450)
PMV BLD AUTO: 11.2 FL (ref 9.4–12.3)
POTASSIUM SERPL-SCNC: 4 MMOL/L (ref 3.5–5.1)
PROT SERPL-MCNC: 7.1 G/DL (ref 6.3–8.2)
RBC # BLD AUTO: 4.16 M/UL (ref 4.05–5.2)
SODIUM SERPL-SCNC: 140 MMOL/L (ref 133–143)
WBC # BLD AUTO: 5.2 K/UL (ref 4.3–11.1)

## 2022-12-05 PROCEDURE — 1090F PRES/ABSN URINE INCON ASSESS: CPT | Performed by: NURSE PRACTITIONER

## 2022-12-05 PROCEDURE — 2580000003 HC RX 258: Performed by: INTERNAL MEDICINE

## 2022-12-05 PROCEDURE — 6360000002 HC RX W HCPCS: Performed by: NURSE PRACTITIONER

## 2022-12-05 PROCEDURE — 1123F ACP DISCUSS/DSCN MKR DOCD: CPT | Performed by: NURSE PRACTITIONER

## 2022-12-05 PROCEDURE — 99214 OFFICE O/P EST MOD 30 MIN: CPT | Performed by: NURSE PRACTITIONER

## 2022-12-05 PROCEDURE — G8399 PT W/DXA RESULTS DOCUMENT: HCPCS | Performed by: NURSE PRACTITIONER

## 2022-12-05 PROCEDURE — 80053 COMPREHEN METABOLIC PANEL: CPT

## 2022-12-05 PROCEDURE — G8417 CALC BMI ABV UP PARAM F/U: HCPCS | Performed by: NURSE PRACTITIONER

## 2022-12-05 PROCEDURE — 3074F SYST BP LT 130 MM HG: CPT | Performed by: NURSE PRACTITIONER

## 2022-12-05 PROCEDURE — 36415 COLL VENOUS BLD VENIPUNCTURE: CPT

## 2022-12-05 PROCEDURE — 2580000003 HC RX 258: Performed by: NURSE PRACTITIONER

## 2022-12-05 PROCEDURE — 1036F TOBACCO NON-USER: CPT | Performed by: NURSE PRACTITIONER

## 2022-12-05 PROCEDURE — 96413 CHEMO IV INFUSION 1 HR: CPT

## 2022-12-05 PROCEDURE — 85025 COMPLETE CBC W/AUTO DIFF WBC: CPT

## 2022-12-05 PROCEDURE — 83735 ASSAY OF MAGNESIUM: CPT

## 2022-12-05 PROCEDURE — G8427 DOCREV CUR MEDS BY ELIG CLIN: HCPCS | Performed by: NURSE PRACTITIONER

## 2022-12-05 PROCEDURE — G8484 FLU IMMUNIZE NO ADMIN: HCPCS | Performed by: NURSE PRACTITIONER

## 2022-12-05 PROCEDURE — 0509F URINE INCON PLAN DOCD: CPT | Performed by: NURSE PRACTITIONER

## 2022-12-05 PROCEDURE — 96523 IRRIG DRUG DELIVERY DEVICE: CPT

## 2022-12-05 PROCEDURE — 3078F DIAST BP <80 MM HG: CPT | Performed by: NURSE PRACTITIONER

## 2022-12-05 RX ORDER — ACETAMINOPHEN 325 MG/1
650 TABLET ORAL
Status: DISCONTINUED | OUTPATIENT
Start: 2022-12-05 | End: 2022-12-06 | Stop reason: HOSPADM

## 2022-12-05 RX ORDER — ONDANSETRON 2 MG/ML
8 INJECTION INTRAMUSCULAR; INTRAVENOUS
Status: DISCONTINUED | OUTPATIENT
Start: 2022-12-05 | End: 2022-12-06 | Stop reason: HOSPADM

## 2022-12-05 RX ORDER — ALBUTEROL SULFATE 90 UG/1
4 AEROSOL, METERED RESPIRATORY (INHALATION) PRN
Status: CANCELLED | OUTPATIENT
Start: 2022-12-05

## 2022-12-05 RX ORDER — MEPERIDINE HYDROCHLORIDE 50 MG/ML
12.5 INJECTION INTRAMUSCULAR; INTRAVENOUS; SUBCUTANEOUS PRN
Status: CANCELLED | OUTPATIENT
Start: 2022-12-05

## 2022-12-05 RX ORDER — SODIUM CHLORIDE 9 MG/ML
5-40 INJECTION INTRAVENOUS PRN
Status: CANCELLED | OUTPATIENT
Start: 2022-12-05

## 2022-12-05 RX ORDER — HEPARIN SODIUM (PORCINE) LOCK FLUSH IV SOLN 100 UNIT/ML 100 UNIT/ML
500 SOLUTION INTRAVENOUS PRN
Status: CANCELLED | OUTPATIENT
Start: 2022-12-05

## 2022-12-05 RX ORDER — SODIUM CHLORIDE 9 MG/ML
5-250 INJECTION, SOLUTION INTRAVENOUS PRN
Status: CANCELLED | OUTPATIENT
Start: 2022-12-05

## 2022-12-05 RX ORDER — SODIUM CHLORIDE 0.9 % (FLUSH) 0.9 %
5-40 SYRINGE (ML) INJECTION PRN
Status: DISCONTINUED | OUTPATIENT
Start: 2022-12-05 | End: 2022-12-06 | Stop reason: HOSPADM

## 2022-12-05 RX ORDER — FAMOTIDINE 10 MG/ML
20 INJECTION, SOLUTION INTRAVENOUS
Status: CANCELLED | OUTPATIENT
Start: 2022-12-05

## 2022-12-05 RX ORDER — MEPERIDINE HYDROCHLORIDE 25 MG/ML
12.5 INJECTION INTRAMUSCULAR; INTRAVENOUS; SUBCUTANEOUS PRN
Status: DISCONTINUED | OUTPATIENT
Start: 2022-12-05 | End: 2022-12-06 | Stop reason: HOSPADM

## 2022-12-05 RX ORDER — DIPHENHYDRAMINE HYDROCHLORIDE 50 MG/ML
50 INJECTION INTRAMUSCULAR; INTRAVENOUS
OUTPATIENT
Start: 2022-12-26

## 2022-12-05 RX ORDER — HEPARIN SODIUM (PORCINE) LOCK FLUSH IV SOLN 100 UNIT/ML 100 UNIT/ML
500 SOLUTION INTRAVENOUS PRN
Status: DISCONTINUED | OUTPATIENT
Start: 2022-12-05 | End: 2022-12-06 | Stop reason: HOSPADM

## 2022-12-05 RX ORDER — ONDANSETRON 2 MG/ML
8 INJECTION INTRAMUSCULAR; INTRAVENOUS
OUTPATIENT
Start: 2022-12-26

## 2022-12-05 RX ORDER — DIPHENHYDRAMINE HYDROCHLORIDE 50 MG/ML
50 INJECTION INTRAMUSCULAR; INTRAVENOUS
Status: CANCELLED | OUTPATIENT
Start: 2022-12-05

## 2022-12-05 RX ORDER — EPINEPHRINE 1 MG/ML
0.3 INJECTION, SOLUTION, CONCENTRATE INTRAVENOUS PRN
Status: DISCONTINUED | OUTPATIENT
Start: 2022-12-05 | End: 2022-12-06 | Stop reason: HOSPADM

## 2022-12-05 RX ORDER — MEPERIDINE HYDROCHLORIDE 50 MG/ML
12.5 INJECTION INTRAMUSCULAR; INTRAVENOUS; SUBCUTANEOUS PRN
OUTPATIENT
Start: 2022-12-26

## 2022-12-05 RX ORDER — FAMOTIDINE 10 MG/ML
20 INJECTION, SOLUTION INTRAVENOUS
OUTPATIENT
Start: 2022-12-26

## 2022-12-05 RX ORDER — SODIUM CHLORIDE 0.9 % (FLUSH) 0.9 %
5-40 SYRINGE (ML) INJECTION PRN
Status: CANCELLED | OUTPATIENT
Start: 2022-12-05

## 2022-12-05 RX ORDER — ACETAMINOPHEN 325 MG/1
650 TABLET ORAL
OUTPATIENT
Start: 2022-12-26

## 2022-12-05 RX ORDER — ANASTROZOLE 1 MG/1
1 TABLET ORAL DAILY
Qty: 90 TABLET | Refills: 3 | Status: SHIPPED | OUTPATIENT
Start: 2022-12-05

## 2022-12-05 RX ORDER — SODIUM CHLORIDE 0.9 % (FLUSH) 0.9 %
10 SYRINGE (ML) INJECTION PRN
Status: DISCONTINUED | OUTPATIENT
Start: 2022-12-05 | End: 2022-12-06 | Stop reason: HOSPADM

## 2022-12-05 RX ORDER — ACETAMINOPHEN 325 MG/1
650 TABLET ORAL
Status: CANCELLED | OUTPATIENT
Start: 2022-12-05

## 2022-12-05 RX ORDER — SODIUM CHLORIDE 0.9 % (FLUSH) 0.9 %
5-40 SYRINGE (ML) INJECTION PRN
OUTPATIENT
Start: 2022-12-26

## 2022-12-05 RX ORDER — TEMAZEPAM 30 MG/1
30 CAPSULE ORAL NIGHTLY PRN
Qty: 30 CAPSULE | Refills: 2 | Status: SHIPPED | OUTPATIENT
Start: 2022-12-05 | End: 2023-02-03

## 2022-12-05 RX ORDER — SODIUM CHLORIDE 9 MG/ML
5-250 INJECTION, SOLUTION INTRAVENOUS PRN
OUTPATIENT
Start: 2022-12-26

## 2022-12-05 RX ORDER — SODIUM CHLORIDE 9 MG/ML
5-250 INJECTION, SOLUTION INTRAVENOUS PRN
Status: DISCONTINUED | OUTPATIENT
Start: 2022-12-05 | End: 2022-12-06 | Stop reason: HOSPADM

## 2022-12-05 RX ORDER — ONDANSETRON 2 MG/ML
8 INJECTION INTRAMUSCULAR; INTRAVENOUS
Status: CANCELLED | OUTPATIENT
Start: 2022-12-05

## 2022-12-05 RX ORDER — SODIUM CHLORIDE 9 MG/ML
INJECTION, SOLUTION INTRAVENOUS CONTINUOUS
OUTPATIENT
Start: 2022-12-26

## 2022-12-05 RX ORDER — DIPHENHYDRAMINE HYDROCHLORIDE 50 MG/ML
50 INJECTION INTRAMUSCULAR; INTRAVENOUS
Status: DISCONTINUED | OUTPATIENT
Start: 2022-12-05 | End: 2022-12-06 | Stop reason: HOSPADM

## 2022-12-05 RX ORDER — HEPARIN SODIUM (PORCINE) LOCK FLUSH IV SOLN 100 UNIT/ML 100 UNIT/ML
500 SOLUTION INTRAVENOUS PRN
OUTPATIENT
Start: 2022-12-26

## 2022-12-05 RX ORDER — SODIUM CHLORIDE 9 MG/ML
5-40 INJECTION INTRAVENOUS PRN
OUTPATIENT
Start: 2022-12-26

## 2022-12-05 RX ORDER — EPINEPHRINE 1 MG/ML
0.3 INJECTION, SOLUTION, CONCENTRATE INTRAVENOUS PRN
Status: CANCELLED | OUTPATIENT
Start: 2022-12-05

## 2022-12-05 RX ORDER — SODIUM CHLORIDE 9 MG/ML
INJECTION, SOLUTION INTRAVENOUS CONTINUOUS
Status: CANCELLED | OUTPATIENT
Start: 2022-12-05

## 2022-12-05 RX ORDER — ALBUTEROL SULFATE 90 UG/1
4 AEROSOL, METERED RESPIRATORY (INHALATION) PRN
OUTPATIENT
Start: 2022-12-26

## 2022-12-05 RX ORDER — ALBUTEROL SULFATE 90 UG/1
4 AEROSOL, METERED RESPIRATORY (INHALATION) PRN
Status: DISCONTINUED | OUTPATIENT
Start: 2022-12-05 | End: 2022-12-06 | Stop reason: HOSPADM

## 2022-12-05 RX ORDER — EPINEPHRINE 1 MG/ML
0.3 INJECTION, SOLUTION, CONCENTRATE INTRAVENOUS PRN
OUTPATIENT
Start: 2022-12-26

## 2022-12-05 RX ADMIN — SODIUM CHLORIDE, PRESERVATIVE FREE 10 ML: 5 INJECTION INTRAVENOUS at 13:00

## 2022-12-05 RX ADMIN — SODIUM CHLORIDE, PRESERVATIVE FREE 10 ML: 5 INJECTION INTRAVENOUS at 10:15

## 2022-12-05 RX ADMIN — SODIUM CHLORIDE 567 MG: 9 INJECTION, SOLUTION INTRAVENOUS at 12:27

## 2022-12-05 RX ADMIN — HEPARIN 500 UNITS: 100 SYRINGE at 13:01

## 2022-12-05 RX ADMIN — SODIUM CHLORIDE 25 ML/HR: 9 INJECTION, SOLUTION INTRAVENOUS at 11:50

## 2022-12-05 RX ADMIN — SODIUM CHLORIDE, PRESERVATIVE FREE 10 ML: 5 INJECTION INTRAVENOUS at 11:50

## 2022-12-05 ASSESSMENT — PATIENT HEALTH QUESTIONNAIRE - PHQ9
2. FEELING DOWN, DEPRESSED OR HOPELESS: 0
SUM OF ALL RESPONSES TO PHQ QUESTIONS 1-9: 0

## 2022-12-05 NOTE — PROGRESS NOTES
Patient arrived to port lab for port access and lab draw   Im [facility] accessed, unable to obtain blood return for labs. Pt to have labs drawn peripherally. Port remains accessed.   Patient discharged from port lab ambulatory

## 2022-12-05 NOTE — PROGRESS NOTES
[facility] Hematology and Oncology: Office Visit Established Patient    Chief Complaint:    Chief Complaint   Patient presents with    Follow-up         History of Present Illness:  Ms. Jimmy Hernandez is a 69 y.o. female who returns today for management of breast cancer. She initially presented for a routine bilateral screening mammogram on 3/23/22 which identified a right breast mass near the 11:00-12:00 position, 6-7 cm from the nipple and other small circumscribed round masses in the right upper inner breast which had not definitely changed from prior examinations. Further evaluation with right breast ultrasound on 3/25/22 confirmed a 6 mm hypoechoic shadowing mass in the 12:00 right breast at 7 cm from the nipple. She presented to Dr. Soumya Hagan on 4/7/22 to discuss whether to proceed with recommended biopsy, he recommended that biopsy be performed. Ultrasound-guided biopsy was subsequently completed on 4/12/22 with pathology revealing high grade infiltrating ductal carcinoma, ER 76%, GA negative, and HER2 positive. MRI of the bilateral breasts was completed on 4/14/22 demonstrating a right anterior 12:00 breast cancer measuring up to 1.2 cm; multiple (greater than 10 total) other right upper breast masses, some of which had enlarged, concerning for additional malignancy; an indeterminate 1.2 cm right axillary lymph node; and no suspicious left breast finding. We recommended upfront surgery because of the MRI findings and the inconclusive size/clinical stage of her cancer. Path reviewed, thankfully it appears that the indeterminate lesions in the breast were benign, with the final tumor dimension being only 1.5 cm, and 4 nodes negative for tumor. Therefore, she is [id number], and would be eligible for the adjuvant paclitaxel and trastuzumab regimen. She is here today for follow up on Arimidex that she started ~1 month ago and her next cycle Herceptin.   Overall, she continues to have fatigue and general weakness that has been present all along and not necessarily since starting AI. Intermittent nausea is unchanged, no bowel complaints. Appetite is good and weight is stable. There is no shortness of breath or edema. Neuropathy in fingers and toes is slightly better. She is now experiencing hot flashes and night sweats that are tolerable. Insomnia is managed with restoril 30 mg at HS. There has been an increase her her urinary incontinence that is affecting her quality of life as she is fearful of having an accident while outside the house. Review of Systems:  Constitutional: Positive for fatigue. HENT: Negative. Eyes: Negative. Respiratory: Negative. Cardiovascular: Negative. Gastrointestinal: Negative. Genitourinary: Positive for urinary incontinence. Musculoskeletal: Negative. Skin: Negative. Neurological: Positive for neuropathy. Endo/Heme/Allergies: Negative. Psychiatric/Behavioral: Negative. All other systems reviewed and are negative. Allergies   Allergen Reactions    Sulfamethoxazole-Trimethoprim Nausea And Vomiting    Meloxicam Other (See Comments)    Oxaprozin Swelling     Past Medical History:   Diagnosis Date    Anxiety     Depression 5/19/2017    Essential hypertension 05/19/2017    Fibromyalgia     History of postoperative nausea and vomiting     Hyperlipidemia     Hypothyroid     Impaired fasting glucose     Insomnia     Nausea & vomiting     Need for hepatitis C screening test 12/20/2020    3/15/21 HCV ab negative.       Obesity     Osteoarthritis 12/15/2015    Stress incontinence, female     Vitamin D deficiency      Past Surgical History:   Procedure Laterality Date    BREAST BIOPSY Bilateral     BREAST BIOPSY Right 3/29/2021    RIGHT BREAST NEEDLE LOCALIZED LUMPECTOMY X2 performed by Sai Scott MD at [address] [facility], [place], operated by [facility]    COLONOSCOPY  2012    normal; internal hemorrhoid    HYSTERECTOMY (CERVIX STATUS UNKNOWN)  [id number] [facility] 6/1/2022    RIGHT SENTINEL NODE BIOPSY MAG TRACE performed by Jean-Claude Noble MD at [facility] Right 6/1/2022    RIGHT BREAST MASTECTOMY performed by Jean-Claude Noble MD at [facility] Left 6/1/2022    PORT INSERTION performed by Jean-Claude Noble MD at [address] [place] [address] Bilateral     Left 2015, Right 7/29/20    US BREAST BIOPSY NEEDLE ADDITIONAL RIGHT Right 2/25/2021    US BREAST BIOPSY NEEDLE ADDITIONAL RIGHT 2/25/2021 [facility]    US BREAST NEEDLE BIOPSY RIGHT Right 2/25/2021    US BREAST NEEDLE BIOPSY RIGHT 2/25/2021 [facility]    US BREAST NEEDLE BIOPSY RIGHT Right 4/12/2022    US BREAST NEEDLE BIOPSY RIGHT 4/12/2022 [facility]    US GUIDED NEEDLE LOC BREAST ADDL RIGHT Right 3/29/2021    US GUIDED NEEDLE LOC BREAST ADDL RIGHT 3/29/2021 [facility]    US GUIDED NEEDLE LOC OF RIGHT BREAST Right 3/29/2021    US GUIDED NEEDLE LOC OF RIGHT BREAST 3/29/2021 [facility]     Family History   Problem Relation Age of Onset    No Known Problems Mother     Heart Disease Father     Breast Cancer Sister     Stroke Father      Social History     Socioeconomic History    Marital status:       Spouse name: Not on file    Number of children: Not on file    Years of education: Not on file    Highest education level: Not on file   Occupational History    Not on file   Tobacco Use    Smoking status: Never    Smokeless tobacco: Never   Substance and Sexual Activity    Alcohol use: No    Drug use: No    Sexual activity: Not on file   Other Topics Concern    Not on file   Social History Narrative    Not on file     Social Determinants of Health     Financial Resource Strain: Not on file   Food Insecurity: Not on file   Transportation Needs: Not on file   Physical Activity: Not on file   Stress: Not on file   Social Connections: Not on file   Intimate Partner Violence: Not on file   Housing Stability: Not on file     Current Outpatient Medications   Medication Sig Dispense Refill    anastrozole (ARIMIDEX) 1 MG tablet Take 1 tablet by mouth daily 30 tablet 3    levothyroxine (SYNTHROID) 112 MCG tablet Take 1 tablet by mouth Daily TAKE 1 TABLET EVERY DAY BEFORE BREAKFAST FOR LOW THYROID HORMONE LEVELS 90 tablet 1    hydrocortisone (ANUSOL-HC) 2.5 % CREA rectal cream Place rectally 2 times daily Apply to affected area BID 28 g 0    lidocaine-prilocaine (EMLA) 2.5-2.5 % cream Apply topically as needed. 30 g 2    ondansetron (ZOFRAN) 4 MG tablet Take 2 tablets by mouth 3 times daily as needed for Nausea or Vomiting 90 tablet 2    prochlorperazine (COMPAZINE) 10 MG tablet Take 1 tablet by mouth every 6 hours as needed (nausea) 60 tablet 2    Multiple Vitamin (MULTIVITAMIN ADULT PO) Take 1 tablet by mouth daily. Carboxymethylcellul-Glycerin (REFRESH OPTIVE OP) Place 1 drop into both eyes daily. ibuprofen (ADVIL;MOTRIN) 400 MG tablet Take by mouth every 6 hours as needed      losartan (COZAAR) 100 MG tablet TAKE 1 TABLET EVERY DAY FOR HIGH BLOOD PRESSURE      Cholecalciferol (VITAMIN D3) 1.25 MG (62934 UT) CAPS Take 1 capsule by mouth daily.        Current Facility-Administered Medications   Medication Dose Route Frequency Provider Last Rate Last Admin    hydrocortisone (ANUSOL-HC) 2.5 % rectal cream   Rectal BID EDISON Méndez CNP         Facility-Administered Medications Ordered in Other Visits   Medication Dose Route Frequency Provider Last Rate Last Admin    sodium chloride flush 0.9 % injection 10 mL  10 mL IntraVENous PRN Brett Swift MD   10 mL at 12/05/22 1015    0.9 % sodium chloride infusion  5-250 mL/hr IntraVENous PRN EDISON Velasquez CNP 25 mL/hr at 12/05/22 1150 25 mL/hr at 12/05/22 1150    trastuzumab-qyyp (TRAZIMERA) 567 mg in sodium chloride 0.9 % 250 mL chemo IVPB  6 mg/kg (Treatment Plan Recorded) IntraVENous Once EDISON Velasquez CNP        sodium chloride flush 0.9 % injection 5-40 mL murmurs, gallops, or rubs. Neuro Grossly nonfocal with no obvious sensory or motor deficits. MSK Normal range of motion in general.  No edema and no tenderness. Psych Appropriate mood and affect.           Labs:  Recent Results (from the past 96 hour(s))   CBC with Auto Differential    Collection Time: 12/05/22 10:30 AM   Result Value Ref Range    WBC 5.2 4.3 - 11.1 K/uL    RBC 4.16 4.05 - 5.2 M/uL    Hemoglobin 12.3 11.7 - 15.4 g/dL    Hematocrit 38.1 35.8 - 46.3 %    MCV 91.6 82.0 - 102.0 FL    MCH 29.6 26.1 - 32.9 PG    MCHC 32.3 31.4 - 35.0 g/dL    RDW 13.0 11.9 - 14.6 %    Platelets 538 023 - 233 K/uL    MPV 11.2 9.4 - 12.3 FL    nRBC 0.00 0.0 - 0.2 K/uL    Seg Neutrophils 66 43 - 78 %    Lymphocytes 19 13 - 44 %    Monocytes 10 4.0 - 12.0 %    Eosinophils % 4 0.5 - 7.8 %    Basophils 1 0.0 - 2.0 %    Immature Granulocytes 0 0.0 - 5.0 %    Segs Absolute 3.4 1.7 - 8.2 K/UL    Absolute Lymph # 1.0 0.5 - 4.6 K/UL    Absolute Mono # 0.5 0.1 - 1.3 K/UL    Absolute Eos # 0.2 0.0 - 0.8 K/UL    Basophils Absolute 0.1 0.0 - 0.2 K/UL    Absolute Immature Granulocyte 0.0 0.0 - 0.5 K/UL    Differential Type AUTOMATED     Comprehensive Metabolic Panel    Collection Time: 12/05/22 10:30 AM   Result Value Ref Range    Sodium 140 133 - 143 mmol/L    Potassium 4.0 3.5 - 5.1 mmol/L    Chloride 107 101 - 110 mmol/L    CO2 29 21 - 32 mmol/L    Anion Gap 4 2 - 11 mmol/L    Glucose 96 65 - 100 mg/dL    BUN 15 8 - 23 MG/DL    Creatinine 0.90 0.6 - 1.0 MG/DL    Est, Glom Filt Rate >60 >60 ml/min/1.73m2    Calcium 9.2 8.3 - 10.4 MG/DL    Total Bilirubin 0.6 0.2 - 1.1 MG/DL    ALT 24 12 - 65 U/L    AST 24 15 - 37 U/L    Alk Phosphatase 100 50 - 136 U/L    Total Protein 7.1 6.3 - 8.2 g/dL    Albumin 3.5 3.2 - 4.6 g/dL    Globulin 3.6 2.8 - 4.5 g/dL    Albumin/Globulin Ratio 1.0 0.4 - 1.6     Magnesium    Collection Time: 12/05/22 10:30 AM   Result Value Ref Range    Magnesium 2.0 1.8 - 2.4 mg/dL           Imaging:  MRI of the Breasts with and without contrast       CLINICAL INDICATION:  New diagnosis of right 12:00 breast cancer status post   biopsy demonstrating infiltrating ductal carcinoma high-grade, poorly   differentiated. Evaluate for extent of disease, staging, therapy planning. Patient had right benign papillomas removed in 2021, and a left benign biopsy in   2011 demonstrating a 2:00 fibroadenoma. Family history of her sister having   breast cancer. .       COMPARISON: Ultrasound and mammography 4/12/2022, 3/25/2022, 3/23/2022 and prior   breast MRI 3/3/2021. TECHNIQUE: Standard MRI sequences were obtained through the breasts in multiple   planes. Images were obtained before and after intravenous infusion of 20 mL of   Prohance contrast. Images were reviewed with PACS and with [employer] CAD software. FINDINGS: The breasts demonstrate mild-moderate bilateral glandularity and mild   background enhancement. Right breast: The new 12:00 biopsy clip is within an irregular heterogeneously   enhancing 1.2 x 0.8 x 1.1 cm malignant mass. As seen on prior exam a large area   of the right upper breast spanning 11:00-12:00 anterior, middle, posterior depth   (overall about 10-12 cm) contains multiple scattered masses of varying sizes and   shapes. There are greater than 10 masses total. Anteriorly at 12:00 10 cm from   nipple an irregular heterogeneously enhancing mass measures up to 1.2 x 1.0 cm   (previously 0.6 x 0.4 cm). Posteriorly near 11:00 14 cm from nipple a lobulated   mass measures up to 1.1 x 0.4 cm (previously 0.8 x 0.3 cm). Left breast: No evidence of suspicious enhancing mass, and no dominant or unique   nonmass enhancement, to suggest additional malignancy. Lymph nodes: Right axilla demonstrates a dysmorphic 1.2 x 0.8 cm node (series 4   image 351) which has developed since the prior exam. No other axillary or   internal mammary lymphadenopathy is evident.        Elsewhere: Limited visualization of the partially included thorax and upper   abdomen shows no acute abnormality. Impression       1. Right anterior 12:00 breast cancer measures up to 1.2 cm. 2. Multiple (greater than 10 total) other right upper breast masses, some of   which have enlarged, concerning for additional malignancy. 3. Right axillary indeterminate lymph node. 4. No suspicious left breast finding. Recommend continued malignancy management as directed clinically. BI-RADS Assessment Category 6: Known Biopsy Proven Malignancy             Pathology:  DIAGNOSIS        A:  \"RIGHT BREAST MASTECTOMY\":  INVASIVE DUCTAL CARCINOMA, HIGH GRADE     (3 + 3 + 2), 15 MILLIMETER IN GREATEST DIMENSION, MARGINS UNINVOLVED;   FIBROCYSTIC MASTOPATHY. B :  \"SENTINEL NODE #1\":  BENIGN LYMPH NODE, NEGATIVE FOR TUMOR. C:  \"ADDITIONAL AXILLARY TISSUE\":  FOUR BENIGN LYMPH NODES, ALL   NEGATIVE FOR TUMOR (0/4). * * *DIAGNOSIS* * * * * * * * * * * * * * * * * * * * * * * * * * * * * * *            A:  \" RIGHT BREAST, 12:00 POSITION, 7 CM FROM NIPPLE, CORE BIOPSY\":         INFILTRATING DUCTAL CARCINOMA WITH APOCRINE FEATURES, HIGH GRADE   (POORLY DIFFERENTIATED). DEFINITE IN SITU COMPONENT AND LYMPHOVASCULAR   INVASION ARE NOT IDENTIFIED             jtl/4/13/2022     * * *Electronically Signed Out* * *         Sign Out Date: 4/13/2022  JAH Olmos M.D.           * * *PROCEDURES/ADDENDA* * * * * * * * * * * * * * * * * * * * *  * * * *                         STF- ER/UT/OTH4EAU BY IHC                                                                                   Status:  Reviewed By:    Tay De Los Santos MD on 4/19/2022                                 Interpretation                       [facility] IHC Quantitative Breast Panel     TEST NAME:                         RESULTS:               INTERNAL   CONTROLS:     ESTROGEN RECEPTOR:               Positive (76%)               Present,   Positive PROGESTERONE RECEPTOR:          Negative (0.0%)          Present, Positive   HER-2/KELVIN:                         Positive (3+)               Percentage   of Cells with Uniform        Intense Complete Membrane   Stainin%       ASSESSMENT:   Diagnosis Orders   1. Malignant neoplasm of right breast in female, estrogen receptor positive, unspecified site of breast [facility]  DISCONTINUED: 0.9 % sodium chloride infusion    DISCONTINUED: trastuzumab-qyyp (TRAZIMERA) 567 mg in sodium chloride 0.9 % 250 mL chemo IVPB    DISCONTINUED: sodium chloride flush 0.9 % injection 5-40 mL    DISCONTINUED: heparin flush 100 UNIT/ML injection 500 Units    DISCONTINUED: alteplase (CATHFLO) 2 mg in sterile water 2 mL injection    DISCONTINUED: diphenhydrAMINE (BENADRYL) injection 50 mg    DISCONTINUED: famotidine (PEPCID) injection 20 mg    DISCONTINUED: hydrocortisone sodium succinate PF (SOLU-CORTEF) injection 100 mg    DISCONTINUED: acetaminophen (TYLENOL) tablet 650 mg    DISCONTINUED: meperidine (DEMEROL) injection 12.5 mg    DISCONTINUED: ondansetron (ZOFRAN) injection 8 mg    DISCONTINUED: EPINEPHrine PF 1 MG/ML injection (Anaphylaxis) 0.3 mg    DISCONTINUED: albuterol sulfate HFA (PROVENTIL;VENTOLIN;PROAIR) 108 (90 Base) MCG/ACT inhaler 4 puff      2. Urinary incontinence, unspecified type  [address] [facility]      3. Fatigue due to treatment        4. Nausea        5. Hot flashes related to aromatase inhibitor therapy        6. Chemotherapy-induced neuropathy ([facility]            PLAN:  Lab studies were personally reviewed. Breast cancer: discovered incidentally on mammogram, 6mm on ultrasound, biopsy proven, high grade IDC, ER 76%, MD negative, HER2 positive. MRI shows greater than 10 lesions in the breast with the dominant lesion 1.2 cm, a dysmorphic indeterminate lymph node in the right axilla, and no evidence of contralateral or distant disease.   Her MRI imaging made things more complicated - if she was obviously T1N0, then upfront surgery would be appropriate, lumpectomy/sentinel node followed by adjuvant Taxol/Herceptin. However, she seemed to have multiple lesions that are indeterminate which make it possible that her disease is multifocal, as well as an indeterminate  axillary node. For this reason, we should at least consider neoadjuvant chemotherapy. However, I would strongly advise at least two additional biopsies in that case, ultrasound guided biopsy of the axillary node, and possibly MRI guided biopsy (if not  visualized on ultrasound) of another in-breast lesion to confirm pathology. She was reluctant to consider additional biopsy, so ultimately the decision was made to proceed with mastectomy. Path reviewed, thankfully it appears that the indeterminate lesions in the breast were benign, with the final tumor dimension being only 1.5 cm, and 4 nodes negative for tumor. Therefore, she is [id number], and would be eligible for the adjuvant paclitaxel and trastuzumab regimen. After TH she will complete a year of Herceptin, as well as endocrine therapy with AI. She will not require radiation due to the T1N0 disease and mastectomy. She is here today for follow up on Arimidex that she started ~1 month ago and her next cycle Herceptin. Overall, she continues to have fatigue and general weakness that has been present all along and not necessarily since starting AI. Intermittent nausea is unchanged, no bowel complaints. Appetite is good and weight is stable. There is no shortness of breath or edema. Neuropathy in fingers and toes is slightly better. She is now experiencing hot flashes and night sweats that are tolerable. Insomnia is managed with restoril 30 mg at HS. There has been an increase her her urinary incontinence that is affecting her quality of life as she is fearful of having an accident while outside the house. Labs reviewed and are unremarkable.   Echo on 11/7 with EF 55-60%, OK to proceed with next Herceptin and she will continue on Arimidex as planned. She was encouraged to have frequent activity with rest periods to help combat fatigue. DEXA March 2022 showed mild osteopenia, repeat March 2024. Referral to Dr Makenzie Mei for incontinence was made. Call with any fevers, uncontrolled side effects from treatment or any other worrisome/concerning symptoms. Follow up 3 weeks for next Herceptin, OV in 6 weeks. All questions were asked and answered to the best of my ability.            EDISON Harris [facility] Hematology and Oncology  [address] [place] [address]  Office : [phone]  Fax : [phone]

## 2022-12-05 NOTE — PROGRESS NOTES
Arrived to the [facility]. Susana Romo completed. Patient instructed to report any side affects to ordering provider. Patient tolerated well. Any issues or concerns during appointment: no blood return noted from port; re-accessed with 1inch needle but still no blood return, though flushing well. Per Brigido Epley, NP via telephone, ok to proceed with treatment today without blood return, as port was treated with Cathflo 2 treatments ago. Patient aware of next infusion appointment on 12/26/22 (date) at 6 AM (time). Discharged ambulatory.

## 2022-12-07 ENCOUNTER — CLINICAL DOCUMENTATION (OUTPATIENT)
Dept: ONCOLOGY | Age: 77
End: 2022-12-07

## 2022-12-07 NOTE — PROGRESS NOTES
Financial Navigator spoke with patient's son with upcoming changes to [facility] Peter drug changes. Navigator advised patient new processes are in progress, and updates will be provided before Jan.2023. Navigator notified Vinayak Logan (assigned navigator) of patient's concerns.

## 2022-12-26 ENCOUNTER — HOSPITAL ENCOUNTER (OUTPATIENT)
Dept: INFUSION THERAPY | Age: 77
Discharge: HOME | End: 2022-12-26
Payer: MEDICARE

## 2022-12-26 VITALS
TEMPERATURE: 98.3 F | DIASTOLIC BLOOD PRESSURE: 61 MMHG | BODY MASS INDEX: 36.49 KG/M2 | HEART RATE: 76 BPM | WEIGHT: 206 LBS | SYSTOLIC BLOOD PRESSURE: 167 MMHG | OXYGEN SATURATION: 100 % | RESPIRATION RATE: 16 BRPM

## 2022-12-26 DIAGNOSIS — Z17.0 MALIGNANT NEOPLASM OF RIGHT BREAST IN FEMALE, ESTROGEN RECEPTOR POSITIVE, UNSPECIFIED SITE OF BREAST (HCC): Primary | ICD-10-CM

## 2022-12-26 DIAGNOSIS — C50.911 MALIGNANT NEOPLASM OF RIGHT BREAST IN FEMALE, ESTROGEN RECEPTOR POSITIVE, UNSPECIFIED SITE OF BREAST (HCC): Primary | ICD-10-CM

## 2022-12-26 PROCEDURE — 2580000003 HC RX 258: Performed by: NURSE PRACTITIONER

## 2022-12-26 PROCEDURE — 96413 CHEMO IV INFUSION 1 HR: CPT

## 2022-12-26 PROCEDURE — 6360000002 HC RX W HCPCS: Performed by: NURSE PRACTITIONER

## 2022-12-26 RX ORDER — SODIUM CHLORIDE 9 MG/ML
5-250 INJECTION, SOLUTION INTRAVENOUS PRN
Status: DISCONTINUED | OUTPATIENT
Start: 2022-12-26 | End: 2022-12-27 | Stop reason: HOSPADM

## 2022-12-26 RX ORDER — ACETAMINOPHEN 325 MG/1
650 TABLET ORAL
Status: DISCONTINUED | OUTPATIENT
Start: 2022-12-26 | End: 2022-12-27 | Stop reason: HOSPADM

## 2022-12-26 RX ORDER — SODIUM CHLORIDE 0.9 % (FLUSH) 0.9 %
5-40 SYRINGE (ML) INJECTION PRN
Status: DISCONTINUED | OUTPATIENT
Start: 2022-12-26 | End: 2022-12-27 | Stop reason: HOSPADM

## 2022-12-26 RX ORDER — DIPHENHYDRAMINE HYDROCHLORIDE 50 MG/ML
50 INJECTION INTRAMUSCULAR; INTRAVENOUS
Status: DISCONTINUED | OUTPATIENT
Start: 2022-12-26 | End: 2022-12-27 | Stop reason: HOSPADM

## 2022-12-26 RX ADMIN — SODIUM CHLORIDE, PRESERVATIVE FREE 10 ML: 5 INJECTION INTRAVENOUS at 12:00

## 2022-12-26 RX ADMIN — SODIUM CHLORIDE 25 ML/HR: 9 INJECTION, SOLUTION INTRAVENOUS at 11:30

## 2022-12-26 RX ADMIN — SODIUM CHLORIDE 567 MG: 9 INJECTION, SOLUTION INTRAVENOUS at 11:57

## 2022-12-26 NOTE — PROGRESS NOTES
Arrived to the [facility]. kerona completed. Patient tolerated well. Any issues or concerns during appointment: none. Patient aware of next infusion appointment on 1/16/2023 (date) at 56 (time). Patient aware of next lab and [facility] office visit on 1/16/2023 (date) at [address] (time). Patient instructed to call provider with temperature of 100.4 or greater or nausea/vomiting/ diarrhea or pain not controlled by medications  Discharged ambulatory. Pre-Excision Curettage Text (Leave Blank If You Do Not Want): Prior to drawing the surgical margin the visible lesion was removed with electrodesiccation and curettage to clearly define the lesion size.

## 2022-12-27 DIAGNOSIS — E78.00 PURE HYPERCHOLESTEROLEMIA: ICD-10-CM

## 2022-12-27 DIAGNOSIS — E03.9 ACQUIRED HYPOTHYROIDISM: ICD-10-CM

## 2022-12-27 LAB
CHOLEST SERPL-MCNC: 166 MG/DL
HDLC SERPL-MCNC: 65 MG/DL (ref 40–60)
HDLC SERPL: 2.6
LDLC SERPL CALC-MCNC: 87.6 MG/DL
TRIGL SERPL-MCNC: 67 MG/DL (ref 35–150)
TSH, 3RD GENERATION: 0.6 UIU/ML (ref 0.36–3.74)
VLDLC SERPL CALC-MCNC: 13.4 MG/DL (ref 6–23)

## 2023-01-03 ENCOUNTER — OFFICE VISIT (OUTPATIENT)
Dept: INTERNAL MEDICINE CLINIC | Facility: CLINIC | Age: 78
End: 2023-01-03
Payer: MEDICARE

## 2023-01-03 VITALS
DIASTOLIC BLOOD PRESSURE: 70 MMHG | HEIGHT: 63 IN | SYSTOLIC BLOOD PRESSURE: 138 MMHG | WEIGHT: 204 LBS | BODY MASS INDEX: 36.14 KG/M2

## 2023-01-03 DIAGNOSIS — Z00.00 MEDICARE ANNUAL WELLNESS VISIT, SUBSEQUENT: Primary | ICD-10-CM

## 2023-01-03 DIAGNOSIS — E03.9 ACQUIRED HYPOTHYROIDISM: ICD-10-CM

## 2023-01-03 DIAGNOSIS — C50.911 MALIGNANT NEOPLASM OF RIGHT BREAST IN FEMALE, ESTROGEN RECEPTOR POSITIVE, UNSPECIFIED SITE OF BREAST (HCC): ICD-10-CM

## 2023-01-03 DIAGNOSIS — T45.1X5A CHEMOTHERAPY-INDUCED NEUROPATHY (HCC): ICD-10-CM

## 2023-01-03 DIAGNOSIS — G62.0 CHEMOTHERAPY-INDUCED NEUROPATHY (HCC): ICD-10-CM

## 2023-01-03 DIAGNOSIS — I10 ESSENTIAL HYPERTENSION: ICD-10-CM

## 2023-01-03 DIAGNOSIS — M85.80 OSTEOPENIA, UNSPECIFIED LOCATION: ICD-10-CM

## 2023-01-03 DIAGNOSIS — Z17.0 MALIGNANT NEOPLASM OF RIGHT BREAST IN FEMALE, ESTROGEN RECEPTOR POSITIVE, UNSPECIFIED SITE OF BREAST (HCC): ICD-10-CM

## 2023-01-03 DIAGNOSIS — F32.A DEPRESSION, UNSPECIFIED DEPRESSION TYPE: ICD-10-CM

## 2023-01-03 DIAGNOSIS — R73.01 IMPAIRED FASTING GLUCOSE: ICD-10-CM

## 2023-01-03 DIAGNOSIS — E55.9 VITAMIN D DEFICIENCY: ICD-10-CM

## 2023-01-03 DIAGNOSIS — E66.01 SEVERE OBESITY (BMI 35.0-39.9) WITH COMORBIDITY (HCC): ICD-10-CM

## 2023-01-03 DIAGNOSIS — E78.00 PURE HYPERCHOLESTEROLEMIA: ICD-10-CM

## 2023-01-03 DIAGNOSIS — E66.01 SEVERE OBESITY (HCC): ICD-10-CM

## 2023-01-03 PROBLEM — Z78.0 MENOPAUSE: Status: RESOLVED | Noted: 2018-10-12 | Resolved: 2023-01-03

## 2023-01-03 PROCEDURE — 1090F PRES/ABSN URINE INCON ASSESS: CPT | Performed by: INTERNAL MEDICINE

## 2023-01-03 PROCEDURE — G8399 PT W/DXA RESULTS DOCUMENT: HCPCS | Performed by: INTERNAL MEDICINE

## 2023-01-03 PROCEDURE — 1036F TOBACCO NON-USER: CPT | Performed by: INTERNAL MEDICINE

## 2023-01-03 PROCEDURE — G8484 FLU IMMUNIZE NO ADMIN: HCPCS | Performed by: INTERNAL MEDICINE

## 2023-01-03 PROCEDURE — 99214 OFFICE O/P EST MOD 30 MIN: CPT | Performed by: INTERNAL MEDICINE

## 2023-01-03 PROCEDURE — 1123F ACP DISCUSS/DSCN MKR DOCD: CPT | Performed by: INTERNAL MEDICINE

## 2023-01-03 PROCEDURE — 3075F SYST BP GE 130 - 139MM HG: CPT | Performed by: INTERNAL MEDICINE

## 2023-01-03 PROCEDURE — G8417 CALC BMI ABV UP PARAM F/U: HCPCS | Performed by: INTERNAL MEDICINE

## 2023-01-03 PROCEDURE — 3078F DIAST BP <80 MM HG: CPT | Performed by: INTERNAL MEDICINE

## 2023-01-03 PROCEDURE — G0439 PPPS, SUBSEQ VISIT: HCPCS | Performed by: INTERNAL MEDICINE

## 2023-01-03 PROCEDURE — G8428 CUR MEDS NOT DOCUMENT: HCPCS | Performed by: INTERNAL MEDICINE

## 2023-01-03 RX ORDER — LOSARTAN POTASSIUM 100 MG/1
100 TABLET ORAL DAILY
Qty: 90 TABLET | Refills: 2 | Status: SHIPPED | OUTPATIENT
Start: 2023-01-03

## 2023-01-03 RX ORDER — LEVOTHYROXINE SODIUM 112 UG/1
112 TABLET ORAL DAILY
Qty: 90 TABLET | Refills: 1 | Status: SHIPPED | OUTPATIENT
Start: 2023-01-03

## 2023-01-03 RX ORDER — LOSARTAN POTASSIUM 100 MG/1
50 TABLET ORAL DAILY
Qty: 90 TABLET | Refills: 3 | Status: SHIPPED | OUTPATIENT
Start: 2023-01-03 | End: 2023-01-03

## 2023-01-03 ASSESSMENT — PATIENT HEALTH QUESTIONNAIRE - PHQ9
SUM OF ALL RESPONSES TO PHQ QUESTIONS 1-9: 0
2. FEELING DOWN, DEPRESSED OR HOPELESS: 0
SUM OF ALL RESPONSES TO PHQ QUESTIONS 1-9: 0
SUM OF ALL RESPONSES TO PHQ9 QUESTIONS 1 & 2: 0
1. LITTLE INTEREST OR PLEASURE IN DOING THINGS: 0

## 2023-01-03 ASSESSMENT — LIFESTYLE VARIABLES
HOW MANY STANDARD DRINKS CONTAINING ALCOHOL DO YOU HAVE ON A TYPICAL DAY: PATIENT DOES NOT DRINK
HOW OFTEN DO YOU HAVE A DRINK CONTAINING ALCOHOL: NEVER

## 2023-01-03 ASSESSMENT — ENCOUNTER SYMPTOMS
STRIDOR: 0
EYE PAIN: 0
VOICE CHANGE: 0
RECTAL PAIN: 0

## 2023-01-03 NOTE — PROGRESS NOTES
FOLLOWUP VISIT    Subjective:    Ms. Lucy Funk is a 69 y.o., female,   Chief Complaint   Patient presents with    Medicare AWV    Medication Refill     Losartan and levothyroxine to [place] [id number]    Follow-up Chronic Condition       HPI:    Patient presents today for follow up of two or more chronic medical problems and review of labs. The patient is also here for the annual Medicare wellness visit. The patient has hypertension. The patient has been on an attempted low sodium diet and has been trying to exercise and maintain a healthy weight. The patient reports good compliance with the blood pressure medications. The patient has hypothyroidism. The patient denies any symptoms of hypo- or hyperthyroidism on the current dose of levothyroxine. The patient has hyperlipidemia. The patient has been following a low cholesterol diet. Due to her chemotherapy induced neuropathy she occasionally feels unsteady on her feet but has has had no falls. The following portions of the patient's history were reviewed and updated as appropriate:      Past Medical History:   Diagnosis Date    Anxiety     Depression 5/19/2017    Essential hypertension 05/19/2017    Fibromyalgia     History of postoperative nausea and vomiting     Hyperlipidemia     Hypothyroid     Impaired fasting glucose     Insomnia     Nausea & vomiting     Need for hepatitis C screening test 12/20/2020    3/15/21 HCV ab negative.       Obesity     Osteoarthritis 12/15/2015    Stress incontinence, female     Vitamin D deficiency        Past Surgical History:   Procedure Laterality Date    BREAST BIOPSY Bilateral     BREAST BIOPSY Right 3/29/2021    RIGHT BREAST NEEDLE LOCALIZED LUMPECTOMY X2 performed by Spike Gilliam MD at [address] [place] [address]    COLONOSCOPY  2012    normal; internal hemorrhoid    HYSTERECTOMY (CERVIX STATUS UNKNOWN)  [facility] Right 6/1/2022    RIGHT SENTINEL NODE BIOPSY MAG TRACE performed by Jacqueline Light MD at [facility] Right 6/1/2022    RIGHT BREAST MASTECTOMY performed by Jacqueline Light MD at [facility] Left 6/1/2022    PORT INSERTION performed by Jacqueline Light MD at [address] [place] [address] Bilateral     Left 2015, Right 7/29/20    US BREAST BIOPSY NEEDLE ADDITIONAL RIGHT Right 2/25/2021    US BREAST BIOPSY NEEDLE ADDITIONAL RIGHT 2/25/2021 [facility]    US BREAST NEEDLE BIOPSY RIGHT Right 2/25/2021    US BREAST NEEDLE BIOPSY RIGHT 2/25/2021 [facility]    US BREAST NEEDLE BIOPSY RIGHT Right 4/12/2022    US BREAST NEEDLE BIOPSY RIGHT 4/12/2022 [facility]    US GUIDED NEEDLE LOC BREAST ADDL RIGHT Right 3/29/2021    US GUIDED NEEDLE LOC BREAST ADDL RIGHT 3/29/2021 [facility]    US GUIDED NEEDLE LOC OF RIGHT BREAST Right 3/29/2021    US GUIDED NEEDLE LOC OF RIGHT BREAST 3/29/2021 [facility]       Family History   Problem Relation Age of Onset    No Known Problems Mother     Heart Disease Father     Breast Cancer Sister     Stroke Father        Social History     Socioeconomic History    Marital status:       Spouse name: Not on file    Number of children: Not on file    Years of education: Not on file    Highest education level: Not on file   Occupational History    Not on file   Tobacco Use    Smoking status: Never    Smokeless tobacco: Never   Substance and Sexual Activity    Alcohol use: No    Drug use: No    Sexual activity: Not on file   Other Topics Concern    Not on file   Social History Narrative    Not on file     Social Determinants of Health     Financial Resource Strain: Not on file   Food Insecurity: Not on file   Transportation Needs: Not on file   Physical Activity: Insufficiently Active    Days of Exercise per Week: 3 days    Minutes of Exercise per Session: 30 min   Stress: Not on file   Social Connections: Not on file   Intimate Partner Violence: Not on file   Housing Stability: Not on file       Current Outpatient Medications   Medication Sig Dispense Refill    levothyroxine (SYNTHROID) 112 MCG tablet Take 1 tablet by mouth Daily TAKE 1 TABLET EVERY DAY BEFORE BREAKFAST FOR LOW THYROID HORMONE LEVELS 90 tablet 1    Calcium-Magnesium-Vitamin D (CALCIUM MAGNESIUM PO) Take by mouth      losartan (COZAAR) 100 MG tablet Take 1 tablet by mouth daily 90 tablet 2    temazepam (RESTORIL) 30 MG capsule Take 1 capsule by mouth nightly as needed for Sleep for up to 60 days. 30 capsule 2    anastrozole (ARIMIDEX) 1 MG tablet Take 1 tablet by mouth daily 90 tablet 3    lidocaine-prilocaine (EMLA) 2.5-2.5 % cream Apply topically as needed. 30 g 2    ondansetron (ZOFRAN) 4 MG tablet Take 2 tablets by mouth 3 times daily as needed for Nausea or Vomiting 90 tablet 2    prochlorperazine (COMPAZINE) 10 MG tablet Take 1 tablet by mouth every 6 hours as needed (nausea) 60 tablet 2    Multiple Vitamin (MULTIVITAMIN ADULT PO) Take 1 tablet by mouth daily.      Carboxymethylcellul-Glycerin (REFRESH OPTIVE OP) Place 1 drop into both eyes daily.      ibuprofen (ADVIL;MOTRIN) 400 MG tablet Take by mouth every 6 hours as needed       Current Facility-Administered Medications   Medication Dose Route Frequency Provider Last Rate Last Admin    hydrocortisone (ANUSOL-HC) 2.5 % rectal cream   Rectal BID EDISON Ortiz - CNP           Allergies as of 01/03/2023 - Fully Reviewed 01/03/2023   Allergen Reaction Noted    Sulfamethoxazole-trimethoprim Nausea And Vomiting 04/16/2016    Meloxicam Other (See Comments) 11/23/2015    Oxaprozin Swelling 11/23/2015       Review of Systems   Constitutional:  Negative for activity change and appetite change.   HENT:  Negative for drooling and voice change.    Eyes:  Negative for pain.   Respiratory:  Negative for stridor.    Gastrointestinal:  Negative for rectal pain.   Endocrine: Negative for polydipsia and polyphagia.   Genitourinary:  Negative for  dyspareunia and enuresis. Musculoskeletal:  Negative for gait problem and neck stiffness. Skin:  Negative for pallor. Neurological:  Negative for facial asymmetry and speech difficulty. Hematological:  Does not bruise/bleed easily. Psychiatric/Behavioral:  Negative for self-injury. The patient is not hyperactive. Patient Care Team:  Blossom Nichols MD as PCP - General  Blossom Nichols MD as PCP - [facility]  Sylvie Rosas MD as Physician  Jacqueline Light MD as Surgeon  Jacqueline Light MD as Surgeon (General Surgery)  Siddhartha Barros RN as Nurse Navigator (Oncology)  Ramsey Steen RN as Nurse Navigator (Oncology)    Objective:    /70 (Site: Left Upper Arm, Position: Sitting)   Ht 5' 3\" (1.6 m)   Wt 204 lb (92.5 kg)   BMI 36.14 kg/m²     Physical Exam  Vitals reviewed. Constitutional:       General: She is not in acute distress. Appearance: She is not toxic-appearing. HENT:      Head: Normocephalic and atraumatic. Right Ear: Tympanic membrane, ear canal and external ear normal.      Left Ear: Tympanic membrane, ear canal and external ear normal.      Nose: Nose normal.      Mouth/Throat:      Mouth: Mucous membranes are moist.      Pharynx: Oropharynx is clear. Eyes:      General: No scleral icterus. Extraocular Movements: Extraocular movements intact. Pupils: Pupils are equal, round, and reactive to light. Cardiovascular:      Rate and Rhythm: Normal rate and regular rhythm. Pulses: Normal pulses. Heart sounds: Normal heart sounds. Pulmonary:      Effort: Pulmonary effort is normal. No respiratory distress. Breath sounds: Normal breath sounds. No stridor. Abdominal:      General: Abdomen is flat. Bowel sounds are normal.      Palpations: Abdomen is soft. There is no mass. Tenderness: There is no guarding or rebound. Musculoskeletal:         General: Normal range of motion.       Cervical back: Normal range of motion and neck supple. Skin:     General: Skin is warm and dry. Coloration: Skin is not jaundiced. Neurological:      Mental Status: She is alert and oriented to person, place, and time. Mental status is at baseline. Psychiatric:         Behavior: Behavior normal.         Thought Content: Thought content normal.            Orders Only on 12/27/2022   Component Date Value Ref Range Status    Cholesterol, Total 12/27/2022 166  <200 MG/DL Final    Comment: Borderline High: 200-239 mg/dL  High: Greater than or equal to 240 mg/dL      Triglycerides 12/27/2022 67  35 - 150 MG/DL Final    Comment: Borderline High: 150-199 mg/dL, High: 200-499 mg/dL  Very High: Greater than or equal to 500 mg/dL      HDL 12/27/2022 65 (A)  40 - 60 MG/DL Final    LDL Calculated 12/27/2022 87.6  <100 MG/DL Final    Comment: Near Optimal: 100-129 mg/dL  Borderline High: 130-159, High: 160-189 mg/dL  Very High: Greater than or equal to 190 mg/dL      VLDL Cholesterol Calculated 12/27/2022 13.4  6.0 - 23.0 MG/DL Final    Chol/HDL Ratio 12/27/2022 2.6    Final    TSH, 3RD GENERATION 12/27/2022 0.599  0.358 - 3.740 uIU/mL Final         Assessent & Plan:        1. Medicare annual wellness visit, subsequent  Overview:  1/3/23    2. Acquired hypothyroidism  Overview:  12/27/22 TSH 0.599 on levothyroxine 112 mcg daily. 9/20/22 TSH 0.376 on levothyroxine 125 mcg daily. 6/21/22 TSH 4.500 on levothyroxine 100 mcg daily. 3/15/21 TSH 7.300 on levothyroxine 88 mcg daily. Labs were reviewed and discussed with the patient. The patient will continue the current treatment. Orders:  -     levothyroxine (SYNTHROID) 112 MCG tablet; Take 1 tablet by mouth Daily TAKE 1 TABLET EVERY DAY BEFORE BREAKFAST FOR LOW THYROID HORMONE LEVELS, Disp-90 tablet, R-1Normal  3. Essential hypertension  Overview:  Well controlled on losartan 100 mg daily. The patient will continue the current treatment. Orders:  -     losartan (COZAAR) 100 MG tablet;  Take 1 tablet by mouth daily, Disp-90 tablet, R-2Normal  4. Vitamin D deficiency  Overview:  6/21/22 25 vitamin D 30.3  6/22/18 25 vitamin D 17.5    Labs were reviewed and discussed with the patient. Continue OTC 25 vitamin D 2000 I. U. daily with a meal containing fat. 5. Chemotherapy-induced neuropathy ([facility] [address]  Overview:  Discussed fall risk reduction with patient. Suggested us of a cane or walker. Patient considering. 6. Severe obesity (BMI 35.0-39. 9) with comorbidity ([address]  7. Malignant neoplasm of right breast in female, estrogen receptor positive, unspecified site of breast ([address]  8. Depression, unspecified depression type  Overview:  Symptoms currently in remission. Previously treated with SSRI therapy. We will monitor for clinical recurrence. 9. Osteopenia, unspecified location  Overview:  3/23/22 DEXA - right femoral neck 0.791 gm/cm2 with T -1.8.  10 yr FRAX fracture risk 11 / 2.4%. 10/12/18 DEXA - left femoral neck 0.891 gm/cm2 with T score -1.1. Reviewed / discussed the patient's most recent DEXA scan results and calculated FRAX ten year fracture risk. The patient was counseled regarding adequate calcium and vitamin D intake. Also discussed fall avoidance and weight bearing exercise as tolerated. 10. Pure hypercholesterolemia  Overview:  12/27/22 total 166 HDL 65 LDL 88 TG 67 on diet therapy. Labs were reviewed and discussed with patient. The patient is not interested in taking medication to lower her cholesterol. She prefers to maintain diet therapy. Her HDL is > 60 and LDL < 100 on diet therapy. The patient will continue the current treatment. 11. Impaired fasting glucose  Overview:  12/5/22 FBS 96  6/21/22 FBS 86  12/16/21 fasting glucose 90, A1C 5.1    Labs were reviewed and discussed with patient. She has had prior elevated blood sugars but more recently controlled with diet and exercise therapy. The patient will continue the current treatment.      12. Severe obesity ([facility] [address]  Overview:  Reviewed the patient's BMI. Discussed the need to lose weight in order to avoid many preventable illnesses. Discussed diet, exercise, and weight loss strategies. The patient and/or patient representative voiced understanding and agreement with the current diagnoses, recommendations, and possible side effects. Return in about 6 months (around 7/3/2023) for follow up of chronic medical problems, review labs. Medicare Annual Wellness Visit    Rizwana Cooper is here for Medicare AWV, Medication Refill (Losartan and levothyroxine to [address]), and Follow-up Chronic Condition    Assessment & Plan   Medicare annual wellness visit, subsequent  Acquired hypothyroidism  -     levothyroxine (SYNTHROID) 112 MCG tablet; Take 1 tablet by mouth Daily TAKE 1 TABLET EVERY DAY BEFORE BREAKFAST FOR LOW THYROID HORMONE LEVELS, Disp-90 tablet, R-1Normal  Essential hypertension  -     losartan (COZAAR) 100 MG tablet; Take 1 tablet by mouth daily, Disp-90 tablet, R-2Normal  Vitamin D deficiency  Chemotherapy-induced neuropathy (HCC)  Severe obesity (BMI 35.0-39. 9) with comorbidity ([address]  Malignant neoplasm of right breast in female, estrogen receptor positive, unspecified site of breast ([address]  Depression, unspecified depression type  Osteopenia, unspecified location  Pure hypercholesterolemia  Impaired fasting glucose  Severe obesity ([address]    Recommendations for Preventive Services Due: see orders and patient instructions/AVS.  Recommended screening schedule for the next 5-10 years is provided to the patient in written form: see Patient Instructions/AVS.     Return in about 6 months (around 7/3/2023) for follow up of chronic medical problems, review labs. Subjective       Patient's complete Health Risk Assessment and screening values have been reviewed and are found in Flowsheets.  The following problems were reviewed today and where indicated follow up appointments were made and/or referrals ordered. Positive Risk Factor Screenings with Interventions:    Fall Risk:  Do you feel unsteady or are you worried about falling? : (!) yes  2 or more falls in past year?: no  Fall with injury in past year?: no     Interventions:    See A/P for plan and any pertinent orders              Weight and Activity:  Physical Activity: Insufficiently Active    Days of Exercise per Week: 3 days    Minutes of Exercise per Session: 30 min     On average, how many days per week do you engage in moderate to strenuous exercise (like a brisk walk)?: 3 days  Have you lost any weight without trying in the past 3 months?: No  Body mass index: (!) 36.13    Obesity Interventions:  See AVS for additional education material                              Objective   Vitals:    01/03/23 1000   BP: 138/70   Site: Left Upper Arm   Position: Sitting   Weight: 204 lb (92.5 kg)   Height: 5' 3\" (1.6 m)      Body mass index is 36.14 kg/m². Allergies   Allergen Reactions    Sulfamethoxazole-Trimethoprim Nausea And Vomiting    Meloxicam Other (See Comments)    Oxaprozin Swelling     Prior to Visit Medications    Medication Sig Taking? Authorizing Provider   levothyroxine (SYNTHROID) 112 MCG tablet Take 1 tablet by mouth Daily TAKE 1 TABLET EVERY DAY BEFORE BREAKFAST FOR LOW THYROID HORMONE LEVELS Yes Josesito Avendano MD   Calcium-Magnesium-Vitamin D (CALCIUM MAGNESIUM PO) Take by mouth Yes Historical Provider, MD   losartan (COZAAR) 100 MG tablet Take 1 tablet by mouth daily Yes Josesito Avendano MD   temazepam (RESTORIL) 30 MG capsule Take 1 capsule by mouth nightly as needed for Sleep for up to 60 days. Yes EDISON Bergman CNP   anastrozole (ARIMIDEX) 1 MG tablet Take 1 tablet by mouth daily Yes EDISON Bergman CNP   lidocaine-prilocaine (EMLA) 2.5-2.5 % cream Apply topically as needed.  Yes EDISON Bergman CNP   ondansetron (ZOFRAN) 4 MG tablet Take 2 tablets by mouth 3 times daily as needed for Nausea or Vomiting Yes EDISON Armenta CNP   prochlorperazine (COMPAZINE) 10 MG tablet Take 1 tablet by mouth every 6 hours as needed (nausea) Yes EDISON Armenta CNP   Multiple Vitamin (MULTIVITAMIN ADULT PO) Take 1 tablet by mouth daily. Yes Historical Provider, MD   Carboxymethylcellul-Glycerin (REFRESH OPTIVE OP) Place 1 drop into both eyes daily.  Yes Historical Provider, MD   ibuprofen (ADVIL;MOTRIN) 400 MG tablet Take by mouth every 6 hours as needed Yes Ar Automatic Reconciliation       CareTeam (Including outside providers/suppliers regularly involved in providing care):   Patient Care Team:  Eduarda Winters MD as PCP - General  Eduarda Winters MD as PCP - [facility]  Ant Traore MD as Physician  Bacilio Cm MD as Surgeon  Bacilio Cm MD as Surgeon (General Surgery)  Brandy Siddiqui RN as Nurse Navigator (Oncology)  Merly Judd RN as Nurse Navigator (Oncology)     Reviewed and updated this visit:  Tobacco  Allergies  Med Hx  Surg Hx  Soc Hx  Fam Hx

## 2023-01-12 DIAGNOSIS — Z17.0 MALIGNANT NEOPLASM OF RIGHT BREAST IN FEMALE, ESTROGEN RECEPTOR POSITIVE, UNSPECIFIED SITE OF BREAST (HCC): Primary | ICD-10-CM

## 2023-01-12 DIAGNOSIS — C50.911 MALIGNANT NEOPLASM OF RIGHT BREAST IN FEMALE, ESTROGEN RECEPTOR POSITIVE, UNSPECIFIED SITE OF BREAST (HCC): Primary | ICD-10-CM

## 2023-01-12 DIAGNOSIS — C50.911 MALIGNANT NEOPLASM OF RIGHT FEMALE BREAST, UNSPECIFIED ESTROGEN RECEPTOR STATUS, UNSPECIFIED SITE OF BREAST (HCC): ICD-10-CM

## 2023-01-12 DIAGNOSIS — Z17.0 ESTROGEN RECEPTOR POSITIVE STATUS (ER+): ICD-10-CM

## 2023-01-16 ENCOUNTER — OFFICE VISIT (OUTPATIENT)
Dept: ONCOLOGY | Age: 78
End: 2023-01-16
Payer: MEDICARE

## 2023-01-16 ENCOUNTER — HOSPITAL ENCOUNTER (OUTPATIENT)
Dept: INFUSION THERAPY | Age: 78
Discharge: HOME | End: 2023-01-16
Payer: MEDICARE

## 2023-01-16 VITALS
WEIGHT: 206.4 LBS | DIASTOLIC BLOOD PRESSURE: 70 MMHG | TEMPERATURE: 97.8 F | HEIGHT: 63 IN | BODY MASS INDEX: 36.57 KG/M2 | SYSTOLIC BLOOD PRESSURE: 145 MMHG | OXYGEN SATURATION: 99 % | HEART RATE: 72 BPM | RESPIRATION RATE: 20 BRPM

## 2023-01-16 DIAGNOSIS — Z17.0 MALIGNANT NEOPLASM OF RIGHT BREAST IN FEMALE, ESTROGEN RECEPTOR POSITIVE, UNSPECIFIED SITE OF BREAST (HCC): Primary | ICD-10-CM

## 2023-01-16 DIAGNOSIS — C50.911 MALIGNANT NEOPLASM OF RIGHT FEMALE BREAST, UNSPECIFIED ESTROGEN RECEPTOR STATUS, UNSPECIFIED SITE OF BREAST (HCC): ICD-10-CM

## 2023-01-16 DIAGNOSIS — Z17.0 MALIGNANT NEOPLASM OF RIGHT BREAST IN FEMALE, ESTROGEN RECEPTOR POSITIVE, UNSPECIFIED SITE OF BREAST (HCC): ICD-10-CM

## 2023-01-16 DIAGNOSIS — C50.911 MALIGNANT NEOPLASM OF RIGHT BREAST IN FEMALE, ESTROGEN RECEPTOR POSITIVE, UNSPECIFIED SITE OF BREAST (HCC): Primary | ICD-10-CM

## 2023-01-16 DIAGNOSIS — F41.1 ANXIETY ASSOCIATED WITH CANCER DIAGNOSIS (HCC): ICD-10-CM

## 2023-01-16 DIAGNOSIS — C50.911 MALIGNANT NEOPLASM OF RIGHT BREAST IN FEMALE, ESTROGEN RECEPTOR POSITIVE, UNSPECIFIED SITE OF BREAST (HCC): ICD-10-CM

## 2023-01-16 DIAGNOSIS — G62.0 CHEMOTHERAPY-INDUCED NEUROPATHY (HCC): ICD-10-CM

## 2023-01-16 DIAGNOSIS — Z51.81 ENCOUNTER FOR MONITORING CARDIOTOXIC DRUG THERAPY: ICD-10-CM

## 2023-01-16 DIAGNOSIS — Z79.899 ENCOUNTER FOR MONITORING CARDIOTOXIC DRUG THERAPY: ICD-10-CM

## 2023-01-16 DIAGNOSIS — Z17.0 ESTROGEN RECEPTOR POSITIVE STATUS (ER+): ICD-10-CM

## 2023-01-16 DIAGNOSIS — T45.1X5A CHEMOTHERAPY-INDUCED NEUROPATHY (HCC): ICD-10-CM

## 2023-01-16 DIAGNOSIS — C80.1 ANXIETY ASSOCIATED WITH CANCER DIAGNOSIS (HCC): ICD-10-CM

## 2023-01-16 LAB
ALBUMIN SERPL-MCNC: 3.6 G/DL (ref 3.2–4.6)
ALBUMIN/GLOB SERPL: 1 (ref 0.4–1.6)
ALP SERPL-CCNC: 119 U/L (ref 50–136)
ALT SERPL-CCNC: 23 U/L (ref 12–65)
ANION GAP SERPL CALC-SCNC: 5 MMOL/L (ref 2–11)
AST SERPL-CCNC: 24 U/L (ref 15–37)
BASOPHILS # BLD: 0.1 K/UL (ref 0–0.2)
BASOPHILS NFR BLD: 1 % (ref 0–2)
BILIRUB SERPL-MCNC: 0.5 MG/DL (ref 0.2–1.1)
BUN SERPL-MCNC: 16 MG/DL (ref 8–23)
CALCIUM SERPL-MCNC: 9.6 MG/DL (ref 8.3–10.4)
CHLORIDE SERPL-SCNC: 105 MMOL/L (ref 101–110)
CO2 SERPL-SCNC: 30 MMOL/L (ref 21–32)
CREAT SERPL-MCNC: 0.9 MG/DL (ref 0.6–1)
DIFFERENTIAL METHOD BLD: NORMAL
EOSINOPHIL # BLD: 0.1 K/UL (ref 0–0.8)
EOSINOPHIL NFR BLD: 2 % (ref 0.5–7.8)
ERYTHROCYTE [DISTWIDTH] IN BLOOD BY AUTOMATED COUNT: 13.3 % (ref 11.9–14.6)
GLOBULIN SER CALC-MCNC: 3.7 G/DL (ref 2.8–4.5)
GLUCOSE SERPL-MCNC: 102 MG/DL (ref 65–100)
HCT VFR BLD AUTO: 40.2 % (ref 35.8–46.3)
HGB BLD-MCNC: 13 G/DL (ref 11.7–15.4)
IMM GRANULOCYTES # BLD AUTO: 0 K/UL (ref 0–0.5)
IMM GRANULOCYTES NFR BLD AUTO: 1 % (ref 0–5)
LYMPHOCYTES # BLD: 1.3 K/UL (ref 0.5–4.6)
LYMPHOCYTES NFR BLD: 22 % (ref 13–44)
MAGNESIUM SERPL-MCNC: 2.1 MG/DL (ref 1.8–2.4)
MCH RBC QN AUTO: 28.4 PG (ref 26.1–32.9)
MCHC RBC AUTO-ENTMCNC: 32.3 G/DL (ref 31.4–35)
MCV RBC AUTO: 87.8 FL (ref 82–102)
MONOCYTES # BLD: 0.5 K/UL (ref 0.1–1.3)
MONOCYTES NFR BLD: 9 % (ref 4–12)
NEUTS SEG # BLD: 3.7 K/UL (ref 1.7–8.2)
NEUTS SEG NFR BLD: 65 % (ref 43–78)
NRBC # BLD: 0 K/UL (ref 0–0.2)
PLATELET # BLD AUTO: 174 K/UL (ref 150–450)
PMV BLD AUTO: 10.8 FL (ref 9.4–12.3)
POTASSIUM SERPL-SCNC: 4.4 MMOL/L (ref 3.5–5.1)
PROT SERPL-MCNC: 7.3 G/DL (ref 6.3–8.2)
RBC # BLD AUTO: 4.58 M/UL (ref 4.05–5.2)
SODIUM SERPL-SCNC: 140 MMOL/L (ref 133–143)
WBC # BLD AUTO: 5.7 K/UL (ref 4.3–11.1)

## 2023-01-16 PROCEDURE — 36415 COLL VENOUS BLD VENIPUNCTURE: CPT

## 2023-01-16 PROCEDURE — G8417 CALC BMI ABV UP PARAM F/U: HCPCS | Performed by: INTERNAL MEDICINE

## 2023-01-16 PROCEDURE — 99215 OFFICE O/P EST HI 40 MIN: CPT | Performed by: INTERNAL MEDICINE

## 2023-01-16 PROCEDURE — 3077F SYST BP >= 140 MM HG: CPT | Performed by: INTERNAL MEDICINE

## 2023-01-16 PROCEDURE — 1090F PRES/ABSN URINE INCON ASSESS: CPT | Performed by: INTERNAL MEDICINE

## 2023-01-16 PROCEDURE — G8399 PT W/DXA RESULTS DOCUMENT: HCPCS | Performed by: INTERNAL MEDICINE

## 2023-01-16 PROCEDURE — 80053 COMPREHEN METABOLIC PANEL: CPT

## 2023-01-16 PROCEDURE — 1036F TOBACCO NON-USER: CPT | Performed by: INTERNAL MEDICINE

## 2023-01-16 PROCEDURE — G8484 FLU IMMUNIZE NO ADMIN: HCPCS | Performed by: INTERNAL MEDICINE

## 2023-01-16 PROCEDURE — 96523 IRRIG DRUG DELIVERY DEVICE: CPT

## 2023-01-16 PROCEDURE — 85025 COMPLETE CBC W/AUTO DIFF WBC: CPT

## 2023-01-16 PROCEDURE — 83735 ASSAY OF MAGNESIUM: CPT

## 2023-01-16 PROCEDURE — 2580000003 HC RX 258: Performed by: INTERNAL MEDICINE

## 2023-01-16 PROCEDURE — 3078F DIAST BP <80 MM HG: CPT | Performed by: INTERNAL MEDICINE

## 2023-01-16 PROCEDURE — 1123F ACP DISCUSS/DSCN MKR DOCD: CPT | Performed by: INTERNAL MEDICINE

## 2023-01-16 PROCEDURE — G8428 CUR MEDS NOT DOCUMENT: HCPCS | Performed by: INTERNAL MEDICINE

## 2023-01-16 RX ORDER — EPINEPHRINE 1 MG/ML
0.3 INJECTION, SOLUTION, CONCENTRATE INTRAVENOUS PRN
OUTPATIENT
Start: 2023-01-16

## 2023-01-16 RX ORDER — ONDANSETRON 2 MG/ML
8 INJECTION INTRAMUSCULAR; INTRAVENOUS
OUTPATIENT
Start: 2023-01-16

## 2023-01-16 RX ORDER — HEPARIN SODIUM (PORCINE) LOCK FLUSH IV SOLN 100 UNIT/ML 100 UNIT/ML
500 SOLUTION INTRAVENOUS PRN
OUTPATIENT
Start: 2023-02-06

## 2023-01-16 RX ORDER — ACETAMINOPHEN 325 MG/1
650 TABLET ORAL
OUTPATIENT
Start: 2023-01-16

## 2023-01-16 RX ORDER — SODIUM CHLORIDE 9 MG/ML
INJECTION, SOLUTION INTRAVENOUS CONTINUOUS
OUTPATIENT
Start: 2023-01-16

## 2023-01-16 RX ORDER — SODIUM CHLORIDE 9 MG/ML
INJECTION, SOLUTION INTRAVENOUS CONTINUOUS
OUTPATIENT
Start: 2023-02-06

## 2023-01-16 RX ORDER — DIPHENHYDRAMINE HYDROCHLORIDE 50 MG/ML
50 INJECTION INTRAMUSCULAR; INTRAVENOUS
OUTPATIENT
Start: 2023-02-06

## 2023-01-16 RX ORDER — SODIUM CHLORIDE 9 MG/ML
5-40 INJECTION INTRAVENOUS PRN
OUTPATIENT
Start: 2023-02-06

## 2023-01-16 RX ORDER — SODIUM CHLORIDE 9 MG/ML
5-250 INJECTION, SOLUTION INTRAVENOUS PRN
OUTPATIENT
Start: 2023-01-16

## 2023-01-16 RX ORDER — ALBUTEROL SULFATE 90 UG/1
4 AEROSOL, METERED RESPIRATORY (INHALATION) PRN
OUTPATIENT
Start: 2023-02-06

## 2023-01-16 RX ORDER — MEPERIDINE HYDROCHLORIDE 50 MG/ML
12.5 INJECTION INTRAMUSCULAR; INTRAVENOUS; SUBCUTANEOUS PRN
OUTPATIENT
Start: 2023-02-06

## 2023-01-16 RX ORDER — SODIUM CHLORIDE 0.9 % (FLUSH) 0.9 %
5-40 SYRINGE (ML) INJECTION PRN
OUTPATIENT
Start: 2023-02-06

## 2023-01-16 RX ORDER — MEPERIDINE HYDROCHLORIDE 50 MG/ML
12.5 INJECTION INTRAMUSCULAR; INTRAVENOUS; SUBCUTANEOUS PRN
OUTPATIENT
Start: 2023-01-16

## 2023-01-16 RX ORDER — EPINEPHRINE 1 MG/ML
0.3 INJECTION, SOLUTION, CONCENTRATE INTRAVENOUS PRN
OUTPATIENT
Start: 2023-02-06

## 2023-01-16 RX ORDER — FAMOTIDINE 10 MG/ML
20 INJECTION, SOLUTION INTRAVENOUS
OUTPATIENT
Start: 2023-02-06

## 2023-01-16 RX ORDER — HEPARIN SODIUM (PORCINE) LOCK FLUSH IV SOLN 100 UNIT/ML 100 UNIT/ML
500 SOLUTION INTRAVENOUS PRN
OUTPATIENT
Start: 2023-01-16

## 2023-01-16 RX ORDER — SODIUM CHLORIDE 0.9 % (FLUSH) 0.9 %
5-40 SYRINGE (ML) INJECTION PRN
OUTPATIENT
Start: 2023-01-16

## 2023-01-16 RX ORDER — SODIUM CHLORIDE 9 MG/ML
5-40 INJECTION INTRAVENOUS PRN
OUTPATIENT
Start: 2023-01-16

## 2023-01-16 RX ORDER — ACETAMINOPHEN 325 MG/1
650 TABLET ORAL
OUTPATIENT
Start: 2023-02-06

## 2023-01-16 RX ORDER — SODIUM CHLORIDE 9 MG/ML
5-250 INJECTION, SOLUTION INTRAVENOUS PRN
OUTPATIENT
Start: 2023-02-06

## 2023-01-16 RX ORDER — SODIUM CHLORIDE 0.9 % (FLUSH) 0.9 %
10 SYRINGE (ML) INJECTION PRN
Status: DISCONTINUED | OUTPATIENT
Start: 2023-01-16 | End: 2023-01-17 | Stop reason: HOSPADM

## 2023-01-16 RX ORDER — ONDANSETRON 2 MG/ML
8 INJECTION INTRAMUSCULAR; INTRAVENOUS
OUTPATIENT
Start: 2023-02-06

## 2023-01-16 RX ORDER — DIPHENHYDRAMINE HYDROCHLORIDE 50 MG/ML
50 INJECTION INTRAMUSCULAR; INTRAVENOUS
OUTPATIENT
Start: 2023-01-16

## 2023-01-16 RX ORDER — FAMOTIDINE 10 MG/ML
20 INJECTION, SOLUTION INTRAVENOUS
OUTPATIENT
Start: 2023-01-16

## 2023-01-16 RX ORDER — ALBUTEROL SULFATE 90 UG/1
4 AEROSOL, METERED RESPIRATORY (INHALATION) PRN
OUTPATIENT
Start: 2023-01-16

## 2023-01-16 RX ADMIN — Medication 10 ML: at 09:04

## 2023-01-16 ASSESSMENT — PATIENT HEALTH QUESTIONNAIRE - PHQ9
SUM OF ALL RESPONSES TO PHQ QUESTIONS 1-9: 2
2. FEELING DOWN, DEPRESSED OR HOPELESS: 1
SUM OF ALL RESPONSES TO PHQ QUESTIONS 1-9: 2
SUM OF ALL RESPONSES TO PHQ9 QUESTIONS 1 & 2: 2
1. LITTLE INTEREST OR PLEASURE IN DOING THINGS: 1

## 2023-01-16 NOTE — PROGRESS NOTES
[facility] Hematology and Oncology: Office Visit Established Patient    Chief Complaint:    Chief Complaint   Patient presents with    Follow-up         History of Present Illness:  Ms. Sanjay Green is a 69 y.o. female who returns today for management of breast cancer. She initially presented for a routine bilateral screening mammogram on 3/23/22 which identified a right breast mass near the 11:00-12:00 position, 6-7 cm from the nipple and other small circumscribed round masses in the right upper inner breast which had not definitely changed from prior examinations. Further evaluation with right breast ultrasound on 3/25/22 confirmed a 6 mm hypoechoic shadowing mass in the 12:00 right breast at 7 cm from the nipple. She presented to Dr. Emmie Watson on 4/7/22 to discuss whether to proceed with recommended biopsy, he recommended that biopsy be performed. Ultrasound-guided biopsy was subsequently completed on 4/12/22 with pathology revealing high grade infiltrating ductal carcinoma, ER 76%, MA negative, and HER2 positive. MRI of the bilateral breasts was completed on 4/14/22 demonstrating a right anterior 12:00 breast cancer measuring up to 1.2 cm; multiple (greater than 10 total) other right upper breast masses, some of which had enlarged, concerning for additional malignancy; an indeterminate 1.2 cm right axillary lymph node; and no suspicious left breast finding. We recommended upfront surgery because of the MRI findings and the inconclusive size/clinical stage of her cancer. Path reviewed, thankfully it appears that the indeterminate lesions in the breast were benign, with the final tumor dimension being only 1.5 cm, and 4 nodes negative for tumor. Therefore, she is [id number], and would be eligible for the adjuvant paclitaxel and trastuzumab regimen. She is here today for follow up. She remains on Arimidex and single agent Herceptin.   She continues to have neuropathy which is not improving since stopping the Taxol, in fact she feels that this is worsening. It does not affect her ability to perform ADLs but she notes that it does affect her quality of life. She continues to have nail changes with darkening of the toenails. She admits that she has become more reclusive and has anxiety about the potential for recurrent breast cancer. ECOG 1, fatigue is ongoing but manageable. Review of Systems:  Constitutional: Positive for fatigue. HENT: Negative. Eyes: Negative. Respiratory: Negative. Cardiovascular: Negative. Gastrointestinal: Negative. Genitourinary: Negative. Musculoskeletal: Negative. Skin: Negative. Neurological: Positive for neuropathy. Endo/Heme/Allergies: Negative. Psychiatric/Behavioral: Negative. All other systems reviewed and are negative. Allergies   Allergen Reactions    Sulfamethoxazole-Trimethoprim Nausea And Vomiting    Meloxicam Other (See Comments)    Oxaprozin Swelling     Past Medical History:   Diagnosis Date    Anxiety     Depression 5/19/2017    Essential hypertension 05/19/2017    Fibromyalgia     History of postoperative nausea and vomiting     Hyperlipidemia     Hypothyroid     Impaired fasting glucose     Insomnia     Nausea & vomiting     Need for hepatitis C screening test 12/20/2020    3/15/21 HCV ab negative.       Obesity     Osteoarthritis 12/15/2015    Stress incontinence, female     Vitamin D deficiency      Past Surgical History:   Procedure Laterality Date    BREAST BIOPSY Bilateral     BREAST BIOPSY Right 3/29/2021    RIGHT BREAST NEEDLE LOCALIZED LUMPECTOMY X2 performed by Reta yLons MD at [address] [place] [address]    COLONOSCOPY  2012    normal; internal hemorrhoid    HYSTERECTOMY ([address])  [facility] Right 6/1/2022    RIGHT SENTINEL NODE BIOPSY MAG TRACE performed by Jacquie Donato MD at [facility] Right 6/1/2022    RIGHT BREAST MASTECTOMY performed by Jacquie Donato MD at [place] [address] OR    PORT SURGERY Left 6/1/2022    PORT INSERTION performed by Sebas Mirza MD at [address] [place] [address] Bilateral     Left 2015, Right 7/29/20    US BREAST BIOPSY NEEDLE ADDITIONAL RIGHT Right 2/25/2021    US BREAST BIOPSY NEEDLE ADDITIONAL RIGHT 2/25/2021 [facility]    US BREAST NEEDLE BIOPSY RIGHT Right 2/25/2021    US BREAST NEEDLE BIOPSY RIGHT 2/25/2021 [facility]    US BREAST NEEDLE BIOPSY RIGHT Right 4/12/2022    US BREAST NEEDLE BIOPSY RIGHT 4/12/2022 [facility]    US GUIDED NEEDLE LOC BREAST ADDL RIGHT Right 3/29/2021    US GUIDED NEEDLE LOC BREAST ADDL RIGHT 3/29/2021 [facility]    US GUIDED NEEDLE LOC OF RIGHT BREAST Right 3/29/2021    US GUIDED NEEDLE LOC OF RIGHT BREAST 3/29/2021 [facility]     Family History   Problem Relation Age of Onset    No Known Problems Mother     Heart Disease Father     Breast Cancer Sister     Stroke Father      Social History     Socioeconomic History    Marital status:       Spouse name: Not on file    Number of children: Not on file    Years of education: Not on file    Highest education level: Not on file   Occupational History    Not on file   Tobacco Use    Smoking status: Never    Smokeless tobacco: Never   Substance and Sexual Activity    Alcohol use: No    Drug use: No    Sexual activity: Not on file   Other Topics Concern    Not on file   Social History Narrative    Not on file     Social Determinants of Health     Financial Resource Strain: Not on file   Food Insecurity: Not on file   Transportation Needs: Not on file   Physical Activity: Insufficiently Active    Days of Exercise per Week: 3 days    Minutes of Exercise per Session: 30 min   Stress: Not on file   Social Connections: Not on file   Intimate Partner Violence: Not on file   Housing Stability: Not on file     Current Outpatient Medications   Medication Sig Dispense Refill    levothyroxine (SYNTHROID) 112 MCG tablet Take 1 tablet by mouth Daily TAKE 1 TABLET EVERY DAY BEFORE BREAKFAST FOR LOW THYROID HORMONE LEVELS 90 tablet 1    Calcium-Magnesium-Vitamin D (CALCIUM MAGNESIUM PO) Take by mouth      losartan (COZAAR) 100 MG tablet Take 1 tablet by mouth daily 90 tablet 2    temazepam (RESTORIL) 30 MG capsule Take 1 capsule by mouth nightly as needed for Sleep for up to 60 days. 30 capsule 2    anastrozole (ARIMIDEX) 1 MG tablet Take 1 tablet by mouth daily 90 tablet 3    lidocaine-prilocaine (EMLA) 2.5-2.5 % cream Apply topically as needed. 30 g 2    ondansetron (ZOFRAN) 4 MG tablet Take 2 tablets by mouth 3 times daily as needed for Nausea or Vomiting 90 tablet 2    prochlorperazine (COMPAZINE) 10 MG tablet Take 1 tablet by mouth every 6 hours as needed (nausea) 60 tablet 2    Multiple Vitamin (MULTIVITAMIN ADULT PO) Take 1 tablet by mouth daily. Carboxymethylcellul-Glycerin (REFRESH OPTIVE OP) Place 1 drop into both eyes daily. ibuprofen (ADVIL;MOTRIN) 400 MG tablet Take by mouth every 6 hours as needed       Current Facility-Administered Medications   Medication Dose Route Frequency Provider Last Rate Last Admin    hydrocortisone (ANUSOL-HC) 2.5 % rectal cream   Rectal BID EDISON Ozuna - CNP         Facility-Administered Medications Ordered in Other Visits   Medication Dose Route Frequency Provider Last Rate Last Admin    sodium chloride flush 0.9 % injection 10 mL  10 mL IntraVENous PRN Governor MD Manuela   10 mL at 23 0904       OBJECTIVE:  BP (!) 145/70 (Site: Left Upper Arm, Position: Sitting, Cuff Size: Large Adult)   Pulse 72   Temp 97.8 °F (36.6 °C) (Oral)   Resp 20   Ht 5' 3\" (1.6 m)   Wt 206 lb 6.4 oz (93.6 kg)   SpO2 99%   BMI 36.56 kg/m²   Pain Score:   0 - No pain (fatigue 4 \"weak feeling)    ECO    Physical Exam:  Constitutional: Well developed, well nourished female in no acute distress, sitting comfortably in the exam room chair. HEENT: Normocephalic and atraumatic. Sclerae anicteric. Neck supple without JVD. No thyromegaly present. Lymph node   Deferred   Skin Warm and dry. No bruising noted. Maculopapular rash over both hands more prominent on left. No pallor. Respiratory Lungs are clear to auscultation bilaterally without wheezes, rales or rhonchi, normal air exchange without accessory muscle use. CVS Normal rate, regular rhythm and normal S1 and S2. No murmurs, gallops, or rubs. Neuro Grossly nonfocal with no obvious sensory or motor deficits. MSK Normal range of motion in general.  No edema and no tenderness. Psych Appropriate mood and affect.           Labs:  Recent Results (from the past 96 hour(s))   CBC with Auto Differential    Collection Time: 01/16/23  9:11 AM   Result Value Ref Range    WBC 5.7 4.3 - 11.1 K/uL    RBC 4.58 4.05 - 5.2 M/uL    Hemoglobin 13.0 11.7 - 15.4 g/dL    Hematocrit 40.2 35.8 - 46.3 %    MCV 87.8 82.0 - 102.0 FL    MCH 28.4 26.1 - 32.9 PG    MCHC 32.3 31.4 - 35.0 g/dL    RDW 13.3 11.9 - 14.6 %    Platelets 052 091 - 720 K/uL    MPV 10.8 9.4 - 12.3 FL    nRBC 0.00 0.0 - 0.2 K/uL    Seg Neutrophils 65 43 - 78 %    Lymphocytes 22 13 - 44 %    Monocytes 9 4.0 - 12.0 %    Eosinophils % 2 0.5 - 7.8 %    Basophils 1 0.0 - 2.0 %    Immature Granulocytes 1 0.0 - 5.0 %    Segs Absolute 3.7 1.7 - 8.2 K/UL    Absolute Lymph # 1.3 0.5 - 4.6 K/UL    Absolute Mono # 0.5 0.1 - 1.3 K/UL    Absolute Eos # 0.1 0.0 - 0.8 K/UL    Basophils Absolute 0.1 0.0 - 0.2 K/UL    Absolute Immature Granulocyte 0.0 0.0 - 0.5 K/UL    Differential Type AUTOMATED     Comprehensive Metabolic Panel    Collection Time: 01/16/23  9:11 AM   Result Value Ref Range    Sodium 140 133 - 143 mmol/L    Potassium 4.4 3.5 - 5.1 mmol/L    Chloride 105 101 - 110 mmol/L    CO2 30 21 - 32 mmol/L    Anion Gap 5 2 - 11 mmol/L    Glucose 102 (H) 65 - 100 mg/dL    BUN 16 8 - 23 MG/DL    Creatinine 0.90 0.6 - 1.0 MG/DL    Est, Glom Filt Rate >60 >60 ml/min/1.73m2    Calcium 9.6 8.3 - 10.4 MG/DL Total Bilirubin 0.5 0.2 - 1.1 MG/DL    ALT 23 12 - 65 U/L    AST 24 15 - 37 U/L    Alk Phosphatase 119 50 - 136 U/L    Total Protein 7.3 6.3 - 8.2 g/dL    Albumin 3.6 3.2 - 4.6 g/dL    Globulin 3.7 2.8 - 4.5 g/dL    Albumin/Globulin Ratio 1.0 0.4 - 1.6     Magnesium    Collection Time: 01/16/23  9:11 AM   Result Value Ref Range    Magnesium 2.1 1.8 - 2.4 mg/dL           Imaging:  MRI of the Breasts with and without contrast       CLINICAL INDICATION:  New diagnosis of right 12:00 breast cancer status post   biopsy demonstrating infiltrating ductal carcinoma high-grade, poorly   differentiated. Evaluate for extent of disease, staging, therapy planning. Patient had right benign papillomas removed in 2021, and a left benign biopsy in   2011 demonstrating a 2:00 fibroadenoma. Family history of her sister having   breast cancer. .       COMPARISON: Ultrasound and mammography 4/12/2022, 3/25/2022, 3/23/2022 and prior   breast MRI 3/3/2021. TECHNIQUE: Standard MRI sequences were obtained through the breasts in multiple   planes. Images were obtained before and after intravenous infusion of 20 mL of   Prohance contrast. Images were reviewed with PACS and with [employer] CAD software. FINDINGS: The breasts demonstrate mild-moderate bilateral glandularity and mild   background enhancement. Right breast: The new 12:00 biopsy clip is within an irregular heterogeneously   enhancing 1.2 x 0.8 x 1.1 cm malignant mass. As seen on prior exam a large area   of the right upper breast spanning 11:00-12:00 anterior, middle, posterior depth   (overall about 10-12 cm) contains multiple scattered masses of varying sizes and   shapes. There are greater than 10 masses total. Anteriorly at 12:00 10 cm from   nipple an irregular heterogeneously enhancing mass measures up to 1.2 x 1.0 cm   (previously 0.6 x 0.4 cm). Posteriorly near 11:00 14 cm from nipple a lobulated   mass measures up to 1.1 x 0.4 cm (previously 0.8 x 0.3 cm). Left breast: No evidence of suspicious enhancing mass, and no dominant or unique   nonmass enhancement, to suggest additional malignancy. Lymph nodes: Right axilla demonstrates a dysmorphic 1.2 x 0.8 cm node (series 4   image 351) which has developed since the prior exam. No other axillary or   internal mammary lymphadenopathy is evident. Elsewhere: Limited visualization of the partially included thorax and upper   abdomen shows no acute abnormality. Impression       1. Right anterior 12:00 breast cancer measures up to 1.2 cm. 2. Multiple (greater than 10 total) other right upper breast masses, some of   which have enlarged, concerning for additional malignancy. 3. Right axillary indeterminate lymph node. 4. No suspicious left breast finding. Recommend continued malignancy management as directed clinically. BI-RADS Assessment Category 6: Known Biopsy Proven Malignancy             Pathology:  DIAGNOSIS        A:  \"RIGHT BREAST MASTECTOMY\":  INVASIVE DUCTAL CARCINOMA, HIGH GRADE     (3 + 3 + 2), 15 MILLIMETER IN GREATEST DIMENSION, MARGINS UNINVOLVED;   FIBROCYSTIC MASTOPATHY. B :  \"SENTINEL NODE #1\":  BENIGN LYMPH NODE, NEGATIVE FOR TUMOR. C:  \"ADDITIONAL AXILLARY TISSUE\":  FOUR BENIGN LYMPH NODES, ALL   NEGATIVE FOR TUMOR (0/4). * * *DIAGNOSIS* * * * * * * * * * * * * * * * * * * * * * * * * * * * * * *            A:  \" RIGHT BREAST, 12:00 POSITION, 7 CM FROM NIPPLE, CORE BIOPSY\":         INFILTRATING DUCTAL CARCINOMA WITH APOCRINE FEATURES, HIGH GRADE   (POORLY DIFFERENTIATED). DEFINITE IN SITU COMPONENT AND LYMPHOVASCULAR   INVASION ARE NOT IDENTIFIED             [facility]/4/13/2022     * * *Electronically Signed Out* * *         Sign Out Date: 4/13/2022  JAH Garcia M.D.           * * *PROCEDURES/ADDENDA* * * * * * * * * * * * * * * * * * * * *  * * * *                         STF- ER/NE/[id number] BY IHC Status:  Reviewed By:    Haily Piña MD on 2022                                 Interpretation                       [facility] IHC Quantitative Breast Panel     TEST NAME:                         RESULTS:               INTERNAL   CONTROLS:     ESTROGEN RECEPTOR:               Positive (76%)               Present,   Positive   PROGESTERONE RECEPTOR:          Negative (0.0%)          Present, Positive   HER-2/KELVIN:                         Positive (3+)               Percentage   of Cells with Uniform        Intense Complete Membrane   Stainin%       ASSESSMENT:   Diagnosis Orders   1. Malignant neoplasm of right breast in female, estrogen receptor positive, unspecified site of breast ([facility] [address]  CBC with Auto Differential    Comprehensive Metabolic Panel      2. Encounter for monitoring cardiotoxic drug therapy  [address]      3. Chemotherapy-induced neuropathy ([facility] [address]        4. Anxiety associated with cancer diagnosis [facility])            PLAN:  Lab studies were personally reviewed. Breast cancer: discovered incidentally on mammogram, 6mm on ultrasound, biopsy proven, high grade IDC, ER 76%, NE negative, HER2 positive. MRI shows greater than 10 lesions in the breast with the dominant lesion 1.2 cm, a dysmorphic indeterminate lymph node in the right axilla, and no evidence of contralateral or distant disease. Her MRI imaging made things more complicated - if she was obviously T1N0, then upfront surgery would be appropriate, lumpectomy/sentinel node followed by adjuvant Taxol/Herceptin. However, she seemed to have multiple lesions that are indeterminate which make it possible that her disease is multifocal, as well as an indeterminate  axillary node. For this reason, we should at least consider neoadjuvant chemotherapy.   However, I would strongly advise at least two additional biopsies in that case, ultrasound guided biopsy of the axillary node, and possibly MRI guided biopsy (if not  visualized on ultrasound) of another in-breast lesion to confirm pathology. She was reluctant to consider additional biopsy, so ultimately the decision was made to proceed with mastectomy. Path reviewed, thankfully it appears that the indeterminate lesions in the breast were benign, with the final tumor dimension being only 1.5 cm, and 4 nodes negative for tumor. Therefore, she is [id number], and would be eligible for the adjuvant paclitaxel and trastuzumab regimen. After TH she will complete a year of Herceptin, as well as endocrine therapy with AI. She will not require radiation due to the T1N0 disease and mastectomy. She is here today for follow up. She remains on Arimidex and single agent Herceptin. She continues to have neuropathy which is not improving since stopping the Taxol, in fact she feels that this is worsening. It does not affect her ability to perform ADLs but she notes that it does affect her quality of life. She continues to have nail changes with darkening of the toenails. She admits that she has become more reclusive and has anxiety about the potential for recurrent breast cancer. I encouraged her to focus on the positive data from APT and others showing her risk of recurrence is very low, and to not allow fear or emotion to dictate her mental health. ECOG 1, fatigue is ongoing but manageable. Labs reviewed and are unremarkable. Her symptoms certainly give the impression that the Herceptin is causing her neuropathy to worsen, this is rare but I have seen patients who had worsening neuropathy on Herceptin alone that improved once therapy was stopped. We will hold her Herceptin for the next two planned cycles and reassess in 6 weeks. She will continue on Arimidex as planned. DEXA March 2022 showed mild osteopenia, repeat March 2024.   Call with any fevers, uncontrolled side effects from treatment or any other worrisome/concerning symptoms. Follow up OV in 6 weeks and discussion of resuming Herceptin or completing IV therapy. All questions were asked and answered to the best of my ability.            Linsey Aguirre MD, MD  [facility] Hematology and Oncology  [address] [place] [address]  Office : [phone]  Fax : [phone]

## 2023-01-16 NOTE — PATIENT INSTRUCTIONS
Patient Instructions from Today's Visit    Reason for Visit:  Follow up. Diagnosis Information:  [email or web address]      Plan:  Hold Treatment today and in 3 weeks. Follow Up:  Labs, office visit, infusion in 6 weeks.       Recent Lab Results:  Hospital Outpatient Visit on 01/16/2023   Component Date Value Ref Range Status    WBC 01/16/2023 5.7  4.3 - 11.1 K/uL Final    RBC 01/16/2023 4.58  4.05 - 5.2 M/uL Final    Hemoglobin 01/16/2023 13.0  11.7 - 15.4 g/dL Final    Hematocrit 01/16/2023 40.2  35.8 - 46.3 % Final    MCV 01/16/2023 87.8  82.0 - 102.0 FL Final    MCH 01/16/2023 28.4  26.1 - 32.9 PG Final    MCHC 01/16/2023 32.3  31.4 - 35.0 g/dL Final    RDW 01/16/2023 13.3  11.9 - 14.6 % Final    Platelets 42/95/2709 174  150 - 450 K/uL Final    MPV 01/16/2023 10.8  9.4 - 12.3 FL Final    nRBC 01/16/2023 0.00  0.0 - 0.2 K/uL Final    **Note: Absolute NRBC parameter is now reported with Hemogram**    Seg Neutrophils 01/16/2023 65  43 - 78 % Final    Lymphocytes 01/16/2023 22  13 - 44 % Final    Monocytes 01/16/2023 9  4.0 - 12.0 % Final    Eosinophils % 01/16/2023 2  0.5 - 7.8 % Final    Basophils 01/16/2023 1  0.0 - 2.0 % Final    Immature Granulocytes 01/16/2023 1  0.0 - 5.0 % Final    Segs Absolute 01/16/2023 3.7  1.7 - 8.2 K/UL Final    Absolute Lymph # 01/16/2023 1.3  0.5 - 4.6 K/UL Final    Absolute Mono # 01/16/2023 0.5  0.1 - 1.3 K/UL Final    Absolute Eos # 01/16/2023 0.1  0.0 - 0.8 K/UL Final    Basophils Absolute 01/16/2023 0.1  0.0 - 0.2 K/UL Final    Absolute Immature Granulocyte 01/16/2023 0.0  0.0 - 0.5 K/UL Final    Differential Type 01/16/2023 AUTOMATED    Final    Sodium 01/16/2023 140  133 - 143 mmol/L Final    Potassium 01/16/2023 4.4  3.5 - 5.1 mmol/L Final    Chloride 01/16/2023 105  101 - 110 mmol/L Final    CO2 01/16/2023 30  21 - 32 mmol/L Final    Anion Gap 01/16/2023 5  2 - 11 mmol/L Final    Glucose 01/16/2023 102 (A)  65 - 100 mg/dL Final    BUN 01/16/2023 16  8 - 23 MG/DL Final    Creatinine 01/16/2023 0.90  0.6 - 1.0 MG/DL Final    Est, Glom Filt Rate 01/16/2023 >60  >60 ml/min/1.73m2 Final    Comment:      Pediatric calculator link: [email or web address] [email or web address]/[email or web address]_[email or web address]       These results are not intended for use in patients <25years of age. eGFR results are calculated without a race factor using  the 2021 CKD-EPI equation. Careful clinical correlation is recommended, particularly when comparing to results calculated using previous equations. The CKD-EPI equation is less accurate in patients with extremes of muscle mass, extra-renal metabolism of creatinine, excessive creatine ingestion, or following therapy that affects renal tubular secretion. Calcium 01/16/2023 9.6  8.3 - 10.4 MG/DL Final    Total Bilirubin 01/16/2023 0.5  0.2 - 1.1 MG/DL Final    ALT 01/16/2023 23  12 - 65 U/L Final    AST 01/16/2023 24  15 - 37 U/L Final    Alk Phosphatase 01/16/2023 119  50 - 136 U/L Final    Total Protein 01/16/2023 7.3  6.3 - 8.2 g/dL Final    Albumin 01/16/2023 3.6  3.2 - 4.6 g/dL Final    Globulin 01/16/2023 3.7  2.8 - 4.5 g/dL Final    Albumin/Globulin Ratio 01/16/2023 1.0  0.4 - 1.6   Final    Magnesium 01/16/2023 2.1  1.8 - 2.4 mg/dL Final         Treatment Summary has been discussed and given to patient: n/a        -------------------------------------------------------------------------------------------------------------------  Please call our office at [phone] if you have any  of the following symptoms:   Fever of 100.5 or greater  Chills  Shortness of breath  Swelling or pain in one leg    After office hours an answering service is available and will contact a provider for emergencies or if you are experiencing any of the above symptoms. Patient did express an interest in My Chart.   My Chart log in information explained on the after visit summary printout at the check-out desk.     Lisbeth Merino RN

## 2023-01-17 ENCOUNTER — CLINICAL DOCUMENTATION (OUTPATIENT)
Dept: ONCOLOGY | Age: 78
End: 2023-01-17

## 2023-01-17 NOTE — PROGRESS NOTES
Spoke with patient's son, Ander King, regarding free drug. I informed him she was still enrolled to receive her Herceptin for free. He also had some questions regarding pending bills. I gave him Donis Niño number to inquire more about the bills. He had no further questions.

## 2023-02-02 PROBLEM — Z00.00 MEDICARE ANNUAL WELLNESS VISIT, SUBSEQUENT: Status: RESOLVED | Noted: 2018-10-12 | Resolved: 2023-02-02

## 2023-02-27 ENCOUNTER — HOSPITAL ENCOUNTER (OUTPATIENT)
Dept: LAB | Age: 78
Discharge: HOME | End: 2023-03-02
Payer: MEDICARE

## 2023-02-27 ENCOUNTER — OFFICE VISIT (OUTPATIENT)
Dept: ONCOLOGY | Age: 78
End: 2023-02-27
Payer: MEDICARE

## 2023-02-27 ENCOUNTER — HOSPITAL ENCOUNTER (OUTPATIENT)
Dept: INFUSION THERAPY | Age: 78
Discharge: HOME | End: 2023-02-27
Payer: MEDICARE

## 2023-02-27 VITALS
BODY MASS INDEX: 36.68 KG/M2 | DIASTOLIC BLOOD PRESSURE: 76 MMHG | SYSTOLIC BLOOD PRESSURE: 155 MMHG | WEIGHT: 207 LBS | RESPIRATION RATE: 16 BRPM | TEMPERATURE: 98.2 F | HEIGHT: 63 IN | HEART RATE: 75 BPM | OXYGEN SATURATION: 100 %

## 2023-02-27 DIAGNOSIS — C50.911 MALIGNANT NEOPLASM OF RIGHT BREAST IN FEMALE, ESTROGEN RECEPTOR POSITIVE, UNSPECIFIED SITE OF BREAST (HCC): Primary | ICD-10-CM

## 2023-02-27 DIAGNOSIS — G47.00 INSOMNIA, UNSPECIFIED TYPE: ICD-10-CM

## 2023-02-27 DIAGNOSIS — Z17.0 MALIGNANT NEOPLASM OF RIGHT BREAST IN FEMALE, ESTROGEN RECEPTOR POSITIVE, UNSPECIFIED SITE OF BREAST (HCC): ICD-10-CM

## 2023-02-27 DIAGNOSIS — T45.1X5A CHEMOTHERAPY-INDUCED NEUROPATHY (HCC): ICD-10-CM

## 2023-02-27 DIAGNOSIS — Z17.0 MALIGNANT NEOPLASM OF RIGHT BREAST IN FEMALE, ESTROGEN RECEPTOR POSITIVE, UNSPECIFIED SITE OF BREAST (HCC): Primary | ICD-10-CM

## 2023-02-27 DIAGNOSIS — G62.0 CHEMOTHERAPY-INDUCED NEUROPATHY (HCC): ICD-10-CM

## 2023-02-27 DIAGNOSIS — C50.911 MALIGNANT NEOPLASM OF RIGHT BREAST IN FEMALE, ESTROGEN RECEPTOR POSITIVE, UNSPECIFIED SITE OF BREAST (HCC): ICD-10-CM

## 2023-02-27 LAB
ALBUMIN SERPL-MCNC: 3.7 G/DL (ref 3.2–4.6)
ALBUMIN/GLOB SERPL: 1 (ref 0.4–1.6)
ALP SERPL-CCNC: 116 U/L (ref 50–136)
ALT SERPL-CCNC: 25 U/L (ref 12–65)
ANION GAP SERPL CALC-SCNC: 3 MMOL/L (ref 2–11)
AST SERPL-CCNC: 21 U/L (ref 15–37)
BASOPHILS # BLD: 0.1 K/UL (ref 0–0.2)
BASOPHILS NFR BLD: 1 % (ref 0–2)
BILIRUB SERPL-MCNC: 0.7 MG/DL (ref 0.2–1.1)
BUN SERPL-MCNC: 16 MG/DL (ref 8–23)
CALCIUM SERPL-MCNC: 9.4 MG/DL (ref 8.3–10.4)
CHLORIDE SERPL-SCNC: 108 MMOL/L (ref 101–110)
CO2 SERPL-SCNC: 30 MMOL/L (ref 21–32)
CREAT SERPL-MCNC: 0.8 MG/DL (ref 0.6–1)
DIFFERENTIAL METHOD BLD: NORMAL
EOSINOPHIL # BLD: 0.1 K/UL (ref 0–0.8)
EOSINOPHIL NFR BLD: 2 % (ref 0.5–7.8)
ERYTHROCYTE [DISTWIDTH] IN BLOOD BY AUTOMATED COUNT: 14.4 % (ref 11.9–14.6)
GLOBULIN SER CALC-MCNC: 3.6 G/DL (ref 2.8–4.5)
GLUCOSE SERPL-MCNC: 101 MG/DL (ref 65–100)
HCT VFR BLD AUTO: 40.5 % (ref 35.8–46.3)
HGB BLD-MCNC: 13.1 G/DL (ref 11.7–15.4)
IMM GRANULOCYTES # BLD AUTO: 0 K/UL (ref 0–0.5)
IMM GRANULOCYTES NFR BLD AUTO: 0 % (ref 0–5)
LYMPHOCYTES # BLD: 1.1 K/UL (ref 0.5–4.6)
LYMPHOCYTES NFR BLD: 21 % (ref 13–44)
MCH RBC QN AUTO: 28.3 PG (ref 26.1–32.9)
MCHC RBC AUTO-ENTMCNC: 32.3 G/DL (ref 31.4–35)
MCV RBC AUTO: 87.5 FL (ref 82–102)
MONOCYTES # BLD: 0.5 K/UL (ref 0.1–1.3)
MONOCYTES NFR BLD: 9 % (ref 4–12)
NEUTS SEG # BLD: 3.5 K/UL (ref 1.7–8.2)
NEUTS SEG NFR BLD: 67 % (ref 43–78)
NRBC # BLD: 0 K/UL (ref 0–0.2)
PLATELET # BLD AUTO: 168 K/UL (ref 150–450)
PMV BLD AUTO: 10.7 FL (ref 9.4–12.3)
POTASSIUM SERPL-SCNC: 4.3 MMOL/L (ref 3.5–5.1)
PROT SERPL-MCNC: 7.3 G/DL (ref 6.3–8.2)
RBC # BLD AUTO: 4.63 M/UL (ref 4.05–5.2)
SODIUM SERPL-SCNC: 141 MMOL/L (ref 133–143)
WBC # BLD AUTO: 5.3 K/UL (ref 4.3–11.1)

## 2023-02-27 PROCEDURE — 80053 COMPREHEN METABOLIC PANEL: CPT

## 2023-02-27 PROCEDURE — 6360000002 HC RX W HCPCS: Performed by: INTERNAL MEDICINE

## 2023-02-27 PROCEDURE — G8484 FLU IMMUNIZE NO ADMIN: HCPCS | Performed by: INTERNAL MEDICINE

## 2023-02-27 PROCEDURE — G8428 CUR MEDS NOT DOCUMENT: HCPCS | Performed by: INTERNAL MEDICINE

## 2023-02-27 PROCEDURE — 96415 CHEMO IV INFUSION ADDL HR: CPT

## 2023-02-27 PROCEDURE — 96413 CHEMO IV INFUSION 1 HR: CPT

## 2023-02-27 PROCEDURE — G8417 CALC BMI ABV UP PARAM F/U: HCPCS | Performed by: INTERNAL MEDICINE

## 2023-02-27 PROCEDURE — 85025 COMPLETE CBC W/AUTO DIFF WBC: CPT

## 2023-02-27 PROCEDURE — 99214 OFFICE O/P EST MOD 30 MIN: CPT | Performed by: INTERNAL MEDICINE

## 2023-02-27 PROCEDURE — 2580000003 HC RX 258: Performed by: INTERNAL MEDICINE

## 2023-02-27 PROCEDURE — 1036F TOBACCO NON-USER: CPT | Performed by: INTERNAL MEDICINE

## 2023-02-27 PROCEDURE — 1123F ACP DISCUSS/DSCN MKR DOCD: CPT | Performed by: INTERNAL MEDICINE

## 2023-02-27 PROCEDURE — G8399 PT W/DXA RESULTS DOCUMENT: HCPCS | Performed by: INTERNAL MEDICINE

## 2023-02-27 PROCEDURE — 36415 COLL VENOUS BLD VENIPUNCTURE: CPT

## 2023-02-27 PROCEDURE — 3078F DIAST BP <80 MM HG: CPT | Performed by: INTERNAL MEDICINE

## 2023-02-27 PROCEDURE — 3077F SYST BP >= 140 MM HG: CPT | Performed by: INTERNAL MEDICINE

## 2023-02-27 PROCEDURE — 1090F PRES/ABSN URINE INCON ASSESS: CPT | Performed by: INTERNAL MEDICINE

## 2023-02-27 RX ORDER — SODIUM CHLORIDE 0.9 % (FLUSH) 0.9 %
5-40 SYRINGE (ML) INJECTION PRN
Status: DISCONTINUED | OUTPATIENT
Start: 2023-02-27 | End: 2023-02-28 | Stop reason: HOSPADM

## 2023-02-27 RX ORDER — SODIUM CHLORIDE 9 MG/ML
5-250 INJECTION, SOLUTION INTRAVENOUS PRN
Status: DISCONTINUED | OUTPATIENT
Start: 2023-02-27 | End: 2023-02-28 | Stop reason: HOSPADM

## 2023-02-27 RX ORDER — TEMAZEPAM 30 MG/1
CAPSULE ORAL
COMMUNITY
Start: 2023-02-03

## 2023-02-27 RX ORDER — TEMAZEPAM 30 MG/1
30 CAPSULE ORAL NIGHTLY PRN
Qty: 30 CAPSULE | Refills: 2 | OUTPATIENT
Start: 2023-02-27 | End: 2023-03-29

## 2023-02-27 RX ORDER — HEPARIN SODIUM (PORCINE) LOCK FLUSH IV SOLN 100 UNIT/ML 100 UNIT/ML
500 SOLUTION INTRAVENOUS PRN
Status: DISCONTINUED | OUTPATIENT
Start: 2023-02-27 | End: 2023-02-28 | Stop reason: HOSPADM

## 2023-02-27 RX ADMIN — TRASTUZUMAB 750 MG: 150 INJECTION, POWDER, LYOPHILIZED, FOR SOLUTION INTRAVENOUS at 12:15

## 2023-02-27 RX ADMIN — HEPARIN 500 UNITS: 100 SYRINGE at 13:50

## 2023-02-27 RX ADMIN — SODIUM CHLORIDE, PRESERVATIVE FREE 10 ML: 5 INJECTION INTRAVENOUS at 11:15

## 2023-02-27 RX ADMIN — SODIUM CHLORIDE 100 ML/HR: 9 INJECTION, SOLUTION INTRAVENOUS at 11:20

## 2023-02-27 ASSESSMENT — PATIENT HEALTH QUESTIONNAIRE - PHQ9
1. LITTLE INTEREST OR PLEASURE IN DOING THINGS: 0
SUM OF ALL RESPONSES TO PHQ9 QUESTIONS 1 & 2: 0
2. FEELING DOWN, DEPRESSED OR HOPELESS: 0
SUM OF ALL RESPONSES TO PHQ QUESTIONS 1-9: 0

## 2023-02-27 NOTE — PROGRESS NOTES
[facility] Hematology and Oncology: Office Visit Established Patient    Chief Complaint:    Chief Complaint   Patient presents with    Follow-up         History of Present Illness:  Ms. Jake Mcallister is a 69 y.o. female who returns today for management of breast cancer. She initially presented for a routine bilateral screening mammogram on 3/23/22 which identified a right breast mass near the 11:00-12:00 position, 6-7 cm from the nipple and other small circumscribed round masses in the right upper inner breast which had not definitely changed from prior examinations. Further evaluation with right breast ultrasound on 3/25/22 confirmed a 6 mm hypoechoic shadowing mass in the 12:00 right breast at 7 cm from the nipple. She presented to Dr. Merly Armenta on 4/7/22 to discuss whether to proceed with recommended biopsy, he recommended that biopsy be performed. Ultrasound-guided biopsy was subsequently completed on 4/12/22 with pathology revealing high grade infiltrating ductal carcinoma, ER 76%, CT negative, and HER2 positive. MRI of the bilateral breasts was completed on 4/14/22 demonstrating a right anterior 12:00 breast cancer measuring up to 1.2 cm; multiple (greater than 10 total) other right upper breast masses, some of which had enlarged, concerning for additional malignancy; an indeterminate 1.2 cm right axillary lymph node; and no suspicious left breast finding. We recommended upfront surgery because of the MRI findings and the inconclusive size/clinical stage of her cancer. Path reviewed, thankfully it appears that the indeterminate lesions in the breast were benign, with the final tumor dimension being only 1.5 cm, and 4 nodes negative for tumor. Therefore, she is [id number], and would be eligible for the adjuvant paclitaxel and trastuzumab regimen. She is here today for follow up. She remains on Arimidex, we held her Herceptin for the last two cycles because of worsening neuropathy.   This is no different after holding two doses of treatment.  It continues to affect her quality of life, she is not sleeping as well. She is willing to try medications but wishes to wait on prescriptions until she has exhausted supplements/OTCs.  No other active issues.      Review of Systems:  Constitutional: Positive for fatigue.   HENT: Negative.   Eyes: Negative.   Respiratory: Negative.   Cardiovascular: Negative.  Gastrointestinal: Negative.  Genitourinary: Negative.   Musculoskeletal: Negative.   Skin: Negative.   Neurological: Positive for neuropathy.   Endo/Heme/Allergies: Negative.   Psychiatric/Behavioral: Negative.   All other systems reviewed and are negative.       Allergies   Allergen Reactions    Sulfamethoxazole-Trimethoprim Nausea And Vomiting    Meloxicam Other (See Comments)    Oxaprozin Swelling     Past Medical History:   Diagnosis Date    Anxiety     Depression 2017    Essential hypertension 2017    Fibromyalgia     History of postoperative nausea and vomiting     Hyperlipidemia     Hypothyroid     Impaired fasting glucose     Insomnia     Nausea & vomiting     Need for hepatitis C screening test 2020    3/15/21 HCV ab negative.      Obesity     Osteoarthritis 12/15/2015    Stress incontinence, female     Vitamin D deficiency      Past Surgical History:   Procedure Laterality Date    BREAST BIOPSY Bilateral     BREAST BIOPSY Right 3/29/2021    RIGHT BREAST NEEDLE LOCALIZED LUMPECTOMY X2 performed by Jabier Carl MD at [facility] OR     SECTION      COLONOSCOPY      normal; internal hemorrhoid    HYSTERECTOMY (CERVIX STATUS UNKNOWN)  1980    LYMPH NODE BIOPSY Right 2022    RIGHT SENTINEL NODE BIOPSY MAG TRACE performed by Jabier Carl MD at [facility] OR    MASTECTOMY Right 2022    RIGHT BREAST MASTECTOMY performed by Jabier Carl MD at [facility] OR    PORT SURGERY Left 2022    PORT INSERTION performed by Jabier Carl MD at [facility] OR    TOTAL KNEE ARTHROPLASTY  Bilateral     Left 2015, Right 7/29/20    US BREAST BIOPSY NEEDLE ADDITIONAL RIGHT Right 2/25/2021    US BREAST BIOPSY NEEDLE ADDITIONAL RIGHT 2/25/2021 [facility]    US BREAST BIOPSY W LOC DEVICE 1ST LESION RIGHT Right 2/25/2021    US BREAST NEEDLE BIOPSY RIGHT 2/25/2021 [facility]    US BREAST BIOPSY W LOC DEVICE 1ST LESION RIGHT Right 4/12/2022    US BREAST NEEDLE BIOPSY RIGHT 4/12/2022 [facility]    US GUIDED NEEDLE LOC BREAST ADDL RIGHT Right 3/29/2021    US GUIDED NEEDLE LOC BREAST ADDL RIGHT 3/29/2021 [facility]    US GUIDED NEEDLE LOC OF RIGHT BREAST Right 3/29/2021    US GUIDED NEEDLE LOC OF RIGHT BREAST 3/29/2021 [facility]     Family History   Problem Relation Age of Onset    No Known Problems Mother     Heart Disease Father     Breast Cancer Sister     Stroke Father      Social History     Socioeconomic History    Marital status:       Spouse name: Not on file    Number of children: Not on file    Years of education: Not on file    Highest education level: Not on file   Occupational History    Not on file   Tobacco Use    Smoking status: Never    Smokeless tobacco: Never   Substance and Sexual Activity    Alcohol use: No    Drug use: No    Sexual activity: Not on file   Other Topics Concern    Not on file   Social History Narrative    Not on file     Social Determinants of Health     Financial Resource Strain: Not on file   Food Insecurity: Not on file   Transportation Needs: Not on file   Physical Activity: Insufficiently Active    Days of Exercise per Week: 3 days    Minutes of Exercise per Session: 30 min   Stress: Not on file   Social Connections: Not on file   Intimate Partner Violence: Not on file   Housing Stability: Not on file     Current Outpatient Medications   Medication Sig Dispense Refill    levothyroxine (SYNTHROID) 112 MCG tablet Take 1 tablet by mouth Daily TAKE 1 TABLET EVERY DAY BEFORE BREAKFAST FOR LOW THYROID HORMONE LEVELS 90 tablet 1    Calcium-Magnesium-Vitamin D (CALCIUM MAGNESIUM PO) Take by mouth      losartan (COZAAR) 100 MG tablet Take 1 tablet by mouth daily 90 tablet 2    anastrozole (ARIMIDEX) 1 MG tablet Take 1 tablet by mouth daily 90 tablet 3    lidocaine-prilocaine (EMLA) 2.5-2.5 % cream Apply topically as needed. 30 g 2    Multiple Vitamin (MULTIVITAMIN ADULT PO) Take 1 tablet by mouth daily. Carboxymethylcellul-Glycerin (REFRESH OPTIVE OP) Place 1 drop into both eyes daily. ibuprofen (ADVIL;MOTRIN) 400 MG tablet Take by mouth every 6 hours as needed      temazepam (RESTORIL) 30 MG capsule       ondansetron (ZOFRAN) 4 MG tablet Take 2 tablets by mouth 3 times daily as needed for Nausea or Vomiting (Patient not taking: Reported on 2023) 90 tablet 2    prochlorperazine (COMPAZINE) 10 MG tablet Take 1 tablet by mouth every 6 hours as needed (nausea) (Patient not taking: Reported on 2023) 60 tablet 2     Current Facility-Administered Medications   Medication Dose Route Frequency Provider Last Rate Last Admin    hydrocortisone (ANUSOL-HC) 2.5 % rectal cream   Rectal BID EDISON Ward - CNP           OBJECTIVE:  BP (!) 155/76 (Site: Left Upper Arm, Position: Standing, Cuff Size: Medium Adult)   Pulse 75   Temp 98.2 °F (36.8 °C) (Oral)   Resp 16   Ht 5' 3\" (1.6 m)   Wt 207 lb (93.9 kg)   SpO2 100%   BMI 36.67 kg/m²   Pain Score:   6 (fatigue-0 if sitting if walks distance will get winded)    ECO    Physical Exam:  Constitutional: Well developed, well nourished female in no acute distress, sitting comfortably in the exam room chair. HEENT: Normocephalic and atraumatic. Sclerae anicteric. Neck supple without JVD. No thyromegaly present. Lymph node   Deferred   Skin Warm and dry. No bruising noted. Maculopapular rash over both hands more prominent on left. No pallor.     Respiratory Lungs are clear to auscultation bilaterally without wheezes, rales or rhonchi, normal air exchange without accessory muscle use. CVS Normal rate, regular rhythm and normal S1 and S2. No murmurs, gallops, or rubs. Neuro Grossly nonfocal with no obvious sensory or motor deficits. MSK Normal range of motion in general.  No edema and no tenderness. Psych Appropriate mood and affect.           Labs:  Recent Results (from the past 96 hour(s))   CBC with Auto Differential    Collection Time: 02/27/23 10:06 AM   Result Value Ref Range    WBC 5.3 4.3 - 11.1 K/uL    RBC 4.63 4.05 - 5.2 M/uL    Hemoglobin 13.1 11.7 - 15.4 g/dL    Hematocrit 40.5 35.8 - 46.3 %    MCV 87.5 82.0 - 102.0 FL    MCH 28.3 26.1 - 32.9 PG    MCHC 32.3 31.4 - 35.0 g/dL    RDW 14.4 11.9 - 14.6 %    Platelets 448 935 - 286 K/uL    MPV 10.7 9.4 - 12.3 FL    nRBC 0.00 0.0 - 0.2 K/uL    Differential Type AUTOMATED      Seg Neutrophils 67 43 - 78 %    Lymphocytes 21 13 - 44 %    Monocytes 9 4.0 - 12.0 %    Eosinophils % 2 0.5 - 7.8 %    Basophils 1 0.0 - 2.0 %    Immature Granulocytes 0 0.0 - 5.0 %    Segs Absolute 3.5 1.7 - 8.2 K/UL    Absolute Lymph # 1.1 0.5 - 4.6 K/UL    Absolute Mono # 0.5 0.1 - 1.3 K/UL    Absolute Eos # 0.1 0.0 - 0.8 K/UL    Basophils Absolute 0.1 0.0 - 0.2 K/UL    Absolute Immature Granulocyte 0.0 0.0 - 0.5 K/UL   Comprehensive Metabolic Panel    Collection Time: 02/27/23 10:06 AM   Result Value Ref Range    Sodium 141 133 - 143 mmol/L    Potassium 4.3 3.5 - 5.1 mmol/L    Chloride 108 101 - 110 mmol/L    CO2 30 21 - 32 mmol/L    Anion Gap 3 2 - 11 mmol/L    Glucose 101 (H) 65 - 100 mg/dL    BUN 16 8 - 23 MG/DL    Creatinine 0.80 0.6 - 1.0 MG/DL    Est, Glom Filt Rate >60 >60 ml/min/1.73m2    Calcium 9.4 8.3 - 10.4 MG/DL    Total Bilirubin 0.7 0.2 - 1.1 MG/DL    ALT 25 12 - 65 U/L    AST 21 15 - 37 U/L    Alk Phosphatase 116 50 - 136 U/L    Total Protein 7.3 6.3 - 8.2 g/dL    Albumin 3.7 3.2 - 4.6 g/dL    Globulin 3.6 2.8 - 4.5 g/dL    Albumin/Globulin Ratio 1.0 0.4 - 1.6             Imaging:  MRI of the Breasts with and without contrast       CLINICAL INDICATION:  New diagnosis of right 12:00 breast cancer status post   biopsy demonstrating infiltrating ductal carcinoma high-grade, poorly   differentiated. Evaluate for extent of disease, staging, therapy planning. Patient had right benign papillomas removed in 2021, and a left benign biopsy in   2011 demonstrating a 2:00 fibroadenoma. Family history of her sister having   breast cancer. .       COMPARISON: Ultrasound and mammography 4/12/2022, 3/25/2022, 3/23/2022 and prior   breast MRI 3/3/2021. TECHNIQUE: Standard MRI sequences were obtained through the breasts in multiple   planes. Images were obtained before and after intravenous infusion of 20 mL of   Prohance contrast. Images were reviewed with PACS and with [employer] CAD software. FINDINGS: The breasts demonstrate mild-moderate bilateral glandularity and mild   background enhancement. Right breast: The new 12:00 biopsy clip is within an irregular heterogeneously   enhancing 1.2 x 0.8 x 1.1 cm malignant mass. As seen on prior exam a large area   of the right upper breast spanning 11:00-12:00 anterior, middle, posterior depth   (overall about 10-12 cm) contains multiple scattered masses of varying sizes and   shapes. There are greater than 10 masses total. Anteriorly at 12:00 10 cm from   nipple an irregular heterogeneously enhancing mass measures up to 1.2 x 1.0 cm   (previously 0.6 x 0.4 cm). Posteriorly near 11:00 14 cm from nipple a lobulated   mass measures up to 1.1 x 0.4 cm (previously 0.8 x 0.3 cm). Left breast: No evidence of suspicious enhancing mass, and no dominant or unique   nonmass enhancement, to suggest additional malignancy. Lymph nodes: Right axilla demonstrates a dysmorphic 1.2 x 0.8 cm node (series 4   image 351) which has developed since the prior exam. No other axillary or   internal mammary lymphadenopathy is evident.        Elsewhere: Limited visualization of the partially included thorax and upper   abdomen shows no acute abnormality. Impression       1. Right anterior 12:00 breast cancer measures up to 1.2 cm. 2. Multiple (greater than 10 total) other right upper breast masses, some of   which have enlarged, concerning for additional malignancy. 3. Right axillary indeterminate lymph node. 4. No suspicious left breast finding. Recommend continued malignancy management as directed clinically. BI-RADS Assessment Category 6: Known Biopsy Proven Malignancy             Pathology:  DIAGNOSIS        A:  \"RIGHT BREAST MASTECTOMY\":  INVASIVE DUCTAL CARCINOMA, HIGH GRADE     (3 + 3 + 2), 15 MILLIMETER IN GREATEST DIMENSION, MARGINS UNINVOLVED;   FIBROCYSTIC MASTOPATHY. B :  \"SENTINEL NODE #1\":  BENIGN LYMPH NODE, NEGATIVE FOR TUMOR. C:  \"ADDITIONAL AXILLARY TISSUE\":  FOUR BENIGN LYMPH NODES, ALL   NEGATIVE FOR TUMOR (0/4). * * *DIAGNOSIS* * * * * * * * * * * * * * * * * * * * * * * * * * * * * * *            A:  \" RIGHT BREAST, 12:00 POSITION, 7 CM FROM NIPPLE, CORE BIOPSY\":         INFILTRATING DUCTAL CARCINOMA WITH APOCRINE FEATURES, HIGH GRADE   (POORLY DIFFERENTIATED). DEFINITE IN SITU COMPONENT AND LYMPHOVASCULAR   INVASION ARE NOT IDENTIFIED             jtl/4/13/2022     * * *Electronically Signed Out* * *         Sign Out Date: 4/13/2022  JAH Marquez M.D.           * * *PROCEDURES/ADDENDA* * * * * * * * * * * * * * * * * * * * *  * * * *                         STF- [id number] BY IHC                                                                                   Status:  Reviewed By:    Kip Barnett MD on 4/19/2022                                 Interpretation                       [employer] IHC Quantitative Breast Panel     TEST NAME:                         RESULTS:               INTERNAL   CONTROLS:     ESTROGEN RECEPTOR:               Positive (76%)               Present,   Positive   PROGESTERONE RECEPTOR: Negative (0.0%)          Present, Positive   HER-2/KELVIN:                         Positive (3+)               Percentage   of Cells with Uniform        Intense Complete Membrane   Stainin%       ASSESSMENT:   Diagnosis Orders   1. Malignant neoplasm of right breast in female, estrogen receptor positive, unspecified site of breast ([facility] [address]        2. Chemotherapy-induced neuropathy ([facility] [address]              PLAN:  Lab studies were personally reviewed. Breast cancer: discovered incidentally on mammogram, 6mm on ultrasound, biopsy proven, high grade IDC, ER 76%, MS negative, HER2 positive. MRI shows greater than 10 lesions in the breast with the dominant lesion 1.2 cm, a dysmorphic indeterminate lymph node in the right axilla, and no evidence of contralateral or distant disease. Her MRI imaging made things more complicated - if she was obviously T1N0, then upfront surgery would be appropriate, lumpectomy/sentinel node followed by adjuvant Taxol/Herceptin. However, she seemed to have multiple lesions that are indeterminate which make it possible that her disease is multifocal, as well as an indeterminate  axillary node. For this reason, we should at least consider neoadjuvant chemotherapy. However, I would strongly advise at least two additional biopsies in that case, ultrasound guided biopsy of the axillary node, and possibly MRI guided biopsy (if not  visualized on ultrasound) of another in-breast lesion to confirm pathology. She was reluctant to consider additional biopsy, so ultimately the decision was made to proceed with mastectomy. Path reviewed, thankfully it appears that the indeterminate lesions in the breast were benign, with the final tumor dimension being only 1.5 cm, and 4 nodes negative for tumor. Therefore, she is [id number], and would be eligible for the adjuvant paclitaxel and trastuzumab regimen. After TH she will complete a year of Herceptin, as well as endocrine therapy with AI.   She will not require radiation due to the T1N0 disease and mastectomy. She is here today for follow up. She remains on Arimidex, we held her Herceptin for the last two cycles because of worsening neuropathy. This is no different after holding two doses of treatment. It continues to affect her quality of life, she is not sleeping as well. She is willing to try medications but wishes to wait on prescriptions until she has exhausted supplements/OTCs. No other active issues. Labs reviewed and are unremarkable. Given the lack of improvement with a treatment break, I would resume Herceptin monotherapy to complete the year of treatment. We will try alpha-lipoic acid in addition to B complex, we can escalate to prescription Metanx or gabapentin if these are ineffective. She will continue on Arimidex as planned. DEXA March 2022 showed mild osteopenia, repeat March 2024. Call with any fevers, uncontrolled side effects from treatment or any other worrisome/concerning symptoms. Follow up Herceptin only in 3 weeks, OV in 6 weeks. All questions were asked and answered to the best of my ability.            Srinivas Meeks MD  [facility] Hematology and Oncology  [address] [place] [address]  Office : [phone]  Fax : [phone]

## 2023-02-27 NOTE — PATIENT INSTRUCTIONS
Patient Instructions from Today's Visit    Reason for Visit:  Follow up Breast Cancer    Diagnosis Information:  https://[email or web address]/. net/about-us/asco-answers-patient-education-materials/nriz-wunaqjs-bqwx-sheets    Plan:  Lab results reviewed  Alpha Lipoic Acid with a multivitamin  Restart Herceptin    Follow Up:   Follow up in 6 weeks with labs prior    Recent Lab Results:  Hospital Outpatient Visit on 02/27/2023   Component Date Value Ref Range Status    WBC 02/27/2023 5.3  4.3 - 11.1 K/uL Final    RBC 02/27/2023 4.63  4.05 - 5.2 M/uL Final    Hemoglobin 02/27/2023 13.1  11.7 - 15.4 g/dL Final    Hematocrit 02/27/2023 40.5  35.8 - 46.3 % Final    MCV 02/27/2023 87.5  82.0 - 102.0 FL Final    MCH 02/27/2023 28.3  26.1 - 32.9 PG Final    MCHC 02/27/2023 32.3  31.4 - 35.0 g/dL Final    RDW 02/27/2023 14.4  11.9 - 14.6 % Final    Platelets 41/46/4464 168  150 - 450 K/uL Final    MPV 02/27/2023 10.7  9.4 - 12.3 FL Final    nRBC 02/27/2023 0.00  0.0 - 0.2 K/uL Final    **Note: Absolute NRBC parameter is now reported with Hemogram**    Differential Type 02/27/2023 AUTOMATED    Final    Seg Neutrophils 02/27/2023 67  43 - 78 % Final    Lymphocytes 02/27/2023 21  13 - 44 % Final    Monocytes 02/27/2023 9  4.0 - 12.0 % Final    Eosinophils % 02/27/2023 2  0.5 - 7.8 % Final    Basophils 02/27/2023 1  0.0 - 2.0 % Final    Immature Granulocytes 02/27/2023 0  0.0 - 5.0 % Final    Segs Absolute 02/27/2023 3.5  1.7 - 8.2 K/UL Final    Absolute Lymph # 02/27/2023 1.1  0.5 - 4.6 K/UL Final    Absolute Mono # 02/27/2023 0.5  0.1 - 1.3 K/UL Final    Absolute Eos # 02/27/2023 0.1  0.0 - 0.8 K/UL Final    Basophils Absolute 02/27/2023 0.1  0.0 - 0.2 K/UL Final    Absolute Immature Granulocyte 02/27/2023 0.0  0.0 - 0.5 K/UL Final    Sodium 02/27/2023 141  133 - 143 mmol/L Final    Potassium 02/27/2023 4.3  3.5 - 5.1 mmol/L Final    Chloride 02/27/2023 108  101 - 110 mmol/L Final    CO2 02/27/2023 30  21 - 32 mmol/L Final    Anion Gap 02/27/2023 3  2 - 11 mmol/L Final    Glucose 02/27/2023 101 (A)  65 - 100 mg/dL Final    BUN 02/27/2023 16  8 - 23 MG/DL Final    Creatinine 02/27/2023 0.80  0.6 - 1.0 MG/DL Final    Est, Glom Filt Rate 02/27/2023 >60  >60 ml/min/1.73m2 Final    Comment:      Pediatric calculator link: [employer].at. org/professionals/kdoqi/gfr_calculatorped       These results are not intended for use in patients <25years of age. eGFR results are calculated without a race factor using  the 2021 CKD-EPI equation. Careful clinical correlation is recommended, particularly when comparing to results calculated using previous equations. The CKD-EPI equation is less accurate in patients with extremes of muscle mass, extra-renal metabolism of creatinine, excessive creatine ingestion, or following therapy that affects renal tubular secretion. Calcium 02/27/2023 9.4  8.3 - 10.4 MG/DL Final    Total Bilirubin 02/27/2023 0.7  0.2 - 1.1 MG/DL Final    ALT 02/27/2023 25  12 - 65 U/L Final    AST 02/27/2023 21  15 - 37 U/L Final    Alk Phosphatase 02/27/2023 116  50 - 136 U/L Final    Total Protein 02/27/2023 7.3  6.3 - 8.2 g/dL Final    Albumin 02/27/2023 3.7  3.2 - 4.6 g/dL Final    Globulin 02/27/2023 3.6  2.8 - 4.5 g/dL Final    Albumin/Globulin Ratio 02/27/2023 1.0  0.4 - 1.6   Final     Treatment Summary has been discussed and given to patient: N/A    -------------------------------------------------------------------------------------------------------------------  Please call our office at [phone] if you have any  of the following symptoms:   Fever of 100.5 or greater  Chills  Shortness of breath  Swelling or pain in one leg    After office hours an answering service is available and will contact a provider for emergencies or if you are experiencing any of the above symptoms. Patient does express an interest in My Chart.   My Chart log in information explained on the after visit summary printout at the check-out floyd.    Christa Peterson RN

## 2023-02-27 NOTE — PROGRESS NOTES
Arrived to the [facility]. Herceptin completed over 90 min for drug reload. Patient tolerated well. Any issues or concerns during appointment: none. Patient aware of next infusion appointment on 3/20 at 9:30 am.   Patient instructed to call provider with temperature of 100.4 or greater or nausea/vomiting/ diarrhea or pain not controlled by medications. Discharged ambulatory to home.

## 2023-03-01 ENCOUNTER — OFFICE VISIT (OUTPATIENT)
Dept: UROGYNECOLOGY | Age: 78
End: 2023-03-01
Payer: MEDICARE

## 2023-03-01 VITALS — BODY MASS INDEX: 36.71 KG/M2 | HEIGHT: 63 IN | WEIGHT: 207.2 LBS

## 2023-03-01 DIAGNOSIS — N39.41 URGE INCONTINENCE OF URINE: Primary | ICD-10-CM

## 2023-03-01 DIAGNOSIS — N81.89 WEAKNESS OF PELVIC FLOOR: ICD-10-CM

## 2023-03-01 DIAGNOSIS — N39.3 SUI (STRESS URINARY INCONTINENCE, FEMALE): ICD-10-CM

## 2023-03-01 LAB
BILIRUBIN, URINE, POC: NEGATIVE
BLOOD URINE, POC: NEGATIVE
GLUCOSE URINE, POC: NEGATIVE
KETONES, URINE, POC: NEGATIVE
LEUKOCYTE ESTERASE, URINE, POC: NEGATIVE
NITRITE, URINE, POC: NEGATIVE
PH, URINE, POC: 5.5 (ref 4.6–8)
PROTEIN,URINE, POC: NEGATIVE
SPECIFIC GRAVITY, URINE, POC: 1.01 (ref 1–1.03)
URINALYSIS CLARITY, POC: CLEAR
URINALYSIS COLOR, POC: YELLOW
UROBILINOGEN, POC: NORMAL

## 2023-03-01 PROCEDURE — 81003 URINALYSIS AUTO W/O SCOPE: CPT | Performed by: OBSTETRICS & GYNECOLOGY

## 2023-03-01 PROCEDURE — 1036F TOBACCO NON-USER: CPT | Performed by: OBSTETRICS & GYNECOLOGY

## 2023-03-01 PROCEDURE — G8427 DOCREV CUR MEDS BY ELIG CLIN: HCPCS | Performed by: OBSTETRICS & GYNECOLOGY

## 2023-03-01 PROCEDURE — 51701 INSERT BLADDER CATHETER: CPT | Performed by: OBSTETRICS & GYNECOLOGY

## 2023-03-01 PROCEDURE — G8417 CALC BMI ABV UP PARAM F/U: HCPCS | Performed by: OBSTETRICS & GYNECOLOGY

## 2023-03-01 PROCEDURE — G8399 PT W/DXA RESULTS DOCUMENT: HCPCS | Performed by: OBSTETRICS & GYNECOLOGY

## 2023-03-01 PROCEDURE — 99204 OFFICE O/P NEW MOD 45 MIN: CPT | Performed by: OBSTETRICS & GYNECOLOGY

## 2023-03-01 PROCEDURE — 0509F URINE INCON PLAN DOCD: CPT | Performed by: OBSTETRICS & GYNECOLOGY

## 2023-03-01 PROCEDURE — G8484 FLU IMMUNIZE NO ADMIN: HCPCS | Performed by: OBSTETRICS & GYNECOLOGY

## 2023-03-01 PROCEDURE — 1123F ACP DISCUSS/DSCN MKR DOCD: CPT | Performed by: OBSTETRICS & GYNECOLOGY

## 2023-03-01 PROCEDURE — 1090F PRES/ABSN URINE INCON ASSESS: CPT | Performed by: OBSTETRICS & GYNECOLOGY

## 2023-03-01 NOTE — ASSESSMENT & PLAN NOTE
We discussed the differential diagnosis of urinary incontinence. We also discussed the pathogenesis and etiology of stress urinary incontinence. I explained the epidemiology of incontinence.  I offered her options which include nothing, physical therapy, barrier treatment, and surgery    UUI> RICH    We will treat this first.

## 2023-03-01 NOTE — ASSESSMENT & PLAN NOTE
We discussed the differential diagnosis of overactive bladder. We discussed the epidemiology, pathogenesis, and etiology of bladder overactivity including the neurophysiologic axis. We also discussed the treatment pathway for OAB. I offered options which include nothing, medications, physical therapy, behavior modification, and neuromodulation. Stop sweeteners. She has so much going on with breast cancer. She would like to try medication. The patient has overactive bladder. She has tried conservative management including dietary modifications, bladder training and physical therapy. She is requiring medical therapy. Long-term continuing therapy with anticholinergic drugs could induce brain changes partially comparable to those present in Alzheimers disease. We discussed the risks of dry eyes, dry mouth and constipation as well as slow gastric motility. She is not a candidate for anticholinergics because she is over the age of 73, has concerns for the risk of memory loss/ dementia. She already has dry eyes, dry mouth, acid reflux and constipation. Since there are other safer options for treatment, we will prescribe a beta-3 agonist.     She has a history of high blood pressure and therefore is not a candidate for Mirbegron.      After assess her symptoms, medical history and other medications, she will be treated with Vibegron 75mg, samples and coupon given

## 2023-03-01 NOTE — PROGRESS NOTES
[facility] UROGYNECOLOGY  [address]  Dept: [phone]        PCP:  Clayton De La O MD    3/1/2023        HPI:  I am being asked to see this patient in consultation by Dr. Lauren Navarro   for New Patient (incontinence)  . Ms. Kaleb Chowdhury  has been leaking urine for 4-5 years. She leaks urine intermittently. She does leak urine with cough, laugh, sneeze and activity. She does leak urine with urgency. She  uses thick and goes through 1. She leaks 4-5 times per day. When she leaks she leaks small amounts. She has not tried PT, she has tried medication was a trial she didn't recall. She has not had procedures/surgery for this in the past.     With both urge incontinence and stress incontinence, urgency incontinence bothers her the most.     She voids 5-10 times during the day. She voids 1-2 times over night. She has 7 BM per week, and does not strain. She drinks 1 caffeine drinks beverages per day. Coffee  She uses  . 5-1 sweetlow artificial sweeteners per day. Sweet n low  She drinks 0 alcoholic beverages per week. She has pelvic surgery in the past. Csection & hysterectomy  Her last PAP: not known  No components found for: 526654  Her last Colonoscopy: 2019  Her last Mammogram: 2022, R masectomy    She does not have a history of DM. Lab Results   Component Value Date    LABA1C 5.1 12/16/2021     Lab Results   Component Value Date     12/16/2021       She does not have a history of sleep apnea. Tobacco: No    Sexual History: not sexually active. Notes were reviewed from the referring provider Dr Lauren Navarro.       Results for orders placed or performed in visit on 03/01/23   AMB POC URINALYSIS DIP STICK AUTO W/O MICRO   Result Value Ref Range    Color, Urine, POC Yellow     Clarity, Urine, POC Clear     Glucose, Urine, POC Negative Negative    Bilirubin, Urine, POC Negative Negative    Ketones, Urine, POC Negative Negative    Specific Gravity, Urine, POC 1.010 1.001 - 1.035    Blood, Urine, POC Negative Negative    pH, Urine, POC 5.5 4.6 - 8.0    Protein, Urine, POC Negative Negative    Urobilinogen, POC 0.2 mg/dL     Nitrite, Urine, POC Negative Negative    Leukocyte Esterase, Urine, POC Negative Negative       Ht 5' 3\" (1.6 m)   Wt 207 lb 3.2 oz (94 kg)   BMI 36.70 kg/m²     PVR by straight catheterization: 40cc    Physical Exam  Vitals reviewed. Exam conducted with a chaperone present. Constitutional:       General: She is not in acute distress. Appearance: Normal appearance. She is obese. HENT:      Head: Normocephalic and atraumatic. Pulmonary:      Effort: Pulmonary effort is normal. No respiratory distress. Abdominal:      General: There is no distension. Palpations: There is no mass. Tenderness: There is no abdominal tenderness. There is no guarding or rebound. Hernia: No hernia is present. Musculoskeletal:      Cervical back: Normal range of motion. Skin:     General: Skin is warm and dry. Neurological:      Mental Status: She is alert and oriented to person, place, and time. Psychiatric:         Mood and Affect: Mood normal.         Behavior: Behavior normal.         Thought Content: Thought content normal.         Judgment: Judgment normal.        Female Genitourinary   Vulva:    Normal. No lesions  Bartholin's Gland:  Bilateral , Normal, nontender  Skenes Gland:  Bilateral, Normal, nontender   Clitoris:  Normal.   Introitus:    Normal.   Urethral Meatus:  Normal appearing, normal size, no lesions, no prolapse  Urethra:  No masses, no tenderness  Vagina:  No atrophy, no discharge, no lesions  Adnexa:   No masses palpated, no tenderness  Bladder:  No tenderness, no masses palpated  Perineum:  Normal, no lesions    Rectal   Anorectal Exam: No hemorrhoids and no masses or lesions of the perineum        POP-Q: (Pelvic Organ Prolapse - Quantification Exam):  No flowsheet data found.          Pelvic floor muscles: Tender Spasm R. Puborectalis: NO 0 /5    L. Puborectalis: NO 0 /5    R. Pubococcyg NO 0 /5    L. Pubococcyg NO 0 /5    R. Ileococcyg: NO 0 /5    L. Ileococcyg: NO 0 /5    R. Obturator Int: NO 0 /5    L. Obturator Int: NO 0 /5    R. Coccygeus: NO 0 /5    L. Coccygeus: NO 0 /5      Pelvic floor contractions: 1/5    Supine Stress Test of RICH: positive          1. Urge incontinence of urine  Assessment & Plan:  We discussed the differential diagnosis of overactive bladder. We discussed the epidemiology, pathogenesis, and etiology of bladder overactivity including the neurophysiologic axis. We also discussed the treatment pathway for OAB. I offered options which include nothing, medications, physical therapy, behavior modification, and neuromodulation. Stop sweeteners. She has so much going on with breast cancer. She would like to try medication. The patient has overactive bladder. She has tried conservative management including dietary modifications, bladder training and physical therapy. She is requiring medical therapy. Long-term continuing therapy with anticholinergic drugs could induce brain changes partially comparable to those present in Alzheimers disease. We discussed the risks of dry eyes, dry mouth and constipation as well as slow gastric motility. She is not a candidate for anticholinergics because she is over the age of 73, has concerns for the risk of memory loss/ dementia. She already has dry eyes, dry mouth, acid reflux and constipation. Since there are other safer options for treatment, we will prescribe a beta-3 agonist.     She has a history of high blood pressure and therefore is not a candidate for Mirbegron. After assess her symptoms, medical history and other medications, she will be treated with Vibegron 75mg, samples and coupon given      Orders:  -     AMB POC URINALYSIS DIP STICK AUTO W/O MICRO  -     INSERT,NON-INDWELLING BLADDER CATHETER  2.  RICH (stress urinary incontinence, female)  Assessment & Plan:  We discussed the differential diagnosis of urinary incontinence. We also discussed the pathogenesis and etiology of stress urinary incontinence. I explained the epidemiology of incontinence. I offered her options which include nothing, physical therapy, barrier treatment, and surgery    UUI> RICH    We will treat this first.   3. Weakness of pelvic floor  Assessment & Plan:  We discussed the purpose of physical therapy which is to strengthen the pelvic floor muscles and teach proper coordination of those muscles. I described the anatomy of those muscles involved and their relationship to the end-organs in the pelvis. I described therapy techniques which include a combination of therapeutic exercise, biofeedback, neuromuscular re-education, home programs, and electrical stimulation, as well as therapeutic massage and ultrasound for pain. Return in about 1 month (around 4/1/2023) [facility]. On this date 3/1/2023 I have spent 55 minutes reviewing previous notes, test results and face to face with the patient discussing the diagnosis and importance of compliance with the treatment plan as well as documenting on the day of the visit.     Rubi Starr,

## 2023-03-01 NOTE — LETTER
Urogynecology  [facility]. [facility], [address]            To Whom It May Concern: This letter is in regards to CEZAR. She was seen in our office 3/1/2023 for a new patient appointment. Thank you for referring this patient to our office. Attached below is the patient's treatment plan. If you have any questions, please feel free to contact our office.       Sincerely,      Judith Espinosa, DO

## 2023-03-06 DIAGNOSIS — Z17.0 MALIGNANT NEOPLASM OF RIGHT BREAST IN FEMALE, ESTROGEN RECEPTOR POSITIVE, UNSPECIFIED SITE OF BREAST (HCC): ICD-10-CM

## 2023-03-06 DIAGNOSIS — C50.911 MALIGNANT NEOPLASM OF RIGHT BREAST IN FEMALE, ESTROGEN RECEPTOR POSITIVE, UNSPECIFIED SITE OF BREAST (HCC): ICD-10-CM

## 2023-03-06 DIAGNOSIS — G47.00 INSOMNIA, UNSPECIFIED TYPE: ICD-10-CM

## 2023-03-06 NOTE — TELEPHONE ENCOUNTER
Call from the patient and she would like to have a refill of Restoril. I have reviewed the patients controlled substance prescription history, as maintained in the [place] prescription monitoring program, so that the prescription(s) for a  controlled substance can be given. Restoril last filled on 2/3/23 30 capsule 30 day.   Script pended to Dr Lasha Hall

## 2023-03-07 RX ORDER — TEMAZEPAM 30 MG/1
30 CAPSULE ORAL NIGHTLY PRN
Qty: 30 CAPSULE | Refills: 2 | Status: SHIPPED | OUTPATIENT
Start: 2023-03-07 | End: 2023-05-06

## 2023-03-20 ENCOUNTER — HOSPITAL ENCOUNTER (OUTPATIENT)
Dept: INFUSION THERAPY | Age: 78
Discharge: HOME | End: 2023-03-20
Payer: MEDICARE

## 2023-03-20 VITALS
OXYGEN SATURATION: 100 % | HEART RATE: 71 BPM | WEIGHT: 206.6 LBS | DIASTOLIC BLOOD PRESSURE: 85 MMHG | BODY MASS INDEX: 36.6 KG/M2 | TEMPERATURE: 97.6 F | RESPIRATION RATE: 16 BRPM | SYSTOLIC BLOOD PRESSURE: 114 MMHG

## 2023-03-20 DIAGNOSIS — C50.911 MALIGNANT NEOPLASM OF RIGHT BREAST IN FEMALE, ESTROGEN RECEPTOR POSITIVE, UNSPECIFIED SITE OF BREAST (HCC): Primary | ICD-10-CM

## 2023-03-20 DIAGNOSIS — Z17.0 MALIGNANT NEOPLASM OF RIGHT BREAST IN FEMALE, ESTROGEN RECEPTOR POSITIVE, UNSPECIFIED SITE OF BREAST (HCC): Primary | ICD-10-CM

## 2023-03-20 PROCEDURE — 6360000002 HC RX W HCPCS: Performed by: INTERNAL MEDICINE

## 2023-03-20 PROCEDURE — 2580000003 HC RX 258: Performed by: INTERNAL MEDICINE

## 2023-03-20 PROCEDURE — 96413 CHEMO IV INFUSION 1 HR: CPT

## 2023-03-20 RX ORDER — DIPHENHYDRAMINE HYDROCHLORIDE 50 MG/ML
50 INJECTION INTRAMUSCULAR; INTRAVENOUS
Status: DISCONTINUED | OUTPATIENT
Start: 2023-03-20 | End: 2023-03-21 | Stop reason: HOSPADM

## 2023-03-20 RX ORDER — SODIUM CHLORIDE 9 MG/ML
5-250 INJECTION, SOLUTION INTRAVENOUS PRN
Status: DISCONTINUED | OUTPATIENT
Start: 2023-03-20 | End: 2023-03-21 | Stop reason: HOSPADM

## 2023-03-20 RX ORDER — SODIUM CHLORIDE 0.9 % (FLUSH) 0.9 %
5-40 SYRINGE (ML) INJECTION PRN
Status: DISCONTINUED | OUTPATIENT
Start: 2023-03-20 | End: 2023-03-21 | Stop reason: HOSPADM

## 2023-03-20 RX ORDER — ACETAMINOPHEN 325 MG/1
650 TABLET ORAL
Status: DISCONTINUED | OUTPATIENT
Start: 2023-03-20 | End: 2023-03-21 | Stop reason: HOSPADM

## 2023-03-20 RX ADMIN — SODIUM CHLORIDE, PRESERVATIVE FREE 10 ML: 5 INJECTION INTRAVENOUS at 09:35

## 2023-03-20 RX ADMIN — SODIUM CHLORIDE 20 ML/HR: 9 INJECTION, SOLUTION INTRAVENOUS at 09:40

## 2023-03-20 RX ADMIN — SODIUM CHLORIDE, PRESERVATIVE FREE 10 ML: 5 INJECTION INTRAVENOUS at 11:45

## 2023-03-20 RX ADMIN — TRASTUZUMAB 567 MG: 150 INJECTION, POWDER, LYOPHILIZED, FOR SOLUTION INTRAVENOUS at 11:10

## 2023-03-20 NOTE — PROGRESS NOTES
Arrived to the [facility]. Herceptin completed. Patient tolerated well. Any issues or concerns during appointment: unable to obtain blood return from port, however flushes well. VO received from Dr. Gilbert Chan to proceed with treatment. Patient aware of next infusion appointment on 4/10/23 (date) at 1:30 PM (time). Patient aware of next lab and [facility] office visit on 4/10/23 (date) at 12:30 PM (time). Appt calendar provided. Patient instructed to call provider with temperature of 100.4 or greater or nausea/vomiting/ diarrhea or pain not controlled by medications  Discharged well.

## 2023-04-05 ENCOUNTER — OFFICE VISIT (OUTPATIENT)
Dept: UROGYNECOLOGY | Age: 78
End: 2023-04-05
Payer: MEDICARE

## 2023-04-05 DIAGNOSIS — N39.41 URGE INCONTINENCE: Primary | ICD-10-CM

## 2023-04-05 DIAGNOSIS — C50.911 MALIGNANT NEOPLASM OF RIGHT BREAST IN FEMALE, ESTROGEN RECEPTOR POSITIVE, UNSPECIFIED SITE OF BREAST (HCC): Primary | ICD-10-CM

## 2023-04-05 DIAGNOSIS — R53.83 FATIGUE DUE TO TREATMENT: ICD-10-CM

## 2023-04-05 DIAGNOSIS — Z17.0 MALIGNANT NEOPLASM OF RIGHT BREAST IN FEMALE, ESTROGEN RECEPTOR POSITIVE, UNSPECIFIED SITE OF BREAST (HCC): Primary | ICD-10-CM

## 2023-04-05 DIAGNOSIS — Z79.899 HIGH RISK MEDICATION USE: ICD-10-CM

## 2023-04-05 PROCEDURE — G8427 DOCREV CUR MEDS BY ELIG CLIN: HCPCS | Performed by: OBSTETRICS & GYNECOLOGY

## 2023-04-05 PROCEDURE — G8417 CALC BMI ABV UP PARAM F/U: HCPCS | Performed by: OBSTETRICS & GYNECOLOGY

## 2023-04-05 PROCEDURE — 1036F TOBACCO NON-USER: CPT | Performed by: OBSTETRICS & GYNECOLOGY

## 2023-04-05 PROCEDURE — G8399 PT W/DXA RESULTS DOCUMENT: HCPCS | Performed by: OBSTETRICS & GYNECOLOGY

## 2023-04-05 PROCEDURE — 0509F URINE INCON PLAN DOCD: CPT | Performed by: OBSTETRICS & GYNECOLOGY

## 2023-04-05 PROCEDURE — 1090F PRES/ABSN URINE INCON ASSESS: CPT | Performed by: OBSTETRICS & GYNECOLOGY

## 2023-04-05 PROCEDURE — 99213 OFFICE O/P EST LOW 20 MIN: CPT | Performed by: OBSTETRICS & GYNECOLOGY

## 2023-04-05 PROCEDURE — 1123F ACP DISCUSS/DSCN MKR DOCD: CPT | Performed by: OBSTETRICS & GYNECOLOGY

## 2023-04-05 NOTE — PROGRESS NOTES
[facility] UROGYNECOLOGY  [address]  Dept: [phone]    PCP:  Trudi Josue MD    4/5/2023      HPI:  Pancho Ball is here to follow up on Follow-up and Incontinence (urge)  . Patient is doing well with gemtessa but will not be able to continue due to cost.    Her insurance company will cover anti cholinergics cheaper however she already has memory issues from chemo and does not want to take these. No results found for this visit on 04/05/23. There were no vitals taken for this visit. 1. Urge incontinence  Assessment & Plan: We are going to see if Myrbetriq works. Samples and coupon given. We discussed the differential diagnosis of overactive bladder. We discussed the epidemiology, pathogenesis, and etiology of bladder overactivity including the neurophysiologic axis. I offered options which include nothing, medications, physical therapy, behavior modification, and neuromodulation. She has tried medications and physical therapy without any improvement. After discussing her options for third line therapy, she is interested in botox/ chemodenervation to the pelvic floor. We discussed the benefits of botox. Many women have at least 50% reduction inn daily leaking episodes and that it may have to be injected every 4-12 months. We discussed the risks of botox which include UTI, urinary retention and a reaction to the onabotulinumtoxinA (Botox). She also   States that she is willing to self-cath if she does develop urinary retention. After discussing Intravesicular botox injections she has consented to cystoscopy with botox injections to the bladder wall. We will try another medication due to cost. If Myrbetriq does not work or is too expensive, we will consider botox. Return in about 1 month (around 5/5/2023) for UUI.       On this date 4/5/2023 I have spent 20 minutes reviewing previous notes, test results and face to face with the patient

## 2023-04-05 NOTE — ASSESSMENT & PLAN NOTE
We are going to see if Myrbetriq works. Samples and coupon given. We discussed the differential diagnosis of overactive bladder. We discussed the epidemiology, pathogenesis, and etiology of bladder overactivity including the neurophysiologic axis. I offered options which include nothing, medications, physical therapy, behavior modification, and neuromodulation. She has tried medications and physical therapy without any improvement. After discussing her options for third line therapy, she is interested in botox/ chemodenervation to the pelvic floor. We discussed the benefits of botox. Many women have at least 50% reduction inn daily leaking episodes and that it may have to be injected every 4-12 months. We discussed the risks of botox which include UTI, urinary retention and a reaction to the onabotulinumtoxinA (Botox). She also   States that she is willing to self-cath if she does develop urinary retention. After discussing Intravesicular botox injections she has consented to cystoscopy with botox injections to the bladder wall. We will try another medication due to cost. If Myrbetriq does not work or is too expensive, we will consider botox.

## 2023-04-05 NOTE — PATIENT INSTRUCTIONS
Patient Education        mirabegron  Pronunciation:  KRISS lopez  Brand:  Myrbetriq  What is the most important information I should know about mirabegron? You should not use mirabegron if you have uncontrolled hypertension (high blood pressure). What is mirabegron? Mirabegron is used in adults to treat overactive bladder with symptoms of frequent or urgent urination and urinary incontinence. Mirabegron is sometimes used together with another medicine called solifenacin (Vesicare). Mirabegron is used in children to treat neurogenic detrusor overactivity (NDO). Urinary incontinence caused by NDO is related to permanent nerve damage from conditions such as multiple sclerosis or spinal injury. Mirabegron tablets may be used in children at least 1years old. Mirabegron granules are for use in children at least 1years old who also weigh at least 77 pounds (35 kilograms). Mirabegron may also be used for purposes not listed in this medication guide. What should I discuss with my healthcare provider before taking mirabegron? You should not use mirabegron if you are allergic to it. Tell your doctor if you have ever had:  high blood pressure;  a bladder obstruction;  trouble emptying your bladder (very little urine or a weak stream of urine);  kidney disease; or  liver disease. Tell your doctor if you are pregnant or breastfeeding. Mirabegron should not be given to a child younger than 1years old. How should I take mirabegron? Follow all directions on your prescription label and read all medication guides or instruction sheets. Use the medicine exactly as directed. If you take mirabegron with solifenacin, take both medicines at the same time each day. Take the mirabegron tablet with a full glass of water. Swallow the tablet whole and do not crush, chew, or break it. An adult may take a mirabegron tablet with or without food. However, a child should take the tablet with food.   Both adults and children

## 2023-04-10 ENCOUNTER — HOSPITAL ENCOUNTER (OUTPATIENT)
Dept: INFUSION THERAPY | Age: 78
Discharge: HOME | End: 2023-04-10
Payer: MEDICARE

## 2023-04-10 DIAGNOSIS — C50.911 MALIGNANT NEOPLASM OF RIGHT BREAST IN FEMALE, ESTROGEN RECEPTOR POSITIVE, UNSPECIFIED SITE OF BREAST (HCC): Primary | ICD-10-CM

## 2023-04-10 DIAGNOSIS — Z17.0 MALIGNANT NEOPLASM OF RIGHT BREAST IN FEMALE, ESTROGEN RECEPTOR POSITIVE, UNSPECIFIED SITE OF BREAST (HCC): Primary | ICD-10-CM

## 2023-04-10 PROCEDURE — 6360000002 HC RX W HCPCS: Performed by: INTERNAL MEDICINE

## 2023-04-10 PROCEDURE — 96413 CHEMO IV INFUSION 1 HR: CPT

## 2023-04-10 PROCEDURE — 2580000003 HC RX 258: Performed by: INTERNAL MEDICINE

## 2023-04-10 RX ORDER — DIPHENHYDRAMINE HYDROCHLORIDE 50 MG/ML
50 INJECTION INTRAMUSCULAR; INTRAVENOUS
Status: DISCONTINUED | OUTPATIENT
Start: 2023-04-10 | End: 2023-04-11 | Stop reason: HOSPADM

## 2023-04-10 RX ORDER — EPINEPHRINE 1 MG/ML
0.3 INJECTION, SOLUTION, CONCENTRATE INTRAVENOUS PRN
Status: DISCONTINUED | OUTPATIENT
Start: 2023-04-10 | End: 2023-04-11 | Stop reason: HOSPADM

## 2023-04-10 RX ORDER — SODIUM CHLORIDE 9 MG/ML
5-250 INJECTION, SOLUTION INTRAVENOUS PRN
Status: DISCONTINUED | OUTPATIENT
Start: 2023-04-10 | End: 2023-04-11 | Stop reason: HOSPADM

## 2023-04-10 RX ORDER — HEPARIN SODIUM (PORCINE) LOCK FLUSH IV SOLN 100 UNIT/ML 100 UNIT/ML
500 SOLUTION INTRAVENOUS PRN
Status: DISCONTINUED | OUTPATIENT
Start: 2023-04-10 | End: 2023-04-11 | Stop reason: HOSPADM

## 2023-04-10 RX ORDER — ONDANSETRON 2 MG/ML
8 INJECTION INTRAMUSCULAR; INTRAVENOUS
Status: DISCONTINUED | OUTPATIENT
Start: 2023-04-10 | End: 2023-04-11 | Stop reason: HOSPADM

## 2023-04-10 RX ORDER — SODIUM CHLORIDE 0.9 % (FLUSH) 0.9 %
5-40 SYRINGE (ML) INJECTION PRN
Status: DISCONTINUED | OUTPATIENT
Start: 2023-04-10 | End: 2023-04-11 | Stop reason: HOSPADM

## 2023-04-10 RX ORDER — ALBUTEROL SULFATE 90 UG/1
4 AEROSOL, METERED RESPIRATORY (INHALATION) PRN
Status: DISCONTINUED | OUTPATIENT
Start: 2023-04-10 | End: 2023-04-11 | Stop reason: HOSPADM

## 2023-04-10 RX ORDER — SODIUM CHLORIDE 9 MG/ML
INJECTION, SOLUTION INTRAVENOUS CONTINUOUS
Status: DISCONTINUED | OUTPATIENT
Start: 2023-04-10 | End: 2023-04-11 | Stop reason: HOSPADM

## 2023-04-10 RX ORDER — ACETAMINOPHEN 325 MG/1
650 TABLET ORAL
Status: DISCONTINUED | OUTPATIENT
Start: 2023-04-10 | End: 2023-04-11 | Stop reason: HOSPADM

## 2023-04-10 RX ADMIN — SODIUM CHLORIDE, PRESERVATIVE FREE 10 ML: 5 INJECTION INTRAVENOUS at 14:45

## 2023-04-10 RX ADMIN — SODIUM CHLORIDE 20 ML/HR: 9 INJECTION, SOLUTION INTRAVENOUS at 14:45

## 2023-04-10 RX ADMIN — TRASTUZUMAB 567 MG: 150 INJECTION, POWDER, LYOPHILIZED, FOR SOLUTION INTRAVENOUS at 15:55

## 2023-04-10 RX ADMIN — HEPARIN 500 UNITS: 100 SYRINGE at 16:36

## 2023-04-10 RX ADMIN — SODIUM CHLORIDE, PRESERVATIVE FREE 10 ML: 5 INJECTION INTRAVENOUS at 16:36

## 2023-04-10 NOTE — PROGRESS NOTES
Arrived to the [facility]. Assessment completed, labs reviewed. Trastuzumab completed. Patient tolerated without problems. Any issues or concerns during appointment: None  Patient instructed to call provider with temperature of 100.4 or greater or nausea/vomiting/ diarrhea or pain not controlled by medications  Instructed to call Dr Bibi Bravo with any side effects or other concerns  Patient aware of next infusion appointment on 5/1/23 (date) at 1 27 PM (time).   Discharged ambulatory

## 2023-04-28 DIAGNOSIS — E46 PROTEIN-CALORIE MALNUTRITION, UNSPECIFIED SEVERITY (HCC): ICD-10-CM

## 2023-04-28 DIAGNOSIS — Z17.0 MALIGNANT NEOPLASM OF RIGHT BREAST IN FEMALE, ESTROGEN RECEPTOR POSITIVE, UNSPECIFIED SITE OF BREAST (HCC): Primary | ICD-10-CM

## 2023-04-28 DIAGNOSIS — C50.911 MALIGNANT NEOPLASM OF RIGHT BREAST IN FEMALE, ESTROGEN RECEPTOR POSITIVE, UNSPECIFIED SITE OF BREAST (HCC): Primary | ICD-10-CM

## 2023-05-01 ENCOUNTER — HOSPITAL ENCOUNTER (OUTPATIENT)
Dept: INFUSION THERAPY | Age: 78
Discharge: HOME | End: 2023-05-01
Payer: MEDICARE

## 2023-05-01 ENCOUNTER — HOSPITAL ENCOUNTER (OUTPATIENT)
Dept: LAB | Age: 78
Discharge: HOME | End: 2023-05-04
Payer: MEDICARE

## 2023-05-01 ENCOUNTER — OFFICE VISIT (OUTPATIENT)
Dept: ONCOLOGY | Age: 78
End: 2023-05-01
Payer: MEDICARE

## 2023-05-01 VITALS
SYSTOLIC BLOOD PRESSURE: 145 MMHG | DIASTOLIC BLOOD PRESSURE: 67 MMHG | OXYGEN SATURATION: 99 % | WEIGHT: 205.3 LBS | BODY MASS INDEX: 36.38 KG/M2 | TEMPERATURE: 97.7 F | RESPIRATION RATE: 18 BRPM | HEART RATE: 76 BPM | HEIGHT: 63 IN

## 2023-05-01 DIAGNOSIS — G62.0 CHEMOTHERAPY-INDUCED NEUROPATHY (HCC): ICD-10-CM

## 2023-05-01 DIAGNOSIS — R11.0 NAUSEA: ICD-10-CM

## 2023-05-01 DIAGNOSIS — T45.1X5A HOT FLASHES RELATED TO AROMATASE INHIBITOR THERAPY: ICD-10-CM

## 2023-05-01 DIAGNOSIS — R23.2 HOT FLASHES RELATED TO AROMATASE INHIBITOR THERAPY: ICD-10-CM

## 2023-05-01 DIAGNOSIS — Z17.0 MALIGNANT NEOPLASM OF RIGHT BREAST IN FEMALE, ESTROGEN RECEPTOR POSITIVE, UNSPECIFIED SITE OF BREAST (HCC): ICD-10-CM

## 2023-05-01 DIAGNOSIS — R53.83 FATIGUE DUE TO TREATMENT: ICD-10-CM

## 2023-05-01 DIAGNOSIS — C50.911 MALIGNANT NEOPLASM OF RIGHT BREAST IN FEMALE, ESTROGEN RECEPTOR POSITIVE, UNSPECIFIED SITE OF BREAST (HCC): Primary | ICD-10-CM

## 2023-05-01 DIAGNOSIS — T45.1X5A CHEMOTHERAPY-INDUCED NEUROPATHY (HCC): ICD-10-CM

## 2023-05-01 DIAGNOSIS — Z17.0 MALIGNANT NEOPLASM OF RIGHT BREAST IN FEMALE, ESTROGEN RECEPTOR POSITIVE, UNSPECIFIED SITE OF BREAST (HCC): Primary | ICD-10-CM

## 2023-05-01 DIAGNOSIS — E46 PROTEIN-CALORIE MALNUTRITION, UNSPECIFIED SEVERITY (HCC): ICD-10-CM

## 2023-05-01 DIAGNOSIS — C50.911 MALIGNANT NEOPLASM OF RIGHT BREAST IN FEMALE, ESTROGEN RECEPTOR POSITIVE, UNSPECIFIED SITE OF BREAST (HCC): ICD-10-CM

## 2023-05-01 LAB
ALBUMIN SERPL-MCNC: 3.7 G/DL (ref 3.2–4.6)
ALBUMIN/GLOB SERPL: 0.9 (ref 0.4–1.6)
ALP SERPL-CCNC: 132 U/L (ref 50–136)
ALT SERPL-CCNC: 32 U/L (ref 12–65)
ANION GAP SERPL CALC-SCNC: 2 MMOL/L (ref 2–11)
AST SERPL-CCNC: 26 U/L (ref 15–37)
BASOPHILS # BLD: 0.1 K/UL (ref 0–0.2)
BASOPHILS NFR BLD: 1 % (ref 0–2)
BILIRUB SERPL-MCNC: 0.7 MG/DL (ref 0.2–1.1)
BUN SERPL-MCNC: 17 MG/DL (ref 8–23)
CALCIUM SERPL-MCNC: 9.4 MG/DL (ref 8.3–10.4)
CHLORIDE SERPL-SCNC: 110 MMOL/L (ref 101–110)
CO2 SERPL-SCNC: 29 MMOL/L (ref 21–32)
CREAT SERPL-MCNC: 0.9 MG/DL (ref 0.6–1)
DIFFERENTIAL METHOD BLD: NORMAL
EOSINOPHIL # BLD: 0.1 K/UL (ref 0–0.8)
EOSINOPHIL NFR BLD: 2 % (ref 0.5–7.8)
ERYTHROCYTE [DISTWIDTH] IN BLOOD BY AUTOMATED COUNT: 13.9 % (ref 11.9–14.6)
GLOBULIN SER CALC-MCNC: 4 G/DL (ref 2.8–4.5)
GLUCOSE SERPL-MCNC: 101 MG/DL (ref 65–100)
HCT VFR BLD AUTO: 40 % (ref 35.8–46.3)
HGB BLD-MCNC: 13.1 G/DL (ref 11.7–15.4)
IMM GRANULOCYTES # BLD AUTO: 0 K/UL (ref 0–0.5)
IMM GRANULOCYTES NFR BLD AUTO: 0 % (ref 0–5)
LYMPHOCYTES # BLD: 1.3 K/UL (ref 0.5–4.6)
LYMPHOCYTES NFR BLD: 22 % (ref 13–44)
MAGNESIUM SERPL-MCNC: 2.1 MG/DL (ref 1.8–2.4)
MCH RBC QN AUTO: 29 PG (ref 26.1–32.9)
MCHC RBC AUTO-ENTMCNC: 32.8 G/DL (ref 31.4–35)
MCV RBC AUTO: 88.7 FL (ref 82–102)
MONOCYTES # BLD: 0.5 K/UL (ref 0.1–1.3)
MONOCYTES NFR BLD: 8 % (ref 4–12)
NEUTS SEG # BLD: 4.2 K/UL (ref 1.7–8.2)
NEUTS SEG NFR BLD: 67 % (ref 43–78)
NRBC # BLD: 0 K/UL (ref 0–0.2)
PLATELET # BLD AUTO: 165 K/UL (ref 150–450)
PMV BLD AUTO: 10.4 FL (ref 9.4–12.3)
POTASSIUM SERPL-SCNC: 4.6 MMOL/L (ref 3.5–5.1)
PROT SERPL-MCNC: 7.7 G/DL (ref 6.3–8.2)
RBC # BLD AUTO: 4.51 M/UL (ref 4.05–5.2)
SODIUM SERPL-SCNC: 141 MMOL/L (ref 133–143)
WBC # BLD AUTO: 6.1 K/UL (ref 4.3–11.1)

## 2023-05-01 PROCEDURE — 3078F DIAST BP <80 MM HG: CPT | Performed by: NURSE PRACTITIONER

## 2023-05-01 PROCEDURE — 96413 CHEMO IV INFUSION 1 HR: CPT

## 2023-05-01 PROCEDURE — G8427 DOCREV CUR MEDS BY ELIG CLIN: HCPCS | Performed by: NURSE PRACTITIONER

## 2023-05-01 PROCEDURE — 1123F ACP DISCUSS/DSCN MKR DOCD: CPT | Performed by: NURSE PRACTITIONER

## 2023-05-01 PROCEDURE — 1036F TOBACCO NON-USER: CPT | Performed by: NURSE PRACTITIONER

## 2023-05-01 PROCEDURE — 80053 COMPREHEN METABOLIC PANEL: CPT

## 2023-05-01 PROCEDURE — 6360000002 HC RX W HCPCS: Performed by: INTERNAL MEDICINE

## 2023-05-01 PROCEDURE — 99214 OFFICE O/P EST MOD 30 MIN: CPT | Performed by: NURSE PRACTITIONER

## 2023-05-01 PROCEDURE — 1090F PRES/ABSN URINE INCON ASSESS: CPT | Performed by: NURSE PRACTITIONER

## 2023-05-01 PROCEDURE — 3077F SYST BP >= 140 MM HG: CPT | Performed by: NURSE PRACTITIONER

## 2023-05-01 PROCEDURE — G8417 CALC BMI ABV UP PARAM F/U: HCPCS | Performed by: NURSE PRACTITIONER

## 2023-05-01 PROCEDURE — 36415 COLL VENOUS BLD VENIPUNCTURE: CPT

## 2023-05-01 PROCEDURE — 83735 ASSAY OF MAGNESIUM: CPT

## 2023-05-01 PROCEDURE — 2580000003 HC RX 258: Performed by: INTERNAL MEDICINE

## 2023-05-01 PROCEDURE — 85025 COMPLETE CBC W/AUTO DIFF WBC: CPT

## 2023-05-01 PROCEDURE — G8399 PT W/DXA RESULTS DOCUMENT: HCPCS | Performed by: NURSE PRACTITIONER

## 2023-05-01 RX ORDER — SODIUM CHLORIDE 9 MG/ML
5-250 INJECTION, SOLUTION INTRAVENOUS PRN
Status: DISCONTINUED | OUTPATIENT
Start: 2023-05-01 | End: 2023-05-02 | Stop reason: HOSPADM

## 2023-05-01 RX ORDER — SODIUM CHLORIDE 0.9 % (FLUSH) 0.9 %
5-40 SYRINGE (ML) INJECTION PRN
Status: DISCONTINUED | OUTPATIENT
Start: 2023-05-01 | End: 2023-05-02 | Stop reason: HOSPADM

## 2023-05-01 RX ADMIN — SODIUM CHLORIDE, PRESERVATIVE FREE 10 ML: 5 INJECTION INTRAVENOUS at 14:48

## 2023-05-01 RX ADMIN — SODIUM CHLORIDE, PRESERVATIVE FREE 10 ML: 5 INJECTION INTRAVENOUS at 14:10

## 2023-05-01 RX ADMIN — SODIUM CHLORIDE 25 ML/HR: 9 INJECTION, SOLUTION INTRAVENOUS at 14:10

## 2023-05-01 RX ADMIN — TRASTUZUMAB 567 MG: 150 INJECTION, POWDER, LYOPHILIZED, FOR SOLUTION INTRAVENOUS at 14:12

## 2023-05-01 ASSESSMENT — PATIENT HEALTH QUESTIONNAIRE - PHQ9
SUM OF ALL RESPONSES TO PHQ QUESTIONS 1-9: 4
4. FEELING TIRED OR HAVING LITTLE ENERGY: 3
7. TROUBLE CONCENTRATING ON THINGS, SUCH AS READING THE NEWSPAPER OR WATCHING TELEVISION: 0
1. LITTLE INTEREST OR PLEASURE IN DOING THINGS: 0
SUM OF ALL RESPONSES TO PHQ QUESTIONS 1-9: 4
5. POOR APPETITE OR OVEREATING: 0
6. FEELING BAD ABOUT YOURSELF - OR THAT YOU ARE A FAILURE OR HAVE LET YOURSELF OR YOUR FAMILY DOWN: 0
3. TROUBLE FALLING OR STAYING ASLEEP: 1
9. THOUGHTS THAT YOU WOULD BE BETTER OFF DEAD, OR OF HURTING YOURSELF: 0
10. IF YOU CHECKED OFF ANY PROBLEMS, HOW DIFFICULT HAVE THESE PROBLEMS MADE IT FOR YOU TO DO YOUR WORK, TAKE CARE OF THINGS AT HOME, OR GET ALONG WITH OTHER PEOPLE: 3
2. FEELING DOWN, DEPRESSED OR HOPELESS: 0
SUM OF ALL RESPONSES TO PHQ QUESTIONS 1-9: 4
8. MOVING OR SPEAKING SO SLOWLY THAT OTHER PEOPLE COULD HAVE NOTICED. OR THE OPPOSITE, BEING SO FIGETY OR RESTLESS THAT YOU HAVE BEEN MOVING AROUND A LOT MORE THAN USUAL: 0
SUM OF ALL RESPONSES TO PHQ9 QUESTIONS 1 & 2: 0
SUM OF ALL RESPONSES TO PHQ QUESTIONS 1-9: 4

## 2023-05-01 ASSESSMENT — ANXIETY QUESTIONNAIRES
IF YOU CHECKED OFF ANY PROBLEMS ON THIS QUESTIONNAIRE, HOW DIFFICULT HAVE THESE PROBLEMS MADE IT FOR YOU TO DO YOUR WORK, TAKE CARE OF THINGS AT HOME, OR GET ALONG WITH OTHER PEOPLE: VERY DIFFICULT
3. WORRYING TOO MUCH ABOUT DIFFERENT THINGS: 0
7. FEELING AFRAID AS IF SOMETHING AWFUL MIGHT HAPPEN: 0
6. BECOMING EASILY ANNOYED OR IRRITABLE: 0
5. BEING SO RESTLESS THAT IT IS HARD TO SIT STILL: 0
2. NOT BEING ABLE TO STOP OR CONTROL WORRYING: 0
GAD7 TOTAL SCORE: 1
4. TROUBLE RELAXING: 1
1. FEELING NERVOUS, ANXIOUS, OR ON EDGE: 0

## 2023-05-01 NOTE — PROGRESS NOTES
She was reluctant to consider additional biopsy, so ultimately the decision was made to proceed with mastectomy. Path reviewed, thankfully it appears that the indeterminate lesions in the breast were benign, with the final tumor dimension being only 1.5 cm, and 4 nodes negative for tumor. Therefore, she is [id number], and would be eligible for the adjuvant paclitaxel and trastuzumab regimen. After TH she will complete a year of Herceptin, as well as endocrine therapy with AI. She will not require radiation due to the T1N0 disease and mastectomy. She returns for follow up and her next cycle Herceptin, continues on daily Arimidex. Since her last visit she reports \"just don't feel good\". There is some fatigue, but otherwise unable to put her finger on whats wrong. Occasional nausea is ongoing, controlled with zofran, no bowel complaints. Appetite is good and weight is stable. There are no respiratory changes. BLE edema is stable. Hot flashes are occasional.  Neuropathy in her feet is stable, overall improved, now on 800 mg ALA daily. Since last visit she also reports new left hand pain with activity, has tried wearing 's brace for carpal tunnel at night with some helpful. She denies any fevers or infectious symptoms. Labs reviewed and unremarkable. ECHO on 4/26 with EF 55-60%. Continue Herceptin monotherapy to complete the year of treatment-end of July. Continue alpha-lipoic acid and other supportive care. She will continue on daily Arimidex as planned. DEXA March 2022 showed mild osteopenia, repeat March 2024. Follow up in 3 weeks. All questions were asked and answered to the best of my ability.            Juan Cardozo, [facility] - [facility] [address]  [id number] [place] [address]  Office : [phone]  Fax : [phone]

## 2023-05-01 NOTE — PROGRESS NOTES
Arrived to the [facility]. Herceptin completed. Patient tolerated well. Any issues or concerns during appointment: none. Patient aware of next infusion appointment on 5/22 (date) at 10:30 AM (time). Patient instructed to call provider with temperature of 100.4 or greater or nausea/vomiting/ diarrhea or pain not controlled by medications  Discharged ambulatory.

## 2023-05-09 ENCOUNTER — OFFICE VISIT (OUTPATIENT)
Dept: ORTHOPEDIC SURGERY | Age: 78
End: 2023-05-09
Payer: MEDICARE

## 2023-05-09 VITALS — BODY MASS INDEX: 35.44 KG/M2 | HEIGHT: 63 IN | WEIGHT: 200 LBS

## 2023-05-09 DIAGNOSIS — G56.02 LEFT CARPAL TUNNEL SYNDROME: Primary | ICD-10-CM

## 2023-05-09 PROCEDURE — 1090F PRES/ABSN URINE INCON ASSESS: CPT | Performed by: ORTHOPAEDIC SURGERY

## 2023-05-09 PROCEDURE — L3908 WHO COCK-UP NONMOLDE PRE OTS: HCPCS | Performed by: ORTHOPAEDIC SURGERY

## 2023-05-09 PROCEDURE — 1036F TOBACCO NON-USER: CPT | Performed by: ORTHOPAEDIC SURGERY

## 2023-05-09 PROCEDURE — G8399 PT W/DXA RESULTS DOCUMENT: HCPCS | Performed by: ORTHOPAEDIC SURGERY

## 2023-05-09 PROCEDURE — G8417 CALC BMI ABV UP PARAM F/U: HCPCS | Performed by: ORTHOPAEDIC SURGERY

## 2023-05-09 PROCEDURE — 99213 OFFICE O/P EST LOW 20 MIN: CPT | Performed by: ORTHOPAEDIC SURGERY

## 2023-05-09 PROCEDURE — G8427 DOCREV CUR MEDS BY ELIG CLIN: HCPCS | Performed by: ORTHOPAEDIC SURGERY

## 2023-05-09 PROCEDURE — 1123F ACP DISCUSS/DSCN MKR DOCD: CPT | Performed by: ORTHOPAEDIC SURGERY

## 2023-05-09 NOTE — PROGRESS NOTES
Patient was fitted and instructed on an  Wrist Splint for patients left wrist. Patient is aware the hand slides in the brace with the thumb placed threw the thumb hole. I demonstrated the correct way to tighten the brace straps to allow for a comfortable fit. Patient understood the correct way to wear the brace. Patient read and signed documenting they understand and agree to [facility]s current DME return policy.
understanding of the information provided and the formulated plan. All questions were answered to the patient's satisfaction during the encounter.         Prudencio Olszewski, MD  Orthopaedic Surgery  05/09/23  2:28 PM

## 2023-05-17 ENCOUNTER — OFFICE VISIT (OUTPATIENT)
Dept: UROGYNECOLOGY | Age: 78
End: 2023-05-17
Payer: MEDICARE

## 2023-05-17 VITALS — SYSTOLIC BLOOD PRESSURE: 118 MMHG | DIASTOLIC BLOOD PRESSURE: 82 MMHG

## 2023-05-17 DIAGNOSIS — Z17.0 MALIGNANT NEOPLASM OF RIGHT BREAST IN FEMALE, ESTROGEN RECEPTOR POSITIVE, UNSPECIFIED SITE OF BREAST (HCC): Primary | ICD-10-CM

## 2023-05-17 DIAGNOSIS — C50.911 MALIGNANT NEOPLASM OF RIGHT BREAST IN FEMALE, ESTROGEN RECEPTOR POSITIVE, UNSPECIFIED SITE OF BREAST (HCC): Primary | ICD-10-CM

## 2023-05-17 DIAGNOSIS — Z79.899 HIGH RISK MEDICATION USE: ICD-10-CM

## 2023-05-17 DIAGNOSIS — N39.41 URGE INCONTINENCE: ICD-10-CM

## 2023-05-17 PROCEDURE — G8399 PT W/DXA RESULTS DOCUMENT: HCPCS | Performed by: OBSTETRICS & GYNECOLOGY

## 2023-05-17 PROCEDURE — 1123F ACP DISCUSS/DSCN MKR DOCD: CPT | Performed by: OBSTETRICS & GYNECOLOGY

## 2023-05-17 PROCEDURE — G8417 CALC BMI ABV UP PARAM F/U: HCPCS | Performed by: OBSTETRICS & GYNECOLOGY

## 2023-05-17 PROCEDURE — 1036F TOBACCO NON-USER: CPT | Performed by: OBSTETRICS & GYNECOLOGY

## 2023-05-17 PROCEDURE — 1090F PRES/ABSN URINE INCON ASSESS: CPT | Performed by: OBSTETRICS & GYNECOLOGY

## 2023-05-17 PROCEDURE — 3079F DIAST BP 80-89 MM HG: CPT | Performed by: OBSTETRICS & GYNECOLOGY

## 2023-05-17 PROCEDURE — G8427 DOCREV CUR MEDS BY ELIG CLIN: HCPCS | Performed by: OBSTETRICS & GYNECOLOGY

## 2023-05-17 PROCEDURE — 99214 OFFICE O/P EST MOD 30 MIN: CPT | Performed by: OBSTETRICS & GYNECOLOGY

## 2023-05-17 PROCEDURE — 3074F SYST BP LT 130 MM HG: CPT | Performed by: OBSTETRICS & GYNECOLOGY

## 2023-05-17 PROCEDURE — 0509F URINE INCON PLAN DOCD: CPT | Performed by: OBSTETRICS & GYNECOLOGY

## 2023-05-17 NOTE — ASSESSMENT & PLAN NOTE
She is having side affects that she is not sure are from the myrbetriq. I told her the best way to find out is to stop the myrbetriq for a few days and see if the side affects improve. Today she wanted to talk about 3rd line therapies. We discussed the differential diagnosis of overactive bladder. We discussed the epidemiology, pathogenesis, and etiology of bladder overactivity including the neurophysiologic axis. I offered options which include nothing, medications, physical therapy, behavior modification, and neuromodulation. She has tried medications and physical therapy without any improvement. After discussing her options for third line therapy, she is interested in botox/ chemodenervation to the pelvic floor. We discussed the benefits of botox. Many women have at least 50% reduction inn daily leaking episodes and that it may have to be injected every 4-12 months. We discussed the risks of botox which include UTI, urinary retention and a reaction to the onabotulinumtoxinA (Botox). She also   States that she is willing to self-cath if she does develop urinary retention. We also discussed snm: We discussed the mechanism of action of sacral neuromodulation. We discussed the technique of the staged of the staged surgery and the success rates. We discussed the expected post-operative course. Educational material and web site were provided for her. We discussed that should she have 50% or greater improvement in your symptoms during the first stage period, then she will be eligible to have the permanent sacral nerve stimulator lead and battery, implanted in a separate procedure. We discussed that the potential risks  include bleeding, bruising, infection, and/or pain or discomfort at the site of the wire insertion, movement of the wires and you may feel a temporary tingling sensation. Risk of fluoroscopy:  The amount of time exposed to radiation during fluoroscopy would be similar to that of standard

## 2023-05-17 NOTE — PROGRESS NOTES
[facility] UROGYNECOLOGY  [address]  Dept: [phone]    PCP:  Jony Smiley MD    5/17/2023      HPI:  Latanya Paulino is here to follow up on Follow-up (UUI)  . She was last seen 4/5/23 for UUI. She was taken off gemtesa and started on myrbetriq due to cost. She is unsure if its related but she has had a sore throat, feels as if there is a lump in her throat, feels as if her voice is hoarse and has an intermittent cough since starting myrbetriq. The medication is helping her but not as good as the gemtesa did. She only goes through 1-2 pads a day now which is an improvement. She is interested in talking about botox today. No results found for this visit on 05/17/23. /82       1. Urge incontinence  Assessment & Plan:  She is having side affects that she is not sure are from the myrbetriq. I told her the best way to find out is to stop the myrbetriq for a few days and see if the side affects improve. Today she wanted to talk about 3rd line therapies. We discussed the differential diagnosis of overactive bladder. We discussed the epidemiology, pathogenesis, and etiology of bladder overactivity including the neurophysiologic axis. I offered options which include nothing, medications, physical therapy, behavior modification, and neuromodulation. She has tried medications and physical therapy without any improvement. After discussing her options for third line therapy, she is interested in botox/ chemodenervation to the pelvic floor. We discussed the benefits of botox. Many women have at least 50% reduction inn daily leaking episodes and that it may have to be injected every 4-12 months. We discussed the risks of botox which include UTI, urinary retention and a reaction to the onabotulinumtoxinA (Botox). She also   States that she is willing to self-cath if she does develop urinary retention. We also discussed snm:  We discussed the mechanism

## 2023-05-22 ENCOUNTER — HOSPITAL ENCOUNTER (OUTPATIENT)
Dept: LAB | Age: 78
Discharge: HOME | End: 2023-05-25
Payer: MEDICARE

## 2023-05-22 ENCOUNTER — HOSPITAL ENCOUNTER (OUTPATIENT)
Dept: INFUSION THERAPY | Age: 78
Discharge: HOME | End: 2023-05-22
Payer: MEDICARE

## 2023-05-22 ENCOUNTER — OFFICE VISIT (OUTPATIENT)
Dept: ONCOLOGY | Age: 78
End: 2023-05-22
Payer: MEDICARE

## 2023-05-22 VITALS
RESPIRATION RATE: 16 BRPM | SYSTOLIC BLOOD PRESSURE: 152 MMHG | OXYGEN SATURATION: 98 % | WEIGHT: 207.9 LBS | HEART RATE: 72 BPM | TEMPERATURE: 98.1 F | BODY MASS INDEX: 36.84 KG/M2 | HEIGHT: 63 IN | DIASTOLIC BLOOD PRESSURE: 67 MMHG

## 2023-05-22 VITALS — WEIGHT: 203 LBS | BODY MASS INDEX: 35.96 KG/M2

## 2023-05-22 DIAGNOSIS — Z17.0 MALIGNANT NEOPLASM OF RIGHT BREAST IN FEMALE, ESTROGEN RECEPTOR POSITIVE, UNSPECIFIED SITE OF BREAST (HCC): ICD-10-CM

## 2023-05-22 DIAGNOSIS — C50.911 MALIGNANT NEOPLASM OF RIGHT BREAST IN FEMALE, ESTROGEN RECEPTOR POSITIVE, UNSPECIFIED SITE OF BREAST (HCC): Primary | ICD-10-CM

## 2023-05-22 DIAGNOSIS — M25.50 AROMATASE INHIBITOR-ASSOCIATED ARTHRALGIA: ICD-10-CM

## 2023-05-22 DIAGNOSIS — Z79.899 HIGH RISK MEDICATION USE: ICD-10-CM

## 2023-05-22 DIAGNOSIS — Z17.0 MALIGNANT NEOPLASM OF RIGHT BREAST IN FEMALE, ESTROGEN RECEPTOR POSITIVE, UNSPECIFIED SITE OF BREAST (HCC): Primary | ICD-10-CM

## 2023-05-22 DIAGNOSIS — T45.1X5A AROMATASE INHIBITOR-ASSOCIATED ARTHRALGIA: ICD-10-CM

## 2023-05-22 DIAGNOSIS — C50.911 MALIGNANT NEOPLASM OF RIGHT BREAST IN FEMALE, ESTROGEN RECEPTOR POSITIVE, UNSPECIFIED SITE OF BREAST (HCC): ICD-10-CM

## 2023-05-22 LAB
ALBUMIN SERPL-MCNC: 3.7 G/DL (ref 3.2–4.6)
ALBUMIN/GLOB SERPL: 0.9 (ref 0.4–1.6)
ALP SERPL-CCNC: 127 U/L (ref 50–136)
ALT SERPL-CCNC: 23 U/L (ref 12–65)
ANION GAP SERPL CALC-SCNC: 3 MMOL/L (ref 2–11)
AST SERPL-CCNC: 22 U/L (ref 15–37)
BASOPHILS # BLD: 0 K/UL (ref 0–0.2)
BASOPHILS NFR BLD: 1 % (ref 0–2)
BILIRUB SERPL-MCNC: 0.8 MG/DL (ref 0.2–1.1)
BUN SERPL-MCNC: 16 MG/DL (ref 8–23)
CALCIUM SERPL-MCNC: 9.8 MG/DL (ref 8.3–10.4)
CHLORIDE SERPL-SCNC: 108 MMOL/L (ref 101–110)
CO2 SERPL-SCNC: 32 MMOL/L (ref 21–32)
CREAT SERPL-MCNC: 0.9 MG/DL (ref 0.6–1)
DIFFERENTIAL METHOD BLD: NORMAL
EOSINOPHIL # BLD: 0.1 K/UL (ref 0–0.8)
EOSINOPHIL NFR BLD: 2 % (ref 0.5–7.8)
ERYTHROCYTE [DISTWIDTH] IN BLOOD BY AUTOMATED COUNT: 13.2 % (ref 11.9–14.6)
GLOBULIN SER CALC-MCNC: 4 G/DL (ref 2.8–4.5)
GLUCOSE SERPL-MCNC: 97 MG/DL (ref 65–100)
HCT VFR BLD AUTO: 39.8 % (ref 35.8–46.3)
HGB BLD-MCNC: 13.1 G/DL (ref 11.7–15.4)
IMM GRANULOCYTES # BLD AUTO: 0 K/UL (ref 0–0.5)
IMM GRANULOCYTES NFR BLD AUTO: 0 % (ref 0–5)
LYMPHOCYTES # BLD: 1 K/UL (ref 0.5–4.6)
LYMPHOCYTES NFR BLD: 21 % (ref 13–44)
MAGNESIUM SERPL-MCNC: 2 MG/DL (ref 1.8–2.4)
MCH RBC QN AUTO: 29 PG (ref 26.1–32.9)
MCHC RBC AUTO-ENTMCNC: 32.9 G/DL (ref 31.4–35)
MCV RBC AUTO: 88.2 FL (ref 82–102)
MONOCYTES # BLD: 0.5 K/UL (ref 0.1–1.3)
MONOCYTES NFR BLD: 10 % (ref 4–12)
NEUTS SEG # BLD: 3.4 K/UL (ref 1.7–8.2)
NEUTS SEG NFR BLD: 66 % (ref 43–78)
NRBC # BLD: 0 K/UL (ref 0–0.2)
PLATELET # BLD AUTO: 153 K/UL (ref 150–450)
PMV BLD AUTO: 10.4 FL (ref 9.4–12.3)
POTASSIUM SERPL-SCNC: 4.7 MMOL/L (ref 3.5–5.1)
PROT SERPL-MCNC: 7.7 G/DL (ref 6.3–8.2)
RBC # BLD AUTO: 4.51 M/UL (ref 4.05–5.2)
SODIUM SERPL-SCNC: 143 MMOL/L (ref 133–143)
WBC # BLD AUTO: 5.1 K/UL (ref 4.3–11.1)

## 2023-05-22 PROCEDURE — 2580000003 HC RX 258: Performed by: INTERNAL MEDICINE

## 2023-05-22 PROCEDURE — 3077F SYST BP >= 140 MM HG: CPT | Performed by: INTERNAL MEDICINE

## 2023-05-22 PROCEDURE — 36415 COLL VENOUS BLD VENIPUNCTURE: CPT

## 2023-05-22 PROCEDURE — 1123F ACP DISCUSS/DSCN MKR DOCD: CPT | Performed by: INTERNAL MEDICINE

## 2023-05-22 PROCEDURE — 6360000002 HC RX W HCPCS: Performed by: INTERNAL MEDICINE

## 2023-05-22 PROCEDURE — G8428 CUR MEDS NOT DOCUMENT: HCPCS | Performed by: INTERNAL MEDICINE

## 2023-05-22 PROCEDURE — 3078F DIAST BP <80 MM HG: CPT | Performed by: INTERNAL MEDICINE

## 2023-05-22 PROCEDURE — 96413 CHEMO IV INFUSION 1 HR: CPT

## 2023-05-22 PROCEDURE — 1090F PRES/ABSN URINE INCON ASSESS: CPT | Performed by: INTERNAL MEDICINE

## 2023-05-22 PROCEDURE — G8417 CALC BMI ABV UP PARAM F/U: HCPCS | Performed by: INTERNAL MEDICINE

## 2023-05-22 PROCEDURE — 83735 ASSAY OF MAGNESIUM: CPT

## 2023-05-22 PROCEDURE — G8399 PT W/DXA RESULTS DOCUMENT: HCPCS | Performed by: INTERNAL MEDICINE

## 2023-05-22 PROCEDURE — 99214 OFFICE O/P EST MOD 30 MIN: CPT | Performed by: INTERNAL MEDICINE

## 2023-05-22 PROCEDURE — 1036F TOBACCO NON-USER: CPT | Performed by: INTERNAL MEDICINE

## 2023-05-22 PROCEDURE — 80053 COMPREHEN METABOLIC PANEL: CPT

## 2023-05-22 PROCEDURE — 85025 COMPLETE CBC W/AUTO DIFF WBC: CPT

## 2023-05-22 RX ORDER — HEPARIN SODIUM (PORCINE) LOCK FLUSH IV SOLN 100 UNIT/ML 100 UNIT/ML
500 SOLUTION INTRAVENOUS PRN
Status: CANCELLED | OUTPATIENT
Start: 2023-05-22

## 2023-05-22 RX ORDER — ACETAMINOPHEN 325 MG/1
650 TABLET ORAL
OUTPATIENT
Start: 2023-06-12

## 2023-05-22 RX ORDER — DIPHENHYDRAMINE HYDROCHLORIDE 50 MG/ML
50 INJECTION INTRAMUSCULAR; INTRAVENOUS
Status: CANCELLED | OUTPATIENT
Start: 2023-05-22

## 2023-05-22 RX ORDER — DIPHENHYDRAMINE HYDROCHLORIDE 50 MG/ML
50 INJECTION INTRAMUSCULAR; INTRAVENOUS
OUTPATIENT
Start: 2023-06-12

## 2023-05-22 RX ORDER — EPINEPHRINE 1 MG/ML
0.3 INJECTION, SOLUTION, CONCENTRATE INTRAVENOUS PRN
OUTPATIENT
Start: 2023-06-12

## 2023-05-22 RX ORDER — SODIUM CHLORIDE 9 MG/ML
5-250 INJECTION, SOLUTION INTRAVENOUS PRN
OUTPATIENT
Start: 2023-06-12

## 2023-05-22 RX ORDER — SODIUM CHLORIDE 9 MG/ML
5-250 INJECTION, SOLUTION INTRAVENOUS PRN
Status: CANCELLED | OUTPATIENT
Start: 2023-05-22

## 2023-05-22 RX ORDER — MEPERIDINE HYDROCHLORIDE 50 MG/ML
12.5 INJECTION INTRAMUSCULAR; INTRAVENOUS; SUBCUTANEOUS PRN
OUTPATIENT
Start: 2023-06-12

## 2023-05-22 RX ORDER — SODIUM CHLORIDE 9 MG/ML
INJECTION, SOLUTION INTRAVENOUS CONTINUOUS
OUTPATIENT
Start: 2023-06-12

## 2023-05-22 RX ORDER — HEPARIN SODIUM (PORCINE) LOCK FLUSH IV SOLN 100 UNIT/ML 100 UNIT/ML
500 SOLUTION INTRAVENOUS PRN
Status: DISCONTINUED | OUTPATIENT
Start: 2023-05-22 | End: 2023-05-23 | Stop reason: HOSPADM

## 2023-05-22 RX ORDER — EPINEPHRINE 1 MG/ML
0.3 INJECTION, SOLUTION, CONCENTRATE INTRAVENOUS PRN
Status: CANCELLED | OUTPATIENT
Start: 2023-05-22

## 2023-05-22 RX ORDER — ONDANSETRON 2 MG/ML
8 INJECTION INTRAMUSCULAR; INTRAVENOUS
Status: CANCELLED | OUTPATIENT
Start: 2023-05-22

## 2023-05-22 RX ORDER — SODIUM CHLORIDE 0.9 % (FLUSH) 0.9 %
5-40 SYRINGE (ML) INJECTION PRN
OUTPATIENT
Start: 2023-06-12

## 2023-05-22 RX ORDER — MEPERIDINE HYDROCHLORIDE 25 MG/ML
12.5 INJECTION INTRAMUSCULAR; INTRAVENOUS; SUBCUTANEOUS PRN
Status: DISCONTINUED | OUTPATIENT
Start: 2023-05-22 | End: 2023-05-23 | Stop reason: HOSPADM

## 2023-05-22 RX ORDER — ONDANSETRON 2 MG/ML
8 INJECTION INTRAMUSCULAR; INTRAVENOUS
Status: DISCONTINUED | OUTPATIENT
Start: 2023-05-22 | End: 2023-05-23 | Stop reason: HOSPADM

## 2023-05-22 RX ORDER — ALBUTEROL SULFATE 90 UG/1
4 AEROSOL, METERED RESPIRATORY (INHALATION) PRN
OUTPATIENT
Start: 2023-06-12

## 2023-05-22 RX ORDER — ONDANSETRON 2 MG/ML
8 INJECTION INTRAMUSCULAR; INTRAVENOUS
OUTPATIENT
Start: 2023-06-12

## 2023-05-22 RX ORDER — ACETAMINOPHEN 325 MG/1
650 TABLET ORAL
Status: DISCONTINUED | OUTPATIENT
Start: 2023-05-22 | End: 2023-05-23 | Stop reason: HOSPADM

## 2023-05-22 RX ORDER — ACETAMINOPHEN 325 MG/1
650 TABLET ORAL
Status: CANCELLED | OUTPATIENT
Start: 2023-05-22

## 2023-05-22 RX ORDER — FAMOTIDINE 10 MG/ML
20 INJECTION, SOLUTION INTRAVENOUS
Status: CANCELLED | OUTPATIENT
Start: 2023-05-22

## 2023-05-22 RX ORDER — SODIUM CHLORIDE 0.9 % (FLUSH) 0.9 %
5-40 SYRINGE (ML) INJECTION PRN
Status: CANCELLED | OUTPATIENT
Start: 2023-05-22

## 2023-05-22 RX ORDER — ALBUTEROL SULFATE 90 UG/1
4 AEROSOL, METERED RESPIRATORY (INHALATION) PRN
Status: CANCELLED | OUTPATIENT
Start: 2023-05-22

## 2023-05-22 RX ORDER — FAMOTIDINE 10 MG/ML
20 INJECTION, SOLUTION INTRAVENOUS
OUTPATIENT
Start: 2023-06-12

## 2023-05-22 RX ORDER — SODIUM CHLORIDE 0.9 % (FLUSH) 0.9 %
5-40 SYRINGE (ML) INJECTION PRN
Status: DISCONTINUED | OUTPATIENT
Start: 2023-05-22 | End: 2023-05-23 | Stop reason: HOSPADM

## 2023-05-22 RX ORDER — MEPERIDINE HYDROCHLORIDE 50 MG/ML
12.5 INJECTION INTRAMUSCULAR; INTRAVENOUS; SUBCUTANEOUS PRN
Status: CANCELLED | OUTPATIENT
Start: 2023-05-22

## 2023-05-22 RX ORDER — EPINEPHRINE 1 MG/ML
0.3 INJECTION, SOLUTION, CONCENTRATE INTRAVENOUS PRN
Status: DISCONTINUED | OUTPATIENT
Start: 2023-05-22 | End: 2023-05-23 | Stop reason: HOSPADM

## 2023-05-22 RX ORDER — SODIUM CHLORIDE 9 MG/ML
5-250 INJECTION, SOLUTION INTRAVENOUS PRN
Status: DISCONTINUED | OUTPATIENT
Start: 2023-05-22 | End: 2023-05-23 | Stop reason: HOSPADM

## 2023-05-22 RX ORDER — SODIUM CHLORIDE 9 MG/ML
INJECTION, SOLUTION INTRAVENOUS CONTINUOUS
Status: CANCELLED | OUTPATIENT
Start: 2023-05-22

## 2023-05-22 RX ORDER — ALBUTEROL SULFATE 90 UG/1
4 AEROSOL, METERED RESPIRATORY (INHALATION) PRN
Status: DISCONTINUED | OUTPATIENT
Start: 2023-05-22 | End: 2023-05-23 | Stop reason: HOSPADM

## 2023-05-22 RX ORDER — DIPHENHYDRAMINE HYDROCHLORIDE 50 MG/ML
50 INJECTION INTRAMUSCULAR; INTRAVENOUS
Status: DISCONTINUED | OUTPATIENT
Start: 2023-05-22 | End: 2023-05-23 | Stop reason: HOSPADM

## 2023-05-22 RX ORDER — HEPARIN SODIUM (PORCINE) LOCK FLUSH IV SOLN 100 UNIT/ML 100 UNIT/ML
500 SOLUTION INTRAVENOUS PRN
OUTPATIENT
Start: 2023-06-12

## 2023-05-22 RX ADMIN — HEPARIN 500 UNITS: 100 SYRINGE at 11:13

## 2023-05-22 RX ADMIN — TRASTUZUMAB 567 MG: 150 INJECTION, POWDER, LYOPHILIZED, FOR SOLUTION INTRAVENOUS at 10:38

## 2023-05-22 RX ADMIN — SODIUM CHLORIDE, PRESERVATIVE FREE 10 ML: 5 INJECTION INTRAVENOUS at 11:12

## 2023-05-22 RX ADMIN — SODIUM CHLORIDE 25 ML/HR: 9 INJECTION, SOLUTION INTRAVENOUS at 10:38

## 2023-05-22 NOTE — PATIENT INSTRUCTIONS
Patient Instructions from Today's Visit    Reason for Visit:  Follow Up    Diagnosis Information:  [email or web address] net/about-us/[email or web address]-answers-patient-education-materials/gjxs-ddeceyv-vzol-sheets    Plan:  Labs reviewed. Follow Up:  3 weeks for next Herceptin.     Recent Lab Results:  Hospital Outpatient Visit on 05/22/2023   Component Date Value Ref Range Status    WBC 05/22/2023 5.1  4.3 - 11.1 K/uL Final    RBC 05/22/2023 4.51  4.05 - 5.2 M/uL Final    Hemoglobin 05/22/2023 13.1  11.7 - 15.4 g/dL Final    Hematocrit 05/22/2023 39.8  35.8 - 46.3 % Final    MCV 05/22/2023 88.2  82.0 - 102.0 FL Final    MCH 05/22/2023 29.0  26.1 - 32.9 PG Final    MCHC 05/22/2023 32.9  31.4 - 35.0 g/dL Final    RDW 05/22/2023 13.2  11.9 - 14.6 % Final    Platelets 40/60/9556 153  150 - 450 K/uL Final    MPV 05/22/2023 10.4  9.4 - 12.3 FL Final    nRBC 05/22/2023 0.00  0.0 - 0.2 K/uL Final    **Note: Absolute NRBC parameter is now reported with Hemogram**    Differential Type 05/22/2023 AUTOMATED    Final    Neutrophils % 05/22/2023 66  43 - 78 % Final    Lymphocytes % 05/22/2023 21  13 - 44 % Final    Monocytes % 05/22/2023 10  4.0 - 12.0 % Final    Eosinophils % 05/22/2023 2  0.5 - 7.8 % Final    Basophils % 05/22/2023 1  0.0 - 2.0 % Final    Immature Granulocytes 05/22/2023 0  0.0 - 5.0 % Final    Neutrophils Absolute 05/22/2023 3.4  1.7 - 8.2 K/UL Final    Lymphocytes Absolute 05/22/2023 1.0  0.5 - 4.6 K/UL Final    Monocytes Absolute 05/22/2023 0.5  0.1 - 1.3 K/UL Final    Eosinophils Absolute 05/22/2023 0.1  0.0 - 0.8 K/UL Final    Basophils Absolute 05/22/2023 0.0  0.0 - 0.2 K/UL Final    Absolute Immature Granulocyte 05/22/2023 0.0  0.0 - 0.5 K/UL Final         Treatment Summary has been discussed and given to patient: n/a        -------------------------------------------------------------------------------------------------------------------  Please call our office at [phone] if you have any  of the following

## 2023-05-22 NOTE — PROGRESS NOTES
[address] [place] [address] Hematology and Oncology: Office Visit Established Patient    Chief Complaint:    Chief Complaint   Patient presents with    Follow-up         History of Present Illness:  Ms. Vandana Rose is a 69 y.o. female who returns today for management of breast cancer. She initially presented for a routine bilateral screening mammogram on 3/23/22 which identified a right breast mass near the 11:00-12:00 position, 6-7 cm from the nipple and other small circumscribed round masses in the right upper inner breast which had not definitely changed from prior examinations. Further evaluation with right breast ultrasound on 3/25/22 confirmed a 6 mm hypoechoic shadowing mass in the 12:00 right breast at 7 cm from the nipple. She presented to Dr. Paige Lopez on 4/7/22 to discuss whether to proceed with recommended biopsy, he recommended that biopsy be performed. Ultrasound-guided biopsy was subsequently completed on 4/12/22 with pathology revealing high grade infiltrating ductal carcinoma, ER 76%, MN negative, and HER2 positive. MRI of the bilateral breasts was completed on 4/14/22 demonstrating a right anterior 12:00 breast cancer measuring up to 1.2 cm; multiple (greater than 10 total) other right upper breast masses, some of which had enlarged, concerning for additional malignancy; an indeterminate 1.2 cm right axillary lymph node; and no suspicious left breast finding. We recommended upfront surgery because of the MRI findings and the inconclusive size/clinical stage of her cancer. Path reviewed, thankfully it appears that the indeterminate lesions in the breast were benign, with the final tumor dimension being only 1.5 cm, and 4 nodes negative for tumor. Therefore, she is [id number], and would be eligible for the adjuvant paclitaxel and trastuzumab regimen. She returns for follow up and her next cycle Herceptin along with daily Arimidex.   She continues to have some general symptoms, her joint pain is worse in the left wrist,

## 2023-05-22 NOTE — PROGRESS NOTES
Arrived to the [facility]. Herceptin completed. Patient tolerated without problems. Any issues or concerns during appointment: no.  Patient aware of next infusion appointment on 6/12/23 (date) at 0900 (time)  Patient instructed to call provider with temperature of 100.4 or greater or nausea/vomiting/ diarrhea or pain not controlled by medications  Discharged ambulatory.

## 2023-05-30 DIAGNOSIS — G56.02 LEFT CARPAL TUNNEL SYNDROME: Primary | ICD-10-CM

## 2023-05-30 RX ORDER — METHYLPREDNISOLONE 4 MG/1
TABLET ORAL
Qty: 1 KIT | Refills: 0 | Status: SHIPPED | OUTPATIENT
Start: 2023-05-30

## 2023-06-05 DIAGNOSIS — G47.00 INSOMNIA, UNSPECIFIED TYPE: ICD-10-CM

## 2023-06-05 DIAGNOSIS — Z17.0 MALIGNANT NEOPLASM OF RIGHT BREAST IN FEMALE, ESTROGEN RECEPTOR POSITIVE, UNSPECIFIED SITE OF BREAST (HCC): ICD-10-CM

## 2023-06-05 DIAGNOSIS — C50.911 MALIGNANT NEOPLASM OF RIGHT BREAST IN FEMALE, ESTROGEN RECEPTOR POSITIVE, UNSPECIFIED SITE OF BREAST (HCC): ICD-10-CM

## 2023-06-05 RX ORDER — TEMAZEPAM 30 MG/1
30 CAPSULE ORAL NIGHTLY PRN
Qty: 30 CAPSULE | Refills: 2 | Status: SHIPPED | OUTPATIENT
Start: 2023-06-05 | End: 2023-09-03

## 2023-06-05 NOTE — TELEPHONE ENCOUNTER
I have reviewed the patients controlled substance prescription history, as maintained in the [address] [place] [address] prescription monitoring program, so that the prescription(s) for a  controlled substance can be given.    Temazepam filled last 5/6/23  Prescription pended to Dr. Vanessa Escalante

## 2023-06-12 ENCOUNTER — HOSPITAL ENCOUNTER (OUTPATIENT)
Dept: INFUSION THERAPY | Age: 78
Discharge: HOME | End: 2023-06-12
Payer: MEDICARE

## 2023-06-12 VITALS
BODY MASS INDEX: 36.28 KG/M2 | TEMPERATURE: 98.3 F | DIASTOLIC BLOOD PRESSURE: 76 MMHG | RESPIRATION RATE: 16 BRPM | WEIGHT: 204.8 LBS | SYSTOLIC BLOOD PRESSURE: 119 MMHG | HEART RATE: 68 BPM | OXYGEN SATURATION: 96 %

## 2023-06-12 DIAGNOSIS — C50.911 MALIGNANT NEOPLASM OF RIGHT BREAST IN FEMALE, ESTROGEN RECEPTOR POSITIVE, UNSPECIFIED SITE OF BREAST (HCC): Primary | ICD-10-CM

## 2023-06-12 DIAGNOSIS — Z17.0 MALIGNANT NEOPLASM OF RIGHT BREAST IN FEMALE, ESTROGEN RECEPTOR POSITIVE, UNSPECIFIED SITE OF BREAST (HCC): Primary | ICD-10-CM

## 2023-06-12 PROCEDURE — 6360000002 HC RX W HCPCS: Performed by: INTERNAL MEDICINE

## 2023-06-12 PROCEDURE — 2580000003 HC RX 258: Performed by: INTERNAL MEDICINE

## 2023-06-12 PROCEDURE — 96413 CHEMO IV INFUSION 1 HR: CPT

## 2023-06-12 RX ORDER — SODIUM CHLORIDE 9 MG/ML
5-250 INJECTION, SOLUTION INTRAVENOUS PRN
Status: DISCONTINUED | OUTPATIENT
Start: 2023-06-12 | End: 2023-06-13 | Stop reason: HOSPADM

## 2023-06-12 RX ORDER — SODIUM CHLORIDE 0.9 % (FLUSH) 0.9 %
5-40 SYRINGE (ML) INJECTION PRN
Status: DISCONTINUED | OUTPATIENT
Start: 2023-06-12 | End: 2023-06-13 | Stop reason: HOSPADM

## 2023-06-12 RX ORDER — HEPARIN SODIUM 100 [USP'U]/ML
500 INJECTION, SOLUTION INTRAVENOUS PRN
Status: DISCONTINUED | OUTPATIENT
Start: 2023-06-12 | End: 2023-06-13 | Stop reason: HOSPADM

## 2023-06-12 RX ADMIN — SODIUM CHLORIDE 25 ML/HR: 9 INJECTION, SOLUTION INTRAVENOUS at 09:30

## 2023-06-12 RX ADMIN — SODIUM CHLORIDE, PRESERVATIVE FREE 10 ML: 5 INJECTION INTRAVENOUS at 10:55

## 2023-06-12 RX ADMIN — TRASTUZUMAB 567 MG: 150 INJECTION, POWDER, LYOPHILIZED, FOR SOLUTION INTRAVENOUS at 10:08

## 2023-06-12 NOTE — PROGRESS NOTES
Arrived to the infusion center. Herceptin completed without signs of adverse reaction. No new issues or concerns voiced during the visit. Aware of her next appointment on 7/3 at 1030. Discharged ambulatory in satisfactory condition.

## 2023-06-28 DIAGNOSIS — E03.9 HYPOTHYROIDISM, UNSPECIFIED TYPE: ICD-10-CM

## 2023-06-28 DIAGNOSIS — R73.01 IMPAIRED FASTING GLUCOSE: Primary | ICD-10-CM

## 2023-06-28 DIAGNOSIS — R73.01 IMPAIRED FASTING GLUCOSE: ICD-10-CM

## 2023-06-29 DIAGNOSIS — Z17.0 MALIGNANT NEOPLASM OF RIGHT BREAST IN FEMALE, ESTROGEN RECEPTOR POSITIVE, UNSPECIFIED SITE OF BREAST (HCC): Primary | ICD-10-CM

## 2023-06-29 DIAGNOSIS — E78.5 HYPERLIPIDEMIA, UNSPECIFIED HYPERLIPIDEMIA TYPE: ICD-10-CM

## 2023-06-29 DIAGNOSIS — E46 PROTEIN-CALORIE MALNUTRITION, UNSPECIFIED SEVERITY (HCC): ICD-10-CM

## 2023-06-29 DIAGNOSIS — C50.911 MALIGNANT NEOPLASM OF RIGHT BREAST IN FEMALE, ESTROGEN RECEPTOR POSITIVE, UNSPECIFIED SITE OF BREAST (HCC): Primary | ICD-10-CM

## 2023-06-29 LAB
EST. AVERAGE GLUCOSE BLD GHB EST-MCNC: 111 MG/DL
HBA1C MFR BLD: 5.5 % (ref 4.8–5.6)
TSH, 3RD GENERATION: 0.1 UIU/ML (ref 0.36–3.74)

## 2023-07-03 ENCOUNTER — HOSPITAL ENCOUNTER (OUTPATIENT)
Dept: INFUSION THERAPY | Age: 78
Discharge: HOME | End: 2023-07-03
Payer: MEDICARE

## 2023-07-03 ENCOUNTER — HOSPITAL ENCOUNTER (OUTPATIENT)
Dept: LAB | Age: 78
Discharge: HOME | End: 2023-07-06
Payer: MEDICARE

## 2023-07-03 ENCOUNTER — OFFICE VISIT (OUTPATIENT)
Dept: ONCOLOGY | Age: 78
End: 2023-07-03
Payer: MEDICARE

## 2023-07-03 VITALS
RESPIRATION RATE: 19 BRPM | DIASTOLIC BLOOD PRESSURE: 72 MMHG | HEART RATE: 73 BPM | OXYGEN SATURATION: 98 % | WEIGHT: 203.5 LBS | BODY MASS INDEX: 36.06 KG/M2 | SYSTOLIC BLOOD PRESSURE: 143 MMHG | TEMPERATURE: 98 F | HEIGHT: 63 IN

## 2023-07-03 DIAGNOSIS — E78.5 HYPERLIPIDEMIA, UNSPECIFIED HYPERLIPIDEMIA TYPE: ICD-10-CM

## 2023-07-03 DIAGNOSIS — F41.1 ANXIETY ASSOCIATED WITH CANCER DIAGNOSIS (HCC): ICD-10-CM

## 2023-07-03 DIAGNOSIS — Z17.0 MALIGNANT NEOPLASM OF RIGHT BREAST IN FEMALE, ESTROGEN RECEPTOR POSITIVE, UNSPECIFIED SITE OF BREAST (HCC): Primary | ICD-10-CM

## 2023-07-03 DIAGNOSIS — E46 PROTEIN-CALORIE MALNUTRITION, UNSPECIFIED SEVERITY (HCC): ICD-10-CM

## 2023-07-03 DIAGNOSIS — C50.911 MALIGNANT NEOPLASM OF RIGHT BREAST IN FEMALE, ESTROGEN RECEPTOR POSITIVE, UNSPECIFIED SITE OF BREAST (HCC): Primary | ICD-10-CM

## 2023-07-03 DIAGNOSIS — T45.1X5A AROMATASE INHIBITOR-ASSOCIATED ARTHRALGIA: ICD-10-CM

## 2023-07-03 DIAGNOSIS — Z17.0 MALIGNANT NEOPLASM OF RIGHT BREAST IN FEMALE, ESTROGEN RECEPTOR POSITIVE, UNSPECIFIED SITE OF BREAST (HCC): ICD-10-CM

## 2023-07-03 DIAGNOSIS — M25.50 AROMATASE INHIBITOR-ASSOCIATED ARTHRALGIA: ICD-10-CM

## 2023-07-03 DIAGNOSIS — C80.1 ANXIETY ASSOCIATED WITH CANCER DIAGNOSIS (HCC): ICD-10-CM

## 2023-07-03 DIAGNOSIS — C50.911 MALIGNANT NEOPLASM OF RIGHT BREAST IN FEMALE, ESTROGEN RECEPTOR POSITIVE, UNSPECIFIED SITE OF BREAST (HCC): ICD-10-CM

## 2023-07-03 LAB
ALBUMIN SERPL-MCNC: 3.6 G/DL (ref 3.2–4.6)
ALBUMIN/GLOB SERPL: 1 (ref 0.4–1.6)
ALP SERPL-CCNC: 124 U/L (ref 50–136)
ALT SERPL-CCNC: 20 U/L (ref 12–65)
ANION GAP SERPL CALC-SCNC: 4 MMOL/L (ref 2–11)
AST SERPL-CCNC: 18 U/L (ref 15–37)
BASOPHILS # BLD: 0.1 K/UL (ref 0–0.2)
BASOPHILS NFR BLD: 1 % (ref 0–2)
BILIRUB SERPL-MCNC: 0.7 MG/DL (ref 0.2–1.1)
BUN SERPL-MCNC: 12 MG/DL (ref 8–23)
CALCIUM SERPL-MCNC: 9.4 MG/DL (ref 8.3–10.4)
CHLORIDE SERPL-SCNC: 111 MMOL/L (ref 101–110)
CO2 SERPL-SCNC: 26 MMOL/L (ref 21–32)
CREAT SERPL-MCNC: 0.8 MG/DL (ref 0.6–1)
DIFFERENTIAL METHOD BLD: NORMAL
EOSINOPHIL # BLD: 0.1 K/UL (ref 0–0.8)
EOSINOPHIL NFR BLD: 2 % (ref 0.5–7.8)
ERYTHROCYTE [DISTWIDTH] IN BLOOD BY AUTOMATED COUNT: 13.6 % (ref 11.9–14.6)
GLOBULIN SER CALC-MCNC: 3.6 G/DL (ref 2.8–4.5)
GLUCOSE SERPL-MCNC: 96 MG/DL (ref 65–100)
HCT VFR BLD AUTO: 39.9 % (ref 35.8–46.3)
HGB BLD-MCNC: 13 G/DL (ref 11.7–15.4)
IMM GRANULOCYTES # BLD AUTO: 0 K/UL (ref 0–0.5)
IMM GRANULOCYTES NFR BLD AUTO: 0 % (ref 0–5)
LYMPHOCYTES # BLD: 0.9 K/UL (ref 0.5–4.6)
LYMPHOCYTES NFR BLD: 18 % (ref 13–44)
MAGNESIUM SERPL-MCNC: 1.9 MG/DL (ref 1.8–2.4)
MCH RBC QN AUTO: 28.9 PG (ref 26.1–32.9)
MCHC RBC AUTO-ENTMCNC: 32.6 G/DL (ref 31.4–35)
MCV RBC AUTO: 88.7 FL (ref 82–102)
MONOCYTES # BLD: 0.5 K/UL (ref 0.1–1.3)
MONOCYTES NFR BLD: 9 % (ref 4–12)
NEUTS SEG # BLD: 3.5 K/UL (ref 1.7–8.2)
NEUTS SEG NFR BLD: 70 % (ref 43–78)
NRBC # BLD: 0 K/UL (ref 0–0.2)
PLATELET # BLD AUTO: 169 K/UL (ref 150–450)
PMV BLD AUTO: 10.6 FL (ref 9.4–12.3)
POTASSIUM SERPL-SCNC: 4.3 MMOL/L (ref 3.5–5.1)
PROT SERPL-MCNC: 7.2 G/DL (ref 6.3–8.2)
RBC # BLD AUTO: 4.5 M/UL (ref 4.05–5.2)
SODIUM SERPL-SCNC: 141 MMOL/L (ref 133–143)
WBC # BLD AUTO: 5.1 K/UL (ref 4.3–11.1)

## 2023-07-03 PROCEDURE — 1123F ACP DISCUSS/DSCN MKR DOCD: CPT | Performed by: NURSE PRACTITIONER

## 2023-07-03 PROCEDURE — 85025 COMPLETE CBC W/AUTO DIFF WBC: CPT

## 2023-07-03 PROCEDURE — 1090F PRES/ABSN URINE INCON ASSESS: CPT | Performed by: NURSE PRACTITIONER

## 2023-07-03 PROCEDURE — 2580000003 HC RX 258: Performed by: NURSE PRACTITIONER

## 2023-07-03 PROCEDURE — G8399 PT W/DXA RESULTS DOCUMENT: HCPCS | Performed by: NURSE PRACTITIONER

## 2023-07-03 PROCEDURE — 1036F TOBACCO NON-USER: CPT | Performed by: NURSE PRACTITIONER

## 2023-07-03 PROCEDURE — G8417 CALC BMI ABV UP PARAM F/U: HCPCS | Performed by: NURSE PRACTITIONER

## 2023-07-03 PROCEDURE — 99214 OFFICE O/P EST MOD 30 MIN: CPT | Performed by: NURSE PRACTITIONER

## 2023-07-03 PROCEDURE — 3077F SYST BP >= 140 MM HG: CPT | Performed by: NURSE PRACTITIONER

## 2023-07-03 PROCEDURE — 6360000002 HC RX W HCPCS: Performed by: NURSE PRACTITIONER

## 2023-07-03 PROCEDURE — 80053 COMPREHEN METABOLIC PANEL: CPT

## 2023-07-03 PROCEDURE — 96413 CHEMO IV INFUSION 1 HR: CPT

## 2023-07-03 PROCEDURE — 36415 COLL VENOUS BLD VENIPUNCTURE: CPT

## 2023-07-03 PROCEDURE — 83735 ASSAY OF MAGNESIUM: CPT

## 2023-07-03 PROCEDURE — 3078F DIAST BP <80 MM HG: CPT | Performed by: NURSE PRACTITIONER

## 2023-07-03 PROCEDURE — G8428 CUR MEDS NOT DOCUMENT: HCPCS | Performed by: NURSE PRACTITIONER

## 2023-07-03 RX ORDER — DIPHENHYDRAMINE HYDROCHLORIDE 50 MG/ML
50 INJECTION INTRAMUSCULAR; INTRAVENOUS
OUTPATIENT
Start: 2023-07-24

## 2023-07-03 RX ORDER — SODIUM CHLORIDE 9 MG/ML
INJECTION, SOLUTION INTRAVENOUS CONTINUOUS
Status: DISCONTINUED | OUTPATIENT
Start: 2023-07-03 | End: 2023-07-04 | Stop reason: HOSPADM

## 2023-07-03 RX ORDER — ONDANSETRON 2 MG/ML
8 INJECTION INTRAMUSCULAR; INTRAVENOUS
OUTPATIENT
Start: 2023-07-24

## 2023-07-03 RX ORDER — DIPHENHYDRAMINE HYDROCHLORIDE 50 MG/ML
50 INJECTION INTRAMUSCULAR; INTRAVENOUS
OUTPATIENT
Start: 2023-07-03

## 2023-07-03 RX ORDER — ACETAMINOPHEN 325 MG/1
650 TABLET ORAL
OUTPATIENT
Start: 2023-07-03

## 2023-07-03 RX ORDER — MEPERIDINE HYDROCHLORIDE 50 MG/ML
12.5 INJECTION INTRAMUSCULAR; INTRAVENOUS; SUBCUTANEOUS PRN
OUTPATIENT
Start: 2023-07-24

## 2023-07-03 RX ORDER — SODIUM CHLORIDE 0.9 % (FLUSH) 0.9 %
5-40 SYRINGE (ML) INJECTION PRN
OUTPATIENT
Start: 2023-07-24

## 2023-07-03 RX ORDER — MEPERIDINE HYDROCHLORIDE 50 MG/ML
12.5 INJECTION INTRAMUSCULAR; INTRAVENOUS; SUBCUTANEOUS PRN
Status: CANCELLED | OUTPATIENT
Start: 2023-07-03

## 2023-07-03 RX ORDER — ACETAMINOPHEN 325 MG/1
650 TABLET ORAL
OUTPATIENT
Start: 2023-07-24

## 2023-07-03 RX ORDER — SODIUM CHLORIDE 0.9 % (FLUSH) 0.9 %
5-40 SYRINGE (ML) INJECTION PRN
Status: DISCONTINUED | OUTPATIENT
Start: 2023-07-03 | End: 2023-07-04 | Stop reason: HOSPADM

## 2023-07-03 RX ORDER — DIPHENHYDRAMINE HYDROCHLORIDE 50 MG/ML
50 INJECTION INTRAMUSCULAR; INTRAVENOUS
Status: DISCONTINUED | OUTPATIENT
Start: 2023-07-03 | End: 2023-07-04 | Stop reason: HOSPADM

## 2023-07-03 RX ORDER — HEPARIN SODIUM (PORCINE) LOCK FLUSH IV SOLN 100 UNIT/ML 100 UNIT/ML
500 SOLUTION INTRAVENOUS PRN
OUTPATIENT
Start: 2023-07-24

## 2023-07-03 RX ORDER — ACETAMINOPHEN 325 MG/1
650 TABLET ORAL
Status: DISCONTINUED | OUTPATIENT
Start: 2023-07-03 | End: 2023-07-04 | Stop reason: HOSPADM

## 2023-07-03 RX ORDER — ACETAMINOPHEN 325 MG/1
650 TABLET ORAL
Status: CANCELLED | OUTPATIENT
Start: 2023-07-03

## 2023-07-03 RX ORDER — SODIUM CHLORIDE 9 MG/ML
5-250 INJECTION, SOLUTION INTRAVENOUS PRN
OUTPATIENT
Start: 2023-07-03

## 2023-07-03 RX ORDER — SODIUM CHLORIDE 0.9 % (FLUSH) 0.9 %
5-40 SYRINGE (ML) INJECTION PRN
Status: CANCELLED | OUTPATIENT
Start: 2023-07-03

## 2023-07-03 RX ORDER — ONDANSETRON 2 MG/ML
8 INJECTION INTRAMUSCULAR; INTRAVENOUS
Status: CANCELLED | OUTPATIENT
Start: 2023-07-03

## 2023-07-03 RX ORDER — SODIUM CHLORIDE 0.9 % (FLUSH) 0.9 %
5-40 SYRINGE (ML) INJECTION PRN
OUTPATIENT
Start: 2023-07-03

## 2023-07-03 RX ORDER — FAMOTIDINE 10 MG/ML
20 INJECTION, SOLUTION INTRAVENOUS
OUTPATIENT
Start: 2023-07-24

## 2023-07-03 RX ORDER — ONDANSETRON 2 MG/ML
8 INJECTION INTRAMUSCULAR; INTRAVENOUS
Status: DISCONTINUED | OUTPATIENT
Start: 2023-07-03 | End: 2023-07-04 | Stop reason: HOSPADM

## 2023-07-03 RX ORDER — ALBUTEROL SULFATE 90 UG/1
4 AEROSOL, METERED RESPIRATORY (INHALATION) PRN
Status: DISCONTINUED | OUTPATIENT
Start: 2023-07-03 | End: 2023-07-04 | Stop reason: HOSPADM

## 2023-07-03 RX ORDER — EPINEPHRINE 1 MG/ML
0.3 INJECTION, SOLUTION, CONCENTRATE INTRAVENOUS PRN
Status: DISCONTINUED | OUTPATIENT
Start: 2023-07-03 | End: 2023-07-04 | Stop reason: HOSPADM

## 2023-07-03 RX ORDER — HEPARIN SODIUM (PORCINE) LOCK FLUSH IV SOLN 100 UNIT/ML 100 UNIT/ML
500 SOLUTION INTRAVENOUS PRN
OUTPATIENT
Start: 2023-07-03

## 2023-07-03 RX ORDER — MEPERIDINE HYDROCHLORIDE 25 MG/ML
12.5 INJECTION INTRAMUSCULAR; INTRAVENOUS; SUBCUTANEOUS PRN
Status: DISCONTINUED | OUTPATIENT
Start: 2023-07-03 | End: 2023-07-04 | Stop reason: HOSPADM

## 2023-07-03 RX ORDER — ALBUTEROL SULFATE 90 UG/1
4 AEROSOL, METERED RESPIRATORY (INHALATION) PRN
OUTPATIENT
Start: 2023-07-24

## 2023-07-03 RX ORDER — EPINEPHRINE 1 MG/ML
0.3 INJECTION, SOLUTION, CONCENTRATE INTRAVENOUS PRN
Status: CANCELLED | OUTPATIENT
Start: 2023-07-03

## 2023-07-03 RX ORDER — EPINEPHRINE 1 MG/ML
0.3 INJECTION, SOLUTION, CONCENTRATE INTRAVENOUS PRN
OUTPATIENT
Start: 2023-07-24

## 2023-07-03 RX ORDER — DIPHENHYDRAMINE HYDROCHLORIDE 50 MG/ML
50 INJECTION INTRAMUSCULAR; INTRAVENOUS
Status: CANCELLED | OUTPATIENT
Start: 2023-07-03

## 2023-07-03 RX ORDER — SODIUM CHLORIDE 9 MG/ML
5-250 INJECTION, SOLUTION INTRAVENOUS PRN
OUTPATIENT
Start: 2023-07-24

## 2023-07-03 RX ORDER — SODIUM CHLORIDE 9 MG/ML
INJECTION, SOLUTION INTRAVENOUS CONTINUOUS
Status: CANCELLED | OUTPATIENT
Start: 2023-07-03

## 2023-07-03 RX ORDER — FAMOTIDINE 10 MG/ML
20 INJECTION, SOLUTION INTRAVENOUS
Status: CANCELLED | OUTPATIENT
Start: 2023-07-03

## 2023-07-03 RX ORDER — SODIUM CHLORIDE 9 MG/ML
5-250 INJECTION, SOLUTION INTRAVENOUS PRN
Status: DISCONTINUED | OUTPATIENT
Start: 2023-07-03 | End: 2023-07-04 | Stop reason: HOSPADM

## 2023-07-03 RX ORDER — SODIUM CHLORIDE 9 MG/ML
INJECTION, SOLUTION INTRAVENOUS CONTINUOUS
OUTPATIENT
Start: 2023-07-24

## 2023-07-03 RX ORDER — ALBUTEROL SULFATE 90 UG/1
4 AEROSOL, METERED RESPIRATORY (INHALATION) PRN
Status: CANCELLED | OUTPATIENT
Start: 2023-07-03

## 2023-07-03 RX ORDER — SODIUM CHLORIDE 9 MG/ML
5-250 INJECTION, SOLUTION INTRAVENOUS PRN
Status: CANCELLED | OUTPATIENT
Start: 2023-07-03

## 2023-07-03 RX ADMIN — SODIUM CHLORIDE, PRESERVATIVE FREE 10 ML: 5 INJECTION INTRAVENOUS at 10:15

## 2023-07-03 RX ADMIN — SODIUM CHLORIDE 75 ML/HR: 9 INJECTION, SOLUTION INTRAVENOUS at 10:18

## 2023-07-03 RX ADMIN — SODIUM CHLORIDE, PRESERVATIVE FREE 10 ML: 5 INJECTION INTRAVENOUS at 11:55

## 2023-07-03 RX ADMIN — TRASTUZUMAB 567 MG: 150 INJECTION, POWDER, LYOPHILIZED, FOR SOLUTION INTRAVENOUS at 11:18

## 2023-07-03 ASSESSMENT — PATIENT HEALTH QUESTIONNAIRE - PHQ9
SUM OF ALL RESPONSES TO PHQ QUESTIONS 1-9: 0
SUM OF ALL RESPONSES TO PHQ QUESTIONS 1-9: 0
1. LITTLE INTEREST OR PLEASURE IN DOING THINGS: 0
SUM OF ALL RESPONSES TO PHQ9 QUESTIONS 1 & 2: 0
2. FEELING DOWN, DEPRESSED OR HOPELESS: 0
SUM OF ALL RESPONSES TO PHQ QUESTIONS 1-9: 0
SUM OF ALL RESPONSES TO PHQ QUESTIONS 1-9: 0

## 2023-07-03 NOTE — PROGRESS NOTES
Herceptin monotherapy to complete the year of treatment, last infusion 7/24/23. She is very anxious about what comes next in terms of monitoring for recurrence. Encouraged routine screening and support groups. Continue alpha-lipoic acid and other supportive care as well as AI. DEXA March 2022 showed mild osteopenia, repeat March 2024. Follow up in 3 weeks for final Herceptin, 6 weeks for OV. All questions were asked and answered to the best of my ability.            [facility]  [address] [place] [address], [address] [place] [address]  Office : [phone]  Fax : [phone]

## 2023-07-03 NOTE — PROGRESS NOTES
Arrived to the [address] [place] [address]. [facility]. Herceptin completed. Patient tolerated well. Any issues or concerns during appointment: none. Patient instructed to call provider with temperature of 100.4 or greater or nausea/vomiting/ diarrhea or pain not controlled by medications. Discharged ambulatory.

## 2023-07-05 ENCOUNTER — OFFICE VISIT (OUTPATIENT)
Dept: INTERNAL MEDICINE CLINIC | Facility: CLINIC | Age: 78
End: 2023-07-05
Payer: MEDICARE

## 2023-07-05 VITALS
BODY MASS INDEX: 36 KG/M2 | WEIGHT: 203.2 LBS | SYSTOLIC BLOOD PRESSURE: 140 MMHG | DIASTOLIC BLOOD PRESSURE: 84 MMHG | HEIGHT: 63 IN | HEART RATE: 77 BPM

## 2023-07-05 DIAGNOSIS — E78.00 HYPERCHOLESTEROLEMIA: ICD-10-CM

## 2023-07-05 DIAGNOSIS — I10 HYPERTENSION, ESSENTIAL: Primary | ICD-10-CM

## 2023-07-05 DIAGNOSIS — E03.9 ACQUIRED HYPOTHYROIDISM: ICD-10-CM

## 2023-07-05 DIAGNOSIS — R73.01 IMPAIRED FASTING GLUCOSE: ICD-10-CM

## 2023-07-05 DIAGNOSIS — E03.9 HYPOTHYROIDISM (ACQUIRED): ICD-10-CM

## 2023-07-05 DIAGNOSIS — E66.01 SEVERE OBESITY (HCC): ICD-10-CM

## 2023-07-05 PROCEDURE — 3077F SYST BP >= 140 MM HG: CPT | Performed by: INTERNAL MEDICINE

## 2023-07-05 PROCEDURE — G8417 CALC BMI ABV UP PARAM F/U: HCPCS | Performed by: INTERNAL MEDICINE

## 2023-07-05 PROCEDURE — 3079F DIAST BP 80-89 MM HG: CPT | Performed by: INTERNAL MEDICINE

## 2023-07-05 PROCEDURE — 1123F ACP DISCUSS/DSCN MKR DOCD: CPT | Performed by: INTERNAL MEDICINE

## 2023-07-05 PROCEDURE — 1036F TOBACCO NON-USER: CPT | Performed by: INTERNAL MEDICINE

## 2023-07-05 PROCEDURE — 1090F PRES/ABSN URINE INCON ASSESS: CPT | Performed by: INTERNAL MEDICINE

## 2023-07-05 PROCEDURE — G8399 PT W/DXA RESULTS DOCUMENT: HCPCS | Performed by: INTERNAL MEDICINE

## 2023-07-05 PROCEDURE — G8428 CUR MEDS NOT DOCUMENT: HCPCS | Performed by: INTERNAL MEDICINE

## 2023-07-05 PROCEDURE — 99214 OFFICE O/P EST MOD 30 MIN: CPT | Performed by: INTERNAL MEDICINE

## 2023-07-05 RX ORDER — HYDROCHLOROTHIAZIDE 12.5 MG/1
12.5 CAPSULE, GELATIN COATED ORAL EVERY MORNING
Qty: 90 CAPSULE | Refills: 1 | Status: SHIPPED | OUTPATIENT
Start: 2023-07-05

## 2023-07-05 RX ORDER — LEVOTHYROXINE SODIUM 0.1 MG/1
100 TABLET ORAL DAILY
Qty: 90 TABLET | Refills: 0 | Status: SHIPPED | OUTPATIENT
Start: 2023-07-05

## 2023-07-05 ASSESSMENT — ENCOUNTER SYMPTOMS
EYE PAIN: 0
STRIDOR: 0
RECTAL PAIN: 0
VOICE CHANGE: 0

## 2023-07-05 NOTE — PROGRESS NOTES
FOLLOWUP VISIT    Subjective:    Ms. Chandu Walker is a 69 y.o., female,   Chief Complaint   Patient presents with    6 Month Follow-Up       HPI:    Patient presents today for follow up of two or more chronic medical problems and review of labs. The patient has hypothyroidism. The patient denies any symptoms of hypo- or hyperthyroidism on the current dose of levothyroxine. Confirms daily compliance on empty stomach. The patient has hypertension. The patient has been on an attempted low sodium diet and has been trying to exercise and maintain a healthy weight. The patient reports good compliance with the blood pressure medications. States her home blood pressures averating 140-150 range. The patient has impaired fasting glucose tolerance. The patient remains on attempted diet exercise and weight loss therapy. The patient denies any symptoms of hyperglycemia. The following portions of the patient's history were reviewed and updated as appropriate:      Past Medical History:   Diagnosis Date    Anxiety     Depression 5/19/2017    Essential hypertension 05/19/2017    Fibromyalgia     History of postoperative nausea and vomiting     Hyperlipidemia     Hypothyroid     Impaired fasting glucose     Insomnia     Nausea & vomiting     Need for hepatitis C screening test 12/20/2020    3/15/21 HCV ab negative.       Obesity     Osteoarthritis 12/15/2015    Stress incontinence, female     Vitamin D deficiency        Past Surgical History:   Procedure Laterality Date    BREAST BIOPSY Bilateral     BREAST BIOPSY Right 3/29/2021    RIGHT BREAST NEEDLE LOCALIZED LUMPECTOMY X2 performed by Sesar Brito MD at [facility]    COLONOSCOPY  2012    normal; internal hemorrhoid    HYSTERECTOMY ([facility])  [place] Right 6/1/2022    RIGHT SENTINEL NODE BIOPSY MAG TRACE performed by Sesar Brito MD at [address] [place] [address] Right 6/1/2022    RIGHT BREAST

## 2023-07-06 ENCOUNTER — PROCEDURE VISIT (OUTPATIENT)
Dept: PHYSICAL MEDICINE AND REHAB | Age: 78
End: 2023-07-06
Payer: MEDICARE

## 2023-07-06 VITALS
HEIGHT: 63 IN | SYSTOLIC BLOOD PRESSURE: 137 MMHG | WEIGHT: 203 LBS | DIASTOLIC BLOOD PRESSURE: 80 MMHG | HEART RATE: 78 BPM | BODY MASS INDEX: 35.97 KG/M2

## 2023-07-06 DIAGNOSIS — G56.02 LEFT CARPAL TUNNEL SYNDROME: Primary | ICD-10-CM

## 2023-07-06 PROCEDURE — 95910 NRV CNDJ TEST 7-8 STUDIES: CPT | Performed by: PHYSICAL MEDICINE & REHABILITATION

## 2023-07-24 ENCOUNTER — HOSPITAL ENCOUNTER (OUTPATIENT)
Dept: INFUSION THERAPY | Age: 78
Discharge: HOME | End: 2023-07-24
Payer: MEDICARE

## 2023-07-24 VITALS
RESPIRATION RATE: 16 BRPM | BODY MASS INDEX: 35.92 KG/M2 | TEMPERATURE: 98 F | SYSTOLIC BLOOD PRESSURE: 152 MMHG | WEIGHT: 202.8 LBS | DIASTOLIC BLOOD PRESSURE: 73 MMHG | HEART RATE: 72 BPM | OXYGEN SATURATION: 97 %

## 2023-07-24 DIAGNOSIS — Z17.0 MALIGNANT NEOPLASM OF RIGHT BREAST IN FEMALE, ESTROGEN RECEPTOR POSITIVE, UNSPECIFIED SITE OF BREAST (HCC): Primary | ICD-10-CM

## 2023-07-24 DIAGNOSIS — C50.911 MALIGNANT NEOPLASM OF RIGHT BREAST IN FEMALE, ESTROGEN RECEPTOR POSITIVE, UNSPECIFIED SITE OF BREAST (HCC): Primary | ICD-10-CM

## 2023-07-24 PROCEDURE — 96413 CHEMO IV INFUSION 1 HR: CPT

## 2023-07-24 PROCEDURE — 2580000003 HC RX 258: Performed by: NURSE PRACTITIONER

## 2023-07-24 PROCEDURE — 6360000002 HC RX W HCPCS: Performed by: NURSE PRACTITIONER

## 2023-07-24 RX ORDER — ONDANSETRON 2 MG/ML
8 INJECTION INTRAMUSCULAR; INTRAVENOUS
Status: DISCONTINUED | OUTPATIENT
Start: 2023-07-24 | End: 2023-07-25 | Stop reason: HOSPADM

## 2023-07-24 RX ORDER — DIPHENHYDRAMINE HYDROCHLORIDE 50 MG/ML
50 INJECTION INTRAMUSCULAR; INTRAVENOUS
Status: DISCONTINUED | OUTPATIENT
Start: 2023-07-24 | End: 2023-07-25 | Stop reason: HOSPADM

## 2023-07-24 RX ORDER — EPINEPHRINE 1 MG/ML
0.3 INJECTION, SOLUTION, CONCENTRATE INTRAVENOUS PRN
Status: DISCONTINUED | OUTPATIENT
Start: 2023-07-24 | End: 2023-07-25 | Stop reason: HOSPADM

## 2023-07-24 RX ORDER — ACETAMINOPHEN 325 MG/1
650 TABLET ORAL
Status: DISCONTINUED | OUTPATIENT
Start: 2023-07-24 | End: 2023-07-25 | Stop reason: HOSPADM

## 2023-07-24 RX ORDER — ALBUTEROL SULFATE 90 UG/1
4 AEROSOL, METERED RESPIRATORY (INHALATION) PRN
Status: DISCONTINUED | OUTPATIENT
Start: 2023-07-24 | End: 2023-07-25 | Stop reason: HOSPADM

## 2023-07-24 RX ORDER — SODIUM CHLORIDE 9 MG/ML
5-250 INJECTION, SOLUTION INTRAVENOUS PRN
Status: DISCONTINUED | OUTPATIENT
Start: 2023-07-24 | End: 2023-07-25 | Stop reason: HOSPADM

## 2023-07-24 RX ORDER — SODIUM CHLORIDE 0.9 % (FLUSH) 0.9 %
5-40 SYRINGE (ML) INJECTION PRN
Status: DISCONTINUED | OUTPATIENT
Start: 2023-07-24 | End: 2023-07-25 | Stop reason: HOSPADM

## 2023-07-24 RX ORDER — MEPERIDINE HYDROCHLORIDE 25 MG/ML
12.5 INJECTION INTRAMUSCULAR; INTRAVENOUS; SUBCUTANEOUS PRN
Status: DISCONTINUED | OUTPATIENT
Start: 2023-07-24 | End: 2023-07-25 | Stop reason: HOSPADM

## 2023-07-24 RX ADMIN — SODIUM CHLORIDE, PRESERVATIVE FREE 10 ML: 5 INJECTION INTRAVENOUS at 14:24

## 2023-07-24 RX ADMIN — SODIUM CHLORIDE 50 ML/HR: 9 INJECTION, SOLUTION INTRAVENOUS at 14:25

## 2023-07-24 RX ADMIN — TRASTUZUMAB 567 MG: 150 INJECTION, POWDER, LYOPHILIZED, FOR SOLUTION INTRAVENOUS at 14:46

## 2023-07-24 NOTE — PROGRESS NOTES
Arrived to the [facility]. herceptin completed. Patient tolerated well. Any issues or concerns during appointment: none. Patient aware of next lab and [facility] office visit on 8/14/2023 (date) at 0900 (time). Patient instructed to call provider with temperature of 100.4 or greater or nausea/vomiting/ diarrhea or pain not controlled by medications  Discharged ambulatory.

## 2023-07-25 ENCOUNTER — OFFICE VISIT (OUTPATIENT)
Dept: ORTHOPEDIC SURGERY | Age: 78
End: 2023-07-25
Payer: MEDICARE

## 2023-07-25 DIAGNOSIS — G56.02 LEFT CARPAL TUNNEL SYNDROME: Primary | ICD-10-CM

## 2023-07-25 PROCEDURE — G8428 CUR MEDS NOT DOCUMENT: HCPCS | Performed by: ORTHOPAEDIC SURGERY

## 2023-07-25 PROCEDURE — G8399 PT W/DXA RESULTS DOCUMENT: HCPCS | Performed by: ORTHOPAEDIC SURGERY

## 2023-07-25 PROCEDURE — G8417 CALC BMI ABV UP PARAM F/U: HCPCS | Performed by: ORTHOPAEDIC SURGERY

## 2023-07-25 PROCEDURE — 20526 THER INJECTION CARP TUNNEL: CPT | Performed by: ORTHOPAEDIC SURGERY

## 2023-07-25 PROCEDURE — 99214 OFFICE O/P EST MOD 30 MIN: CPT | Performed by: ORTHOPAEDIC SURGERY

## 2023-07-25 PROCEDURE — 1036F TOBACCO NON-USER: CPT | Performed by: ORTHOPAEDIC SURGERY

## 2023-07-25 PROCEDURE — 1123F ACP DISCUSS/DSCN MKR DOCD: CPT | Performed by: ORTHOPAEDIC SURGERY

## 2023-07-25 PROCEDURE — 1090F PRES/ABSN URINE INCON ASSESS: CPT | Performed by: ORTHOPAEDIC SURGERY

## 2023-07-25 RX ORDER — BETAMETHASONE SODIUM PHOSPHATE AND BETAMETHASONE ACETATE 3; 3 MG/ML; MG/ML
6 INJECTION, SUSPENSION INTRA-ARTICULAR; INTRALESIONAL; INTRAMUSCULAR; SOFT TISSUE ONCE
Status: COMPLETED | OUTPATIENT
Start: 2023-07-25 | End: 2023-07-25

## 2023-07-25 RX ADMIN — BETAMETHASONE SODIUM PHOSPHATE AND BETAMETHASONE ACETATE 6 MG: 3; 3 INJECTION, SUSPENSION INTRA-ARTICULAR; INTRALESIONAL; INTRAMUSCULAR; SOFT TISSUE at 13:29

## 2023-07-25 NOTE — PROGRESS NOTES
Orthopaedic Hand Surgery Note    Name: Farzana Elise  YOB: 1945  Gender: female  MRN: [id number]    CC: Follow up of hand numbness    HPI: Patient is a 69 y.o. female who is here regarding follow up for  hand numbness and tingling. She is here to review her nerve conduction study. ROS/Meds/PSH/PMH/FH/SH: I personally reviewed the patients standard intake form. Pertinents are discussed in the HPI    Physical Examination:  Musculoskeletal:       Examination of the left upper extremity demonstrates Decreased sensation to light touch in the median distribution, normal sensation in ulnar and radial distribution, Positive carpal tunnel compression testing and Phalen testing, cap refill < 5 seconds in all fingers. Inspection reveals no thenar atrophy. Negative Tinel and elbow flexion compression test of the cubital tunnel, negative Tinel over Guyon's canal. Sensation to light touch in the ulnar 2 digits is normal with no intrinsic atrophy/weakness. No tenderness to palpation or masses noted in the forearm. Imaging / Electrodiagnostic Tests: Independently reviewed and interpreted the patient's nerve conduction study. She has findings consistent with severe left carpal tunnel syndrome    Assessment:     ICD-10-CM    1. Left carpal tunnel syndrome  G56.02 US EXTREMITY LEFT NON VASC LIMITED     betamethasone acetate-betamethasone sodium phosphate (CELESTONE) injection 6 mg     INJECT CARPAL TUNNEL          Plan:  We discussed the diagnosis and different treatment options. We discussed observation, EMG/NCV, night splinting, cortisone injections and surgical decompression and the risks and benefits of all were clearly outlined. After discussing in detail the patient elects to proceed with cortisone injection. She is not ready to pursue surgery    Procedure Note    The risk, benefits and alternatives of injection and no injection therapy were discussed.  Risks including skin blanching, subcutaneous

## 2023-08-04 DIAGNOSIS — Z17.0 MALIGNANT NEOPLASM OF RIGHT BREAST IN FEMALE, ESTROGEN RECEPTOR POSITIVE, UNSPECIFIED SITE OF BREAST (HCC): Primary | ICD-10-CM

## 2023-08-04 DIAGNOSIS — C50.911 MALIGNANT NEOPLASM OF RIGHT BREAST IN FEMALE, ESTROGEN RECEPTOR POSITIVE, UNSPECIFIED SITE OF BREAST (HCC): Primary | ICD-10-CM

## 2023-08-04 DIAGNOSIS — Z79.899 HIGH RISK MEDICATIONS (NOT ANTICOAGULANTS) LONG-TERM USE: ICD-10-CM

## 2023-08-09 DIAGNOSIS — E03.9 HYPOTHYROIDISM (ACQUIRED): ICD-10-CM

## 2023-08-09 LAB — TSH W FREE THYROID IF ABNORMAL: 0.42 UIU/ML (ref 0.36–3.74)

## 2023-08-14 ENCOUNTER — OFFICE VISIT (OUTPATIENT)
Dept: ONCOLOGY | Age: 78
End: 2023-08-14
Payer: MEDICARE

## 2023-08-14 ENCOUNTER — HOSPITAL ENCOUNTER (OUTPATIENT)
Dept: LAB | Age: 78
Discharge: HOME | End: 2023-08-17
Payer: MEDICARE

## 2023-08-14 VITALS
BODY MASS INDEX: 36.21 KG/M2 | SYSTOLIC BLOOD PRESSURE: 160 MMHG | HEART RATE: 66 BPM | TEMPERATURE: 98.1 F | RESPIRATION RATE: 16 BRPM | DIASTOLIC BLOOD PRESSURE: 70 MMHG | OXYGEN SATURATION: 98 % | HEIGHT: 63 IN | WEIGHT: 204.4 LBS

## 2023-08-14 DIAGNOSIS — Z79.899 HIGH RISK MEDICATIONS (NOT ANTICOAGULANTS) LONG-TERM USE: ICD-10-CM

## 2023-08-14 DIAGNOSIS — C50.911 MALIGNANT NEOPLASM OF RIGHT BREAST IN FEMALE, ESTROGEN RECEPTOR POSITIVE, UNSPECIFIED SITE OF BREAST (HCC): ICD-10-CM

## 2023-08-14 DIAGNOSIS — Z17.0 MALIGNANT NEOPLASM OF RIGHT BREAST IN FEMALE, ESTROGEN RECEPTOR POSITIVE, UNSPECIFIED SITE OF BREAST (HCC): ICD-10-CM

## 2023-08-14 DIAGNOSIS — C50.911 MALIGNANT NEOPLASM OF RIGHT BREAST IN FEMALE, ESTROGEN RECEPTOR POSITIVE, UNSPECIFIED SITE OF BREAST (HCC): Primary | ICD-10-CM

## 2023-08-14 DIAGNOSIS — Z12.31 SCREENING MAMMOGRAM FOR HIGH-RISK PATIENT: ICD-10-CM

## 2023-08-14 DIAGNOSIS — Z17.0 MALIGNANT NEOPLASM OF RIGHT BREAST IN FEMALE, ESTROGEN RECEPTOR POSITIVE, UNSPECIFIED SITE OF BREAST (HCC): Primary | ICD-10-CM

## 2023-08-14 LAB
ALBUMIN SERPL-MCNC: 3.5 G/DL (ref 3.2–4.6)
ALBUMIN/GLOB SERPL: 1 (ref 0.4–1.6)
ALP SERPL-CCNC: 112 U/L (ref 50–136)
ALT SERPL-CCNC: 23 U/L (ref 12–65)
ANION GAP SERPL CALC-SCNC: 3 MMOL/L (ref 2–11)
AST SERPL-CCNC: 24 U/L (ref 15–37)
BASOPHILS # BLD: 0.1 K/UL (ref 0–0.2)
BASOPHILS NFR BLD: 1 % (ref 0–2)
BILIRUB SERPL-MCNC: 0.8 MG/DL (ref 0.2–1.1)
BUN SERPL-MCNC: 15 MG/DL (ref 8–23)
CALCIUM SERPL-MCNC: 9.1 MG/DL (ref 8.3–10.4)
CHLORIDE SERPL-SCNC: 106 MMOL/L (ref 101–110)
CO2 SERPL-SCNC: 30 MMOL/L (ref 21–32)
CREAT SERPL-MCNC: 0.9 MG/DL (ref 0.6–1)
DIFFERENTIAL METHOD BLD: NORMAL
EOSINOPHIL # BLD: 0.1 K/UL (ref 0–0.8)
EOSINOPHIL NFR BLD: 2 % (ref 0.5–7.8)
ERYTHROCYTE [DISTWIDTH] IN BLOOD BY AUTOMATED COUNT: 13.9 % (ref 11.9–14.6)
GLOBULIN SER CALC-MCNC: 3.6 G/DL (ref 2.8–4.5)
GLUCOSE SERPL-MCNC: 93 MG/DL (ref 65–100)
HCT VFR BLD AUTO: 38.9 % (ref 35.8–46.3)
HGB BLD-MCNC: 12.7 G/DL (ref 11.7–15.4)
IMM GRANULOCYTES # BLD AUTO: 0 K/UL (ref 0–0.5)
IMM GRANULOCYTES NFR BLD AUTO: 0 % (ref 0–5)
LYMPHOCYTES # BLD: 1.3 K/UL (ref 0.5–4.6)
LYMPHOCYTES NFR BLD: 20 % (ref 13–44)
MAGNESIUM SERPL-MCNC: 2.1 MG/DL (ref 1.8–2.4)
MCH RBC QN AUTO: 29.2 PG (ref 26.1–32.9)
MCHC RBC AUTO-ENTMCNC: 32.6 G/DL (ref 31.4–35)
MCV RBC AUTO: 89.4 FL (ref 82–102)
MONOCYTES # BLD: 0.5 K/UL (ref 0.1–1.3)
MONOCYTES NFR BLD: 9 % (ref 4–12)
NEUTS SEG # BLD: 4.3 K/UL (ref 1.7–8.2)
NEUTS SEG NFR BLD: 68 % (ref 43–78)
NRBC # BLD: 0 K/UL (ref 0–0.2)
PLATELET # BLD AUTO: 156 K/UL (ref 150–450)
PMV BLD AUTO: 10.6 FL (ref 9.4–12.3)
POTASSIUM SERPL-SCNC: 4.2 MMOL/L (ref 3.5–5.1)
PROT SERPL-MCNC: 7.1 G/DL (ref 6.3–8.2)
RBC # BLD AUTO: 4.35 M/UL (ref 4.05–5.2)
SODIUM SERPL-SCNC: 139 MMOL/L (ref 133–143)
WBC # BLD AUTO: 6.2 K/UL (ref 4.3–11.1)

## 2023-08-14 PROCEDURE — 1123F ACP DISCUSS/DSCN MKR DOCD: CPT | Performed by: INTERNAL MEDICINE

## 2023-08-14 PROCEDURE — G8399 PT W/DXA RESULTS DOCUMENT: HCPCS | Performed by: INTERNAL MEDICINE

## 2023-08-14 PROCEDURE — 83735 ASSAY OF MAGNESIUM: CPT

## 2023-08-14 PROCEDURE — G8428 CUR MEDS NOT DOCUMENT: HCPCS | Performed by: INTERNAL MEDICINE

## 2023-08-14 PROCEDURE — 1036F TOBACCO NON-USER: CPT | Performed by: INTERNAL MEDICINE

## 2023-08-14 PROCEDURE — 3078F DIAST BP <80 MM HG: CPT | Performed by: INTERNAL MEDICINE

## 2023-08-14 PROCEDURE — 1090F PRES/ABSN URINE INCON ASSESS: CPT | Performed by: INTERNAL MEDICINE

## 2023-08-14 PROCEDURE — 85025 COMPLETE CBC W/AUTO DIFF WBC: CPT

## 2023-08-14 PROCEDURE — G8417 CALC BMI ABV UP PARAM F/U: HCPCS | Performed by: INTERNAL MEDICINE

## 2023-08-14 PROCEDURE — 36415 COLL VENOUS BLD VENIPUNCTURE: CPT

## 2023-08-14 PROCEDURE — 99214 OFFICE O/P EST MOD 30 MIN: CPT | Performed by: INTERNAL MEDICINE

## 2023-08-14 PROCEDURE — 3077F SYST BP >= 140 MM HG: CPT | Performed by: INTERNAL MEDICINE

## 2023-08-14 PROCEDURE — 80053 COMPREHEN METABOLIC PANEL: CPT

## 2023-08-14 ASSESSMENT — PATIENT HEALTH QUESTIONNAIRE - PHQ9
1. LITTLE INTEREST OR PLEASURE IN DOING THINGS: 0
SUM OF ALL RESPONSES TO PHQ QUESTIONS 1-9: 0
SUM OF ALL RESPONSES TO PHQ QUESTIONS 1-9: 0
2. FEELING DOWN, DEPRESSED OR HOPELESS: 0
SUM OF ALL RESPONSES TO PHQ9 QUESTIONS 1 & 2: 0
SUM OF ALL RESPONSES TO PHQ QUESTIONS 1-9: 0
SUM OF ALL RESPONSES TO PHQ QUESTIONS 1-9: 0

## 2023-08-14 NOTE — PATIENT INSTRUCTIONS
Patient Instructions from Today's Visit    Reason for Visit:  Follow up Breast Cancer    Diagnosis Information:  [email or web address]    Plan:  Lab results reviewed     Follow Up:   Follow up 4 mo     Recent Lab Results:  Hospital Outpatient Visit on 08/14/2023   Component Date Value Ref Range Status    WBC 08/14/2023 6.2  4.3 - 11.1 K/uL Final    RBC 08/14/2023 4.35  4.05 - 5.2 M/uL Final    Hemoglobin 08/14/2023 12.7  11.7 - 15.4 g/dL Final    Hematocrit 08/14/2023 38.9  35.8 - 46.3 % Final    MCV 08/14/2023 89.4  82.0 - 102.0 FL Final    MCH 08/14/2023 29.2  26.1 - 32.9 PG Final    MCHC 08/14/2023 32.6  31.4 - 35.0 g/dL Final    RDW 08/14/2023 13.9  11.9 - 14.6 % Final    Platelets 66/89/5969 156  150 - 450 K/uL Final    MPV 08/14/2023 10.6  9.4 - 12.3 FL Final    nRBC 08/14/2023 0.00  0.0 - 0.2 K/uL Final    **Note: Absolute NRBC parameter is now reported with Hemogram**    Differential Type 08/14/2023 AUTOMATED    Final    Neutrophils % 08/14/2023 68  43 - 78 % Final    Lymphocytes % 08/14/2023 20  13 - 44 % Final    Monocytes % 08/14/2023 9  4.0 - 12.0 % Final    Eosinophils % 08/14/2023 2  0.5 - 7.8 % Final    Basophils % 08/14/2023 1  0.0 - 2.0 % Final    Immature Granulocytes 08/14/2023 0  0.0 - 5.0 % Final    Neutrophils Absolute 08/14/2023 4.3  1.7 - 8.2 K/UL Final    Lymphocytes Absolute 08/14/2023 1.3  0.5 - 4.6 K/UL Final    Monocytes Absolute 08/14/2023 0.5  0.1 - 1.3 K/UL Final    Eosinophils Absolute 08/14/2023 0.1  0.0 - 0.8 K/UL Final    Basophils Absolute 08/14/2023 0.1  0.0 - 0.2 K/UL Final    Absolute Immature Granulocyte 08/14/2023 0.0  0.0 - 0.5 K/UL Final     Treatment Summary has been discussed and given to patient:   N/A  -------------------------------------------------------------------------------------------------------------------  Please call our office at [phone] if you have any  of the following

## 2023-08-14 NOTE — PROGRESS NOTES
Therefore, she is [id number], and would be eligible for the adjuvant paclitaxel and trastuzumab regimen. After TH she will complete a year of Herceptin, as well as endocrine therapy with AI. She will not require radiation due to the T1N0 disease and mastectomy. She returns for follow up on Arimidex. She completed Herceptin therapy last month. She is doing well physically, she had a recent dizzy spell and her blood pressure cuff suggested she may have an irregular heartbeat, she has not had any follow-up for this at present. She will follow-up with primary care for discussion, but I reassured her that Herceptin (or Arimidex for that matter) would not cause any arrhythmia. Her TSH has been slightly low and her PCP is modifying her Synthroid dosing. She is tolerating the Arimidex very well overall. She continues follow-up with ortho for CTS. No other new symptoms, neuropathy unchanged. She denies any fevers or infectious symptoms. Labs reviewed and unremarkable. Continue Arimidex without change. DEXA March 2022 showed mild osteopenia, repeat March 2024. She is due for her contralateral mammogram but wishes to delay this until port removal, we will ask Dr. Klaudia Joseph office to arrange this. Follow up in 4 months for status check on Arimidex. I once again reassured her that based on the results of the APT trial her risk of recurrence is very low, and no other follow-up is needed (other than labs/H&P/breast screening). All questions were asked and answered to the best of my ability.            William Barclay MD  [facility]  [address]  Office : [phone]  Fax : [phone]

## 2023-08-16 ENCOUNTER — OFFICE VISIT (OUTPATIENT)
Dept: INTERNAL MEDICINE CLINIC | Facility: CLINIC | Age: 78
End: 2023-08-16
Payer: MEDICARE

## 2023-08-16 VITALS
WEIGHT: 203 LBS | BODY MASS INDEX: 35.97 KG/M2 | DIASTOLIC BLOOD PRESSURE: 60 MMHG | SYSTOLIC BLOOD PRESSURE: 130 MMHG | HEIGHT: 63 IN | HEART RATE: 52 BPM

## 2023-08-16 DIAGNOSIS — R73.01 IMPAIRED FASTING GLUCOSE: Primary | ICD-10-CM

## 2023-08-16 DIAGNOSIS — I49.9 IRREGULAR HEART BEAT: ICD-10-CM

## 2023-08-16 DIAGNOSIS — E78.00 HYPERCHOLESTEROLEMIA: ICD-10-CM

## 2023-08-16 DIAGNOSIS — E03.9 HYPOTHYROIDISM (ACQUIRED): ICD-10-CM

## 2023-08-16 DIAGNOSIS — I10 HYPERTENSION, ESSENTIAL: ICD-10-CM

## 2023-08-16 PROCEDURE — 1036F TOBACCO NON-USER: CPT | Performed by: INTERNAL MEDICINE

## 2023-08-16 PROCEDURE — G8399 PT W/DXA RESULTS DOCUMENT: HCPCS | Performed by: INTERNAL MEDICINE

## 2023-08-16 PROCEDURE — 3078F DIAST BP <80 MM HG: CPT | Performed by: INTERNAL MEDICINE

## 2023-08-16 PROCEDURE — G8428 CUR MEDS NOT DOCUMENT: HCPCS | Performed by: INTERNAL MEDICINE

## 2023-08-16 PROCEDURE — 99214 OFFICE O/P EST MOD 30 MIN: CPT | Performed by: INTERNAL MEDICINE

## 2023-08-16 PROCEDURE — 1090F PRES/ABSN URINE INCON ASSESS: CPT | Performed by: INTERNAL MEDICINE

## 2023-08-16 PROCEDURE — G8417 CALC BMI ABV UP PARAM F/U: HCPCS | Performed by: INTERNAL MEDICINE

## 2023-08-16 PROCEDURE — 1123F ACP DISCUSS/DSCN MKR DOCD: CPT | Performed by: INTERNAL MEDICINE

## 2023-08-16 PROCEDURE — 3075F SYST BP GE 130 - 139MM HG: CPT | Performed by: INTERNAL MEDICINE

## 2023-08-16 ASSESSMENT — ENCOUNTER SYMPTOMS
STRIDOR: 0
RECTAL PAIN: 0
VOICE CHANGE: 0
EYE PAIN: 0

## 2023-08-16 NOTE — PROGRESS NOTES
1979    LYMPH NODE BIOPSY Right 6/1/2022    RIGHT SENTINEL NODE BIOPSY MAG TRACE [facility] Jair Madrid MD [facility] [address] [facility] Right 6/1/2022    RIGHT BREAST MASTECTOMY performed by Jair Madrid MD at [address] Left 6/1/2022    PORT INSERTION performed by Jair Madrid MD at [address] Bilateral     Left 2015, Right 7/29/20    US BREAST BIOPSY NEEDLE ADDITIONAL RIGHT Right 2/25/2021    US BREAST BIOPSY NEEDLE ADDITIONAL RIGHT 2/25/2021 [facility]    US BREAST BIOPSY W LOC DEVICE 1ST LESION RIGHT Right 2/25/2021    US BREAST NEEDLE BIOPSY RIGHT 2/25/2021 [facility]    US BREAST BIOPSY W LOC DEVICE 1ST LESION RIGHT Right 4/12/2022    US BREAST NEEDLE BIOPSY RIGHT 4/12/2022 [facility]    US GUIDED NEEDLE LOC BREAST ADDL RIGHT Right 3/29/2021    US GUIDED NEEDLE LOC BREAST ADDL RIGHT 3/29/2021 [facility]    US GUIDED NEEDLE LOC OF RIGHT BREAST Right 3/29/2021    US GUIDED NEEDLE LOC OF RIGHT BREAST 3/29/2021 [facility]       Family History   Problem Relation Age of Onset    No Known Problems Mother     Heart Disease Father     Breast Cancer Sister     Stroke Father        Social History     Socioeconomic History    Marital status:       Spouse name: Not on file    Number of children: Not on file    Years of education: Not on file    Highest education level: Not on file   Occupational History    Not on file   Tobacco Use    Smoking status: Never    Smokeless tobacco: Never   Substance and Sexual Activity    Alcohol use: No    Drug use: No    Sexual activity: Not on file   Other Topics Concern    Not on file   Social History Narrative    Not on file     Social Determinants of Health     Financial Resource Strain: Not on file   Food Insecurity: Not on file   Transportation Needs: Not on file   Physical Activity: Insufficiently Active    Days of Exercise per Week: 3 days    Minutes of Exercise per

## 2023-08-19 DIAGNOSIS — I10 ESSENTIAL HYPERTENSION: ICD-10-CM

## 2023-08-21 ENCOUNTER — TELEPHONE (OUTPATIENT)
Dept: SURGERY | Age: 78
End: 2023-08-21

## 2023-08-21 RX ORDER — LOSARTAN POTASSIUM 100 MG/1
TABLET ORAL
Qty: 90 TABLET | Refills: 2 | Status: SHIPPED | OUTPATIENT
Start: 2023-08-21

## 2023-08-21 NOTE — TELEPHONE ENCOUNTER
Mrs. Irma Sanchez wants to know if she can come to her [place] Port removal alone on Thursday (8/24/23)? She says she won't have anyone to come with her on Thursday.

## 2023-08-24 ENCOUNTER — OFFICE VISIT (OUTPATIENT)
Dept: SURGERY | Age: 78
End: 2023-08-24
Payer: MEDICARE

## 2023-08-24 VITALS
SYSTOLIC BLOOD PRESSURE: 164 MMHG | DIASTOLIC BLOOD PRESSURE: 86 MMHG | BODY MASS INDEX: 35.78 KG/M2 | WEIGHT: 202 LBS | HEART RATE: 72 BPM

## 2023-08-24 DIAGNOSIS — C50.911 HER2-POSITIVE CARCINOMA OF RIGHT BREAST (HCC): Primary | ICD-10-CM

## 2023-08-24 PROCEDURE — 36590 REMOVAL TUNNELED CV CATH: CPT | Performed by: SURGERY

## 2023-09-02 ENCOUNTER — HOSPITAL ENCOUNTER (OUTPATIENT)
Dept: MAMMOGRAPHY | Age: 78
End: 2023-09-02
Attending: INTERNAL MEDICINE
Payer: MEDICARE

## 2023-09-02 DIAGNOSIS — Z12.31 SCREENING MAMMOGRAM FOR HIGH-RISK PATIENT: ICD-10-CM

## 2023-09-02 PROCEDURE — 77063 BREAST TOMOSYNTHESIS BI: CPT

## 2023-09-06 ENCOUNTER — TELEPHONE (OUTPATIENT)
Dept: ORTHOPEDIC SURGERY | Age: 78
End: 2023-09-06

## 2023-09-06 NOTE — TELEPHONE ENCOUNTER
I spoke to MsStephanie Sahra and discussed the recovery times for open CTR and U/S guided CTR. She was under the impression that there were two incisions for the one procedure. I also told her that she had to wait 3 months from the date of the cortisone injection before having sx. She will call back when she is ready to come back in to discuss sx and get on the sx schedule.

## 2023-09-07 ENCOUNTER — HOSPITAL ENCOUNTER (OUTPATIENT)
Dept: MAMMOGRAPHY | Age: 78
Discharge: HOME | End: 2023-09-07
Attending: INTERNAL MEDICINE
Payer: MEDICARE

## 2023-09-07 DIAGNOSIS — R92.8 ABNORMAL SCREENING MAMMOGRAM: ICD-10-CM

## 2023-09-07 DIAGNOSIS — Z85.3 HX OF BREAST CANCER: ICD-10-CM

## 2023-09-07 PROCEDURE — 76642 ULTRASOUND BREAST LIMITED: CPT

## 2023-09-07 NOTE — PROGRESS NOTES
Operative Report        Problem:   1. HER2-positive carcinoma of right breast [facility])         Procedure: Removal of venous access device/port    Procedure in Detail:    Consent was obtained and the patient was brought to the procedure room and placed in a supine position. The left chest was prepped and draped sterilely then anesthetized with  lidocaine with epinephrine. The incision  over the port was opened sharply and blunt and sharp dissection were used to dissect to the port and catheter. The sheath around the catheter was incised, the catheter withdrawn, and the distal end of the cut sheath oversewn with vicryl. The port pocket was entered and the port itself identified. This was then completely dissected from the surrounding tissue and removed. The wound was then closed with 4-0 Vicryl. A dressing was applied. The patient tolerated the procedure well and was instructed in wound care.     Specimen:none  EBL: minimal  Complications:none         PT WILL CONSIDER EXCISION OF REDUNDANT \"DOG EAR\" OF MEDIAL RIGHT MASTECTOMY SCAR IN THE OFFICE or EXCISION OF THIS DOG EAR IN ADDITION TO LATERAL SKIN REDUNDANCY WIDE EXCISION IN THE OR          Signed By: Mena Chase MD     September 7, 2023

## 2023-09-16 DIAGNOSIS — E03.9 ACQUIRED HYPOTHYROIDISM: ICD-10-CM

## 2023-09-18 RX ORDER — LEVOTHYROXINE SODIUM 0.1 MG/1
TABLET ORAL
Qty: 90 TABLET | Refills: 0 | Status: SHIPPED | OUTPATIENT
Start: 2023-09-18

## 2023-09-19 ENCOUNTER — OFFICE VISIT (OUTPATIENT)
Dept: SURGERY | Age: 78
End: 2023-09-19
Payer: MEDICARE

## 2023-09-19 VITALS
BODY MASS INDEX: 36.03 KG/M2 | WEIGHT: 203.4 LBS | HEART RATE: 75 BPM | SYSTOLIC BLOOD PRESSURE: 164 MMHG | DIASTOLIC BLOOD PRESSURE: 84 MMHG

## 2023-09-19 DIAGNOSIS — Z90.11 S/P RIGHT MASTECTOMY: ICD-10-CM

## 2023-09-19 DIAGNOSIS — L98.7 EXCESSIVE AND REDUNDANT SKIN AND SUBCUTANEOUS TISSUE: Primary | ICD-10-CM

## 2023-09-19 PROCEDURE — 15839 EXC EXCESSIVE SKN OTHER AREA: CPT | Performed by: SURGERY

## 2023-09-20 NOTE — PROGRESS NOTES
Excision of Mass Operative Report      Date of Procedure: 9/19/2023    Preoperative Diagnosis:  1. Excessive and redundant skin and subcutaneous tissue    2. S/P right mastectomy         Postoperative Diagnosis:   1. Excessive and redundant skin and subcutaneous tissue    2. S/P right mastectomy            Procedure:  excision redundant skin chest, [id number]      Procedure in Detail:    Informed consent was obtained and the patient was brought to the operating room and placed in a supine position. The medial aspect of the prior right mastectomy site was examined and the redundant skin to be excised was marked and confirmed appropriate with the pt. This area was prepped and draped in a sterile fashion then anesthetized with local. An elliptical incision was made encompassing this redundant. This was then dissected from the underlying subcutaneous tissue using sharp dissection and extended in a V-shape down to the pectoralis fascia. .  The specimen measured [id number]. The wound was then closed with layered 3-0 and 4-0 Vicryl. Steristrips and a minimal gauze dressing, due to prior dressing site reaction to adhesive,  were applied. The patient tolerated the procedure well, and was tinstructed in wound care.       Specimens: * Cannot find log *          Signed By: Cali Davalos MD     September 20, 2023

## 2023-09-23 DIAGNOSIS — Z17.0 MALIGNANT NEOPLASM OF RIGHT BREAST IN FEMALE, ESTROGEN RECEPTOR POSITIVE, UNSPECIFIED SITE OF BREAST (HCC): ICD-10-CM

## 2023-09-23 DIAGNOSIS — C50.911 MALIGNANT NEOPLASM OF RIGHT BREAST IN FEMALE, ESTROGEN RECEPTOR POSITIVE, UNSPECIFIED SITE OF BREAST (HCC): ICD-10-CM

## 2023-10-05 ENCOUNTER — TELEPHONE (OUTPATIENT)
Dept: SURGERY | Age: 78
End: 2023-10-05

## 2023-10-05 NOTE — TELEPHONE ENCOUNTER
Patient called in to say her insurance denied the claim for office surgery on 9/19/23. She asked if we could appeal this decision. I spoke to my supervisor who checked billing and we have not received a denial as of yet. I let the patient know we will appeal once we get the denial. She voiced understanding.

## 2023-10-10 ENCOUNTER — TELEPHONE (OUTPATIENT)
Dept: ONCOLOGY | Age: 78
End: 2023-10-10

## 2023-10-10 DIAGNOSIS — Z17.0 MALIGNANT NEOPLASM OF RIGHT BREAST IN FEMALE, ESTROGEN RECEPTOR POSITIVE, UNSPECIFIED SITE OF BREAST (HCC): ICD-10-CM

## 2023-10-10 DIAGNOSIS — C50.911 MALIGNANT NEOPLASM OF RIGHT BREAST IN FEMALE, ESTROGEN RECEPTOR POSITIVE, UNSPECIFIED SITE OF BREAST (HCC): ICD-10-CM

## 2023-10-10 RX ORDER — ANASTROZOLE 1 MG/1
1 TABLET ORAL DAILY
Qty: 90 TABLET | Refills: 3 | Status: SHIPPED | OUTPATIENT
Start: 2023-10-10

## 2023-10-10 NOTE — TELEPHONE ENCOUNTER
Medication Requested: Anastrozole    Date last prescribed: 12/5/22    [facility]ed [facility]acy[facility]    Action Taken: Chart reviewed, refill sent, patient notified.

## 2023-10-10 NOTE — TELEPHONE ENCOUNTER
Physician provider: Isa Concepcion MD  Reason for today's call:medication refill   Last office visit:8/14/2023    Patient notified that their information will be routed to the [facility] clinical triage team for review. Patient is advised that they will receive a phone call from the triage department.        Medication Refill: Anastrozole 1 Mg    Pharmacy:  [facility]

## 2023-10-16 ENCOUNTER — OFFICE VISIT (OUTPATIENT)
Dept: ORTHOPEDIC SURGERY | Age: 78
End: 2023-10-16
Payer: MEDICARE

## 2023-10-16 DIAGNOSIS — G56.02 LEFT CARPAL TUNNEL SYNDROME: Primary | ICD-10-CM

## 2023-10-16 DIAGNOSIS — G56.02 CARPAL TUNNEL SYNDROME ON LEFT: ICD-10-CM

## 2023-10-16 PROCEDURE — G8417 CALC BMI ABV UP PARAM F/U: HCPCS | Performed by: ORTHOPAEDIC SURGERY

## 2023-10-16 PROCEDURE — G8399 PT W/DXA RESULTS DOCUMENT: HCPCS | Performed by: ORTHOPAEDIC SURGERY

## 2023-10-16 PROCEDURE — 1090F PRES/ABSN URINE INCON ASSESS: CPT | Performed by: ORTHOPAEDIC SURGERY

## 2023-10-16 PROCEDURE — G8428 CUR MEDS NOT DOCUMENT: HCPCS | Performed by: ORTHOPAEDIC SURGERY

## 2023-10-16 PROCEDURE — G8484 FLU IMMUNIZE NO ADMIN: HCPCS | Performed by: ORTHOPAEDIC SURGERY

## 2023-10-16 PROCEDURE — 99213 OFFICE O/P EST LOW 20 MIN: CPT | Performed by: ORTHOPAEDIC SURGERY

## 2023-10-16 PROCEDURE — 1123F ACP DISCUSS/DSCN MKR DOCD: CPT | Performed by: ORTHOPAEDIC SURGERY

## 2023-10-16 PROCEDURE — 1036F TOBACCO NON-USER: CPT | Performed by: ORTHOPAEDIC SURGERY

## 2023-10-16 NOTE — PROGRESS NOTES
tunnel release including but not limited to nerve injury, vessel injury, infection, failure to achieve desired results and possible need for additional surgery. Patient understands and wishes to proceed with surgery. On Exam:   The patient is alert and oriented  Cardiovascular: regular rate and rhythm  Respiratory: Non labored breathing   . Patient voiced accordance and understanding of the information provided and the formulated plan. All questions were answered to the patient's satisfaction during the encounter.       Kendra Williamson MD  Orthopaedic Surgery  10/16/23  12:32 PM

## 2023-10-16 NOTE — H&P (VIEW-ONLY)
Orthopaedic Hand Surgery Note    Name: Sherrie Bach  YOB: 1945  Gender: female  MRN: [id number]    CC: Follow up of hand numbness    HPI: Patient is a 69 y.o. female who is here regarding follow up for  hand numbness and tingling. Injection was not helpful. Again, the patient complains of numbness in the median distribution, which wakes her at night and is worse with driving. Symptoms started shortly after starting chemotherapy. ROS/Meds/PSH/PMH/FH/SH: I personally reviewed the patients standard intake form. Pertinents are discussed in the HPI    Physical Examination:  Musculoskeletal:   Examination of the left upper extremity demonstrates Decreased sensation to light touch in the median distribution, normal sensation in ulnar and radial distribution, Positive carpal tunnel compression testing and Phalen testing, cap refill < 5 seconds in all fingers. Inspection reveals no thenar atrophy. Negative Tinel and elbow flexion compression test of the cubital tunnel, negative Tinel over Guyon's canal. Sensation to light touch in the ulnar 2 digits is normal with no intrinsic atrophy/weakness. No tenderness to palpation or masses noted in the forearm. Imaging / Electrodiagnostic Tests:     none    Assessment:     ICD-10-CM    1. Left carpal tunnel syndrome  G56.02           Plan:  We discussed the diagnosis and different treatment options. We discussed observation, EMG/NCV, night splinting, cortisone injections and surgical decompression and the risks and benefits of all were clearly outlined. After discussing in detail the patient elects to proceed with surgical treatment. The patient did not notice symptom improvement with cortisone injection. 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     Patient understands risks and benefits of left ultrasound guided carpal tunnel release including but not limited to nerve injury, vessel injury, infection, failure to achieve desired results and possible need for additional surgery. Patient understands and wishes to proceed with surgery. On Exam:   The patient is alert and oriented  Cardiovascular: regular rate and rhythm  Respiratory: Non labored breathing   . Patient voiced accordance and understanding of the information provided and the formulated plan. All questions were answered to the patient's satisfaction during the encounter.       Eleazar Sanz MD  Orthopaedic Surgery  10/16/23  12:32 PM

## 2023-10-24 RX ORDER — ANASTROZOLE 1 MG/1
1 TABLET ORAL DAILY
Qty: 90 TABLET | Refills: 3 | OUTPATIENT
Start: 2023-10-24

## 2023-10-26 ENCOUNTER — TELEPHONE (OUTPATIENT)
Dept: ORTHOPEDIC SURGERY | Age: 78
End: 2023-10-26

## 2023-11-08 NOTE — PERIOP NOTE
Patient verified name, , and procedure. Abbreviated Assessment completed. Procedure posted local; no anesthesia involved. Instructed pt that they will be notified of their arrival time the day before their procedure; call [facility] Pre-op @ [phone] if you don't receive a call by 2 pm.     Follow office instructions including NPO status and medications for DOS. Bath or shower the night before and the am of surgery with non-moisturizing soap. Wear loose fitting comfortable, clean clothing.

## 2023-11-09 NOTE — PERIOP NOTE
Preop department called to notify patient of arrival time for scheduled procedure. Instructions given to   - Arrive at [address] [facility]. - Remain NPO after midnight, unless otherwise indicated, including gum, mints, and ice chips. - Have a responsible adult to drive patient to the hospital, stay during surgery, and patient will need supervision 24 hours after anesthesia. - Use antibacterial soap in shower the night before surgery and on the morning of surgery.        Was patient contacted: yes  Voicemail left:   Numbers contacted: [phone]   Arrival time: 0530

## 2023-11-10 ENCOUNTER — HOSPITAL ENCOUNTER (OUTPATIENT)
Age: 78
Setting detail: OUTPATIENT SURGERY
Discharge: HOME | End: 2023-11-10
Attending: ORTHOPAEDIC SURGERY | Admitting: ORTHOPAEDIC SURGERY
Payer: MEDICARE

## 2023-11-10 VITALS
TEMPERATURE: 98.2 F | BODY MASS INDEX: 35.44 KG/M2 | HEIGHT: 63 IN | DIASTOLIC BLOOD PRESSURE: 69 MMHG | SYSTOLIC BLOOD PRESSURE: 149 MMHG | WEIGHT: 200 LBS | OXYGEN SATURATION: 98 % | RESPIRATION RATE: 16 BRPM | HEART RATE: 61 BPM

## 2023-11-10 DIAGNOSIS — G56.02 CARPAL TUNNEL SYNDROME ON LEFT: Primary | ICD-10-CM

## 2023-11-10 PROCEDURE — 7100000000 HC PACU RECOVERY - FIRST 15 MIN: Performed by: ORTHOPAEDIC SURGERY

## 2023-11-10 PROCEDURE — 6360000002 HC RX W HCPCS: Performed by: ORTHOPAEDIC SURGERY

## 2023-11-10 PROCEDURE — 3600000012 HC SURGERY LEVEL 2 ADDTL 15MIN: Performed by: ORTHOPAEDIC SURGERY

## 2023-11-10 PROCEDURE — 2500000003 HC RX 250 WO HCPCS: Performed by: ORTHOPAEDIC SURGERY

## 2023-11-10 PROCEDURE — 2709999900 HC NON-CHARGEABLE SUPPLY: Performed by: ORTHOPAEDIC SURGERY

## 2023-11-10 PROCEDURE — 7100000010 HC PHASE II RECOVERY - FIRST 15 MIN: Performed by: ORTHOPAEDIC SURGERY

## 2023-11-10 PROCEDURE — 2720000010 HC SURG SUPPLY STERILE: Performed by: ORTHOPAEDIC SURGERY

## 2023-11-10 PROCEDURE — 3600000002 HC SURGERY LEVEL 2 BASE: Performed by: ORTHOPAEDIC SURGERY

## 2023-11-10 RX ORDER — SODIUM CHLORIDE 0.9 % (FLUSH) 0.9 %
5-40 SYRINGE (ML) INJECTION PRN
Status: DISCONTINUED | OUTPATIENT
Start: 2023-11-10 | End: 2023-11-10 | Stop reason: HOSPADM

## 2023-11-10 RX ORDER — SODIUM CHLORIDE 0.9 % (FLUSH) 0.9 %
5-40 SYRINGE (ML) INJECTION EVERY 12 HOURS SCHEDULED
Status: DISCONTINUED | OUTPATIENT
Start: 2023-11-10 | End: 2023-11-10 | Stop reason: HOSPADM

## 2023-11-10 RX ORDER — ACETAMINOPHEN AND CODEINE PHOSPHATE 300; 30 MG/1; MG/1
1 TABLET ORAL EVERY 4 HOURS PRN
Qty: 12 TABLET | Refills: 0 | Status: SHIPPED | OUTPATIENT
Start: 2023-11-10 | End: 2023-11-13

## 2023-11-10 RX ORDER — BUPIVACAINE HYDROCHLORIDE 2.5 MG/ML
INJECTION, SOLUTION EPIDURAL; INFILTRATION; INTRACAUDAL PRN
Status: DISCONTINUED | OUTPATIENT
Start: 2023-11-10 | End: 2023-11-10 | Stop reason: ALTCHOICE

## 2023-11-10 RX ORDER — LIDOCAINE HYDROCHLORIDE 10 MG/ML
INJECTION, SOLUTION INFILTRATION; PERINEURAL PRN
Status: DISCONTINUED | OUTPATIENT
Start: 2023-11-10 | End: 2023-11-10 | Stop reason: ALTCHOICE

## 2023-11-10 RX ORDER — SODIUM CHLORIDE 9 MG/ML
INJECTION, SOLUTION INTRAVENOUS PRN
Status: DISCONTINUED | OUTPATIENT
Start: 2023-11-10 | End: 2023-11-10 | Stop reason: HOSPADM

## 2023-11-10 ASSESSMENT — PAIN DESCRIPTION - DESCRIPTORS: DESCRIPTORS: ACHING

## 2023-11-10 ASSESSMENT — PAIN DESCRIPTION - LOCATION: LOCATION: HAND

## 2023-11-10 ASSESSMENT — PAIN SCALES - GENERAL: PAINLEVEL_OUTOF10: 7

## 2023-11-10 ASSESSMENT — PAIN DESCRIPTION - ORIENTATION: ORIENTATION: LEFT

## 2023-11-10 NOTE — OP NOTE
Hand Surgery Operative Note      Quyen Jung   69 y.o.   female      Pre-op diagnosis: Left Carpal Tunnel syndrome  Post op diagnosis: same      Procedure: Left Ultrasound-Guided Carpal Tunnel release cpt [id number], [id number]     Surgeon: Tanisha Buchanan MD     Anesthesia: Local + MAC      Tourniquet time: * No tourniquets in log *      Procedure indications: Patient with radial digit numbness recalcitrant to conservative measures with positive documentation of NCV findings consistent with carpal tunnel syndrome. After Thorough discussion, the patient decided to proceed with surgical management. We discussed in detail surgical risks including scar, pain, bleeding, infection, anesthetic risks, neurovascular injury, need for further surgery,  weakness, stiffness, risk of death and potential risk of other unforseen complication. Procedure description:      Patient was placed in the supine position and after appropriate time-out and side, site and procedure confirmed. Using a sterile ultrasound cover and sterile gel, relevant anatomical landmarks were identified, including but not limited to the median nerve, thenar motor branch/recurrent motor branch of the median nerve, palmar cutaneous branch of the median nerve, median and ulnar digital nerves and any visualized communications, ulnar vessels and superficial palmar arch, palmar fascia and distal transverse carpal ligament. A sterile ink marker was used to kit the borders of the transverse and longitudinal safe zones as well as the incision site in the proximal carpal tunnel region. The safe zones were re-scanned to ensure acceptable anatomy and sonographic visualization. A regional anesthetic was administered. A 25 gauge] needle was used to create a small skin wheal at the incision site using 2 ml of buffered 1% lidocaine plain.  Following this, under the guidance of ultrasound local anesthesia was delivered deep and superficial to the transverse carpal ligament

## 2023-11-10 NOTE — PERIOP NOTE
Pt admitted to [facility] PACU, [facility], no IV

## 2023-11-20 ENCOUNTER — OFFICE VISIT (OUTPATIENT)
Dept: ORTHOPEDIC SURGERY | Age: 78
End: 2023-11-20

## 2023-11-20 DIAGNOSIS — G56.02 LEFT CARPAL TUNNEL SYNDROME: ICD-10-CM

## 2023-11-20 DIAGNOSIS — G56.01 RIGHT CARPAL TUNNEL SYNDROME: Primary | ICD-10-CM

## 2023-11-20 PROCEDURE — 99024 POSTOP FOLLOW-UP VISIT: CPT | Performed by: NURSE PRACTITIONER

## 2023-11-20 NOTE — PROGRESS NOTES
Orthopaedic Hand Surgery Note    Name: Elizabeth Russell  YOB: 1945  Gender: female  MRN: [id number]    HPI: Patient is status post Left ultrasound guided CARPAL TUNNEL RELEASE - Left on 11/10/2023. Patient is 12 days postop. She reports no change in her symptoms. We discussed her history of chemotherapy. She denies pain or discomfort. She reports her fingers are still numb. She says her right hand is painful and numb. She would like to have this wound checked. Physical Examination:  Wound healing well. There is no erythema or drainage. Good finger and wrist range of motion. Assessment:   1. Right carpal tunnel syndrome        Status post Left ultrasound guided CARPAL TUNNEL RELEASE - Left on 11/10/2023    Plan:  Patient can progress to activities as tolerated. We will send her for nerve conduction studies on the right with Dr. Sri Tillman who did the left side. She will follow-up with Dr. Lisa Alvarez in approximately 6 weeks.     Duane Laird NP  Orthopaedic Surgery  11/20/23  11:29 AM

## 2023-12-12 DIAGNOSIS — C50.911 MALIGNANT NEOPLASM OF RIGHT BREAST IN FEMALE, ESTROGEN RECEPTOR POSITIVE, UNSPECIFIED SITE OF BREAST (HCC): Primary | ICD-10-CM

## 2023-12-12 DIAGNOSIS — Z17.0 MALIGNANT NEOPLASM OF RIGHT BREAST IN FEMALE, ESTROGEN RECEPTOR POSITIVE, UNSPECIFIED SITE OF BREAST (HCC): Primary | ICD-10-CM

## 2023-12-14 ENCOUNTER — HOSPITAL ENCOUNTER (OUTPATIENT)
Dept: LAB | Age: 78
Discharge: HOME | End: 2023-12-14
Payer: MEDICARE

## 2023-12-14 ENCOUNTER — OFFICE VISIT (OUTPATIENT)
Dept: ONCOLOGY | Age: 78
End: 2023-12-14
Payer: MEDICARE

## 2023-12-14 VITALS
TEMPERATURE: 98.2 F | WEIGHT: 201 LBS | SYSTOLIC BLOOD PRESSURE: 141 MMHG | HEIGHT: 63 IN | HEART RATE: 73 BPM | DIASTOLIC BLOOD PRESSURE: 68 MMHG | BODY MASS INDEX: 35.61 KG/M2 | RESPIRATION RATE: 18 BRPM | OXYGEN SATURATION: 99 %

## 2023-12-14 DIAGNOSIS — T45.1X5A PERIPHERAL NEUROPATHY DUE TO CHEMOTHERAPY (HCC): ICD-10-CM

## 2023-12-14 DIAGNOSIS — R06.02 SOB (SHORTNESS OF BREATH): ICD-10-CM

## 2023-12-14 DIAGNOSIS — T45.1X5A AROMATASE INHIBITOR-ASSOCIATED ARTHRALGIA: Primary | ICD-10-CM

## 2023-12-14 DIAGNOSIS — C50.911 MALIGNANT NEOPLASM OF RIGHT BREAST IN FEMALE, ESTROGEN RECEPTOR POSITIVE, UNSPECIFIED SITE OF BREAST (HCC): ICD-10-CM

## 2023-12-14 DIAGNOSIS — Z17.0 MALIGNANT NEOPLASM OF RIGHT BREAST IN FEMALE, ESTROGEN RECEPTOR POSITIVE, UNSPECIFIED SITE OF BREAST (HCC): ICD-10-CM

## 2023-12-14 DIAGNOSIS — G62.0 PERIPHERAL NEUROPATHY DUE TO CHEMOTHERAPY (HCC): ICD-10-CM

## 2023-12-14 DIAGNOSIS — Z79.811 AROMATASE INHIBITOR USE: ICD-10-CM

## 2023-12-14 DIAGNOSIS — M25.50 AROMATASE INHIBITOR-ASSOCIATED ARTHRALGIA: Primary | ICD-10-CM

## 2023-12-14 LAB
ALBUMIN SERPL-MCNC: 3.6 G/DL (ref 3.2–4.6)
ALBUMIN/GLOB SERPL: 0.9 (ref 0.4–1.6)
ALP SERPL-CCNC: 116 U/L (ref 50–136)
ALT SERPL-CCNC: 18 U/L (ref 12–65)
ANION GAP SERPL CALC-SCNC: 2 MMOL/L (ref 2–11)
AST SERPL-CCNC: 19 U/L (ref 15–37)
BASOPHILS # BLD: 0.1 K/UL (ref 0–0.2)
BASOPHILS NFR BLD: 1 % (ref 0–2)
BILIRUB SERPL-MCNC: 0.9 MG/DL (ref 0.2–1.1)
BUN SERPL-MCNC: 14 MG/DL (ref 8–23)
CALCIUM SERPL-MCNC: 9.5 MG/DL (ref 8.3–10.4)
CHLORIDE SERPL-SCNC: 108 MMOL/L (ref 103–113)
CO2 SERPL-SCNC: 30 MMOL/L (ref 21–32)
CREAT SERPL-MCNC: 0.9 MG/DL (ref 0.6–1)
DIFFERENTIAL METHOD BLD: NORMAL
EOSINOPHIL # BLD: 0.1 K/UL (ref 0–0.8)
EOSINOPHIL NFR BLD: 2 % (ref 0.5–7.8)
ERYTHROCYTE [DISTWIDTH] IN BLOOD BY AUTOMATED COUNT: 13 % (ref 11.9–14.6)
GLOBULIN SER CALC-MCNC: 3.8 G/DL (ref 2.8–4.5)
GLUCOSE SERPL-MCNC: 100 MG/DL (ref 65–100)
HCT VFR BLD AUTO: 41.5 % (ref 35.8–46.3)
HGB BLD-MCNC: 13.7 G/DL (ref 11.7–15.4)
IMM GRANULOCYTES # BLD AUTO: 0 K/UL (ref 0–0.5)
IMM GRANULOCYTES NFR BLD AUTO: 0 % (ref 0–5)
LYMPHOCYTES # BLD: 1.1 K/UL (ref 0.5–4.6)
LYMPHOCYTES NFR BLD: 20 % (ref 13–44)
MCH RBC QN AUTO: 29.5 PG (ref 26.1–32.9)
MCHC RBC AUTO-ENTMCNC: 33 G/DL (ref 31.4–35)
MCV RBC AUTO: 89.2 FL (ref 82–102)
MONOCYTES # BLD: 0.5 K/UL (ref 0.1–1.3)
MONOCYTES NFR BLD: 9 % (ref 4–12)
NEUTS SEG # BLD: 3.8 K/UL (ref 1.7–8.2)
NEUTS SEG NFR BLD: 68 % (ref 43–78)
NRBC # BLD: 0 K/UL (ref 0–0.2)
PLATELET # BLD AUTO: 172 K/UL (ref 150–450)
PMV BLD AUTO: 10.2 FL (ref 9.4–12.3)
POTASSIUM SERPL-SCNC: 4.6 MMOL/L (ref 3.5–5.1)
PROT SERPL-MCNC: 7.4 G/DL (ref 6.3–8.2)
RBC # BLD AUTO: 4.65 M/UL (ref 4.05–5.2)
SODIUM SERPL-SCNC: 140 MMOL/L (ref 136–146)
WBC # BLD AUTO: 5.6 K/UL (ref 4.3–11.1)

## 2023-12-14 PROCEDURE — G8427 DOCREV CUR MEDS BY ELIG CLIN: HCPCS | Performed by: INTERNAL MEDICINE

## 2023-12-14 PROCEDURE — 3078F DIAST BP <80 MM HG: CPT | Performed by: INTERNAL MEDICINE

## 2023-12-14 PROCEDURE — 36415 COLL VENOUS BLD VENIPUNCTURE: CPT

## 2023-12-14 PROCEDURE — G8417 CALC BMI ABV UP PARAM F/U: HCPCS | Performed by: INTERNAL MEDICINE

## 2023-12-14 PROCEDURE — 85025 COMPLETE CBC W/AUTO DIFF WBC: CPT

## 2023-12-14 PROCEDURE — 1123F ACP DISCUSS/DSCN MKR DOCD: CPT | Performed by: INTERNAL MEDICINE

## 2023-12-14 PROCEDURE — 3077F SYST BP >= 140 MM HG: CPT | Performed by: INTERNAL MEDICINE

## 2023-12-14 PROCEDURE — 1036F TOBACCO NON-USER: CPT | Performed by: INTERNAL MEDICINE

## 2023-12-14 PROCEDURE — 99214 OFFICE O/P EST MOD 30 MIN: CPT | Performed by: INTERNAL MEDICINE

## 2023-12-14 PROCEDURE — G8399 PT W/DXA RESULTS DOCUMENT: HCPCS | Performed by: INTERNAL MEDICINE

## 2023-12-14 PROCEDURE — G8484 FLU IMMUNIZE NO ADMIN: HCPCS | Performed by: INTERNAL MEDICINE

## 2023-12-14 PROCEDURE — 80053 COMPREHEN METABOLIC PANEL: CPT

## 2023-12-14 PROCEDURE — 1090F PRES/ABSN URINE INCON ASSESS: CPT | Performed by: INTERNAL MEDICINE

## 2023-12-14 RX ORDER — GABAPENTIN 100 MG/1
100 CAPSULE ORAL 3 TIMES DAILY
Qty: 90 CAPSULE | Refills: 3 | Status: SHIPPED | OUTPATIENT
Start: 2023-12-14 | End: 2024-04-12

## 2023-12-14 ASSESSMENT — PATIENT HEALTH QUESTIONNAIRE - PHQ9
SUM OF ALL RESPONSES TO PHQ QUESTIONS 1-9: 0
2. FEELING DOWN, DEPRESSED OR HOPELESS: 0
SUM OF ALL RESPONSES TO PHQ QUESTIONS 1-9: 0
SUM OF ALL RESPONSES TO PHQ QUESTIONS 1-9: 0
1. LITTLE INTEREST OR PLEASURE IN DOING THINGS: 0
SUM OF ALL RESPONSES TO PHQ QUESTIONS 1-9: 0
SUM OF ALL RESPONSES TO PHQ9 QUESTIONS 1 & 2: 0

## 2023-12-14 NOTE — PATIENT INSTRUCTIONS
Patient Instructions from Today's Visit    Reason for Visit:  Follow up visit for breast cancer      Plan:    Continue Arimidex. Can try Neurontin for your neuropathy. Script sent to pharmacy. Will get a CXR since you have had some shortness of breath. Dexa bone density scan due in March. Radiology will call you to set this up. Radiology #: [phone].     Follow Up:  - 4 months    Recent Lab Results:  Hospital Outpatient Visit on 12/14/2023   Component Date Value Ref Range Status    WBC 12/14/2023 5.6  4.3 - 11.1 K/uL Final    RBC 12/14/2023 4.65  4.05 - 5.2 M/uL Final    Hemoglobin 12/14/2023 13.7  11.7 - 15.4 g/dL Final    Hematocrit 12/14/2023 41.5  35.8 - 46.3 % Final    MCV 12/14/2023 89.2  82.0 - 102.0 FL Final    MCH 12/14/2023 29.5  26.1 - 32.9 PG Final    MCHC 12/14/2023 33.0  31.4 - 35.0 g/dL Final    RDW 12/14/2023 13.0  11.9 - 14.6 % Final    Platelets 52/04/4712 172  150 - 450 K/uL Final    MPV 12/14/2023 10.2  9.4 - 12.3 FL Final    nRBC 12/14/2023 0.00  0.0 - 0.2 K/uL Final    **Note: Absolute NRBC parameter is now reported with Hemogram**    Neutrophils % 12/14/2023 68  43 - 78 % Final    Lymphocytes % 12/14/2023 20  13 - 44 % Final    Monocytes % 12/14/2023 9  4.0 - 12.0 % Final    Eosinophils % 12/14/2023 2  0.5 - 7.8 % Final    Basophils % 12/14/2023 1  0.0 - 2.0 % Final    Immature Granulocytes 12/14/2023 0  0.0 - 5.0 % Final    Neutrophils Absolute 12/14/2023 3.8  1.7 - 8.2 K/UL Final    Lymphocytes Absolute 12/14/2023 1.1  0.5 - 4.6 K/UL Final    Monocytes Absolute 12/14/2023 0.5  0.1 - 1.3 K/UL Final    Eosinophils Absolute 12/14/2023 0.1  0.0 - 0.8 K/UL Final    Basophils Absolute 12/14/2023 0.1  0.0 - 0.2 K/UL Final    Absolute Immature Granulocyte 12/14/2023 0.0  0.0 - 0.5 K/UL Final    Differential Type 12/14/2023 AUTOMATED    Final        Treatment Summary has been discussed and given to patient:

## 2023-12-14 NOTE — PROGRESS NOTES
[facility] [place] [facility]: Office Visit Established Patient    Chief Complaint:    Chief Complaint   Patient presents with    Follow-up         History of Present Illness:  Ms. Christie Landaverde is a 69 y.o. female who returns today for management of breast cancer. She initially presented for a routine bilateral screening mammogram on 3/23/22 which identified a right breast mass near the 11:00-12:00 position, 6-7 cm from the nipple and other small circumscribed round masses in the right upper inner breast which had not definitely changed from prior examinations. Further evaluation with right breast ultrasound on 3/25/22 confirmed a 6 mm hypoechoic shadowing mass in the 12:00 right breast at 7 cm from the nipple. She presented to Dr. Allison Jolly on 4/7/22 to discuss whether to proceed with recommended biopsy, he recommended that biopsy be performed. Ultrasound-guided biopsy was subsequently completed on 4/12/22 with pathology revealing high grade infiltrating ductal carcinoma, ER 76%, DE negative, and HER2 positive. MRI of the bilateral breasts was completed on 4/14/22 demonstrating a right anterior 12:00 breast cancer measuring up to 1.2 cm; multiple (greater than 10 total) other right upper breast masses, some of which had enlarged, concerning for additional malignancy; an indeterminate 1.2 cm right axillary lymph node; and no suspicious left breast finding. We recommended upfront surgery because of the MRI findings and the inconclusive size/clinical stage of her cancer. Path reviewed, thankfully it appears that the indeterminate lesions in the breast were benign, with the final tumor dimension being only 1.5 cm, and 4 nodes negative for tumor. Therefore, she is [id number], and would be eligible for the adjuvant paclitaxel and trastuzumab regimen. She returns for follow up on Arimidex. She appears to be tolerating her Arimidex therapy well.   However, she complains bitterly about the numbness and paresthesias in her

## 2024-01-01 DIAGNOSIS — I10 HYPERTENSION, ESSENTIAL: ICD-10-CM

## 2024-01-02 ENCOUNTER — OFFICE VISIT (OUTPATIENT)
Dept: ORTHOPEDIC SURGERY | Age: 79
End: 2024-01-02
Payer: MEDICARE

## 2024-01-02 VITALS — WEIGHT: 200 LBS | BODY MASS INDEX: 35.44 KG/M2 | HEIGHT: 63 IN

## 2024-01-02 DIAGNOSIS — G56.03 CARPAL TUNNEL SYNDROME, BILATERAL: Primary | ICD-10-CM

## 2024-01-02 PROCEDURE — 1036F TOBACCO NON-USER: CPT | Performed by: ORTHOPAEDIC SURGERY

## 2024-01-02 PROCEDURE — 1123F ACP DISCUSS/DSCN MKR DOCD: CPT | Performed by: ORTHOPAEDIC SURGERY

## 2024-01-02 PROCEDURE — G8484 FLU IMMUNIZE NO ADMIN: HCPCS | Performed by: ORTHOPAEDIC SURGERY

## 2024-01-02 PROCEDURE — 1090F PRES/ABSN URINE INCON ASSESS: CPT | Performed by: ORTHOPAEDIC SURGERY

## 2024-01-02 PROCEDURE — G8417 CALC BMI ABV UP PARAM F/U: HCPCS | Performed by: ORTHOPAEDIC SURGERY

## 2024-01-02 PROCEDURE — 99214 OFFICE O/P EST MOD 30 MIN: CPT | Performed by: ORTHOPAEDIC SURGERY

## 2024-01-02 PROCEDURE — G8427 DOCREV CUR MEDS BY ELIG CLIN: HCPCS | Performed by: ORTHOPAEDIC SURGERY

## 2024-01-02 PROCEDURE — G8399 PT W/DXA RESULTS DOCUMENT: HCPCS | Performed by: ORTHOPAEDIC SURGERY

## 2024-01-02 RX ORDER — HYDROCHLOROTHIAZIDE 12.5 MG/1
12.5 CAPSULE, GELATIN COATED ORAL EVERY MORNING
Qty: 90 CAPSULE | Refills: 3 | Status: SHIPPED | OUTPATIENT
Start: 2024-01-02

## 2024-01-02 NOTE — TELEPHONE ENCOUNTER
Requested Prescriptions     Pending Prescriptions Disp Refills    hydroCHLOROthiazide (MICROZIDE) 12.5 MG capsule [Pharmacy Med Name: HYDROCHLOROTHIAZIDE 12.5 MG Capsule] 90 capsule 3     Sig: TAKE 1 CAPSULE EVERY MORNING     Pt requesting refill. Next f/u 02/16/2024

## 2024-01-03 NOTE — PROGRESS NOTES
Orthopaedic Hand Surgery Note    Name: Kirk Bocanegra  YOB: 1945  Gender: female  MRN: [id number]    CC: Follow up of hand numbness    HPI: Patient is a 78 y.o. female who is here regarding follow up for  hand numbness and tingling. She states that she has numbness and tingling in both hands, which began shortly after starting chemotherapy. She underwent left CTR, but still has not noticed much improvement. She has had a nerve conduction study on the right as well. She has neuropathy in her feet. She has been given a prescription  for gabapentin for this.    ROS/Meds/PSH/PMH/FH/SH: I personally reviewed the patients standard intake form.  Pertinents are discussed in the HPI    Physical Examination:  Musculoskeletal:       Examination of the right upper extremity demonstrates Decreased sensation to light touch in the median distribution, normal sensation in ulnar and radial distribution, Positive carpal tunnel compression testing and Phalen testing, cap refill < 5 seconds in all fingers. Inspection reveals no thenar atrophy. Negative Tinel and elbow flexion compression test of the cubital tunnel, negative Tinel over Guyon's canal. Sensation to light touch in the ulnar 2 digits is normal with no intrinsic atrophy/weakness. No tenderness to palpation or masses noted in the forearm. On exam of the left upper extremity, there is a well healed incision . Light touch sensation is diminished in the median distribution.    Imaging / Electrodiagnostic Tests:     I independently reviewed and interpreted the patient's nerve conduction study. She has findings consistent with moderate right carpal tunnel syndrome    Assessment:     ICD-10-CM    1. Carpal tunnel syndrome, bilateral  G56.03           Plan:  We discussed the diagnosis and different treatment options. We discussed observation, EMG/NCV, night splinting, cortisone injections and surgical decompression and the risks and benefits of all were clearly

## 2024-01-10 ENCOUNTER — TELEPHONE (OUTPATIENT)
Dept: SURGERY | Age: 79
End: 2024-01-10

## 2024-01-10 NOTE — TELEPHONE ENCOUNTER
I spoke to the patient to let her know that her insurance denial is being appealed for the procedure that was done in the office on 9/19/23. Per Brit Roldan in coding she will keep me informed and I will let the patient know as soon as we hear something.

## 2024-01-23 ENCOUNTER — TELEPHONE (OUTPATIENT)
Dept: SURGERY | Age: 79
End: 2024-01-23

## 2024-01-23 NOTE — TELEPHONE ENCOUNTER
Spoke with patient to let her know we are working with our coding department to get her concerns addressed bout her bill.  I will call her once they get back with me.  This is a follow up to her phone call with Aleksandra on 1/10/24.

## 2024-02-09 DIAGNOSIS — E03.9 HYPOTHYROIDISM (ACQUIRED): ICD-10-CM

## 2024-02-09 DIAGNOSIS — R73.01 IMPAIRED FASTING GLUCOSE: ICD-10-CM

## 2024-02-09 DIAGNOSIS — E78.00 HYPERCHOLESTEROLEMIA: ICD-10-CM

## 2024-02-09 LAB
EST. AVERAGE GLUCOSE BLD GHB EST-MCNC: 105 MG/DL
HBA1C MFR BLD: 5.3 % (ref 4.8–5.6)

## 2024-02-10 LAB
ALBUMIN SERPL-MCNC: 3.8 G/DL (ref 3.2–4.6)
ALBUMIN/GLOB SERPL: 1.1 (ref 0.4–1.6)
ALP SERPL-CCNC: 124 U/L (ref 50–136)
ALT SERPL-CCNC: 22 U/L (ref 12–65)
ANION GAP SERPL CALC-SCNC: 3 MMOL/L (ref 2–11)
AST SERPL-CCNC: 20 U/L (ref 15–37)
BILIRUB SERPL-MCNC: 0.6 MG/DL (ref 0.2–1.1)
BUN SERPL-MCNC: 23 MG/DL (ref 8–23)
CALCIUM SERPL-MCNC: 9.5 MG/DL (ref 8.3–10.4)
CHLORIDE SERPL-SCNC: 108 MMOL/L (ref 103–113)
CHOLEST SERPL-MCNC: 184 MG/DL
CO2 SERPL-SCNC: 29 MMOL/L (ref 21–32)
CREAT SERPL-MCNC: 1 MG/DL (ref 0.6–1)
GLOBULIN SER CALC-MCNC: 3.5 G/DL (ref 2.8–4.5)
GLUCOSE SERPL-MCNC: 94 MG/DL (ref 65–100)
HDLC SERPL-MCNC: 69 MG/DL (ref 40–60)
HDLC SERPL: 2.7
LDLC SERPL CALC-MCNC: 104.4 MG/DL
POTASSIUM SERPL-SCNC: 4.1 MMOL/L (ref 3.5–5.1)
PROT SERPL-MCNC: 7.3 G/DL (ref 6.3–8.2)
SODIUM SERPL-SCNC: 140 MMOL/L (ref 136–146)
TRIGL SERPL-MCNC: 53 MG/DL (ref 35–150)
TSH, 3RD GENERATION: 0.81 UIU/ML (ref 0.36–3.74)
VLDLC SERPL CALC-MCNC: 10.6 MG/DL (ref 6–23)

## 2024-02-12 DIAGNOSIS — E03.9 ACQUIRED HYPOTHYROIDISM: ICD-10-CM

## 2024-02-12 RX ORDER — LEVOTHYROXINE SODIUM 0.1 MG/1
TABLET ORAL
Qty: 90 TABLET | Refills: 3 | Status: SHIPPED | OUTPATIENT
Start: 2024-02-12

## 2024-02-12 NOTE — TELEPHONE ENCOUNTER
Requested Prescriptions     Pending Prescriptions Disp Refills    levothyroxine (SYNTHROID) 100 MCG tablet [Pharmacy Med Name: LEVOTHYROXINE SODIUM 100 MCG Tablet] 90 tablet 3     Sig: TAKE 1 TABLET EVERY DAY BEFORE BREAKFAST FOR LOW THYROID HORMONE LEVELS      Pt requesting refill. Next OV 02/16/24. Pharmacy confirmed.

## 2024-02-16 ENCOUNTER — OFFICE VISIT (OUTPATIENT)
Dept: INTERNAL MEDICINE CLINIC | Facility: CLINIC | Age: 79
End: 2024-02-16

## 2024-02-16 VITALS
SYSTOLIC BLOOD PRESSURE: 130 MMHG | BODY MASS INDEX: 36.25 KG/M2 | HEART RATE: 79 BPM | WEIGHT: 204.6 LBS | HEIGHT: 63 IN | DIASTOLIC BLOOD PRESSURE: 80 MMHG

## 2024-02-16 DIAGNOSIS — E03.9 HYPOTHYROIDISM (ACQUIRED): ICD-10-CM

## 2024-02-16 DIAGNOSIS — N18.31 CHRONIC KIDNEY DISEASE, STAGE 3A (HCC): ICD-10-CM

## 2024-02-16 DIAGNOSIS — Z00.00 MEDICARE ANNUAL WELLNESS VISIT, SUBSEQUENT: Primary | Chronic | ICD-10-CM

## 2024-02-16 DIAGNOSIS — R73.01 IMPAIRED FASTING GLUCOSE: ICD-10-CM

## 2024-02-16 DIAGNOSIS — T45.1X5A PERIPHERAL NEUROPATHY DUE TO CHEMOTHERAPY (HCC): ICD-10-CM

## 2024-02-16 DIAGNOSIS — T45.1X5A CHEMOTHERAPY-INDUCED NEUROPATHY (HCC): ICD-10-CM

## 2024-02-16 DIAGNOSIS — F32.A DEPRESSION, UNSPECIFIED DEPRESSION TYPE: ICD-10-CM

## 2024-02-16 DIAGNOSIS — E66.01 SEVERE OBESITY (BMI 35.0-39.9) WITH COMORBIDITY (HCC): ICD-10-CM

## 2024-02-16 DIAGNOSIS — G62.0 PERIPHERAL NEUROPATHY DUE TO CHEMOTHERAPY (HCC): ICD-10-CM

## 2024-02-16 DIAGNOSIS — E78.00 HYPERCHOLESTEROLEMIA: ICD-10-CM

## 2024-02-16 DIAGNOSIS — I10 HYPERTENSION, ESSENTIAL: ICD-10-CM

## 2024-02-16 DIAGNOSIS — Z23 ENCOUNTER FOR IMMUNIZATION: ICD-10-CM

## 2024-02-16 DIAGNOSIS — Z12.11 COLON CANCER SCREENING: Chronic | ICD-10-CM

## 2024-02-16 DIAGNOSIS — E55.9 VITAMIN D DEFICIENCY: ICD-10-CM

## 2024-02-16 DIAGNOSIS — G62.0 CHEMOTHERAPY-INDUCED NEUROPATHY (HCC): ICD-10-CM

## 2024-02-16 DIAGNOSIS — M85.80 OSTEOPENIA, UNSPECIFIED LOCATION: ICD-10-CM

## 2024-02-16 DIAGNOSIS — C50.911 MALIGNANT NEOPLASM OF RIGHT BREAST IN FEMALE, ESTROGEN RECEPTOR POSITIVE, UNSPECIFIED SITE OF BREAST (HCC): ICD-10-CM

## 2024-02-16 DIAGNOSIS — Z17.0 MALIGNANT NEOPLASM OF RIGHT BREAST IN FEMALE, ESTROGEN RECEPTOR POSITIVE, UNSPECIFIED SITE OF BREAST (HCC): ICD-10-CM

## 2024-02-16 DIAGNOSIS — G56.02 LEFT CARPAL TUNNEL SYNDROME: ICD-10-CM

## 2024-02-16 PROBLEM — I49.9 IRREGULAR HEART BEAT: Status: RESOLVED | Noted: 2023-08-16 | Resolved: 2024-02-16

## 2024-02-16 NOTE — PROGRESS NOTES
FOLLOWUP VISIT and MEDICARE WELLNESS    Subjective:    Ms. Bocanegra is a 78 y.o., female,   Chief Complaint   Patient presents with    Medicare AW    6 Month Follow-Up       HPI:    Patient presents today for follow up of two or more chronic medical problems and review of labs.       The patient is also here for the annual Medicare wellness visit.     The patient has hypertension.  The patient has been on an attempted low sodium diet and has been trying to exercise and maintain a healthy weight.  The patient reports good compliance with the blood pressure medications.       The patient has hypothyroidism.  The patient denies any symptoms of hypo- or hyperthyroidism on the current dose of levothyroxine.       The patient has impaired fasting glucose tolerance.  The patient remains on attempted diet exercise and weight loss therapy.  The patient denies any symptoms of hyperglycemia.     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      Also discussed her new CKD 3A diagnosis (see below).  Had been using daily ibuprofen.    The following portions of the patient's history were reviewed and updated as appropriate:      Past Medical History:   Diagnosis Date    Anxiety     Breast cancer (HCC) 2022    chemo x 1 year finished 8/2023    Carpal tunnel syndrome of right wrist     Depression 5/19/2017    Essential hypertension 05/19/2017    medication    Fibromyalgia     History of postoperative nausea and vomiting     Hyperlipidemia     Hypothyroid     Impaired fasting glucose     Insomnia     Nausea & vomiting     Need for hepatitis C screening test 12/20/2020    3/15/21 HCV ab negative.      Obesity     Osteoarthritis 12/15/2015    Stress incontinence, female     Vitamin D deficiency        Past Surgical History:   Procedure Laterality Date    BREAST BIOPSY Bilateral     BREAST BIOPSY Right 3/29/2021    RIGHT BREAST NEEDLE LOCALIZED LUMPECTOMY X2 performed by Jabier Carl MD at [facility]

## 2024-02-28 ENCOUNTER — TELEPHONE (OUTPATIENT)
Dept: ONCOLOGY | Age: 79
End: 2024-02-28

## 2024-02-28 NOTE — TELEPHONE ENCOUNTER
Patient was started on gabopentin 100 mg 3 x's/day in December. States its not helping and was told to call back because \"we can always go up on it\". She is wondering what dose she can go up to. Message to clinical team.  Per NP Lily, Patient can go up to 300 mg at bedtime. She should continue on 100 mg for her other 2 doses and let us know in about 7-10 days if it is helping. She verbalized understanding. Asked if she need a new prescription since she will be going up on her dose and she declined at this time stating she has enough for now

## 2024-03-18 ENCOUNTER — TELEPHONE (OUTPATIENT)
Dept: ONCOLOGY | Age: 79
End: 2024-03-18

## 2024-03-18 DIAGNOSIS — G62.0 PERIPHERAL NEUROPATHY DUE TO CHEMOTHERAPY (HCC): Primary | ICD-10-CM

## 2024-03-18 DIAGNOSIS — T45.1X5A PERIPHERAL NEUROPATHY DUE TO CHEMOTHERAPY (HCC): Primary | ICD-10-CM

## 2024-03-18 RX ORDER — GABAPENTIN 100 MG/1
CAPSULE ORAL
Qty: 150 CAPSULE | Refills: 2 | Status: SHIPPED | OUTPATIENT
Start: 2024-03-18 | End: 2024-04-18

## 2024-03-18 NOTE — TELEPHONE ENCOUNTER
Medication Requested: Neurontin    Date last prescribed: 12/14/23    Requested Pharmacy: [facility]    Action Taken: Chart reviewed. Spoke to pt - taking increased dose of 300 mg nightly and continuing 100 mg for 2 other daily doses. Needing updated rx sent. Pended for Dr. Wood signature.

## 2024-03-18 NOTE — TELEPHONE ENCOUNTER
Received call from pt. She recently increased nightly dose of Neurontin to 300 mg per NP Lily direction. Continuing to take 100 mg for other 2 doses during the day. Per pt, this current dosing is helping. Requesting new refill be sent. Will pend for Dr. Wood to sign.

## 2024-03-25 ENCOUNTER — HOSPITAL ENCOUNTER (OUTPATIENT)
Dept: MAMMOGRAPHY | Age: 79
Discharge: HOME | End: 2024-03-28
Attending: INTERNAL MEDICINE
Payer: MEDICARE

## 2024-03-25 DIAGNOSIS — Z79.811 AROMATASE INHIBITOR USE: ICD-10-CM

## 2024-03-25 PROCEDURE — 77080 DXA BONE DENSITY AXIAL: CPT

## 2024-04-11 DIAGNOSIS — G62.0 PERIPHERAL NEUROPATHY DUE TO CHEMOTHERAPY (HCC): Primary | ICD-10-CM

## 2024-04-11 DIAGNOSIS — Z17.0 MALIGNANT NEOPLASM OF RIGHT BREAST IN FEMALE, ESTROGEN RECEPTOR POSITIVE, UNSPECIFIED SITE OF BREAST (HCC): ICD-10-CM

## 2024-04-11 DIAGNOSIS — C50.911 MALIGNANT NEOPLASM OF RIGHT BREAST IN FEMALE, ESTROGEN RECEPTOR POSITIVE, UNSPECIFIED SITE OF BREAST (HCC): ICD-10-CM

## 2024-04-11 DIAGNOSIS — T45.1X5A PERIPHERAL NEUROPATHY DUE TO CHEMOTHERAPY (HCC): Primary | ICD-10-CM

## 2024-04-15 ENCOUNTER — HOSPITAL ENCOUNTER (OUTPATIENT)
Dept: LAB | Age: 79
Discharge: HOME | End: 2024-04-18
Payer: MEDICARE

## 2024-04-15 ENCOUNTER — OFFICE VISIT (OUTPATIENT)
Dept: ONCOLOGY | Age: 79
End: 2024-04-15
Payer: MEDICARE

## 2024-04-15 VITALS
HEIGHT: 63 IN | HEART RATE: 82 BPM | RESPIRATION RATE: 20 BRPM | SYSTOLIC BLOOD PRESSURE: 145 MMHG | TEMPERATURE: 98 F | OXYGEN SATURATION: 98 % | BODY MASS INDEX: 35.61 KG/M2 | WEIGHT: 201 LBS | DIASTOLIC BLOOD PRESSURE: 66 MMHG

## 2024-04-15 DIAGNOSIS — C50.911 MALIGNANT NEOPLASM OF RIGHT BREAST IN FEMALE, ESTROGEN RECEPTOR POSITIVE, UNSPECIFIED SITE OF BREAST (HCC): ICD-10-CM

## 2024-04-15 DIAGNOSIS — Z12.31 SCREENING MAMMOGRAM FOR HIGH-RISK PATIENT: ICD-10-CM

## 2024-04-15 DIAGNOSIS — R22.2 NODULE OF RIGHT ANTERIOR CHEST WALL: ICD-10-CM

## 2024-04-15 DIAGNOSIS — T45.1X5A CHEMOTHERAPY-INDUCED NEUROPATHY (HCC): ICD-10-CM

## 2024-04-15 DIAGNOSIS — Z90.11 HX OF RIGHT MASTECTOMY: ICD-10-CM

## 2024-04-15 DIAGNOSIS — Z17.0 MALIGNANT NEOPLASM OF RIGHT BREAST IN FEMALE, ESTROGEN RECEPTOR POSITIVE, UNSPECIFIED SITE OF BREAST (HCC): ICD-10-CM

## 2024-04-15 DIAGNOSIS — G62.0 CHEMOTHERAPY-INDUCED NEUROPATHY (HCC): ICD-10-CM

## 2024-04-15 DIAGNOSIS — Z17.0 MALIGNANT NEOPLASM OF RIGHT BREAST IN FEMALE, ESTROGEN RECEPTOR POSITIVE, UNSPECIFIED SITE OF BREAST (HCC): Primary | ICD-10-CM

## 2024-04-15 DIAGNOSIS — C50.911 MALIGNANT NEOPLASM OF RIGHT BREAST IN FEMALE, ESTROGEN RECEPTOR POSITIVE, UNSPECIFIED SITE OF BREAST (HCC): Primary | ICD-10-CM

## 2024-04-15 LAB
ALBUMIN SERPL-MCNC: 3.6 G/DL (ref 3.2–4.6)
ALBUMIN/GLOB SERPL: 1 (ref 0.4–1.6)
ALP SERPL-CCNC: 112 U/L (ref 50–136)
ALT SERPL-CCNC: 16 U/L (ref 12–65)
ANION GAP SERPL CALC-SCNC: 3 MMOL/L (ref 2–11)
AST SERPL-CCNC: 17 U/L (ref 15–37)
BASOPHILS # BLD: 0.1 K/UL (ref 0–0.2)
BASOPHILS NFR BLD: 1 % (ref 0–2)
BILIRUB SERPL-MCNC: 0.8 MG/DL (ref 0.2–1.1)
BUN SERPL-MCNC: 12 MG/DL (ref 8–23)
CALCIUM SERPL-MCNC: 9.4 MG/DL (ref 8.3–10.4)
CHLORIDE SERPL-SCNC: 105 MMOL/L (ref 103–113)
CO2 SERPL-SCNC: 30 MMOL/L (ref 21–32)
CREAT SERPL-MCNC: 0.8 MG/DL (ref 0.6–1)
DIFFERENTIAL METHOD BLD: NORMAL
EOSINOPHIL # BLD: 0.1 K/UL (ref 0–0.8)
EOSINOPHIL NFR BLD: 1 % (ref 0.5–7.8)
ERYTHROCYTE [DISTWIDTH] IN BLOOD BY AUTOMATED COUNT: 12.6 % (ref 11.9–14.6)
GLOBULIN SER CALC-MCNC: 3.5 G/DL (ref 2.8–4.5)
GLUCOSE SERPL-MCNC: 108 MG/DL (ref 65–100)
HCT VFR BLD AUTO: 39.9 % (ref 35.8–46.3)
HGB BLD-MCNC: 12.9 G/DL (ref 11.7–15.4)
IMM GRANULOCYTES # BLD AUTO: 0 K/UL (ref 0–0.5)
IMM GRANULOCYTES NFR BLD AUTO: 0 % (ref 0–5)
LYMPHOCYTES # BLD: 1.2 K/UL (ref 0.5–4.6)
LYMPHOCYTES NFR BLD: 18 % (ref 13–44)
MCH RBC QN AUTO: 29.2 PG (ref 26.1–32.9)
MCHC RBC AUTO-ENTMCNC: 32.3 G/DL (ref 31.4–35)
MCV RBC AUTO: 90.3 FL (ref 82–102)
MONOCYTES # BLD: 0.6 K/UL (ref 0.1–1.3)
MONOCYTES NFR BLD: 9 % (ref 4–12)
NEUTS SEG # BLD: 5.1 K/UL (ref 1.7–8.2)
NEUTS SEG NFR BLD: 71 % (ref 43–78)
NRBC # BLD: 0 K/UL (ref 0–0.2)
PLATELET # BLD AUTO: 180 K/UL (ref 150–450)
PMV BLD AUTO: 10.8 FL (ref 9.4–12.3)
POTASSIUM SERPL-SCNC: 4 MMOL/L (ref 3.5–5.1)
PROT SERPL-MCNC: 7.1 G/DL (ref 6.3–8.2)
RBC # BLD AUTO: 4.42 M/UL (ref 4.05–5.2)
SODIUM SERPL-SCNC: 138 MMOL/L (ref 136–146)
WBC # BLD AUTO: 7.1 K/UL (ref 4.3–11.1)

## 2024-04-15 PROCEDURE — 1036F TOBACCO NON-USER: CPT | Performed by: INTERNAL MEDICINE

## 2024-04-15 PROCEDURE — G8428 CUR MEDS NOT DOCUMENT: HCPCS | Performed by: INTERNAL MEDICINE

## 2024-04-15 PROCEDURE — 1123F ACP DISCUSS/DSCN MKR DOCD: CPT | Performed by: INTERNAL MEDICINE

## 2024-04-15 PROCEDURE — G8417 CALC BMI ABV UP PARAM F/U: HCPCS | Performed by: INTERNAL MEDICINE

## 2024-04-15 PROCEDURE — G8399 PT W/DXA RESULTS DOCUMENT: HCPCS | Performed by: INTERNAL MEDICINE

## 2024-04-15 PROCEDURE — 85025 COMPLETE CBC W/AUTO DIFF WBC: CPT

## 2024-04-15 PROCEDURE — 99214 OFFICE O/P EST MOD 30 MIN: CPT | Performed by: INTERNAL MEDICINE

## 2024-04-15 PROCEDURE — 36415 COLL VENOUS BLD VENIPUNCTURE: CPT

## 2024-04-15 PROCEDURE — 80053 COMPREHEN METABOLIC PANEL: CPT

## 2024-04-15 PROCEDURE — 3077F SYST BP >= 140 MM HG: CPT | Performed by: INTERNAL MEDICINE

## 2024-04-15 PROCEDURE — 1090F PRES/ABSN URINE INCON ASSESS: CPT | Performed by: INTERNAL MEDICINE

## 2024-04-15 PROCEDURE — 3078F DIAST BP <80 MM HG: CPT | Performed by: INTERNAL MEDICINE

## 2024-04-15 RX ORDER — GABAPENTIN 300 MG/1
300 CAPSULE ORAL 3 TIMES DAILY
Qty: 90 CAPSULE | Refills: 5 | Status: SHIPPED | OUTPATIENT
Start: 2024-04-15 | End: 2024-10-12

## 2024-04-15 ASSESSMENT — PATIENT HEALTH QUESTIONNAIRE - PHQ9
SUM OF ALL RESPONSES TO PHQ QUESTIONS 1-9: 1
SUM OF ALL RESPONSES TO PHQ9 QUESTIONS 1 & 2: 1
SUM OF ALL RESPONSES TO PHQ QUESTIONS 1-9: 1
2. FEELING DOWN, DEPRESSED OR HOPELESS: SEVERAL DAYS
1. LITTLE INTEREST OR PLEASURE IN DOING THINGS: NOT AT ALL
SUM OF ALL RESPONSES TO PHQ QUESTIONS 1-9: 1
SUM OF ALL RESPONSES TO PHQ QUESTIONS 1-9: 1

## 2024-04-15 NOTE — PATIENT INSTRUCTIONS
Patient Information from Today's Visit    Labs and symptoms reviewed. You can try taking Queasy Drops for the nausea. These are sold online and you do not need a prescription.    We will increase your Gabapentin dose to help with the neuropathy.    You are due for your mammogram in September. We will go ahead and order an ultrasound of the right side where you are feeling those nodules. You can have this done with your mammogram in September. Please call Radiology Scheduling at [phone] to schedule.    Continue Arimidex.    Treatment Summary has been discussed and given to patient:N/A  Follow Up: 6 months    Please refer to After Visit Summary or [facility] for upcoming appointment information. If you have any questions regarding your upcoming schedule please reach out to your care team through [facility] or call [phone].    -------------------------------------------------------------------------------------------------------------------  Please call our office at [phone] if you have any  of the following symptoms:   Fever of 100.5 or greater  Chills  Shortness of breath  Swelling or pain in one leg    After office hours an answering service is available and will contact a provider for emergencies or if you are experiencing any of the above symptoms.    Patient did express an interest in My Chart.  My Chart log in information explained on the after visit summary printout at the check-out desk.    SHEREE RIVERA RN

## 2024-04-15 NOTE — PROGRESS NOTES
TEST NAME:                         RESULTS:               INTERNAL   CONTROLS:     ESTROGEN RECEPTOR:               Positive (76%)               Present,   Positive   PROGESTERONE RECEPTOR:          Negative (0.0%)          Present, Positive   HER-2/KELVIN:                         Positive (3+)               Percentage   of Cells with Uniform        Intense Complete Membrane   Stainin%       ASSESSMENT:   Diagnosis Orders   1. Malignant neoplasm of right breast in female, estrogen receptor positive, unspecified site of breast (HCC)  US BREAST COMPLETE RIGHT    Comprehensive Metabolic Panel    CBC with Auto Differential    Comprehensive Metabolic Panel    CBC with Auto Differential      2. Screening mammogram for high-risk patient  JUANA WANDA DIGITAL SCREEN UNI LEFT      3. Nodule of right anterior chest wall  US BREAST COMPLETE RIGHT      4. Hx of right mastectomy  US BREAST COMPLETE RIGHT      5. Chemotherapy-induced neuropathy (HCC)                      PLAN:  Lab studies were personally reviewed.    Breast cancer: discovered incidentally on mammogram, 6mm on ultrasound, biopsy proven, high grade IDC, ER 76%, PA negative, HER2 positive.  MRI shows greater than 10 lesions in the breast with the dominant lesion 1.2 cm, a dysmorphic indeterminate lymph node in the right axilla, and no evidence of contralateral or distant disease.  Her MRI imaging made things more complicated - if she was obviously T1N0, then upfront surgery would be appropriate, lumpectomy/sentinel node followed by adjuvant Taxol/Herceptin.  However, she seemed to have multiple lesions that are indeterminate which make it possible that her disease is multifocal, as well as an indeterminate  axillary node.  For this reason, we should at least consider neoadjuvant chemotherapy.  However, I would strongly advise at least two additional biopsies in that case, ultrasound guided biopsy of the axillary node, and possibly MRI guided biopsy (if not

## 2024-04-16 ENCOUNTER — TELEPHONE (OUTPATIENT)
Dept: ONCOLOGY | Age: 79
End: 2024-04-16

## 2024-04-16 NOTE — TELEPHONE ENCOUNTER
Chart reviewed. US was ordered to be done with Mammogram in September to make easier for patient only having to take one trip. If she feels like she wants to have it done in July before her next appointment that would be fine also. Call to patient to discuss prior to calling radiology scheduling back. No answer. Message left

## 2024-04-16 NOTE — TELEPHONE ENCOUNTER
Physician provider: Wayne Wood MD  Reason for today's call: US orders  Last office visit:N/A    Patient notified that their information will be routed to the [facility] clinical triage team for review. Patient is advised that they will receive a phone call from the triage department. If symptoms worsen before receiving a call back, the patient has been advised to proceed to the nearest ED.      Sakina from Central Scheduling is requesting clarification of orders for US breast complete right for September.  Pt is under impression that this needs to be done before appt in July.  Sakina's going to go ahead and schedule for before July appt but let her know if it needs to be RS for September.  Phone number is [phone].

## 2024-04-17 NOTE — TELEPHONE ENCOUNTER
Patient returned call and we discussed the US. She states she is a very anxious person and did not want to wait till September to get done. Dr. Gonzalez team is aware and fine with it.   Call back to radiology scheduling as requested to discuss US appointment.

## 2024-05-02 ENCOUNTER — HOSPITAL ENCOUNTER (OUTPATIENT)
Dept: MAMMOGRAPHY | Age: 79
Discharge: HOME | End: 2024-05-02
Attending: INTERNAL MEDICINE
Payer: MEDICARE

## 2024-05-02 DIAGNOSIS — Z17.0 MALIGNANT NEOPLASM OF RIGHT BREAST IN FEMALE, ESTROGEN RECEPTOR POSITIVE, UNSPECIFIED SITE OF BREAST (HCC): ICD-10-CM

## 2024-05-02 DIAGNOSIS — R22.2 NODULE OF RIGHT ANTERIOR CHEST WALL: ICD-10-CM

## 2024-05-02 DIAGNOSIS — C50.911 MALIGNANT NEOPLASM OF RIGHT BREAST IN FEMALE, ESTROGEN RECEPTOR POSITIVE, UNSPECIFIED SITE OF BREAST (HCC): ICD-10-CM

## 2024-05-02 DIAGNOSIS — Z90.11 HX OF RIGHT MASTECTOMY: ICD-10-CM

## 2024-05-02 PROCEDURE — 76642 ULTRASOUND BREAST LIMITED: CPT

## 2024-06-08 DIAGNOSIS — I10 ESSENTIAL HYPERTENSION: ICD-10-CM

## 2024-06-10 RX ORDER — LOSARTAN POTASSIUM 100 MG/1
TABLET ORAL
Qty: 90 TABLET | Refills: 3 | Status: SHIPPED | OUTPATIENT
Start: 2024-06-10

## 2024-07-15 ENCOUNTER — OFFICE VISIT (OUTPATIENT)
Dept: ONCOLOGY | Age: 79
End: 2024-07-15
Payer: MEDICARE

## 2024-07-15 ENCOUNTER — HOSPITAL ENCOUNTER (OUTPATIENT)
Dept: LAB | Age: 79
Discharge: HOME | End: 2024-07-18
Payer: MEDICARE

## 2024-07-15 VITALS
RESPIRATION RATE: 12 BRPM | TEMPERATURE: 97.9 F | HEIGHT: 63 IN | SYSTOLIC BLOOD PRESSURE: 141 MMHG | DIASTOLIC BLOOD PRESSURE: 65 MMHG | BODY MASS INDEX: 36.86 KG/M2 | WEIGHT: 208 LBS | OXYGEN SATURATION: 100 % | HEART RATE: 66 BPM

## 2024-07-15 DIAGNOSIS — C50.911 MALIGNANT NEOPLASM OF RIGHT BREAST IN FEMALE, ESTROGEN RECEPTOR POSITIVE, UNSPECIFIED SITE OF BREAST (HCC): ICD-10-CM

## 2024-07-15 DIAGNOSIS — Z17.0 MALIGNANT NEOPLASM OF RIGHT BREAST IN FEMALE, ESTROGEN RECEPTOR POSITIVE, UNSPECIFIED SITE OF BREAST (HCC): ICD-10-CM

## 2024-07-15 LAB
ALBUMIN SERPL-MCNC: 3.5 G/DL (ref 3.2–4.6)
ALBUMIN/GLOB SERPL: 1.1 (ref 1–1.9)
ALP SERPL-CCNC: 121 U/L (ref 35–104)
ALT SERPL-CCNC: 14 U/L (ref 12–65)
ANION GAP SERPL CALC-SCNC: 10 MMOL/L (ref 9–18)
AST SERPL-CCNC: 23 U/L (ref 15–37)
BASOPHILS # BLD: 0.1 K/UL (ref 0–0.2)
BASOPHILS NFR BLD: 1 % (ref 0–2)
BILIRUB SERPL-MCNC: 0.8 MG/DL (ref 0–1.2)
BUN SERPL-MCNC: 13 MG/DL (ref 8–23)
CALCIUM SERPL-MCNC: 9.3 MG/DL (ref 8.8–10.2)
CHLORIDE SERPL-SCNC: 106 MMOL/L (ref 98–107)
CO2 SERPL-SCNC: 28 MMOL/L (ref 20–28)
CREAT SERPL-MCNC: 0.8 MG/DL (ref 0.6–1.1)
DIFFERENTIAL METHOD BLD: NORMAL
EOSINOPHIL # BLD: 0.1 K/UL (ref 0–0.8)
EOSINOPHIL NFR BLD: 2 % (ref 0.5–7.8)
ERYTHROCYTE [DISTWIDTH] IN BLOOD BY AUTOMATED COUNT: 13.7 % (ref 11.9–14.6)
GLOBULIN SER CALC-MCNC: 3.2 G/DL (ref 2.3–3.5)
GLUCOSE SERPL-MCNC: 96 MG/DL (ref 70–99)
HCT VFR BLD AUTO: 40.3 % (ref 35.8–46.3)
HGB BLD-MCNC: 13.4 G/DL (ref 11.7–15.4)
IMM GRANULOCYTES # BLD AUTO: 0 K/UL (ref 0–0.5)
IMM GRANULOCYTES NFR BLD AUTO: 0 % (ref 0–5)
LYMPHOCYTES # BLD: 1.1 K/UL (ref 0.5–4.6)
LYMPHOCYTES NFR BLD: 21 % (ref 13–44)
MCH RBC QN AUTO: 29.6 PG (ref 26.1–32.9)
MCHC RBC AUTO-ENTMCNC: 33.3 G/DL (ref 31.4–35)
MCV RBC AUTO: 89 FL (ref 82–102)
MONOCYTES # BLD: 0.4 K/UL (ref 0.1–1.3)
MONOCYTES NFR BLD: 9 % (ref 4–12)
NEUTS SEG # BLD: 3.3 K/UL (ref 1.7–8.2)
NEUTS SEG NFR BLD: 67 % (ref 43–78)
NRBC # BLD: 0.07 K/UL (ref 0–0.2)
PLATELET # BLD AUTO: 161 K/UL (ref 150–450)
PMV BLD AUTO: 11 FL (ref 9.4–12.3)
POTASSIUM SERPL-SCNC: 4.1 MMOL/L (ref 3.5–5.1)
PROT SERPL-MCNC: 6.7 G/DL (ref 6.3–8.2)
RBC # BLD AUTO: 4.53 M/UL (ref 4.05–5.2)
SODIUM SERPL-SCNC: 144 MMOL/L (ref 136–145)
WBC # BLD AUTO: 5 K/UL (ref 4.3–11.1)

## 2024-07-15 PROCEDURE — 85025 COMPLETE CBC W/AUTO DIFF WBC: CPT

## 2024-07-15 PROCEDURE — 1090F PRES/ABSN URINE INCON ASSESS: CPT | Performed by: INTERNAL MEDICINE

## 2024-07-15 PROCEDURE — 1123F ACP DISCUSS/DSCN MKR DOCD: CPT | Performed by: INTERNAL MEDICINE

## 2024-07-15 PROCEDURE — G8417 CALC BMI ABV UP PARAM F/U: HCPCS | Performed by: INTERNAL MEDICINE

## 2024-07-15 PROCEDURE — G8428 CUR MEDS NOT DOCUMENT: HCPCS | Performed by: INTERNAL MEDICINE

## 2024-07-15 PROCEDURE — 3078F DIAST BP <80 MM HG: CPT | Performed by: INTERNAL MEDICINE

## 2024-07-15 PROCEDURE — 36415 COLL VENOUS BLD VENIPUNCTURE: CPT

## 2024-07-15 PROCEDURE — 99214 OFFICE O/P EST MOD 30 MIN: CPT | Performed by: INTERNAL MEDICINE

## 2024-07-15 PROCEDURE — 3077F SYST BP >= 140 MM HG: CPT | Performed by: INTERNAL MEDICINE

## 2024-07-15 PROCEDURE — 1036F TOBACCO NON-USER: CPT | Performed by: INTERNAL MEDICINE

## 2024-07-15 PROCEDURE — G8399 PT W/DXA RESULTS DOCUMENT: HCPCS | Performed by: INTERNAL MEDICINE

## 2024-07-15 PROCEDURE — 80053 COMPREHEN METABOLIC PANEL: CPT

## 2024-07-15 RX ORDER — ANASTROZOLE 1 MG/1
1 TABLET ORAL DAILY
Qty: 90 TABLET | Refills: 3 | Status: SHIPPED | OUTPATIENT
Start: 2024-07-15

## 2024-07-15 ASSESSMENT — PATIENT HEALTH QUESTIONNAIRE - PHQ9
2. FEELING DOWN, DEPRESSED OR HOPELESS: NOT AT ALL
SUM OF ALL RESPONSES TO PHQ QUESTIONS 1-9: 0
1. LITTLE INTEREST OR PLEASURE IN DOING THINGS: NOT AT ALL
SUM OF ALL RESPONSES TO PHQ9 QUESTIONS 1 & 2: 0
SUM OF ALL RESPONSES TO PHQ QUESTIONS 1-9: 0

## 2024-07-15 NOTE — PROGRESS NOTES
Determinants of Health     Financial Resource Strain: Not on file   Food Insecurity: Not on file   Transportation Needs: Not on file   Physical Activity: Insufficiently Active (2024)    Exercise Vital Sign     Days of Exercise per Week: 3 days     Minutes of Exercise per Session: 10 min   Stress: Not on file   Social Connections: Not on file   Intimate Partner Violence: Not on file   Housing Stability: Not on file     Current Outpatient Medications   Medication Sig Dispense Refill    anastrozole (ARIMIDEX) 1 MG tablet Take 1 tablet by mouth daily 90 tablet 3    losartan (COZAAR) 100 MG tablet TAKE 1 TABLET EVERY DAY 90 tablet 3    gabapentin (NEURONTIN) 300 MG capsule Take 1 capsule by mouth 3 times daily for 180 days. Intended supply: 30 days 90 capsule 5    Cholecalciferol (VITAMIN D3 PO) Take by mouth daily      levothyroxine (SYNTHROID) 100 MCG tablet TAKE 1 TABLET EVERY DAY BEFORE BREAKFAST FOR LOW THYROID HORMONE LEVELS 90 tablet 3    hydroCHLOROthiazide (MICROZIDE) 12.5 MG capsule TAKE 1 CAPSULE EVERY MORNING 90 capsule 3    Calcium-Magnesium-Vitamin D (CALCIUM MAGNESIUM PO) Take by mouth daily      Multiple Vitamin (MULTIVITAMIN ADULT PO) Take 1 tablet by mouth daily.      ibuprofen (ADVIL;MOTRIN) 400 MG tablet Take by mouth every 6 hours as needed (Patient not taking: Reported on 4/15/2024)       Current Facility-Administered Medications   Medication Dose Route Frequency Provider Last Rate Last Admin    hydrocortisone (ANUSOL-HC) 2.5 % rectal cream   Rectal BID Sarai Byrd, APRN - CNP           OBJECTIVE:  BP (!) 141/65   Pulse 66   Temp 97.9 °F (36.6 °C) (Oral)   Resp 12   Ht 1.6 m (5' 3\")   Wt 94.3 kg (208 lb)   SpO2 100%   BMI 36.85 kg/m²   Pain Score:   5 (fatigue-6)    ECO    Physical Exam:  Constitutional: Well developed, well nourished female in no acute distress, sitting comfortably in the exam room chair.    HEENT: Normocephalic and atraumatic. Sclerae anicteric. Neck supple

## 2024-07-15 NOTE — PATIENT INSTRUCTIONS
Total Bilirubin 07/15/2024 0.8  0.0 - 1.2 MG/DL Final    ALT 07/15/2024 14  12 - 65 U/L Final    AST 07/15/2024 23  15 - 37 U/L Final    Alk Phosphatase 07/15/2024 121 (H)  35 - 104 U/L Final    Total Protein 07/15/2024 6.7  6.3 - 8.2 g/dL Final    Albumin 07/15/2024 3.5  3.2 - 4.6 g/dL Final    Globulin 07/15/2024 3.2  2.3 - 3.5 g/dL Final    Albumin/Globulin Ratio 07/15/2024 1.1  1.0 - 1.9   Final    WBC 07/15/2024 5.0  4.3 - 11.1 K/uL Final    RBC 07/15/2024 4.53  4.05 - 5.2 M/uL Final    Hemoglobin 07/15/2024 13.4  11.7 - 15.4 g/dL Final    Hematocrit 07/15/2024 40.3  35.8 - 46.3 % Final    MCV 07/15/2024 89.0  82.0 - 102.0 FL Final    MCH 07/15/2024 29.6  26.1 - 32.9 PG Final    MCHC 07/15/2024 33.3  31.4 - 35.0 g/dL Final    RDW 07/15/2024 13.7  11.9 - 14.6 % Final    Platelets 07/15/2024 161  150 - 450 K/uL Final    MPV 07/15/2024 11.0  9.4 - 12.3 FL Final    nRBC 07/15/2024 0.07  0.0 - 0.2 K/uL Final    **Note: Absolute NRBC parameter is now reported with Hemogram**    Neutrophils % 07/15/2024 67  43 - 78 % Final    Lymphocytes % 07/15/2024 21  13 - 44 % Final    Monocytes % 07/15/2024 9  4.0 - 12.0 % Final    Eosinophils % 07/15/2024 2  0.5 - 7.8 % Final    Basophils % 07/15/2024 1  0.0 - 2.0 % Final    Immature Granulocytes % 07/15/2024 0  0.0 - 5.0 % Final    Neutrophils Absolute 07/15/2024 3.3  1.7 - 8.2 K/UL Final    Lymphocytes Absolute 07/15/2024 1.1  0.5 - 4.6 K/UL Final    Monocytes Absolute 07/15/2024 0.4  0.1 - 1.3 K/UL Final    Eosinophils Absolute 07/15/2024 0.1  0.0 - 0.8 K/UL Final    Basophils Absolute 07/15/2024 0.1  0.0 - 0.2 K/UL Final    Immature Granulocytes Absolute 07/15/2024 0.0  0.0 - 0.5 K/UL Final    Differential Type 07/15/2024 AUTOMATED    Final         Please refer to After Visit Summary or [email or web address] for upcoming appointment information. If you have any questions regarding your upcoming schedule please reach out to your care team through [email or web address] or call [phone].    Please

## 2024-08-09 DIAGNOSIS — E78.00 HYPERCHOLESTEROLEMIA: ICD-10-CM

## 2024-08-09 DIAGNOSIS — E03.9 HYPOTHYROIDISM (ACQUIRED): ICD-10-CM

## 2024-08-09 LAB
ALBUMIN SERPL-MCNC: 3.7 G/DL (ref 3.2–4.6)
ALBUMIN/GLOB SERPL: 1.3 (ref 1–1.9)
ALP SERPL-CCNC: 119 U/L (ref 35–104)
ALT SERPL-CCNC: 15 U/L (ref 12–65)
ANION GAP SERPL CALC-SCNC: 10 MMOL/L (ref 9–18)
AST SERPL-CCNC: 24 U/L (ref 15–37)
BILIRUB SERPL-MCNC: 1.1 MG/DL (ref 0–1.2)
BUN SERPL-MCNC: 13 MG/DL (ref 8–23)
CALCIUM SERPL-MCNC: 9.5 MG/DL (ref 8.8–10.2)
CHLORIDE SERPL-SCNC: 103 MMOL/L (ref 98–107)
CHOLEST SERPL-MCNC: 169 MG/DL (ref 0–200)
CO2 SERPL-SCNC: 28 MMOL/L (ref 20–28)
CREAT SERPL-MCNC: 0.89 MG/DL (ref 0.6–1.1)
GLOBULIN SER CALC-MCNC: 3 G/DL (ref 2.3–3.5)
GLUCOSE SERPL-MCNC: 99 MG/DL (ref 70–99)
HDLC SERPL-MCNC: 70 MG/DL (ref 40–60)
HDLC SERPL: 2.4 (ref 0–5)
LDLC SERPL CALC-MCNC: 84 MG/DL (ref 0–100)
POTASSIUM SERPL-SCNC: 4.3 MMOL/L (ref 3.5–5.1)
PROT SERPL-MCNC: 6.7 G/DL (ref 6.3–8.2)
SODIUM SERPL-SCNC: 140 MMOL/L (ref 136–145)
TRIGL SERPL-MCNC: 74 MG/DL (ref 0–150)
TSH, 3RD GENERATION: 0.57 UIU/ML (ref 0.27–4.2)
VLDLC SERPL CALC-MCNC: 15 MG/DL (ref 6–23)

## 2024-08-15 ASSESSMENT — PATIENT HEALTH QUESTIONNAIRE - PHQ9
8. MOVING OR SPEAKING SO SLOWLY THAT OTHER PEOPLE COULD HAVE NOTICED. OR THE OPPOSITE, BEING SO FIGETY OR RESTLESS THAT YOU HAVE BEEN MOVING AROUND A LOT MORE THAN USUAL: NOT AT ALL
SUM OF ALL RESPONSES TO PHQ QUESTIONS 1-9: 6
1. LITTLE INTEREST OR PLEASURE IN DOING THINGS: SEVERAL DAYS
5. POOR APPETITE OR OVEREATING: NOT AT ALL
3. TROUBLE FALLING OR STAYING ASLEEP: NEARLY EVERY DAY
SUM OF ALL RESPONSES TO PHQ QUESTIONS 1-9: 6
7. TROUBLE CONCENTRATING ON THINGS, SUCH AS READING THE NEWSPAPER OR WATCHING TELEVISION: SEVERAL DAYS
10. IF YOU CHECKED OFF ANY PROBLEMS, HOW DIFFICULT HAVE THESE PROBLEMS MADE IT FOR YOU TO DO YOUR WORK, TAKE CARE OF THINGS AT HOME, OR GET ALONG WITH OTHER PEOPLE: NOT DIFFICULT AT ALL
2. FEELING DOWN, DEPRESSED OR HOPELESS: NOT AT ALL
4. FEELING TIRED OR HAVING LITTLE ENERGY: SEVERAL DAYS
9. THOUGHTS THAT YOU WOULD BE BETTER OFF DEAD, OR OF HURTING YOURSELF: NOT AT ALL
8. MOVING OR SPEAKING SO SLOWLY THAT OTHER PEOPLE COULD HAVE NOTICED. OR THE OPPOSITE - BEING SO FIDGETY OR RESTLESS THAT YOU HAVE BEEN MOVING AROUND A LOT MORE THAN USUAL: NOT AT ALL
9. THOUGHTS THAT YOU WOULD BE BETTER OFF DEAD, OR OF HURTING YOURSELF: NOT AT ALL
SUM OF ALL RESPONSES TO PHQ9 QUESTIONS 1 & 2: 1
1. LITTLE INTEREST OR PLEASURE IN DOING THINGS: SEVERAL DAYS
3. TROUBLE FALLING OR STAYING ASLEEP: NEARLY EVERY DAY
SUM OF ALL RESPONSES TO PHQ QUESTIONS 1-9: 6
4. FEELING TIRED OR HAVING LITTLE ENERGY: SEVERAL DAYS
7. TROUBLE CONCENTRATING ON THINGS, SUCH AS READING THE NEWSPAPER OR WATCHING TELEVISION: SEVERAL DAYS
5. POOR APPETITE OR OVEREATING: NOT AT ALL
6. FEELING BAD ABOUT YOURSELF - OR THAT YOU ARE A FAILURE OR HAVE LET YOURSELF OR YOUR FAMILY DOWN: NOT AT ALL
SUM OF ALL RESPONSES TO PHQ QUESTIONS 1-9: 6
SUM OF ALL RESPONSES TO PHQ QUESTIONS 1-9: 6
10. IF YOU CHECKED OFF ANY PROBLEMS, HOW DIFFICULT HAVE THESE PROBLEMS MADE IT FOR YOU TO DO YOUR WORK, TAKE CARE OF THINGS AT HOME, OR GET ALONG WITH OTHER PEOPLE: NOT DIFFICULT AT ALL
2. FEELING DOWN, DEPRESSED OR HOPELESS: NOT AT ALL
6. FEELING BAD ABOUT YOURSELF - OR THAT YOU ARE A FAILURE OR HAVE LET YOURSELF OR YOUR FAMILY DOWN: NOT AT ALL

## 2024-08-16 ENCOUNTER — OFFICE VISIT (OUTPATIENT)
Dept: INTERNAL MEDICINE CLINIC | Facility: CLINIC | Age: 79
End: 2024-08-16
Payer: MEDICARE

## 2024-08-16 VITALS
DIASTOLIC BLOOD PRESSURE: 60 MMHG | HEART RATE: 82 BPM | BODY MASS INDEX: 36.32 KG/M2 | WEIGHT: 205 LBS | HEIGHT: 63 IN | SYSTOLIC BLOOD PRESSURE: 120 MMHG

## 2024-08-16 DIAGNOSIS — I10 HYPERTENSION, ESSENTIAL: ICD-10-CM

## 2024-08-16 DIAGNOSIS — N18.2 CKD (CHRONIC KIDNEY DISEASE) STAGE 2, GFR 60-89 ML/MIN: ICD-10-CM

## 2024-08-16 DIAGNOSIS — F51.04 CHRONIC INSOMNIA: ICD-10-CM

## 2024-08-16 DIAGNOSIS — E03.9 HYPOTHYROIDISM (ACQUIRED): Primary | ICD-10-CM

## 2024-08-16 DIAGNOSIS — E78.00 HYPERCHOLESTEROLEMIA: ICD-10-CM

## 2024-08-16 PROCEDURE — G2211 COMPLEX E/M VISIT ADD ON: HCPCS | Performed by: INTERNAL MEDICINE

## 2024-08-16 PROCEDURE — 3078F DIAST BP <80 MM HG: CPT | Performed by: INTERNAL MEDICINE

## 2024-08-16 PROCEDURE — 1090F PRES/ABSN URINE INCON ASSESS: CPT | Performed by: INTERNAL MEDICINE

## 2024-08-16 PROCEDURE — G8417 CALC BMI ABV UP PARAM F/U: HCPCS | Performed by: INTERNAL MEDICINE

## 2024-08-16 PROCEDURE — G8399 PT W/DXA RESULTS DOCUMENT: HCPCS | Performed by: INTERNAL MEDICINE

## 2024-08-16 PROCEDURE — 1123F ACP DISCUSS/DSCN MKR DOCD: CPT | Performed by: INTERNAL MEDICINE

## 2024-08-16 PROCEDURE — 1036F TOBACCO NON-USER: CPT | Performed by: INTERNAL MEDICINE

## 2024-08-16 PROCEDURE — G8428 CUR MEDS NOT DOCUMENT: HCPCS | Performed by: INTERNAL MEDICINE

## 2024-08-16 PROCEDURE — 99214 OFFICE O/P EST MOD 30 MIN: CPT | Performed by: INTERNAL MEDICINE

## 2024-08-16 PROCEDURE — 3074F SYST BP LT 130 MM HG: CPT | Performed by: INTERNAL MEDICINE

## 2024-08-16 RX ORDER — MIRTAZAPINE 7.5 MG/1
7.5 TABLET, FILM COATED ORAL NIGHTLY
Qty: 30 TABLET | Refills: 0 | Status: SHIPPED | OUTPATIENT
Start: 2024-08-16

## 2024-08-16 SDOH — ECONOMIC STABILITY: FOOD INSECURITY: WITHIN THE PAST 12 MONTHS, THE FOOD YOU BOUGHT JUST DIDN'T LAST AND YOU DIDN'T HAVE MONEY TO GET MORE.: NEVER TRUE

## 2024-08-16 SDOH — ECONOMIC STABILITY: INCOME INSECURITY: HOW HARD IS IT FOR YOU TO PAY FOR THE VERY BASICS LIKE FOOD, HOUSING, MEDICAL CARE, AND HEATING?: NOT HARD AT ALL

## 2024-08-16 SDOH — ECONOMIC STABILITY: FOOD INSECURITY: WITHIN THE PAST 12 MONTHS, YOU WORRIED THAT YOUR FOOD WOULD RUN OUT BEFORE YOU GOT MONEY TO BUY MORE.: NEVER TRUE

## 2024-08-16 ASSESSMENT — ENCOUNTER SYMPTOMS
VOICE CHANGE: 0
EYE PAIN: 0
RECTAL PAIN: 0
STRIDOR: 0

## 2024-08-16 NOTE — PROGRESS NOTES
(acquired)  Overview:  8/9/24 TSH 0.575 on levothyroxine 100 mcg daily.    2/9/24 TSH 0.810 on levothyroxine 100 mcg daily.    6/28/23 TSH 0.097 on levothyroxine 112 mcg daily.    12/27/22 TSH 0.599 on levothyroxine 112 mcg daily.    9/20/22 TSH 0.376 on levothyroxine 125 mcg daily.    6/21/22 TSH 4.500 on levothyroxine 100 mcg daily.    3/15/21 TSH 7.300 on levothyroxine 88 mcg daily.        Labs were reviewed and discussed with the patient.  The patient will continue the current treatment.     Orders:  -     TSH; Future  2. Chronic insomnia  Overview:  The patient was educated regarding insomnia and the use of nonpharmacologic strategies to improve sleep. She was previously given Ambien which was stopped by a former provider due to fear of possible side effects.  OTC melatonin 5-10 mg at bedtime was ineffective.  She states trazodone caused a dry mouth.  Would like to avoid Ambien / Lunesta / Benzo's due to fall risk and memory loss risk.  Trial Remeron at bedtime    Orders:  -     mirtazapine (REMERON) 7.5 MG tablet; Take 1 tablet by mouth nightly, Disp-30 tablet, R-0Normal  3. Hypertension, essential  Overview:  Her blood pressure is now controlled on losartan 100 mg + HCTZ 12.5 mg daily.  The patient will continue the current treatment.     Orders:  -     Comprehensive Metabolic Panel; Future  4. Hypercholesterolemia  Overview:  8/9/24 total 169 HDL 70 LDL 84 TG 74 on low cholesterol diet.    2/9/24 total 184 HDL 69  TG 53 on attempted diet therapy.   12/27/22 total 166 HDL 65 LDL 88 TG 67 on diet therapy.      Labs were reviewed and discussed with patient.   The patient is not interested in taking medication to lower her cholesterol.  She prefers to maintain diet therapy.   The patient will continue the current treatment.       5. CKD (chronic kidney disease) stage 2, GFR 60-89 ml/min  Overview:  8/9/24 creatinine 0.89 eGFR 67  2/9/24 creatinine 1.0, eGFR 58    Labs were reviewed and discussed with

## 2024-09-03 ENCOUNTER — HOSPITAL ENCOUNTER (OUTPATIENT)
Dept: MAMMOGRAPHY | Age: 79
Discharge: HOME | End: 2024-09-06
Attending: INTERNAL MEDICINE
Payer: MEDICARE

## 2024-09-03 DIAGNOSIS — Z12.31 SCREENING MAMMOGRAM FOR HIGH-RISK PATIENT: ICD-10-CM

## 2024-09-03 PROCEDURE — 77063 BREAST TOMOSYNTHESIS BI: CPT

## 2024-10-07 ENCOUNTER — TELEPHONE (OUTPATIENT)
Dept: ORTHOPEDIC SURGERY | Age: 79
End: 2024-10-07

## 2024-10-07 NOTE — TELEPHONE ENCOUNTER
I called and left a  for patient stating I am returning her call and for her to please call us back or send Dr. Patton a [employer] message.

## 2024-10-07 NOTE — TELEPHONE ENCOUNTER
Patient is having trouble with her Rt hand and thumb. Scheduled for 10/24 but she would like for someone to give her a call.

## 2024-10-08 NOTE — TELEPHONE ENCOUNTER
I called and spoke with patient.  Got her appointment moved up from 10/24 to 10/17 with Dr. Patton.  Patient voiced understanding.

## 2024-10-09 RX ORDER — GABAPENTIN 300 MG/1
CAPSULE ORAL
Qty: 90 CAPSULE | Refills: 11 | OUTPATIENT
Start: 2024-10-09

## 2024-10-17 ENCOUNTER — OFFICE VISIT (OUTPATIENT)
Age: 79
End: 2024-10-17

## 2024-10-17 VITALS — BODY MASS INDEX: 35.44 KG/M2 | WEIGHT: 200 LBS | HEIGHT: 63 IN

## 2024-10-17 DIAGNOSIS — M79.641 RIGHT HAND PAIN: Primary | ICD-10-CM

## 2024-10-17 DIAGNOSIS — M65.4 DE QUERVAIN'S TENOSYNOVITIS, RIGHT: ICD-10-CM

## 2024-10-17 RX ORDER — BETAMETHASONE SODIUM PHOSPHATE AND BETAMETHASONE ACETATE 3; 3 MG/ML; MG/ML
6 INJECTION, SUSPENSION INTRA-ARTICULAR; INTRALESIONAL; INTRAMUSCULAR; SOFT TISSUE ONCE
Status: COMPLETED | OUTPATIENT
Start: 2024-10-17 | End: 2024-10-17

## 2024-10-17 RX ADMIN — BETAMETHASONE SODIUM PHOSPHATE AND BETAMETHASONE ACETATE 6 MG: 3; 3 INJECTION, SUSPENSION INTRA-ARTICULAR; INTRALESIONAL; INTRAMUSCULAR; SOFT TISSUE at 11:46

## 2024-10-17 NOTE — PROGRESS NOTES
Orthopaedic Hand Surgery Note    Name: Kirk Bocanegra  YOB: 1945  Gender: female  MRN: [id number]    CC: New patient referred for hand/wrist pain    HPI: Patient is a 79 y.o. female  with a chief complaint of Right radial sided wrist pain. Pain is severe with pinching and gripping activities. Other complaints include pain with use of scissors or sewing.     ROS/Meds/PSH/PMH/FH/SH: I personally reviewed the patients standard intake form.  Pertinents are discussed in the HPI    Physical Examination:  Musculoskeletal:   Examination of the Right upper extremity demonstrates normal sensation to light touch in the median, ulnar and radial distribution, cap refill < 5 seconds in all fingers, positive tenderness at the radial styloid with  a positive Finkelstein test. No pain or crepitus at the point of the second and first compartment intersection. No pain at the thumb CMC joint.    Imaging / Electrodiagnostic Tests:     Hand XR: AP, Lateral, Oblique and Thumb CMC joint     Clinical Indication:  1. Right hand pain    2. De Quervain's tenosynovitis, right           Report: AP, lateral, oblique and thumb CMC joint x-ray of the right hand demonstrates mild joint space narrowing, subluxation and osteophytic changes of the trapezium consistent with osteoarthritis of the thumb CMC joint.      Impression: as above     Harmony Patton MD         Assessment:     ICD-10-CM    1. Right hand pain  M79.641 XR HAND RIGHT (MIN 3 VIEWS)      2. De Quervain's tenosynovitis, right  M65.4 betamethasone acetate-betamethasone sodium phosphate (CELESTONE) injection 6 mg     INJECT TENDON SHEATH/LIGAMENT     Ambulatory Referral to [facility]          Plan:  We discussed the diagnosis and different treatment options. We again discussed observation, splinting, cortisone injections and surgical release of the first dorsal extensor compartment.  We discussed that de Quervain's tendinitis is a chronic condition regardless of how long the

## 2024-10-18 NOTE — PROGRESS NOTES
The patient was prescribed and fitted with a DeQuervain's hand brace for the right hand.     Patient read and signed documenting they understand and agree to [facility]'s current DME return policy.

## 2024-10-23 DIAGNOSIS — I10 HYPERTENSION, ESSENTIAL: ICD-10-CM

## 2024-10-23 RX ORDER — HYDROCHLOROTHIAZIDE 12.5 MG/1
12.5 CAPSULE ORAL EVERY MORNING
Qty: 90 CAPSULE | Refills: 3 | Status: SHIPPED | OUTPATIENT
Start: 2024-10-23

## 2024-10-23 NOTE — TELEPHONE ENCOUNTER
Requested Prescriptions     Pending Prescriptions Disp Refills    hydroCHLOROthiazide 12.5 MG capsule [Pharmacy Med Name: hydroCHLOROthiazide Oral Capsule 12.5 MG] 90 capsule 3     Sig: TAKE 1 CAPSULE EVERY MORNING     Dose verified and to [facility]. Patient is scheduled for follow up visit.

## 2024-11-04 ENCOUNTER — TELEPHONE (OUTPATIENT)
Dept: ONCOLOGY | Age: 79
End: 2024-11-04

## 2024-11-04 RX ORDER — GABAPENTIN 300 MG/1
300 CAPSULE ORAL 3 TIMES DAILY
Qty: 90 CAPSULE | Refills: 5 | Status: SHIPPED | OUTPATIENT
Start: 2024-11-04 | End: 2025-05-03

## 2024-11-04 NOTE — TELEPHONE ENCOUNTER
Medication Requested: Gabapentin    Date last prescribed: 4/15/24    Requested Pharmacy: [facility]    Action Taken: Chart reviewed, refill pended and routed for signature.

## 2024-11-04 NOTE — TELEPHONE ENCOUNTER
Physician provider: Dr. Wood  Reason for today's call (Please detail here patients chief complaint): Rx denial  Last office visit:n/a  Patient Callback Number: [phone]  Was callback number verified?: Yes  Preferred pharmacy (If refill request): [facility]  Calls to office within the last 48 hours?:No    Patient notified that their information will be routed to the [facility] clinical triage team for review. Patient is advised that they will receive a phone call from the triage department. If symptom related and symptoms worsen before receiving a call back, the patient has been advised to proceed to the nearest ED.          Rx refill for Gabapentin was denied per [facility]    [phone]

## 2024-11-15 ENCOUNTER — OFFICE VISIT (OUTPATIENT)
Dept: ONCOLOGY | Age: 79
End: 2024-11-15

## 2024-11-15 ENCOUNTER — HOSPITAL ENCOUNTER (OUTPATIENT)
Dept: LAB | Age: 79
Discharge: HOME | End: 2024-11-15
Payer: MEDICARE

## 2024-11-15 VITALS
HEIGHT: 63 IN | BODY MASS INDEX: 37.74 KG/M2 | OXYGEN SATURATION: 100 % | SYSTOLIC BLOOD PRESSURE: 155 MMHG | RESPIRATION RATE: 17 BRPM | WEIGHT: 213 LBS | DIASTOLIC BLOOD PRESSURE: 71 MMHG | HEART RATE: 71 BPM | TEMPERATURE: 97.9 F

## 2024-11-15 DIAGNOSIS — Z17.0 MALIGNANT NEOPLASM OF RIGHT BREAST IN FEMALE, ESTROGEN RECEPTOR POSITIVE, UNSPECIFIED SITE OF BREAST (HCC): Primary | ICD-10-CM

## 2024-11-15 DIAGNOSIS — R23.2 HOT FLASHES RELATED TO AROMATASE INHIBITOR THERAPY: ICD-10-CM

## 2024-11-15 DIAGNOSIS — C50.911 MALIGNANT NEOPLASM OF RIGHT BREAST IN FEMALE, ESTROGEN RECEPTOR POSITIVE, UNSPECIFIED SITE OF BREAST (HCC): ICD-10-CM

## 2024-11-15 DIAGNOSIS — Z17.0 MALIGNANT NEOPLASM OF RIGHT BREAST IN FEMALE, ESTROGEN RECEPTOR POSITIVE, UNSPECIFIED SITE OF BREAST (HCC): ICD-10-CM

## 2024-11-15 DIAGNOSIS — C50.911 MALIGNANT NEOPLASM OF RIGHT BREAST IN FEMALE, ESTROGEN RECEPTOR POSITIVE, UNSPECIFIED SITE OF BREAST (HCC): Primary | ICD-10-CM

## 2024-11-15 DIAGNOSIS — C80.1 ANXIETY ASSOCIATED WITH CANCER DIAGNOSIS (HCC): ICD-10-CM

## 2024-11-15 DIAGNOSIS — F41.1 ANXIETY ASSOCIATED WITH CANCER DIAGNOSIS (HCC): ICD-10-CM

## 2024-11-15 DIAGNOSIS — T45.1X5A CHEMOTHERAPY-INDUCED NEUROPATHY (HCC): ICD-10-CM

## 2024-11-15 DIAGNOSIS — G62.0 CHEMOTHERAPY-INDUCED NEUROPATHY (HCC): ICD-10-CM

## 2024-11-15 DIAGNOSIS — Z79.811 LONG TERM (CURRENT) USE OF AROMATASE INHIBITORS: ICD-10-CM

## 2024-11-15 DIAGNOSIS — T45.1X5A HOT FLASHES RELATED TO AROMATASE INHIBITOR THERAPY: ICD-10-CM

## 2024-11-15 LAB
ALBUMIN SERPL-MCNC: 3.6 G/DL (ref 3.2–4.6)
ALBUMIN/GLOB SERPL: 1 (ref 1–1.9)
ALP SERPL-CCNC: 137 U/L (ref 35–104)
ALT SERPL-CCNC: 14 U/L (ref 8–45)
ANION GAP SERPL CALC-SCNC: 7 MMOL/L (ref 7–16)
AST SERPL-CCNC: 26 U/L (ref 15–37)
BASOPHILS # BLD: 0.1 K/UL (ref 0–0.2)
BASOPHILS NFR BLD: 1 % (ref 0–2)
BILIRUB SERPL-MCNC: 0.8 MG/DL (ref 0–1.2)
BUN SERPL-MCNC: 15 MG/DL (ref 8–23)
CALCIUM SERPL-MCNC: 9.5 MG/DL (ref 8.8–10.2)
CHLORIDE SERPL-SCNC: 105 MMOL/L (ref 98–107)
CO2 SERPL-SCNC: 28 MMOL/L (ref 20–29)
CREAT SERPL-MCNC: 0.89 MG/DL (ref 0.6–1.1)
DIFFERENTIAL METHOD BLD: NORMAL
EOSINOPHIL # BLD: 0.1 K/UL (ref 0–0.8)
EOSINOPHIL NFR BLD: 2 % (ref 0.5–7.8)
ERYTHROCYTE [DISTWIDTH] IN BLOOD BY AUTOMATED COUNT: 13.8 % (ref 11.9–14.6)
GLOBULIN SER CALC-MCNC: 3.6 G/DL (ref 2.3–3.5)
GLUCOSE SERPL-MCNC: 94 MG/DL (ref 70–99)
HCT VFR BLD AUTO: 41.5 % (ref 35.8–46.3)
HGB BLD-MCNC: 13.5 G/DL (ref 11.7–15.4)
IMM GRANULOCYTES # BLD AUTO: 0 K/UL (ref 0–0.5)
IMM GRANULOCYTES NFR BLD AUTO: 0 % (ref 0–5)
LYMPHOCYTES # BLD: 1.2 K/UL (ref 0.5–4.6)
LYMPHOCYTES NFR BLD: 24 % (ref 13–44)
MCH RBC QN AUTO: 29.3 PG (ref 26.1–32.9)
MCHC RBC AUTO-ENTMCNC: 32.5 G/DL (ref 31.4–35)
MCV RBC AUTO: 90.2 FL (ref 82–102)
MONOCYTES # BLD: 0.6 K/UL (ref 0.1–1.3)
MONOCYTES NFR BLD: 11 % (ref 4–12)
NEUTS SEG # BLD: 3.2 K/UL (ref 1.7–8.2)
NEUTS SEG NFR BLD: 62 % (ref 43–78)
NRBC # BLD: 0 K/UL (ref 0–0.2)
PLATELET # BLD AUTO: 176 K/UL (ref 150–450)
PMV BLD AUTO: 10.8 FL (ref 9.4–12.3)
POTASSIUM SERPL-SCNC: 4.3 MMOL/L (ref 3.5–5.1)
PROT SERPL-MCNC: 7.2 G/DL (ref 6.3–8.2)
RBC # BLD AUTO: 4.6 M/UL (ref 4.05–5.2)
SODIUM SERPL-SCNC: 140 MMOL/L (ref 136–145)
WBC # BLD AUTO: 5.1 K/UL (ref 4.3–11.1)

## 2024-11-15 PROCEDURE — 36415 COLL VENOUS BLD VENIPUNCTURE: CPT

## 2024-11-15 PROCEDURE — 85025 COMPLETE CBC W/AUTO DIFF WBC: CPT

## 2024-11-15 PROCEDURE — 80053 COMPREHEN METABOLIC PANEL: CPT

## 2024-11-15 ASSESSMENT — PATIENT HEALTH QUESTIONNAIRE - PHQ9
SUM OF ALL RESPONSES TO PHQ QUESTIONS 1-9: 0
2. FEELING DOWN, DEPRESSED OR HOPELESS: NOT AT ALL
SUM OF ALL RESPONSES TO PHQ9 QUESTIONS 1 & 2: 0
SUM OF ALL RESPONSES TO PHQ QUESTIONS 1-9: 0
1. LITTLE INTEREST OR PLEASURE IN DOING THINGS: NOT AT ALL

## 2024-11-15 NOTE — PROGRESS NOTES
no breast or chest wall changes or concerns.  Denies any recent illness, fevers, or infectious symptoms.  Labs reviewed and unremarkable.  Continue Arimidex without change.  Left mammogram in September was benign, repeat 2025.  DEXA March 2024 showed osteopenia, continue Ca+D for now, repeat March 2026.  All questions were asked and answered to the best of my ability.  F/u in 4 months for monitoring of neuropathy.            KRISH Lam (Mishelle)-[facility]  [address] [place] [address]  Office : [phone]  Fax : [phone]

## 2024-12-01 DIAGNOSIS — E03.9 ACQUIRED HYPOTHYROIDISM: ICD-10-CM

## 2024-12-02 RX ORDER — LEVOTHYROXINE SODIUM 100 UG/1
TABLET ORAL
Qty: 90 TABLET | Refills: 3 | Status: SHIPPED | OUTPATIENT
Start: 2024-12-02

## 2024-12-02 NOTE — TELEPHONE ENCOUNTER
Requested Prescriptions     Pending Prescriptions Disp Refills    levothyroxine (SYNTHROID) 100 MCG tablet [Pharmacy Med Name: Levothyroxine Sodium Oral Tablet 100 MCG] 90 tablet 3     Sig: TAKE 1 TABLET EVERY DAY BEFORE BREAKFAST FOR LOW THYROID HORMONE LEVELS     Dose verified and to [facility]. Patient is scheduled for follow up visit.

## 2025-02-14 DIAGNOSIS — Z17.0 MALIGNANT NEOPLASM OF RIGHT BREAST IN FEMALE, ESTROGEN RECEPTOR POSITIVE, UNSPECIFIED SITE OF BREAST (HCC): ICD-10-CM

## 2025-02-14 DIAGNOSIS — E03.9 HYPOTHYROIDISM (ACQUIRED): ICD-10-CM

## 2025-02-14 DIAGNOSIS — C50.911 MALIGNANT NEOPLASM OF RIGHT BREAST IN FEMALE, ESTROGEN RECEPTOR POSITIVE, UNSPECIFIED SITE OF BREAST (HCC): ICD-10-CM

## 2025-02-14 DIAGNOSIS — I10 HYPERTENSION, ESSENTIAL: ICD-10-CM

## 2025-02-14 LAB
ALBUMIN SERPL-MCNC: 3.6 G/DL (ref 3.2–4.6)
ALBUMIN/GLOB SERPL: 1.2 (ref 1–1.9)
ALP SERPL-CCNC: 121 U/L (ref 35–104)
ALT SERPL-CCNC: 16 U/L (ref 8–45)
ANION GAP SERPL CALC-SCNC: 10 MMOL/L (ref 7–16)
AST SERPL-CCNC: 23 U/L (ref 15–37)
BASOPHILS # BLD: 0.07 K/UL (ref 0–0.2)
BASOPHILS NFR BLD: 1 % (ref 0–2)
BILIRUB SERPL-MCNC: 0.8 MG/DL (ref 0–1.2)
BUN SERPL-MCNC: 14 MG/DL (ref 8–23)
CALCIUM SERPL-MCNC: 9.3 MG/DL (ref 8.8–10.2)
CHLORIDE SERPL-SCNC: 103 MMOL/L (ref 98–107)
CO2 SERPL-SCNC: 28 MMOL/L (ref 20–29)
CREAT SERPL-MCNC: 0.9 MG/DL (ref 0.6–1.1)
DIFFERENTIAL METHOD BLD: NORMAL
EOSINOPHIL # BLD: 0.12 K/UL (ref 0–0.8)
EOSINOPHIL NFR BLD: 1.7 % (ref 0.5–7.8)
ERYTHROCYTE [DISTWIDTH] IN BLOOD BY AUTOMATED COUNT: 13.2 % (ref 11.9–14.6)
GLOBULIN SER CALC-MCNC: 3 G/DL (ref 2.3–3.5)
GLUCOSE SERPL-MCNC: 90 MG/DL (ref 70–99)
HCT VFR BLD AUTO: 40 % (ref 35.8–46.3)
HGB BLD-MCNC: 13 G/DL (ref 11.7–15.4)
IMM GRANULOCYTES # BLD AUTO: 0.01 K/UL (ref 0–0.5)
IMM GRANULOCYTES NFR BLD AUTO: 0.1 % (ref 0–5)
LYMPHOCYTES # BLD: 1.4 K/UL (ref 0.5–4.6)
LYMPHOCYTES NFR BLD: 20.1 % (ref 13–44)
MCH RBC QN AUTO: 29 PG (ref 26.1–32.9)
MCHC RBC AUTO-ENTMCNC: 32.5 G/DL (ref 31.4–35)
MCV RBC AUTO: 89.3 FL (ref 82–102)
MONOCYTES # BLD: 0.64 K/UL (ref 0.1–1.3)
MONOCYTES NFR BLD: 9.2 % (ref 4–12)
NEUTS SEG # BLD: 4.71 K/UL (ref 1.7–8.2)
NEUTS SEG NFR BLD: 67.9 % (ref 43–78)
NRBC # BLD: 0 K/UL (ref 0–0.2)
PLATELET # BLD AUTO: 175 K/UL (ref 150–450)
PMV BLD AUTO: 11.7 FL (ref 9.4–12.3)
POTASSIUM SERPL-SCNC: 3.9 MMOL/L (ref 3.5–5.1)
PROT SERPL-MCNC: 6.6 G/DL (ref 6.3–8.2)
RBC # BLD AUTO: 4.48 M/UL (ref 4.05–5.2)
SODIUM SERPL-SCNC: 141 MMOL/L (ref 136–145)
TSH, 3RD GENERATION: 0.92 UIU/ML (ref 0.27–4.2)
WBC # BLD AUTO: 7 K/UL (ref 4.3–11.1)

## 2025-02-21 ENCOUNTER — OFFICE VISIT (OUTPATIENT)
Dept: INTERNAL MEDICINE CLINIC | Facility: CLINIC | Age: 80
End: 2025-02-21

## 2025-02-21 VITALS
WEIGHT: 211.4 LBS | HEIGHT: 63 IN | BODY MASS INDEX: 37.46 KG/M2 | HEART RATE: 71 BPM | DIASTOLIC BLOOD PRESSURE: 60 MMHG | SYSTOLIC BLOOD PRESSURE: 120 MMHG

## 2025-02-21 DIAGNOSIS — E03.9 HYPOTHYROIDISM (ACQUIRED): ICD-10-CM

## 2025-02-21 DIAGNOSIS — T45.1X5A CHEMOTHERAPY-INDUCED NEUROPATHY: ICD-10-CM

## 2025-02-21 DIAGNOSIS — E78.00 HYPERCHOLESTEROLEMIA: ICD-10-CM

## 2025-02-21 DIAGNOSIS — C50.911 MALIGNANT NEOPLASM OF RIGHT BREAST IN FEMALE, ESTROGEN RECEPTOR POSITIVE, UNSPECIFIED SITE OF BREAST (HCC): ICD-10-CM

## 2025-02-21 DIAGNOSIS — I10 HYPERTENSION, ESSENTIAL: ICD-10-CM

## 2025-02-21 DIAGNOSIS — Z00.00 MEDICARE ANNUAL WELLNESS VISIT, SUBSEQUENT: Primary | Chronic | ICD-10-CM

## 2025-02-21 DIAGNOSIS — F32.A DEPRESSION, UNSPECIFIED DEPRESSION TYPE: ICD-10-CM

## 2025-02-21 DIAGNOSIS — Z17.0 MALIGNANT NEOPLASM OF RIGHT BREAST IN FEMALE, ESTROGEN RECEPTOR POSITIVE, UNSPECIFIED SITE OF BREAST (HCC): ICD-10-CM

## 2025-02-21 DIAGNOSIS — N18.2 CKD (CHRONIC KIDNEY DISEASE) STAGE 2, GFR 60-89 ML/MIN: ICD-10-CM

## 2025-02-21 DIAGNOSIS — G62.0 CHEMOTHERAPY-INDUCED NEUROPATHY: ICD-10-CM

## 2025-02-21 SDOH — ECONOMIC STABILITY: FOOD INSECURITY: WITHIN THE PAST 12 MONTHS, YOU WORRIED THAT YOUR FOOD WOULD RUN OUT BEFORE YOU GOT MONEY TO BUY MORE.: NEVER TRUE

## 2025-02-21 SDOH — ECONOMIC STABILITY: FOOD INSECURITY: WITHIN THE PAST 12 MONTHS, THE FOOD YOU BOUGHT JUST DIDN'T LAST AND YOU DIDN'T HAVE MONEY TO GET MORE.: NEVER TRUE

## 2025-02-21 ASSESSMENT — ENCOUNTER SYMPTOMS
VOICE CHANGE: 0
STRIDOR: 0
EYE PAIN: 0
RECTAL PAIN: 0

## 2025-02-21 NOTE — PROGRESS NOTES
breast (HCC)  6. CKD (chronic kidney disease) stage 2, GFR 60-89 ml/min  Overview:  8/9/24 creatinine 0.89 eGFR 67  2/9/24 creatinine 1.0, eGFR 58    Labs were reviewed and discussed with patient.   The patient was advised to avoid NSAIDs and other nephrotoxins.  Will dose adjust medications as appropriate.  Will monitor labs over time.     7. Depression, unspecified depression type  Overview:  Patient has mild symptoms of recurrent depression.  Offered her medication which she declines at this time.  She hopes to feel better with warmer Spring weather.  Call me if needed.      8. Chemotherapy-induced neuropathy  Overview:  Monitor clinically for worsening over time.        The patient and/or patient representative voiced understanding and agreement with the current diagnoses, recommendations, and possible side effects.    Return in about 6 months (around 8/21/2025) for follow up of chronic medical problems, review labs.        Medicare Annual Wellness Visit    Kirk Bocanegra is here for Follow-up and Medicare AWV    Assessment & Plan   Medicare annual wellness visit, subsequent  Hypothyroidism (acquired)  Hypercholesterolemia  Hypertension, essential  Malignant neoplasm of right breast in female, estrogen receptor positive, unspecified site of breast (HCC)  CKD (chronic kidney disease) stage 2, GFR 60-89 ml/min  Depression, unspecified depression type  Chemotherapy-induced neuropathy       Return in about 6 months (around 8/21/2025) for follow up of chronic medical problems, review labs.     Subjective       Patient's complete Health Risk Assessment and screening values have been reviewed and are found in Flowsheets. The following problems were reviewed today and where indicated follow up appointments were made and/or referrals ordered.    Positive Risk Factor Screenings with Interventions:              Inactivity:  On average, how many days per week do you engage in moderate to strenuous exercise (like a brisk

## 2025-03-14 ENCOUNTER — OFFICE VISIT (OUTPATIENT)
Dept: ONCOLOGY | Age: 80
End: 2025-03-14
Payer: MEDICARE

## 2025-03-14 VITALS
BODY MASS INDEX: 37.21 KG/M2 | TEMPERATURE: 98.6 F | HEART RATE: 77 BPM | RESPIRATION RATE: 12 BRPM | DIASTOLIC BLOOD PRESSURE: 76 MMHG | OXYGEN SATURATION: 96 % | SYSTOLIC BLOOD PRESSURE: 122 MMHG | HEIGHT: 63 IN | WEIGHT: 210 LBS

## 2025-03-14 DIAGNOSIS — Z12.31 SCREENING MAMMOGRAM FOR BREAST CANCER: ICD-10-CM

## 2025-03-14 DIAGNOSIS — Z17.0 MALIGNANT NEOPLASM OF RIGHT BREAST IN FEMALE, ESTROGEN RECEPTOR POSITIVE, UNSPECIFIED SITE OF BREAST (HCC): Primary | ICD-10-CM

## 2025-03-14 DIAGNOSIS — C50.911 MALIGNANT NEOPLASM OF RIGHT BREAST IN FEMALE, ESTROGEN RECEPTOR POSITIVE, UNSPECIFIED SITE OF BREAST (HCC): Primary | ICD-10-CM

## 2025-03-14 PROCEDURE — 1090F PRES/ABSN URINE INCON ASSESS: CPT | Performed by: INTERNAL MEDICINE

## 2025-03-14 PROCEDURE — 1123F ACP DISCUSS/DSCN MKR DOCD: CPT | Performed by: INTERNAL MEDICINE

## 2025-03-14 PROCEDURE — G8428 CUR MEDS NOT DOCUMENT: HCPCS | Performed by: INTERNAL MEDICINE

## 2025-03-14 PROCEDURE — G8417 CALC BMI ABV UP PARAM F/U: HCPCS | Performed by: INTERNAL MEDICINE

## 2025-03-14 PROCEDURE — 99214 OFFICE O/P EST MOD 30 MIN: CPT | Performed by: INTERNAL MEDICINE

## 2025-03-14 PROCEDURE — 3074F SYST BP LT 130 MM HG: CPT | Performed by: INTERNAL MEDICINE

## 2025-03-14 PROCEDURE — 1036F TOBACCO NON-USER: CPT | Performed by: INTERNAL MEDICINE

## 2025-03-14 PROCEDURE — 3078F DIAST BP <80 MM HG: CPT | Performed by: INTERNAL MEDICINE

## 2025-03-14 PROCEDURE — G8399 PT W/DXA RESULTS DOCUMENT: HCPCS | Performed by: INTERNAL MEDICINE

## 2025-03-14 PROCEDURE — 1125F AMNT PAIN NOTED PAIN PRSNT: CPT | Performed by: INTERNAL MEDICINE

## 2025-03-14 RX ORDER — GABAPENTIN 300 MG/1
300 CAPSULE ORAL 3 TIMES DAILY
Qty: 90 CAPSULE | Refills: 5 | Status: SHIPPED | OUTPATIENT
Start: 2025-03-14 | End: 2025-03-14

## 2025-03-14 RX ORDER — ANASTROZOLE 1 MG/1
1 TABLET ORAL DAILY
Qty: 90 TABLET | Refills: 3 | Status: SHIPPED | OUTPATIENT
Start: 2025-03-14

## 2025-03-14 RX ORDER — GABAPENTIN 300 MG/1
300 CAPSULE ORAL 3 TIMES DAILY
Qty: 90 CAPSULE | Refills: 5 | Status: SHIPPED | OUTPATIENT
Start: 2025-03-14 | End: 2025-09-10

## 2025-03-14 RX ORDER — ANASTROZOLE 1 MG/1
1 TABLET ORAL DAILY
Qty: 90 TABLET | Refills: 3 | Status: SHIPPED | OUTPATIENT
Start: 2025-03-14 | End: 2025-03-14

## 2025-03-14 ASSESSMENT — PATIENT HEALTH QUESTIONNAIRE - PHQ9
SUM OF ALL RESPONSES TO PHQ QUESTIONS 1-9: 1
1. LITTLE INTEREST OR PLEASURE IN DOING THINGS: NOT AT ALL
2. FEELING DOWN, DEPRESSED OR HOPELESS: SEVERAL DAYS

## 2025-03-14 NOTE — PROGRESS NOTES
[facility]: Office Visit Established Patient    Chief Complaint:    Chief Complaint   Patient presents with    Follow-up         History of Present Illness:  Ms. Bocanegra is a 79 y.o. female who returns today for management of breast cancer.  She initially presented for a routine bilateral screening mammogram on 3/23/22 which identified a right breast mass near the 11:00-12:00 position, 6-7 cm from the nipple and other small circumscribed round masses in the right upper inner breast which had not definitely changed from prior examinations. Further evaluation with right breast ultrasound on 3/25/22 confirmed a 6 mm hypoechoic shadowing mass in the 12:00 right breast at 7 cm from the nipple.  She presented to Dr. Carl on 4/7/22 to discuss whether to proceed with recommended biopsy, he recommended that biopsy be performed. Ultrasound-guided biopsy was subsequently completed on 4/12/22 with pathology revealing high grade infiltrating ductal carcinoma, ER 76%, NC negative, and HER2 positive.  MRI of the bilateral breasts was completed on 4/14/22 demonstrating a right anterior 12:00 breast cancer measuring up to 1.2 cm; multiple (greater than 10 total) other right upper breast masses, some of which had enlarged, concerning for additional malignancy; an indeterminate 1.2 cm right axillary lymph node; and no suspicious left breast finding.  We recommended upfront surgery because of the MRI findings and the inconclusive size/clinical stage of her cancer. Path reviewed, thankfully it appears that the indeterminate lesions in the breast were benign, with the final tumor dimension being only 1.5 cm, and 4 nodes negative for tumor.  Therefore, she is [id number], and would be eligible for the adjuvant paclitaxel and trastuzumab regimen.     History of Present Illness  The patient is a 79-year-old female who presents for a follow-up regarding her breast cancer, currently managed with Arimidex

## 2025-03-14 NOTE — PATIENT INSTRUCTIONS
0.20 K/UL Final    Immature Granulocytes Absolute 02/14/2025 0.01  0.0 - 0.5 K/UL Final         Please refer to After Visit Summary or [place] for upcoming appointment information. Please call our office for rescheduling needs at least 24 hours before your scheduled appointment time.If you have any questions regarding your upcoming schedule please reach out to your care team through [employer] or call [phone].     Please notify your assigned Nurse Navigator of any unplanned hospital admissions or Emergency Room visits within 24 hours of discharge.    -------------------------------------------------------------------------------------------------------------------  Please call our office at [phone] if you have any  of the following symptoms:   Fever of 100.5 or greater  Chills  Shortness of breath  Swelling or pain in one leg    After office hours an answering service is available and will contact a provider for emergencies or if you are experiencing any of the above symptoms.      SHEREE RIVERA RN

## 2025-03-29 DIAGNOSIS — I10 ESSENTIAL HYPERTENSION: ICD-10-CM

## 2025-03-31 RX ORDER — LOSARTAN POTASSIUM 100 MG/1
100 TABLET ORAL DAILY
Qty: 90 TABLET | Refills: 3 | Status: SHIPPED | OUTPATIENT
Start: 2025-03-31

## 2025-03-31 NOTE — TELEPHONE ENCOUNTER
Requested Prescriptions     Pending Prescriptions Disp Refills    losartan (COZAAR) 100 MG tablet [Pharmacy Med Name: Losartan Potassium Oral Tablet 100 MG] 90 tablet 3     Sig: TAKE 1 TABLET EVERY DAY     Dose verified and to [facility]. Patient is scheduled for follow up visit.

## 2025-05-09 NOTE — TELEPHONE ENCOUNTER
Call back to patient with new instructions.  She will try the hydrocortisone cream and mix with zinc. She will call back if no relief
Called to say that the proctofoam is not covered by her insurance and it will cost her 125$. She also mentioned that the [facility] didn't have it in stock. Wondering if there is something cheaper she can get.  Will reach out to MD and also to  to see if there is a PA that might be needed
Reviewed with Dr. Lasha Hall, Pt can use over the counter hydrocortisone cream (1%) and if the zinc oxide ointment is soothing she can mix these together.
 used

## 2025-05-19 ENCOUNTER — OFFICE VISIT (OUTPATIENT)
Dept: ORTHOPEDIC SURGERY | Age: 80
End: 2025-05-19
Payer: MEDICARE

## 2025-05-19 DIAGNOSIS — G89.29 CHRONIC LEFT-SIDED LOW BACK PAIN WITHOUT SCIATICA: ICD-10-CM

## 2025-05-19 DIAGNOSIS — M70.72 BURSITIS OF LEFT HIP, UNSPECIFIED BURSA: ICD-10-CM

## 2025-05-19 DIAGNOSIS — Z09 SURGERY FOLLOW-UP: ICD-10-CM

## 2025-05-19 DIAGNOSIS — Z96.652 TOTAL KNEE REPLACEMENT STATUS, LEFT: ICD-10-CM

## 2025-05-19 DIAGNOSIS — Z96.652 HISTORY OF TOTAL KNEE ARTHROPLASTY, LEFT: Primary | ICD-10-CM

## 2025-05-19 DIAGNOSIS — M54.50 CHRONIC LEFT-SIDED LOW BACK PAIN WITHOUT SCIATICA: ICD-10-CM

## 2025-05-19 DIAGNOSIS — Z96.651 PRESENCE OF TOTAL KNEE JOINT PROSTHESIS, RIGHT: ICD-10-CM

## 2025-05-19 PROCEDURE — 1090F PRES/ABSN URINE INCON ASSESS: CPT | Performed by: ORTHOPAEDIC SURGERY

## 2025-05-19 PROCEDURE — 99214 OFFICE O/P EST MOD 30 MIN: CPT | Performed by: ORTHOPAEDIC SURGERY

## 2025-05-19 PROCEDURE — 1123F ACP DISCUSS/DSCN MKR DOCD: CPT | Performed by: ORTHOPAEDIC SURGERY

## 2025-05-19 PROCEDURE — 1036F TOBACCO NON-USER: CPT | Performed by: ORTHOPAEDIC SURGERY

## 2025-05-19 PROCEDURE — G8399 PT W/DXA RESULTS DOCUMENT: HCPCS | Performed by: ORTHOPAEDIC SURGERY

## 2025-05-19 PROCEDURE — G8428 CUR MEDS NOT DOCUMENT: HCPCS | Performed by: ORTHOPAEDIC SURGERY

## 2025-05-19 PROCEDURE — 20610 DRAIN/INJ JOINT/BURSA W/O US: CPT | Performed by: ORTHOPAEDIC SURGERY

## 2025-05-19 PROCEDURE — G8417 CALC BMI ABV UP PARAM F/U: HCPCS | Performed by: ORTHOPAEDIC SURGERY

## 2025-05-19 RX ORDER — METHYLPREDNISOLONE ACETATE 40 MG/ML
40 INJECTION, SUSPENSION INTRA-ARTICULAR; INTRALESIONAL; INTRAMUSCULAR; SOFT TISSUE ONCE
Status: COMPLETED | OUTPATIENT
Start: 2025-05-19 | End: 2025-05-19

## 2025-05-19 RX ADMIN — METHYLPREDNISOLONE ACETATE 40 MG: 40 INJECTION, SUSPENSION INTRA-ARTICULAR; INTRALESIONAL; INTRAMUSCULAR; SOFT TISSUE at 09:23
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
## 2025-08-07 DIAGNOSIS — I10 HYPERTENSION, ESSENTIAL: ICD-10-CM

## 2025-08-07 RX ORDER — HYDROCHLOROTHIAZIDE 12.5 MG/1
12.5 CAPSULE ORAL EVERY MORNING
Qty: 90 CAPSULE | Refills: 3 | Status: SHIPPED | OUTPATIENT
Start: 2025-08-07

## 2025-08-15 DIAGNOSIS — E03.9 HYPOTHYROIDISM (ACQUIRED): ICD-10-CM

## 2025-08-15 DIAGNOSIS — I10 HYPERTENSION, ESSENTIAL: ICD-10-CM

## 2025-08-15 DIAGNOSIS — E78.00 HYPERCHOLESTEROLEMIA: ICD-10-CM

## 2025-08-15 LAB
ALBUMIN SERPL-MCNC: 3.4 G/DL (ref 3.2–4.6)
ALBUMIN/GLOB SERPL: 1.1 (ref 1–1.9)
ALP SERPL-CCNC: 112 U/L (ref 35–104)
ALT SERPL-CCNC: 14 U/L (ref 8–45)
ANION GAP SERPL CALC-SCNC: 11 MMOL/L (ref 7–16)
AST SERPL-CCNC: 21 U/L (ref 15–37)
BASOPHILS # BLD: 0.06 K/UL (ref 0–0.2)
BASOPHILS NFR BLD: 1 % (ref 0–2)
BILIRUB SERPL-MCNC: 0.8 MG/DL (ref 0–1.2)
BUN SERPL-MCNC: 15 MG/DL (ref 8–23)
CALCIUM SERPL-MCNC: 9.3 MG/DL (ref 8.8–10.2)
CHLORIDE SERPL-SCNC: 102 MMOL/L (ref 98–107)
CHOLEST SERPL-MCNC: 169 MG/DL (ref 0–200)
CO2 SERPL-SCNC: 26 MMOL/L (ref 20–29)
CREAT SERPL-MCNC: 0.86 MG/DL (ref 0.6–1.1)
DIFFERENTIAL METHOD BLD: NORMAL
EOSINOPHIL # BLD: 0.13 K/UL (ref 0–0.8)
EOSINOPHIL NFR BLD: 2.1 % (ref 0.5–7.8)
ERYTHROCYTE [DISTWIDTH] IN BLOOD BY AUTOMATED COUNT: 13.4 % (ref 11.9–14.6)
GLOBULIN SER CALC-MCNC: 3 G/DL (ref 2.3–3.5)
GLUCOSE SERPL-MCNC: 93 MG/DL (ref 70–99)
HCT VFR BLD AUTO: 40.2 % (ref 35.8–46.3)
HDLC SERPL-MCNC: 63 MG/DL (ref 40–60)
HDLC SERPL: 2.7 (ref 0–5)
HGB BLD-MCNC: 13.4 G/DL (ref 11.7–15.4)
IMM GRANULOCYTES # BLD AUTO: 0.02 K/UL (ref 0–0.5)
IMM GRANULOCYTES NFR BLD AUTO: 0.3 % (ref 0–5)
LDLC SERPL CALC-MCNC: 92 MG/DL (ref 0–100)
LYMPHOCYTES # BLD: 1.34 K/UL (ref 0.5–4.6)
LYMPHOCYTES NFR BLD: 21.5 % (ref 13–44)
MCH RBC QN AUTO: 30.4 PG (ref 26.1–32.9)
MCHC RBC AUTO-ENTMCNC: 33.3 G/DL (ref 31.4–35)
MCV RBC AUTO: 91.2 FL (ref 82–102)
MONOCYTES # BLD: 0.61 K/UL (ref 0.1–1.3)
MONOCYTES NFR BLD: 9.8 % (ref 4–12)
NEUTS SEG # BLD: 4.06 K/UL (ref 1.7–8.2)
NEUTS SEG NFR BLD: 65.3 % (ref 43–78)
NRBC # BLD: 0 K/UL (ref 0–0.2)
PLATELET # BLD AUTO: 163 K/UL (ref 150–450)
PMV BLD AUTO: 11.6 FL (ref 9.4–12.3)
POTASSIUM SERPL-SCNC: 4 MMOL/L (ref 3.5–5.1)
PROT SERPL-MCNC: 6.5 G/DL (ref 6.3–8.2)
RBC # BLD AUTO: 4.41 M/UL (ref 4.05–5.2)
SODIUM SERPL-SCNC: 139 MMOL/L (ref 136–145)
TRIGL SERPL-MCNC: 69 MG/DL (ref 0–150)
TSH, 3RD GENERATION: 1.95 UIU/ML (ref 0.27–4.2)
VLDLC SERPL CALC-MCNC: 14 MG/DL (ref 6–23)
WBC # BLD AUTO: 6.2 K/UL (ref 4.3–11.1)

## 2025-08-21 ENCOUNTER — OFFICE VISIT (OUTPATIENT)
Dept: INTERNAL MEDICINE CLINIC | Facility: CLINIC | Age: 80
End: 2025-08-21
Payer: MEDICARE

## 2025-08-21 VITALS
BODY MASS INDEX: 37.97 KG/M2 | OXYGEN SATURATION: 98 % | WEIGHT: 214.3 LBS | DIASTOLIC BLOOD PRESSURE: 80 MMHG | SYSTOLIC BLOOD PRESSURE: 150 MMHG | HEART RATE: 83 BPM | HEIGHT: 63 IN

## 2025-08-21 DIAGNOSIS — G62.0 CHEMOTHERAPY-INDUCED NEUROPATHY: ICD-10-CM

## 2025-08-21 DIAGNOSIS — T45.1X5A CHEMOTHERAPY-INDUCED NEUROPATHY: ICD-10-CM

## 2025-08-21 DIAGNOSIS — I10 HYPERTENSION, ESSENTIAL: ICD-10-CM

## 2025-08-21 DIAGNOSIS — E03.9 HYPOTHYROIDISM (ACQUIRED): Primary | ICD-10-CM

## 2025-08-21 DIAGNOSIS — E78.00 HYPERCHOLESTEROLEMIA: ICD-10-CM

## 2025-08-21 PROCEDURE — 1123F ACP DISCUSS/DSCN MKR DOCD: CPT | Performed by: INTERNAL MEDICINE

## 2025-08-21 PROCEDURE — G8417 CALC BMI ABV UP PARAM F/U: HCPCS | Performed by: INTERNAL MEDICINE

## 2025-08-21 PROCEDURE — G8399 PT W/DXA RESULTS DOCUMENT: HCPCS | Performed by: INTERNAL MEDICINE

## 2025-08-21 PROCEDURE — G2211 COMPLEX E/M VISIT ADD ON: HCPCS | Performed by: INTERNAL MEDICINE

## 2025-08-21 PROCEDURE — 99214 OFFICE O/P EST MOD 30 MIN: CPT | Performed by: INTERNAL MEDICINE

## 2025-08-21 PROCEDURE — 3077F SYST BP >= 140 MM HG: CPT | Performed by: INTERNAL MEDICINE

## 2025-08-21 PROCEDURE — 1126F AMNT PAIN NOTED NONE PRSNT: CPT | Performed by: INTERNAL MEDICINE

## 2025-08-21 PROCEDURE — 3079F DIAST BP 80-89 MM HG: CPT | Performed by: INTERNAL MEDICINE

## 2025-08-21 PROCEDURE — 1090F PRES/ABSN URINE INCON ASSESS: CPT | Performed by: INTERNAL MEDICINE

## 2025-08-21 PROCEDURE — G8428 CUR MEDS NOT DOCUMENT: HCPCS | Performed by: INTERNAL MEDICINE

## 2025-08-21 PROCEDURE — 1036F TOBACCO NON-USER: CPT | Performed by: INTERNAL MEDICINE

## 2025-08-21 RX ORDER — NORTRIPTYLINE HYDROCHLORIDE 10 MG/1
10 CAPSULE ORAL NIGHTLY
Qty: 10 CAPSULE | Refills: 0 | Status: SHIPPED | OUTPATIENT
Start: 2025-08-21

## 2025-08-21 ASSESSMENT — ENCOUNTER SYMPTOMS
RECTAL PAIN: 0
EYE PAIN: 0
STRIDOR: 0
VOICE CHANGE: 0

## (undated) DEVICE — HANDPIECE SET WITH COAXIAL HIGH FLOW TIP AND SUCTION TUBE: Brand: INTERPULSE

## (undated) DEVICE — CLOSURE SKIN FACILITATES COMPATIBILITY W/ CERTAIN IS DSG

## (undated) DEVICE — SOLUTION IV 250ML 0.9% SOD CHL CLR INJ FLX BG CONT PRT CLSR

## (undated) DEVICE — (D)PREP SKN CHLRAPRP APPL 26ML -- CONVERT TO ITEM 371833

## (undated) DEVICE — SYRINGE NDL 25GA 1ML L5/8IN BLU PLAS NDL S STL SHLD HYPO

## (undated) DEVICE — BIPOLAR SEALER 23-112-1 AQM 6.0: Brand: AQUAMANTYS ®

## (undated) DEVICE — SUTURE VCRL SZ 3-0 L27IN ABSRB UD L26MM SH 1/2 CIR J416H

## (undated) DEVICE — DRAIN SURG 19FR 0.25IN SIL RND W/ TRCR INDIC DOT RADPQ FULL

## (undated) DEVICE — Device: Brand: POWER-FLO®

## (undated) DEVICE — SUT SLK 3-0 30IN SH BLK --

## (undated) DEVICE — RESERVOIR,SUCTION,100CC,SILICONE: Brand: MEDLINE

## (undated) DEVICE — HICKMAN 9 FR PEDIATRIC LENGTH DUAL-LUMEN CV CATHETER, PEEL-APART INTRODUCER KIT WITH SURECUFF TISSUE INGROWTH: Brand: HICKMAN

## (undated) DEVICE — SURGICAL PROCEDURE PACK BASIC ST FRANCIS

## (undated) DEVICE — NEEDLE HYPO 25GA L1.5IN BLU POLYPR HUB S STL REG BVL STR

## (undated) DEVICE — SYR 50ML LR LCK 1ML GRAD NSAF --

## (undated) DEVICE — DRAPE,TOP,102X53,STERILE: Brand: MEDLINE

## (undated) DEVICE — GLOVE SURG SZ 8 CRM LTX FREE POLYISOPRENE POLYMER BEAD ANTI

## (undated) DEVICE — DISPOSABLE BIPOLAR CODE, 12' (3.66 M): Brand: CONMED

## (undated) DEVICE — DRAPE,U/SHT,SPLIT,FILM,60X84,STERILE: Brand: MEDLINE

## (undated) DEVICE — SPONGE GZ W6XL6IN COT 6 PLY SUP FLUF EXTRA ABSRB FOR PRE OP

## (undated) DEVICE — MINOR SPLIT GENERAL: Brand: MEDLINE INDUSTRIES, INC.

## (undated) DEVICE — Z DISCONTINUED USE 2744636  DRESSING AQUACEL 14 IN ALG W3.5XL14IN POLYUR FLM CVR W/ HYDRCOLL

## (undated) DEVICE — WIRE CUTTER, STERILE (WCS144): Brand: CENTURION MEDICAL PRODUCTS CORP

## (undated) DEVICE — SUTURE VCRL SZ 1 L27IN ABSRB UD L36MM CP-1 1/2 CIR REV CUT J268H

## (undated) DEVICE — PAD,NON-ADHERENT,3X8,STERILE,LF,1/PK: Brand: MEDLINE

## (undated) DEVICE — SUTURE VCRL SZ 2-0 L27IN ABSRB UD L36MM CP-1 1/2 CIR REV J266H

## (undated) DEVICE — SUTURE VCRL SZ 4-0 L18IN ABSRB UD L19MM PS-2 3/8 CIR PRIM J496H

## (undated) DEVICE — MAGTRACE

## (undated) DEVICE — ELECTRODE BLDE L6.5IN CAUT EXT DISP

## (undated) DEVICE — INSULATED BLADE ELECTRODE: Brand: EDGE

## (undated) DEVICE — APPLIER CLP L9.38IN M LIG TI DISP STR RNG HNDL LIGACLP

## (undated) DEVICE — SUTURE PROL SZ 2-0 L30IN NONABSORBABLE BLU L26MM CT-2 1/2 8411H

## (undated) DEVICE — NEEDLE HYPO 18GA L1.5IN PNK S STL HUB POLYPR SHLD REG BVL

## (undated) DEVICE — SOLUTION IRRIG 3000ML 0.9% SOD CHL FLX CONT 0797208] ICU MEDICAL INC]

## (undated) DEVICE — 48" PROBE COVER W/GEL, ULTRASOUND, STERILE: Brand: SITE-RITE

## (undated) DEVICE — GARMENT,MEDLINE,DVT,INT,CALF,MED, GEN2: Brand: MEDLINE

## (undated) DEVICE — X-RAY SPONGES,12 PLY: Brand: DERMACEA

## (undated) DEVICE — MASTISOL ADHESIVE LIQ 2/3ML

## (undated) DEVICE — CONTAINER COLL/TRNSPRT BX BRST -- E-Z-EM CAT 8390

## (undated) DEVICE — SOLUTION IRRIG 1000ML 09% SOD CHL USP PIC PLAS CONTAINER

## (undated) DEVICE — GLOVE SURG SZ 65 L12IN FNGR THK79MIL GRN LTX FREE

## (undated) DEVICE — 2000CC GUARDIAN II: Brand: GUARDIAN

## (undated) DEVICE — NEEDLE HYPO 21GA L1.5IN INTRAMUSCULAR S STL LATCH BVL UP

## (undated) DEVICE — NEEDLE HYPO 21GA L1.5IN GRN POLYPR HUB S STL REG BVL STR

## (undated) DEVICE — SYRINGE MED 30ML STD CLR PLAS LUERLOCK TIP N CTRL DISP

## (undated) DEVICE — CLOSURE SKIN FLX NONINVASIVE PRELOC TECHNOLOGY FOR 24IN

## (undated) DEVICE — SX-ONE MICROKNIFE IS REBRANDED AS ULTRAGUIDECTR.  ULTRAGUIDECTR IS INTENDED TO TRANSECT THE TRANSVERSE CARPAL LIGAMENT FOR THE TREATMENT OF CARPAL TUNNEL SYNDROME.ULTRAGUIDECTR IS A DISPOSABLE DEVICE INTENDED TO CREATE SPACE WITHIN THE CARPAL TUNNEL AND TRANSECT THE TRANSVERSE CARPAL LIGAMENT (TCL) FOR THE TREATMENT OF CARPAL TUNNEL SYNDROME.: Brand: SX-ONE MICROKNIFE

## (undated) DEVICE — SHEET, T, LAPAROTOMY, STERILE: Brand: MEDLINE

## (undated) DEVICE — GEL US 20GM NONIRRITATING OVERWRAPPED FILE PCH TRNSMIT

## (undated) DEVICE — T4 HOOD

## (undated) DEVICE — GARMENT,MEDLINE,DVT,INT,CALF,LG, GEN2: Brand: MEDLINE

## (undated) DEVICE — ILLUMINATOR ELECTROCAUTERY RETRACTED L8IN EXT L11IN DYN 10PK

## (undated) DEVICE — STRIP,CLOSURE,WOUND,MEDI-STRIP,1/2X4: Brand: MEDLINE

## (undated) DEVICE — 3M™ TEGADERM™ TRANSPARENT FILM DRESSING FRAME STYLE, 1626W, 4 IN X 4-3/4 IN (10 CM X 12 CM), 50/CT 4CT/CASE: Brand: 3M™ TEGADERM™

## (undated) DEVICE — REM POLYHESIVE ADULT PATIENT RETURN ELECTRODE: Brand: VALLEYLAB

## (undated) DEVICE — SUTURE VCRL SZ 2-0 L27IN ABSRB UD L26MM CT-2 1/2 CIR J269H

## (undated) DEVICE — DRESSING,GAUZE,XEROFORM,CURAD,1"X8",ST: Brand: CURAD

## (undated) DEVICE — SOLUTION IRRIG 1000ML 0.9% SOD CHL USP POUR PLAS BTL

## (undated) DEVICE — GLOVE SURG SZ 65 THK91MIL LTX FREE SYN POLYISOPRENE

## (undated) DEVICE — STERILE PRESSURE PROTECTOR PAD® FOR DE MAYO UNIVERSAL DISTRACTOR® (10/CASE): Brand: DE MAYO UNIVERSAL DISTRACTOR®

## (undated) DEVICE — GOWN,REINF,POLY,ECL,PP SLV,XL: Brand: MEDLINE

## (undated) DEVICE — 3M™ STERI-DRAPE™ INSTRUMENT POUCH 1018: Brand: STERI-DRAPE™

## (undated) DEVICE — DRAPE,T,LAPARO,TRANS,STERILE: Brand: MEDLINE

## (undated) DEVICE — HAND PACK: Brand: MEDLINE INDUSTRIES, INC.

## (undated) DEVICE — SOLUTION IV 1000ML 0.9% SOD CHL

## (undated) DEVICE — SYRINGE IRRIG 60ML SFT PLIABLE BLB EZ TO GRP 1 HND USE W/

## (undated) DEVICE — SUTURE VCRL + SZ 3-0 L27IN ABSRB UD L26MM SH 1/2 CIR VCP416H

## (undated) DEVICE — STAPLER SKIN L39MM DIA0.53MM CRWN 5.7MM S STL FIX HD PROX

## (undated) DEVICE — TRAY PREP DRY W/ PREM GLV 2 APPL 6 SPNG 2 UNDPD 1 OVERWRAP

## (undated) DEVICE — SPONGE LAPAROTOMY W18XL18IN WHITE STRUNG RADIOPAQUE STERILE

## (undated) DEVICE — DUAL CUT SAGITTAL BLADE

## (undated) DEVICE — DRAPE SHT 3 QTR PROXIMA 53X77 --

## (undated) DEVICE — SUTURE STRATAFIX SPRL SZ 1 L14IN ABSRB VLT L48CM CTX 1/2 SXPD2B405

## (undated) DEVICE — BNDG COHESIVE 4INX5YD COBAN --

## (undated) DEVICE — BUTTON SWITCH PENCIL BLADE ELECTRODE, HOLSTER: Brand: EDGE

## (undated) DEVICE — SOLUTION IV SODIUM CHL 0.9% 500 ML INJ

## (undated) DEVICE — C-ARM: Brand: UNBRANDED

## (undated) DEVICE — BLADE SAW PAT RMR PILT H 46MM --

## (undated) DEVICE — PADDING CAST W3INXL4YD COT BLEND MIC PLEAT UNDERCAST SPEC

## (undated) DEVICE — BLADE SAW W12.5XL70MM THK0.8MM CUT THK1.12MM S STL RECIP

## (undated) DEVICE — TOTAL KNEE DR RIDGEWAY: Brand: MEDLINE INDUSTRIES, INC.

## (undated) DEVICE — SYR LR LCK 1ML GRAD NSAF 30ML --

## (undated) DEVICE — DECANTER BAG 9": Brand: MEDLINE INDUSTRIES, INC.

## (undated) DEVICE — BANDAGE COMPR L5YDXW2IN FOAM CO FLX

## (undated) DEVICE — APPLICATOR MEDICATED 26 CC SOLUTION HI LT ORNG CHLORAPREP

## (undated) DEVICE — SUTURE PERMAHAND SZ 3-0 L18IN NONABSORBABLE BLK L26MM SH C013D

## (undated) DEVICE — ELECTRODE PT RET AD L9FT HI MOIST COND ADH HYDRGEL CORDED